# Patient Record
Sex: FEMALE | Race: WHITE | NOT HISPANIC OR LATINO | Employment: UNEMPLOYED | URBAN - METROPOLITAN AREA
[De-identification: names, ages, dates, MRNs, and addresses within clinical notes are randomized per-mention and may not be internally consistent; named-entity substitution may affect disease eponyms.]

---

## 2018-05-18 ENCOUNTER — TELEPHONE (OUTPATIENT)
Dept: SURGERY | Facility: CLINIC | Age: 63
End: 2018-05-18

## 2018-05-18 NOTE — TELEPHONE ENCOUNTER
called pt to inquire about updated insurance information  on file it says no insurance and theres an meredith schedule for consult for umbilical hernia  scheduled per she so i wanted to make sure pt was aware of our fees and if she had any new insurance to update in the system

## 2018-09-18 ENCOUNTER — TRANSCRIBE ORDERS (OUTPATIENT)
Dept: ADMINISTRATIVE | Facility: HOSPITAL | Age: 63
End: 2018-09-18

## 2018-09-18 DIAGNOSIS — R10.2 PELVIC PAIN: Primary | ICD-10-CM

## 2018-09-24 ENCOUNTER — HOSPITAL ENCOUNTER (OUTPATIENT)
Dept: RADIOLOGY | Facility: MEDICAL CENTER | Age: 63
Discharge: HOME | End: 2018-09-24
Payer: COMMERCIAL

## 2018-09-24 DIAGNOSIS — R10.2 PELVIC PAIN: ICD-10-CM

## 2018-09-24 PROCEDURE — 76856 US EXAM PELVIC COMPLETE: CPT

## 2018-09-24 PROCEDURE — 76830 TRANSVAGINAL US NON-OB: CPT

## 2021-08-02 ENCOUNTER — OFFICE VISIT (OUTPATIENT)
Dept: WOUND CARE | Facility: CLINIC | Age: 66
End: 2021-08-02
Payer: COMMERCIAL

## 2021-08-02 VITALS
WEIGHT: 293 LBS | DIASTOLIC BLOOD PRESSURE: 72 MMHG | BODY MASS INDEX: 45.99 KG/M2 | HEART RATE: 92 BPM | SYSTOLIC BLOOD PRESSURE: 132 MMHG | TEMPERATURE: 97.9 F | HEIGHT: 67 IN | RESPIRATION RATE: 22 BRPM

## 2021-08-02 DIAGNOSIS — L97.211 NON-PRESSURE CHRONIC ULCER OF RIGHT CALF LIMITED TO BREAKDOWN OF SKIN (HCC): Primary | ICD-10-CM

## 2021-08-02 DIAGNOSIS — I83.11 LIPODERMATOSCLEROSIS OF BOTH LOWER EXTREMITIES: ICD-10-CM

## 2021-08-02 DIAGNOSIS — E66.01 CLASS 3 SEVERE OBESITY WITH BODY MASS INDEX (BMI) GREATER THAN OR EQUAL TO 70 IN ADULT, UNSPECIFIED OBESITY TYPE, UNSPECIFIED WHETHER SERIOUS COMORBIDITY PRESENT (HCC): ICD-10-CM

## 2021-08-02 DIAGNOSIS — I83.12 LIPODERMATOSCLEROSIS OF BOTH LOWER EXTREMITIES: ICD-10-CM

## 2021-08-02 DIAGNOSIS — R60.9 LIPEDEMA: ICD-10-CM

## 2021-08-02 PROBLEM — M79.3 LIPODERMATOSCLEROSIS OF BOTH LOWER EXTREMITIES: Status: ACTIVE | Noted: 2021-08-02

## 2021-08-02 PROBLEM — E66.813 CLASS 3 SEVERE OBESITY WITH BODY MASS INDEX (BMI) GREATER THAN OR EQUAL TO 70 IN ADULT (HCC): Status: ACTIVE | Noted: 2021-08-02

## 2021-08-02 PROCEDURE — 99203 OFFICE O/P NEW LOW 30 MIN: CPT | Performed by: FAMILY MEDICINE

## 2021-08-02 PROCEDURE — G0463 HOSPITAL OUTPT CLINIC VISIT: HCPCS | Performed by: FAMILY MEDICINE

## 2021-08-02 PROCEDURE — 99213 OFFICE O/P EST LOW 20 MIN: CPT | Performed by: FAMILY MEDICINE

## 2021-08-02 RX ORDER — LIDOCAINE 40 MG/G
CREAM TOPICAL ONCE
Status: COMPLETED | OUTPATIENT
Start: 2021-08-02 | End: 2021-08-02

## 2021-08-02 RX ADMIN — LIDOCAINE: 40 CREAM TOPICAL at 14:57

## 2021-08-02 NOTE — PATIENT INSTRUCTIONS
Orders Placed This Encounter   Procedures    Wound cleansing and dressings     Right lower leg wound:    Wash your hands with soap and water  Remove old dressing, discard into plastic bag and place in trash  Cleanse the right lower leg and wound with soap and water (Dove unscented) prior to applying a clean dressing  Do not use tissue or cotton balls  Do not scrub the wound  Pat dry using gauze  Shower yes     Paint wound with betadine then cover with dry dressing (may use diapers for excessive drainage) secure with coban  Change dressing daily and as needed for excessive drainage or leakage  This was done today  Standing Status:   Future     Standing Expiration Date:   8/2/2022     High Protein Diet   WHAT YOU NEED TO KNOW:   A high-protein diet is a meal plan that includes extra protein  Your body may need extra protein if you have certain health conditions, such as cancer, burns, or injuries  You may also need to follow this diet to get stronger after a surgery or illness  Extra protein helps to heal wounds and form new tissue in the body  Your dietitian will tell you how much protein and how many calories you need each day  DISCHARGE INSTRUCTIONS:   Foods high in protein:  The average amount of protein is listed below in grams (g)  To find the exact amount of protein in a food, read the food labels on packaged items  · Dairy:      ? 1 cup of any type of milk (8 g)    ? ½ cup of evaporated canned milk (9 g)    ? ¼ cup of nonfat dry milk (11 g)    ? 1 ounce of semi-hard or solid cheese (7 g)    ? ¼ cup of parmesan cheese (8 g)    ? ½ cup of cottage cheese (14 g)    ? ½ cup of pudding (4 g)    ? 1 cup of plain or fruit yogurt (8 g)    · Meats and meat substitutes:      ? 3 ounces of cooked freshwater fish (21 g)     ? 3 ounces of cooked shellfish (19 g)    ? ½ cup of canned tuna (14 g)    ? 3 ounces of cooked chicken, turkey, or other poultry (24 g)    ?  3 ounces of cooked beef, pork, lamb, or other red meat (21 g)    ? 1 large egg (6 g)    ? ¼ cup of fat free egg substitute (5 g)    ? ½ cup of tofu or tempeh (10 g)    ? 1 cup of cooked dried beans, such as parisi, kidney, or navy (15 g)    · Nuts and seeds:      ? 2 tablespoons of almonds, cashews, sunflower seeds, or walnuts (5 g)    ? 2 tablespoons of peanuts (7 g)    ? 2 tablespoons of peanut butter (8 g)    How to add extra protein:   · Add powdered milk to milk, cereals, scrambled eggs, soups, and casseroles  · Add cheese to sauces, soups, or vegetables  · Add eggs to tuna, salads, sauces, or casseroles  · Add nutrition supplements and breakfast drink mixes to milk or shakes  · Add nuts to foods or eat them as snacks  · Add meat (beef, chicken, and pork) to soups, casseroles, pasta dishes, or vegetables  · Add beans, peas, and other legumes to salads  · Eat cottage cheese or yogurt with fruit  © Copyright [employer] 2021 Information is for End User's use only and may not be sold, redistributed or otherwise used for commercial purposes  All illustrations and images included in CareNotes® are the copyrighted property of [employer]  or Lindsay Hart   The above information is an  only  It is not intended as medical advice for individual conditions or treatments  Talk to your doctor, nurse or pharmacist before following any medical regimen to see if it is safe and effective for you  Low-Sodium Diet   AMBULATORY CARE:   A low-sodium diet  limits foods that are high in sodium (salt)  You will need to follow a low-sodium diet if you have high blood pressure, kidney disease, or heart failure  You may also need to follow this diet if you have a condition that is causing your body to retain (hold) extra fluid  You may need to limit the amount of sodium you eat in a day to 1,500 to 2,000 mg  Ask your healthcare provider how much sodium you can have each day    How to use food labels to choose foods that are low in sodium:  Read food labels to find the amount of sodium they contain  The amount of sodium is listed in milligrams (mg)  The % Daily Value (DV) column tells you how much of your daily needs are met by 1 serving of the food for each nutrient listed  Choose foods that have less than 5% of the DV of sodium  These foods are considered low in sodium  Foods that have 20% or more of the DV of sodium are considered high in sodium  Some food labels may also list any of the following terms that tell you about the sodium content in the food:  · Sodium-free:  Less than 5 mg in each serving    · Very low sodium:  35 mg of sodium or less in each serving    · Low sodium:  140 mg of sodium or less in each serving    · Reduced sodium: At least 25% less sodium in each serving than the regular type    · Light in sodium:  50% less sodium in each serving    · Unsalted or no added salt:  No extra salt is added during processing (the food may still contain sodium)     Foods to avoid:  Salty foods are high in sodium  You should avoid the following:  · Processed foods:      ? Mixes for cornbread, biscuits, cake, and pudding     ? Instant foods, such as potatoes, cereals, noodles, and rice     ? Packaged foods, such as bread stuffing, rice and pasta mixes, snack dip mixes, and macaroni and cheese     ? Canned foods, such as canned vegetables, soups, broths, sauces, and vegetable or tomato juice    ? Snack foods, such as salted chips, popcorn, pretzels, pork rinds, salted crackers, and salted nuts    ? Frozen foods, such as dinners, entrees, vegetables with sauces, and breaded meats    ? Sauerkraut, pickled vegetables, and other foods prepared in brine    · Meats and cheeses:      ? Smoked or cured meat, such as corned beef, lake, ham, hot dogs, and sausage    ? Canned meats or spreads, such as potted meats, sardines, anchovies, and imitation seafood    ? Deli or lunch meats, such as bologna, ham, turkey, and roast beef    ?  Processed cheese, such as American cheese and cheese spreads    · Condiments, sauces, and seasonings:      ? Salt (¼ teaspoon of salt contains 575 mg of sodium)    ? Seasonings made with salt, such as garlic salt, celery salt, onion salt, and seasoned salt    ? Regular soy sauce, barbecue sauce, teriyaki sauce, steak sauce, Worcestershire sauce, and most flavored vinegars    ? Canned gravy and mixes     ? Regular condiments, such as mustard, ketchup, and salad dressings    ? Pickles and olives    ? Meat tenderizers and monosodium glutamate (MSG)    Foods to include:  Read the food label to find the exact amount of sodium in each serving  · Bread and cereal:  Try to choose breads with less than 80 mg of sodium per serving  ? Bread, roll, yaw, tortilla, or unsalted crackers  ? Ready-to-eat cereals with less than 5% DV of sodium (examples include shredded wheat and puffed rice)    ? Pasta    · Vegetables and fruits:      ? Unsalted fresh, frozen, or canned vegetables    ? Fresh, frozen, or canned fruits    ? Fruit juice    · Dairy:  One serving has about 150 mg of sodium  ? Milk, all types    ? Yogurt    ? Hard cheese, such as cheddar, Swiss, [employer], or mozzarella    · Meat and other protein foods:  Some raw meats may have added sodium  ? Plain meats, fish, and poultry     ? Eggs    · Other foods:      ? Homemade pudding    ? Unsalted nuts, popcorn, or pretzels    ? Unsalted butter or margarine    Ways to decrease sodium:   · Add spices and herbs to foods instead of salt during cooking  Use salt-free seasonings to add flavor to foods  Examples include onion powder, garlic powder, basil, trujillo powder, paprika, and parsley  Try lemon or lime juice or vinegar to give foods a tart flavor  Use hot peppers, pepper, or cayenne pepper to add a spicy flavor  · Do not keep a salt shaker at your kitchen table  This may help keep you from adding salt to food at the table   A teaspoon of salt has 2,300 mg of sodium  It may take time to get used to enjoying the natural flavor of food instead of adding salt  Talk to your healthcare provider before you use salt substitutes  Some salt substitutes have a high amount of potassium and need to be avoided if you have kidney disease  · Choose low-sodium foods at restaurants  Meals from restaurants are often high in sodium  Some restaurants have nutrition information on the menu that tells you the amount of sodium in their foods  If possible, ask for your food to be prepared with less, or no salt  · Shop for unsalted or low-sodium foods and snacks at the grocery store  Examples include unsalted or low-sodium broths, soups, and canned vegetables  Choose fresh or frozen vegetables instead  Choose unsalted nuts or seeds or fresh fruits or vegetables as snacks  Read food labels and choose salt-free, very low-sodium, or low-sodium foods  © Copyright [employer] 2021 Information is for End User's use only and may not be sold, redistributed or otherwise used for commercial purposes  All illustrations and images included in CareNotes® are the copyrighted property of [employer]  or Lindsay Hart   The above information is an  only  It is not intended as medical advice for individual conditions or treatments  Talk to your doctor, nurse or pharmacist before following any medical regimen to see if it is safe and effective for you

## 2021-08-02 NOTE — PROGRESS NOTES
Patient ID: Angelina Glover is a 77 y o  female Date of Birth 1955       Chief Complaint   Patient presents with   [facility]     Patient here today for wound on right lower leg  Patient stated it has been present for about 3 weeks  Current wound treatment is a silver alginate that gets changed daily and the outter dressing gets changed twice a day  Allergies:  Sulfa antibiotics and Nitrates, organic - food allergy    Diagnosis:      Diagnosis ICD-10-CM Associated Orders   1  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 ([facility])  L97 211 lidocaine (LMX) 4 % cream     Wound cleansing and dressings   2  Lipedema  R60 9    3  Lipodermatosclerosis of both lower extremities  I83 11     I83 12    4  Class 3 severe obesity with body mass index (BMI) greater than or equal to 70 in adult, unspecified obesity type, unspecified whether serious comorbidity present ([facility] )  E66 01     Z68 45            Assessment :    Chronic ulcer on the right calf secondary to lipedema  Morbid obesity and L DS  Because of the appearance of her legs, lymphedema is not the exclusive diagnosis  The patient's feet are normal      Plan:     Betadine to area, baby diaper and Coban to hold in place with mild compression  Extensive discussion as to pathophysiology that this is not straightforward lymphedema but rather lipedema and that she would benefit from bariatric surgery  She is 77years old and not sure if there is any age limit  With the BM I of 70 she is still at risk  Subjective:     8/2/21:  1st visit for the 14-year-old female who is referred to the office for ulceration on the right calf  She states that this started about three weeks ago  She is morbidly obese and states that she has lymphedema for most of her life  However, she has never had any skin breakdown before this  Years ago she was given lymphedema pumps but she has never use them    And now she was afraid to use them because of the ulcer  There was no trauma  The following portions of the patient's history were reviewed and updated as appropriate:   Patient Active Problem List   Diagnosis    Lipedema    Lipodermatosclerosis of both lower extremities    Class 3 severe obesity with body mass index (BMI) greater than or equal to 70 in adult [facility])    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 [facility])     Past Medical History:   Diagnosis Date    Morbid obesity ([address] )      History reviewed  No pertinent surgical history  Family History   Problem Relation Age of Onset    Diabetes Mother     Heart disease Mother     Stroke Brother     Diabetes Maternal Grandfather       Social History     Socioeconomic History    Marital status: /Civil Union     Spouse name: None    Number of children: 2    Years of education: [employer] education level: Master's degree (e gerard , MA, MS, Rafael, MEd, MSW, IMELDA)   Occupational History    None   Tobacco Use    Smoking status: Former Smoker    Smokeless tobacco: Never Used    Tobacco comment: quit over 20 years ago   Substance and Sexual Activity    Alcohol use: Never    Drug use: Never    Sexual activity: None   Other Topics Concern    None   Social History Narrative    None     Social Determinants of Health     Financial Resource Strain:     Difficulty of Paying Living Expenses:    Food Insecurity:     Worried About Running Out of Food in the Last Year:     Ran Out of Food in the Last Year:    Transportation Needs:     Lack of Transportation (Medical):      Lack of Transportation (Non-Medical):    Physical Activity:     Days of Exercise per Week:     Minutes of Exercise per Session:    Stress:     Feeling of Stress :    Social Connections:     Frequency of Communication with Friends and Family:     Frequency of Social Gatherings with Friends and Family:     Attends Mandaeism Services:     Active Member of Clubs or Organizations:  Attends Club or Organization Meetings:     Marital Status:    Intimate Partner Violence:     Fear of Current or Ex-Partner:     Emotionally Abused:     Physically Abused:     Sexually Abused:       No current outpatient medications on file  No current facility-administered medications for this visit  Review of Systems   Constitutional: Positive for unexpected weight change (Ongoing weight increase)  Negative for appetite change, chills, fatigue and fever  HENT: Negative for congestion, hearing loss, postnasal drip and sinus pressure  Eyes: Negative for discharge and visual disturbance  Respiratory: Negative for cough and shortness of breath  Cardiovascular: Positive for leg swelling  Negative for chest pain and palpitations  Gastrointestinal: Negative for abdominal pain, blood in stool, constipation, diarrhea and nausea  Endocrine: Negative  Genitourinary: Negative for difficulty urinating, dysuria and urgency  Musculoskeletal: Positive for gait problem [facility])  Negative for back pain  Skin: Positive for wound  Negative for rash  Allergic/Immunologic: Negative  Neurological: Negative for dizziness, tremors, seizures, weakness, numbness and headaches  Hematological: Does not bruise/bleed easily  Psychiatric/Behavioral: Negative  Negative for dysphoric mood  The patient is not nervous/anxious  Objective:  /72   Pulse 92   Temp 97 9 °F (36 6 °C)   Resp 22   Ht 5' 7" (1 702 m)   Wt (!) 204 kg (450 lb)   BMI 70 48 kg/m²   Pain Score: 0-No pain     Physical Exam  Vitals and nursing note reviewed  Constitutional:       Appearance: Normal appearance  She is well-developed  She is morbidly obese  HENT:      Head: Normocephalic and atraumatic  Right Ear: External ear normal       Left Ear: External ear normal       Mouth/Throat:      Comments: masked  Eyes:      General: Lids are normal          Right eye: No discharge  Left eye: No discharge  Conjunctiva/sclera: Conjunctivae normal       Pupils: Pupils are equal, round, and reactive to light  Cardiovascular:      Rate and Rhythm: Normal rate and regular rhythm  Pulses:           Dorsalis pedis pulses are 2+ on the right side and 2+ on the left side  Posterior tibial pulses are 1+ on the right side and 1+ on the left side  Heart sounds: Normal heart sounds  No murmur heard  No friction rub  No gallop  Comments:   Lipedema with LDS of the ankles  Feet are normal   Pulmonary:      Effort: Pulmonary effort is normal       Breath sounds: Normal breath sounds and air entry  Abdominal:      General: Abdomen is flat and protuberant  Palpations: Abdomen is soft  There is no hepatomegaly or splenomegaly  Comments:   Unable to examine the abdomen due to morbid obesity  Musculoskeletal:      Cervical back: Neck supple  Right lower le+ Edema present  Left lower le+ Edema present  Lymphadenopathy:      Cervical: No cervical adenopathy  Skin:     General: Skin is warm and dry  Findings: Wound present  No erythema  Comments: There is a large partial thickness ulcer at the border between lipedema and LDS of the right lower extremity  Slight malodor  Granulation tissue  Some surrounding maceration noted  Neurological:      Mental Status: She is alert and oriented to person, place, and time  Gait: Gait is intact  Psychiatric:         Attention and Perception: Attention normal          Mood and Affect: Mood and affect normal          Speech: Speech normal          Behavior: Behavior is cooperative           Cognition and Memory: Cognition normal            Wound 21 Leg Right;Lateral (Active)   Wound Image   21 1430   Wound Description White;Pink;Yellow 21 1430   Tiffany-wound Assessment Yellow-brown 21 1430   Wound Length (cm) 7 cm 21 1430   Wound Width (cm) 7 5 cm 21 1430   Wound Depth (cm) 0 1 cm 08/02/21 1430   Wound Surface Area (cm^2) 52 5 cm^2 08/02/21 1430   Wound Volume (cm^3) 5 25 cm^3 08/02/21 1430   Calculated Wound Volume (cm^3) 5 25 cm^3 08/02/21 1430   Drainage Amount Large 08/02/21 1430   Drainage Description Yellow 08/02/21 1430   Non-staged Wound Description Full thickness 08/02/21 1430   Treatments Cleansed 08/02/21 1430   Patient Tolerance Tolerated well 08/02/21 1430                 Wound Instructions:  Orders Placed This Encounter   Procedures    Wound cleansing and dressings     Right lower leg wound:    Wash your hands with soap and water  Remove old dressing, discard into plastic bag and place in trash  Cleanse the right lower leg and wound with soap and water (Dove unscented) prior to applying a clean dressing  Do not use tissue or cotton balls  Do not scrub the wound  Pat dry using gauze  Shower yes     Paint wound with betadine then cover with dry dressing (may use diapers for excessive drainage) secure with coban  Change dressing daily and as needed for excessive drainage or leakage  This was done today  Standing Status:   Future     Standing Expiration Date:   8/2/2022       Total time spent today:    Twenty-five minutes  There was so is a combination of evaluation and management as well as discussion of weight loss, bariatric surgery and pathophysiology of lipedema versus  lymphedema  Eva Tate MD, CHT, CWS    Portions of the record may have been created with voice recognition software  Occasional wrong word or "sound alike" substitutions may have occurred due to the inherent limitations of voice recognition software  Read the chart carefully and recognize, using context, where substitutions have occurred

## 2021-08-19 ENCOUNTER — TELEPHONE (OUTPATIENT)
Dept: WOUND CARE | Facility: CLINIC | Age: 66
End: 2021-08-19

## 2021-08-26 ENCOUNTER — OFFICE VISIT (OUTPATIENT)
Dept: WOUND CARE | Facility: CLINIC | Age: 66
End: 2021-08-26
Payer: COMMERCIAL

## 2021-08-26 VITALS
DIASTOLIC BLOOD PRESSURE: 65 MMHG | HEART RATE: 87 BPM | TEMPERATURE: 97.1 F | RESPIRATION RATE: 20 BRPM | SYSTOLIC BLOOD PRESSURE: 161 MMHG

## 2021-08-26 DIAGNOSIS — L97.211 NON-PRESSURE CHRONIC ULCER OF RIGHT CALF LIMITED TO BREAKDOWN OF SKIN (HCC): Primary | ICD-10-CM

## 2021-08-26 DIAGNOSIS — I83.12 LIPODERMATOSCLEROSIS OF BOTH LOWER EXTREMITIES: ICD-10-CM

## 2021-08-26 DIAGNOSIS — R60.9 LIPEDEMA: ICD-10-CM

## 2021-08-26 DIAGNOSIS — I83.11 LIPODERMATOSCLEROSIS OF BOTH LOWER EXTREMITIES: ICD-10-CM

## 2021-08-26 PROCEDURE — 87186 SC STD MICRODIL/AGAR DIL: CPT | Performed by: SPECIALIST

## 2021-08-26 PROCEDURE — 87077 CULTURE AEROBIC IDENTIFY: CPT | Performed by: SPECIALIST

## 2021-08-26 PROCEDURE — 99213 OFFICE O/P EST LOW 20 MIN: CPT | Performed by: SPECIALIST

## 2021-08-26 PROCEDURE — 87205 SMEAR GRAM STAIN: CPT | Performed by: SPECIALIST

## 2021-08-26 PROCEDURE — 97598 DBRDMT OPN WND ADDL 20CM/<: CPT | Performed by: SPECIALIST

## 2021-08-26 PROCEDURE — 97597 DBRDMT OPN WND 1ST 20 CM/<: CPT | Performed by: SPECIALIST

## 2021-08-26 PROCEDURE — 87070 CULTURE OTHR SPECIMN AEROBIC: CPT | Performed by: SPECIALIST

## 2021-08-26 PROCEDURE — G0463 HOSPITAL OUTPT CLINIC VISIT: HCPCS | Performed by: SPECIALIST

## 2021-08-26 RX ORDER — LIDOCAINE 40 MG/G
CREAM TOPICAL ONCE
Status: COMPLETED | OUTPATIENT
Start: 2021-08-26 | End: 2021-08-26

## 2021-08-26 RX ADMIN — LIDOCAINE: 40 CREAM TOPICAL at 16:04

## 2021-08-26 NOTE — PATIENT INSTRUCTIONS
Orders Placed This Encounter   Procedures    Wound cleansing and dressings     Right lower leg wound:     Wash your hands with soap and water  Marlen Becerra old dressing, discard into plastic bag and place in trash   Cleanse the right lower leg and wound with soap and water (Dove unscented) prior to applying a clean dressing  Do not use tissue or cotton balls  Do not scrub the wound  Pat dry using gauze  Shower yes     Apply Acticoat 3 then cover with dry dressing (may use diapers for excessive drainage) secure with coban  Change dressing daily and as needed for excessive drainage or leakage  This was done today  Wound Culture done today    Apply lotrimin to travis wound then valisone with each dressing change  Standing Status:   Future     Standing Expiration Date:   8/26/2022   High Protein Diet   WHAT YOU NEED TO KNOW:   A high-protein diet is a meal plan that includes extra protein  Your body may need extra protein if you have certain health conditions, such as cancer, burns, or injuries  You may also need to follow this diet to get stronger after a surgery or illness  Extra protein helps to heal wounds and form new tissue in the body  Your dietitian will tell you how much protein and how many calories you need each day  DISCHARGE INSTRUCTIONS:   Foods high in protein:  The average amount of protein is listed below in grams (g)  To find the exact amount of protein in a food, read the food labels on packaged items  · Dairy:      ? 1 cup of any type of milk (8 g)    ? ½ cup of evaporated canned milk (9 g)    ? ¼ cup of nonfat dry milk (11 g)    ? 1 ounce of semi-hard or solid cheese (7 g)    ? ¼ cup of parmesan cheese (8 g)    ? ½ cup of cottage cheese (14 g)    ? ½ cup of pudding (4 g)    ? 1 cup of plain or fruit yogurt (8 g)    · Meats and meat substitutes:      ? 3 ounces of cooked freshwater fish (21 g)     ? 3 ounces of cooked shellfish (19 g)    ? ½ cup of canned tuna (14 g)    ?  3 ounces of cooked chicken, turkey, or other poultry (24 g)    ? 3 ounces of cooked beef, pork, lamb, or other red meat (21 g)    ? 1 large egg (6 g)    ? ¼ cup of fat free egg substitute (5 g)    ? ½ cup of tofu or tempeh (10 g)    ? 1 cup of cooked dried beans, such as parisi, kidney, or navy (15 g)    · Nuts and seeds:      ? 2 tablespoons of almonds, cashews, sunflower seeds, or walnuts (5 g)    ? 2 tablespoons of peanuts (7 g)    ? 2 tablespoons of peanut butter (8 g)    How to add extra protein:   · Add powdered milk to milk, cereals, scrambled eggs, soups, and casseroles  · Add cheese to sauces, soups, or vegetables  · Add eggs to tuna, salads, sauces, or casseroles  · Add nutrition supplements and breakfast drink mixes to milk or shakes  · Add nuts to foods or eat them as snacks  · Add meat (beef, chicken, and pork) to soups, casseroles, pasta dishes, or vegetables  · Add beans, peas, and other legumes to salads  · Eat cottage cheese or yogurt with fruit  © Copyright [address] 2021 Information is for End User's use only and may not be sold, redistributed or otherwise used for commercial purposes  All illustrations and images included in CareNotes® are the copyrighted property of [employer]  or [employer]  The above information is an  only  It is not intended as medical advice for individual conditions or treatments  Talk to your doctor, nurse or pharmacist before following any medical regimen to see if it is safe and effective for you  Low-Sodium Diet   AMBULATORY CARE:   A low-sodium diet  limits foods that are high in sodium (salt)  You will need to follow a low-sodium diet if you have high blood pressure, kidney disease, or heart failure  You may also need to follow this diet if you have a condition that is causing your body to retain (hold) extra fluid  You may need to limit the amount of sodium you eat in a day to 1,500 to 2,000 mg   Ask your healthcare provider how much sodium you can have each day  How to use food labels to choose foods that are low in sodium:  Read food labels to find the amount of sodium they contain  The amount of sodium is listed in milligrams (mg)  The % Daily Value (DV) column tells you how much of your daily needs are met by 1 serving of the food for each nutrient listed  Choose foods that have less than 5% of the DV of sodium  These foods are considered low in sodium  Foods that have 20% or more of the DV of sodium are considered high in sodium  Some food labels may also list any of the following terms that tell you about the sodium content in the food:  · Sodium-free:  Less than 5 mg in each serving    · Very low sodium:  35 mg of sodium or less in each serving    · Low sodium:  140 mg of sodium or less in each serving    · Reduced sodium: At least 25% less sodium in each serving than the regular type    · Light in sodium:  50% less sodium in each serving    · Unsalted or no added salt:  No extra salt is added during processing (the food may still contain sodium)     Foods to avoid:  Salty foods are high in sodium  You should avoid the following:  · Processed foods:      ? Mixes for cornbread, biscuits, cake, and pudding     ? Instant foods, such as potatoes, cereals, noodles, and rice     ? Packaged foods, such as bread stuffing, rice and pasta mixes, snack dip mixes, and macaroni and cheese     ? Canned foods, such as canned vegetables, soups, broths, sauces, and vegetable or tomato juice    ? Snack foods, such as salted chips, popcorn, pretzels, pork rinds, salted crackers, and salted nuts    ? Frozen foods, such as dinners, entrees, vegetables with sauces, and breaded meats    ? Sauerkraut, pickled vegetables, and other foods prepared in brine    · Meats and cheeses:      ? Smoked or cured meat, such as corned beef, lake, ham, hot dogs, and sausage    ?  Canned meats or spreads, such as potted meats, sardines, anchovies, and imitation seafood    ? Deli or lunch meats, such as bologna, ham, turkey, and roast beef    ? Processed cheese, such as American cheese and cheese spreads    · Condiments, sauces, and seasonings:      ? Salt (¼ teaspoon of salt contains 575 mg of sodium)    ? Seasonings made with salt, such as garlic salt, celery salt, onion salt, and seasoned salt    ? Regular soy sauce, barbecue sauce, teriyaki sauce, steak sauce, Worcestershire sauce, and most flavored vinegars    ? Canned gravy and mixes     ? Regular condiments, such as mustard, ketchup, and salad dressings    ? Pickles and olives    ? Meat tenderizers and monosodium glutamate (MSG)    Foods to include:  Read the food label to find the exact amount of sodium in each serving  · Bread and cereal:  Try to choose breads with less than 80 mg of sodium per serving  ? Bread, roll, yaw, tortilla, or unsalted crackers  ? Ready-to-eat cereals with less than 5% DV of sodium (examples include shredded wheat and puffed rice)    ? Pasta    · Vegetables and fruits:      ? Unsalted fresh, frozen, or canned vegetables    ? Fresh, frozen, or canned fruits    ? Fruit juice    · Dairy:  One serving has about 150 mg of sodium  ? Milk, all types    ? Yogurt    ? Hard cheese, such as cheddar, Swiss, [employer], or mozzarella    · Meat and other protein foods:  Some raw meats may have added sodium  ? Plain meats, fish, and poultry     ? Eggs    · Other foods:      ? Homemade pudding    ? Unsalted nuts, popcorn, or pretzels    ? Unsalted butter or margarine    Ways to decrease sodium:   · Add spices and herbs to foods instead of salt during cooking  Use salt-free seasonings to add flavor to foods  Examples include onion powder, garlic powder, basil, trujillo powder, paprika, and parsley  Try lemon or lime juice or vinegar to give foods a tart flavor  Use hot peppers, pepper, or cayenne pepper to add a spicy flavor  · Do not keep a salt shaker at your kitchen table    This may help keep you from adding salt to food at the table  A teaspoon of salt has 2,300 mg of sodium  It may take time to get used to enjoying the natural flavor of food instead of adding salt  Talk to your healthcare provider before you use salt substitutes  Some salt substitutes have a high amount of potassium and need to be avoided if you have kidney disease  · Choose low-sodium foods at restaurants  Meals from restaurants are often high in sodium  Some restaurants have nutrition information on the menu that tells you the amount of sodium in their foods  If possible, ask for your food to be prepared with less, or no salt  · Shop for unsalted or low-sodium foods and snacks at the grocery store  Examples include unsalted or low-sodium broths, soups, and canned vegetables  Choose fresh or frozen vegetables instead  Choose unsalted nuts or seeds or fresh fruits or vegetables as snacks  Read food labels and choose salt-free, very low-sodium, or low-sodium foods  © Copyright [facility] 2021 Information is for End User's use only and may not be sold, redistributed or otherwise used for commercial purposes  All illustrations and images included in CareNotes® are the copyrighted property of [employer]  or [facility] Anette Hart   The above information is an  only  It is not intended as medical advice for individual conditions or treatments  Talk to your doctor, nurse or pharmacist before following any medical regimen to see if it is safe and effective for you

## 2021-08-26 NOTE — PROGRESS NOTES
Patient ID: Castillo Richard is a 77 y o  female Date of Birth 1955     Chief Complaint   Patient presents with    Follow Up Wound Care Visit     Right lower leg ulcer       Allergies  Sulfa antibiotics and Nitrates, organic - food allergy    Assessment / Plan:    No problem-specific Assessment & Plan notes found for this encounter  Diagnoses and all orders for this visit:    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)  -     lidocaine (LMX) 4 % cream  -     Wound cleansing and dressings; Future  -     Wound culture and Gram stain; Future  -     Wound culture and Gram stain    Lipedema  -     lidocaine (LMX) 4 % cream  -     Wound cleansing and dressings; Future    Lipodermatosclerosis of both lower extremities  -     lidocaine (LMX) 4 % cream  -     Wound cleansing and dressings; Future              Debridement   Wound 08/02/21 Leg Right;Lateral    Universal Protocol:  Consent: Written consent obtained  Consent given by: patient  Time out: Immediately prior to procedure a "time out" was called to verify the correct patient, procedure, equipment, support staff and site/side marked as required    Patient understanding: patient states understanding of the procedure being performed  Patient identity confirmed: verbally with patient      Performed by: physician  Debridement type: selective  Pain control: lidocaine 4%  Pre-debridement measurements  Length (cm): 21  Width (cm): 16 5  Depth (cm): 0 1  Surface Area (cm^2): 346 5  Volume (cm^3): 34 65    Post-debridement measurements  Length (cm): 21  Width (cm): 16 5  Depth (cm): 0 1  Percent debrided: 80%  Surface Area (cm^2): 346 5  Area debrided (cm^2): 277 2  Volume (cm^3): 34 65  Devitalized tissue debrided: biofilm, exudate and fibrin  Instrument(s) utilized: curette  Bleeding: none  Hemostasis obtained with: not applicable  Procedural pain (0-10): 3  Post-procedural pain: 1   Response to treatment: procedure was tolerated well  Debridement Comments: Begin Topical Acticoat, with Vaseline and Lotrimin to control maceration  May use pneumatic compression directly over the dressing  Wound 08/02/21 Leg Right;Lateral (Active)   Wound Image Images linked 08/26/21 1550   Wound Description White;Pink;Yellow 08/26/21 1550   Tiffany-wound Assessment Yellow-brown 08/26/21 1550   Wound Length (cm) 21 cm 08/26/21 1550   Wound Width (cm) 16 5 cm 08/26/21 1550   Wound Depth (cm) 0 1 cm 08/26/21 1550   Wound Surface Area (cm^2) 346 5 cm^2 08/26/21 1550   Wound Volume (cm^3) 34 65 cm^3 08/26/21 1550   Calculated Wound Volume (cm^3) 34 65 cm^3 08/26/21 1550   Change in Wound Size % -560 08/26/21 1550   Drainage Amount Large 08/26/21 1550   Drainage Description Brown;Yellow 08/26/21 1550   Non-staged Wound Description Full thickness 08/26/21 1550   Treatments Cleansed 08/26/21 1550   Patient Tolerance Tolerated well 08/26/21 1550       Subjective:            HPI    The following portions of the patient's history were reviewed and updated as appropriate: allergies, current medications, past family history, past medical history, past social history, past surgical history, and problem list     Review of Systems      Objective:       Wound 08/02/21 Leg Right;Lateral (Active)   Wound Image Images linked 08/26/21 1550   Wound Description White;Pink;Yellow 08/26/21 1550   Tiffany-wound Assessment Yellow-brown 08/26/21 1550   Wound Length (cm) 21 cm 08/26/21 1550   Wound Width (cm) 16 5 cm 08/26/21 1550   Wound Depth (cm) 0 1 cm 08/26/21 1550   Wound Surface Area (cm^2) 346 5 cm^2 08/26/21 1550   Wound Volume (cm^3) 34 65 cm^3 08/26/21 1550   Calculated Wound Volume (cm^3) 34 65 cm^3 08/26/21 1550   Change in Wound Size % -560 08/26/21 1550   Drainage Amount Large 08/26/21 1550   Drainage Description Brown;Yellow 08/26/21 1550   Non-staged Wound Description Full thickness 08/26/21 1550   Treatments Cleansed 08/26/21 1550   Patient Tolerance Tolerated well 08/26/21 1550       /65   Pulse 87   Temp (!) 97 1 °F (36 2 °C)   Resp 20     Physical Exam      Wound Instructions:  Orders Placed This Encounter   Procedures    Wound cleansing and dressings     Right lower leg wound:     Wash your hands with soap and water  Sue Acevedo old dressing, discard into plastic bag and place in trash   Cleanse the right lower leg and wound with soap and water (Dove unscented) prior to applying a clean dressing  Do not use tissue or cotton balls  Do not scrub the wound  Pat dry using gauze  Shower yes     Apply Acticoat 3 then cover with dry dressing (may use diapers for excessive drainage) secure with coban  Change dressing daily and as needed for excessive drainage or leakage  This was done today  Wound Culture done today    Apply lotrimin to travis wound then valisone with each dressing change       Standing Status:   Future     Standing Expiration Date:   8/26/2022    Wound culture and Gram stain     Standing Status:   Future     Number of Occurrences:   1     Standing Expiration Date:   8/26/2022     Order Specific Question:   Release to patient through Mychart     Answer:   Immediate

## 2021-08-30 LAB
BACTERIA WND AEROBE CULT: ABNORMAL
GRAM STN SPEC: ABNORMAL

## 2021-09-02 ENCOUNTER — OFFICE VISIT (OUTPATIENT)
Dept: WOUND CARE | Facility: CLINIC | Age: 66
End: 2021-09-02
Payer: COMMERCIAL

## 2021-09-02 VITALS
TEMPERATURE: 97.7 F | RESPIRATION RATE: 20 BRPM | HEART RATE: 88 BPM | DIASTOLIC BLOOD PRESSURE: 80 MMHG | SYSTOLIC BLOOD PRESSURE: 162 MMHG

## 2021-09-02 DIAGNOSIS — L97.211 NON-PRESSURE CHRONIC ULCER OF RIGHT CALF LIMITED TO BREAKDOWN OF SKIN (HCC): Primary | ICD-10-CM

## 2021-09-02 PROCEDURE — 97598 DBRDMT OPN WND ADDL 20CM/<: CPT | Performed by: SPECIALIST

## 2021-09-02 PROCEDURE — 99213 OFFICE O/P EST LOW 20 MIN: CPT | Performed by: SPECIALIST

## 2021-09-02 PROCEDURE — 97597 DBRDMT OPN WND 1ST 20 CM/<: CPT | Performed by: SPECIALIST

## 2021-09-02 PROCEDURE — 99214 OFFICE O/P EST MOD 30 MIN: CPT | Performed by: SPECIALIST

## 2021-09-02 PROCEDURE — G0463 HOSPITAL OUTPT CLINIC VISIT: HCPCS | Performed by: SPECIALIST

## 2021-09-02 RX ORDER — AMPICILLIN 500 MG/1
500 CAPSULE ORAL 4 TIMES DAILY
Qty: 28 CAPSULE | Refills: 0 | Status: SHIPPED | OUTPATIENT
Start: 2021-09-02 | End: 2021-09-09

## 2021-09-02 RX ORDER — LIDOCAINE 40 MG/G
CREAM TOPICAL ONCE
Status: COMPLETED | OUTPATIENT
Start: 2021-09-02 | End: 2021-09-02

## 2021-09-02 RX ORDER — CIPROFLOXACIN 500 MG/1
500 TABLET, FILM COATED ORAL EVERY 12 HOURS SCHEDULED
Qty: 14 TABLET | Refills: 0 | Status: SHIPPED | OUTPATIENT
Start: 2021-09-02 | End: 2021-09-09

## 2021-09-02 RX ADMIN — LIDOCAINE 1 APPLICATION: 40 CREAM TOPICAL at 15:45

## 2021-09-02 NOTE — PATIENT INSTRUCTIONS
Orders Placed This Encounter   Procedures    Debridement     This order was created via procedure documentation    Wound cleansing and dressings     Right lower leg wound:      Wash your hands with soap and water     Shower yes    Apply Lac Hydrin( Ammonium Lactate) to dry areas on the legs-- do not get in the wounds    Wash wound with each dressing change using Hibiclens  Apply Silver Alginate then cover with dry dressing (may use diapers for excessive drainage) secure with coban  Change dressing daily and as needed for excessive drainage or leakage  Treatments above were completed today at the [facility]        Apply lotrimin to travis wound then vasoline with each dressing change       Standing Status:   Future     Standing Expiration Date:   9/2/2022

## 2021-09-02 NOTE — PROGRESS NOTES
Patient ID: Joesph Pacheco is a 77 y o  female Date of Birth 1955     Chief Complaint   Patient presents with    Follow Up Wound Care Visit     RLE wound       Allergies  Sulfa antibiotics and Nitrates, organic - food allergy    Assessment / Plan:    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 ([address] )      Patient was supplied with silver alginate, acticoat not available  Drainage improved  Culture with Pseudomonas and Enterococcus  Topically anesthetized, selective debridement with dermal curette  Add CIPRO and Ampicillin  Continue silver alginage  Use Pneumatic compression over dressings  Wash wound with Hibiclens with each dressing change  Lotramin and Vaseline to wound edge  Follow up one week       Diagnoses and all orders for this visit:    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 ([facility])  -     lidocaine (LMX) 4 % cream  -     Wound cleansing and dressings; Future  -     ciprofloxacin (CIPRO) 500 mg tablet; Take 1 tablet (500 mg total) by mouth every 12 (twelve) hours for 7 days  -     ampicillin (PRINCIPEN) 500 mg capsule; Take 1 capsule (500 mg total) by mouth 4 (four) times a day for 7 days              Debridement   Wound 08/02/21 Leg Right;Lateral    Universal Protocol:  Consent: Written consent obtained    Consent given by: patient  Patient understanding: patient states understanding of the procedure being performed  Patient identity confirmed: verbally with patient      Performed by: physician  Debridement type: selective  Pain control: lidocaine 4%  Pre-debridement measurements  Length (cm): 18 5  Width (cm): 14  Depth (cm): 0 1  Surface Area (cm^2): 259  Volume (cm^3): 25 9    Post-debridement measurements  Length (cm): 18 5  Width (cm): 14  Depth (cm): 0 15  Percent debrided: 20%  Surface Area (cm^2): 259  Area debrided (cm^2): 51 8  Volume (cm^3): 38 85  Devitalized tissue debrided: biofilm, exudate and fibrin  Instrument(s) utilized: curette  Bleeding: none  Hemostasis obtained with: not applicable  Procedural pain (0-10): 3  Post-procedural pain: 1   Response to treatment: procedure was tolerated well             Wound 08/02/21 Leg Right;Lateral (Active)   Wound Image Images linked 09/02/21 1519   Wound Description Epithelialization;Granulation tissue;Pink; White;Yellow 09/02/21 1519   Tiffany-wound Assessment Dry;Edema; Hyperpigmented; Maceration;Scaly 09/02/21 1519   Wound Length (cm) 18 5 cm 09/02/21 1519   Wound Width (cm) 14 cm 09/02/21 1519   Wound Depth (cm) 0 1 cm 09/02/21 1519   Wound Surface Area (cm^2) 259 cm^2 09/02/21 1519   Wound Volume (cm^3) 25 9 cm^3 09/02/21 1519   Calculated Wound Volume (cm^3) 25 9 cm^3 09/02/21 1519   Change in Wound Size % -393 33 09/02/21 1519   Drainage Amount Moderate 09/02/21 1519   Drainage Description Serous; Yellow 09/02/21 1519   Non-staged Wound Description Full thickness 09/02/21 1519       Subjective:        HPI    The following portions of the patient's history were reviewed and updated as appropriate: allergies, current medications, past family history, past medical history, past social history, past surgical history, and problem list     Review of Systems      Objective:       Wound 08/02/21 Leg Right;Lateral (Active)   Wound Image Images linked 09/02/21 1519   Wound Description Epithelialization;Granulation tissue;Pink; White;Yellow 09/02/21 1519   Tiffany-wound Assessment Dry;Edema; Hyperpigmented; Maceration;Scaly 09/02/21 1519   Wound Length (cm) 18 5 cm 09/02/21 1519   Wound Width (cm) 14 cm 09/02/21 1519   Wound Depth (cm) 0 1 cm 09/02/21 1519   Wound Surface Area (cm^2) 259 cm^2 09/02/21 1519   Wound Volume (cm^3) 25 9 cm^3 09/02/21 1519   Calculated Wound Volume (cm^3) 25 9 cm^3 09/02/21 1519   Change in Wound Size % -393 33 09/02/21 1519   Drainage Amount Moderate 09/02/21 1519   Drainage Description Serous; Yellow 09/02/21 1519   Non-staged Wound Description Full thickness 09/02/21 1517 /80   Pulse 88   Temp 97 7 °F (36 5 °C)   Resp 20     Physical Exam      Wound Instructions:  Orders Placed This Encounter   Procedures    Wound cleansing and dressings     Right lower leg wound:      Wash your hands with soap and water     Cleanse the right lower leg and wound with soap and water (Dove unscented) prior to applying a clean dressing       Shower yes      Apply Acticoat 3 then cover with dry dressing (may use diapers for excessive drainage) secure with coban  Change dressing daily and as needed for excessive drainage or leakage  Treatments above were completed today at the [facility]        Apply lotrimin to travis wound then vasoline with each dressing change       Standing Status:   Future     Standing Expiration Date:   9/2/2022

## 2021-09-02 NOTE — ASSESSMENT & PLAN NOTE
Patient was supplied with silver alginate, acticoat not available  Drainage improved  Culture with Pseudomonas and Enterococcus  Topically anesthetized, selective debridement with dermal curette  Add CIPRO and Ampicillin  Continue silver alginage  Use Pneumatic compression over dressings  Wash wound with Hibiclens with each dressing change  Lotramin and Vaseline to wound edge    Follow up one week

## 2021-09-09 ENCOUNTER — OFFICE VISIT (OUTPATIENT)
Dept: WOUND CARE | Facility: CLINIC | Age: 66
End: 2021-09-09
Payer: COMMERCIAL

## 2021-09-09 VITALS
HEART RATE: 86 BPM | DIASTOLIC BLOOD PRESSURE: 84 MMHG | TEMPERATURE: 97.5 F | SYSTOLIC BLOOD PRESSURE: 161 MMHG | RESPIRATION RATE: 24 BRPM

## 2021-09-09 DIAGNOSIS — L97.211 NON-PRESSURE CHRONIC ULCER OF RIGHT CALF LIMITED TO BREAKDOWN OF SKIN (HCC): Primary | ICD-10-CM

## 2021-09-09 DIAGNOSIS — B37.3 YEAST INFECTION INVOLVING THE VAGINA AND SURROUNDING AREA: ICD-10-CM

## 2021-09-09 PROCEDURE — 99213 OFFICE O/P EST LOW 20 MIN: CPT | Performed by: SPECIALIST

## 2021-09-09 PROCEDURE — 97598 DBRDMT OPN WND ADDL 20CM/<: CPT | Performed by: SPECIALIST

## 2021-09-09 PROCEDURE — 97597 DBRDMT OPN WND 1ST 20 CM/<: CPT | Performed by: SPECIALIST

## 2021-09-09 RX ORDER — LIDOCAINE 40 MG/G
CREAM TOPICAL ONCE
Status: COMPLETED | OUTPATIENT
Start: 2021-09-09 | End: 2021-09-09

## 2021-09-09 RX ORDER — FLUCONAZOLE 150 MG/1
150 TABLET ORAL DAILY
Qty: 2 TABLET | Refills: 0 | Status: SHIPPED | OUTPATIENT
Start: 2021-09-09 | End: 2021-09-11

## 2021-09-09 RX ADMIN — LIDOCAINE 1 APPLICATION: 40 CREAM TOPICAL at 16:49

## 2021-09-09 NOTE — ASSESSMENT & PLAN NOTE
With crusted calcium alginate on the wound  Topically anesthetized, fibrinous material removed with curette, washed with Hibiclens, redressed with acticoat, to use alginage over the acticoat as needed to control drainage

## 2021-09-09 NOTE — PROGRESS NOTES
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)      With crusted calcium alginate on the wound  Topically anesthetized, fibrinous material removed with curette, washed with Hibiclens, redressed with acticoat, to use alginage over the acticoat as needed to control drainage  Diagnoses and all orders for this visit:    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)  -     Wound cleansing and dressings; Future  -     lidocaine (LMX) 4 % cream    Yeast infection involving the vagina and surrounding area  -     fluconazole (DIFLUCAN) 150 mg tablet; Take 1 tablet (150 mg total) by mouth daily for 2 doses              Debridement   Wound 08/02/21 Leg Right;Lateral    Universal Protocol:  Consent given by: patient  Time out: Immediately prior to procedure a "time out" was called to verify the correct patient, procedure, equipment, support staff and site/side marked as required    Patient understanding: patient states understanding of the procedure being performed  Patient identity confirmed: verbally with patient      Performed by: physician  Debridement type: selective  Pain control: lidocaine 4%  Pre-debridement measurements  Length (cm): 18  Width (cm): 12  Depth (cm): 0 1  Surface Area (cm^2): 216  Volume (cm^3): 21 6    Post-debridement measurements  Length (cm): 18  Width (cm): 12  Depth (cm): 0 1  Percent debrided: 20%  Surface Area (cm^2): 216  Area debrided (cm^2): 43 2  Volume (cm^3): 21 6  Devitalized tissue debrided: biofilm, exudate and fibrin  Instrument(s) utilized: curette  Bleeding: none  Hemostasis obtained with: not applicable  Procedural pain (0-10): 4  Post-procedural pain: 1 Response to treatment: procedure was tolerated well             Wound 08/02/21 Leg Right;Lateral (Active)   Wound Image Images linked 09/09/21 1628   Wound Description Epithelialization;Granulation tissue;Pink; White;Yellow 09/09/21 1629   Tiffany-wound Assessment Dry;Edema; Hyperpigmented; Maceration;Scaly 09/09/21 1629   Wound Length (cm) 18 cm 09/09/21 1629   Wound Width (cm) 12 cm 09/09/21 1629   Wound Depth (cm) 0 1 cm 09/09/21 1629   Wound Surface Area (cm^2) 216 cm^2 09/09/21 1629   Wound Volume (cm^3) 21 6 cm^3 09/09/21 1629   Calculated Wound Volume (cm^3) 21 6 cm^3 09/09/21 1629   Change in Wound Size % -311 43 09/09/21 1629   Drainage Amount Moderate 09/09/21 1629   Drainage Description Serous; Yellow 09/09/21 1629   Non-staged Wound Description Full thickness 09/09/21 1629   Patient Tolerance Tolerated well 09/09/21 1629       Subjective:        Only interval change is that she is requesting Fluconizole for a yeast infection      The following portions of the patient's history were reviewed and updated as appropriate: allergies, current medications, past family history, past medical history, past social history, past surgical history, and problem list     Review of Systems      Objective:       Wound 08/02/21 Leg Right;Lateral (Active)   Wound Image Images linked 09/09/21 1628   Wound Description Epithelialization;Granulation tissue;Pink; White;Yellow 09/09/21 1629   Tiffany-wound Assessment Dry;Edema; Hyperpigmented; Maceration;Scaly 09/09/21 1629   Wound Length (cm) 18 cm 09/09/21 1629   Wound Width (cm) 12 cm 09/09/21 1629   Wound Depth (cm) 0 1 cm 09/09/21 1629   Wound Surface Area (cm^2) 216 cm^2 09/09/21 1629   Wound Volume (cm^3) 21 6 cm^3 09/09/21 1629   Calculated Wound Volume (cm^3) 21 6 cm^3 09/09/21 1629   Change in Wound Size % -311 43 09/09/21 1629   Drainage Amount Moderate 09/09/21 1629   Drainage Description Serous; Yellow 09/09/21 1622   Non-staged Wound Description Full thickness 09/09/21 0180 Patient Tolerance Tolerated well 09/09/21 1629       /84   Pulse 86   Temp 97 5 °F (36 4 °C)   Resp (!) 24     Physical Exam      Wound Instructions:  Orders Placed This Encounter   Procedures    Wound cleansing and dressings     Wound cleansing and dressings       Right lower leg wound:      Wash your hands with soap and water     Cleanse the right lower leg and wound with soap and water (Dove unscented) prior to applying a clean dressing  May use Hibiclens for cleaning also      Shower yes      Apply Acticoat 3 to right leg wound (May use Calcium alginate on top of Acticoat 3 if there is large amount of drainage)   Apply dry dressing and secure with coban  Change dressing daily and as needed for excessive drainage or leakage       Treatments above were completed today at the [facility]        Apply lotrimin to travis wound then vasoline with each dressing change      Prescription will be sent for Diflucan   Return visit in 1 week     Standing Status:   Future     Standing Expiration Date:   9/9/2022

## 2021-09-09 NOTE — PATIENT INSTRUCTIONS
Orders Placed This Encounter   Procedures    Wound cleansing and dressings     Wound cleansing and dressings       Right lower leg wound:      Wash your hands with soap and water     Cleanse the right lower leg and wound with soap and water (Dove unscented) prior to applying a clean dressing  May use Hibiclens for cleaning also      Shower yes      Apply Acticoat 3 to right leg wound (May use Calcium alginate on top of Acticoat 3 if there is large amount of drainage)   Apply dry dressing and secure with coban  Change dressing daily and as needed for excessive drainage or leakage       Treatments above were completed today at the [facility]        Apply lotrimin to travis wound then vasoline with each dressing change      Prescription will be sent for [facility]   Return visit in 1 week     Standing Status:   Future     Standing Expiration Date:   9/9/2022

## 2021-09-16 ENCOUNTER — OFFICE VISIT (OUTPATIENT)
Dept: WOUND CARE | Facility: CLINIC | Age: 66
End: 2021-09-16
Payer: COMMERCIAL

## 2021-09-16 VITALS
SYSTOLIC BLOOD PRESSURE: 160 MMHG | DIASTOLIC BLOOD PRESSURE: 78 MMHG | HEART RATE: 84 BPM | TEMPERATURE: 97.3 F | RESPIRATION RATE: 18 BRPM

## 2021-09-16 DIAGNOSIS — I83.11 LIPODERMATOSCLEROSIS OF BOTH LOWER EXTREMITIES: ICD-10-CM

## 2021-09-16 DIAGNOSIS — I83.12 LIPODERMATOSCLEROSIS OF BOTH LOWER EXTREMITIES: ICD-10-CM

## 2021-09-16 DIAGNOSIS — L97.211 NON-PRESSURE CHRONIC ULCER OF RIGHT CALF LIMITED TO BREAKDOWN OF SKIN (HCC): Primary | ICD-10-CM

## 2021-09-16 DIAGNOSIS — R60.9 LIPEDEMA: ICD-10-CM

## 2021-09-16 DIAGNOSIS — E66.01 CLASS 3 SEVERE OBESITY WITH BODY MASS INDEX (BMI) GREATER THAN OR EQUAL TO 70 IN ADULT, UNSPECIFIED OBESITY TYPE, UNSPECIFIED WHETHER SERIOUS COMORBIDITY PRESENT (HCC): ICD-10-CM

## 2021-09-16 PROCEDURE — G0463 HOSPITAL OUTPT CLINIC VISIT: HCPCS | Performed by: SPECIALIST

## 2021-09-16 PROCEDURE — 99213 OFFICE O/P EST LOW 20 MIN: CPT | Performed by: SPECIALIST

## 2021-09-16 NOTE — PROGRESS NOTES
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 ([facility] [address] )      With delicate epithelium covering the majority of the wound  No Debridement necessary, washed with Hibiclens and redressed  Still hesitant to do pneumatic compression  Patient reassured, will continue present treatment, follow up in 2 weeks  Diagnoses and all orders for this visit:    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)  -     Wound cleansing and dressings; Future    Lipedema  -     Wound cleansing and dressings; Future    Lipodermatosclerosis of both lower extremities  -     Wound cleansing and dressings; Future    Class 3 severe obesity with body mass index (BMI) greater than or equal to 70 in adult, unspecified obesity type, unspecified whether serious comorbidity present ([facility] [address] )  -     Wound cleansing and dressings; Future              Procedures       Wound 08/02/21 Leg Right;Lateral (Active)   Wound Image Images linked 09/16/21 1533   Wound Description Epithelialization;Granulation tissue;Pink;Yellow 09/16/21 1534   Tiffany-wound Assessment Dry;Edema; Hyperpigmented; Maceration;Scaly 09/16/21 1534   Wound Length (cm) 17 cm 09/16/21 1534   Wound Width (cm) 11 5 cm 09/16/21 1534   Wound Depth (cm) 0 1 cm 09/16/21 1534   Wound Surface Area (cm^2) 195 5 cm^2 09/16/21 1534   Wound Volume (cm^3) 19 55 cm^3 09/16/21 1534   Calculated Wound Volume (cm^3) 19 55 cm^3 09/16/21 1534   Change in Wound Size % -272 38 09/16/21 1534   Drainage Amount Moderate 09/16/21 1534   Drainage Description Serous; Yellow 09/16/21 1534   Non-staged Wound Description Full thickness 09/16/21 1534       Subjective:            HPI    The following portions of the patient's history were reviewed and updated as appropriate: allergies, current medications, past family history, past medical history, past social history, past surgical history, and problem list     Review of Systems      Objective:       Wound 08/02/21 Leg Right;Lateral (Active)   Wound Image Images linked 09/16/21 1533   Wound Description Epithelialization;Granulation tissue;Pink;Yellow 09/16/21 1534   Travis-wound Assessment Dry;Edema; Hyperpigmented; Maceration;Scaly 09/16/21 1534   Wound Length (cm) 17 cm 09/16/21 1534   Wound Width (cm) 11 5 cm 09/16/21 1534   Wound Depth (cm) 0 1 cm 09/16/21 1534   Wound Surface Area (cm^2) 195 5 cm^2 09/16/21 1534   Wound Volume (cm^3) 19 55 cm^3 09/16/21 1534   Calculated Wound Volume (cm^3) 19 55 cm^3 09/16/21 1534   Change in Wound Size % -272 38 09/16/21 1534   Drainage Amount Moderate 09/16/21 1534   Drainage Description Serous; Yellow 09/16/21 1534   Non-staged Wound Description Full thickness 09/16/21 1534       /78   Pulse 84   Temp (!) 97 3 °F (36 3 °C)   Resp 18     Physical Exam      Wound Instructions:  Orders Placed This Encounter   Procedures    Wound cleansing and dressings     Right lower leg wound:      Wash your hands with soap and water     Cleanse the right lower leg and wound with soap and water (Dove unscented) prior to applying a clean dressing  May use Hibiclens for cleaning also      Shower yes      Apply Acticoat 3 flex to right leg wound (May use Calcium alginate on top of Acticoat 3 flex if there is large amount of drainage)   Apply dry dressing and secure with coban  Change dressing daily and as needed for excessive drainage or leakage       Treatments above were completed today at the [facility]     Apply lotrimin to travis wound then vasoline with each dressing change      Miscellaneous Instructions     Other use lymphedema pumps     Return visit in 2 weeks     Standing Status:   Future     Standing Expiration Date:   9/16/2022

## 2021-09-16 NOTE — ASSESSMENT & PLAN NOTE
With delicate epithelium covering the majority of the wound  No Debridement necessary, washed with Hibiclens and redressed  Still hesitant to do pneumatic compression  Patient reassured, will continue present treatment, follow up in 2 weeks

## 2021-09-16 NOTE — PATIENT INSTRUCTIONS
Orders Placed This Encounter   Procedures    Wound cleansing and dressings     Right lower leg wound:      Wash your hands with soap and water     Cleanse the right lower leg and wound with soap and water (Dove unscented) prior to applying a clean dressing  May use Hibiclens for cleaning also      Shower yes      Apply Acticoat 3 flex to right leg wound (May use Calcium alginate on top of Acticoat 3 flex if there is large amount of drainage)   Apply dry dressing and secure with coban  Change dressing daily and as needed for excessive drainage or leakage       Treatments above were completed today at the [facility]     Apply lotrimin to travis wound then vasoline with each dressing change      Miscellaneous Instructions     Other use lymphedema pumps     Return visit in 2 weeks     Standing Status:   Future     Standing Expiration Date:   9/16/2022

## 2021-10-05 ENCOUNTER — AMB VIDEO VISIT (OUTPATIENT)
Dept: OTHER | Facility: HOSPITAL | Age: 66
End: 2021-10-05

## 2021-10-05 PROCEDURE — ECARE PR SL URGENT CARE VIRTUAL VISIT: Performed by: FAMILY MEDICINE

## 2021-10-21 ENCOUNTER — OFFICE VISIT (OUTPATIENT)
Dept: WOUND CARE | Facility: CLINIC | Age: 66
End: 2021-10-21
Payer: COMMERCIAL

## 2021-10-21 VITALS
SYSTOLIC BLOOD PRESSURE: 159 MMHG | DIASTOLIC BLOOD PRESSURE: 84 MMHG | TEMPERATURE: 98 F | RESPIRATION RATE: 28 BRPM | HEART RATE: 94 BPM

## 2021-10-21 DIAGNOSIS — L97.211 NON-PRESSURE CHRONIC ULCER OF RIGHT CALF LIMITED TO BREAKDOWN OF SKIN (HCC): Primary | ICD-10-CM

## 2021-10-21 DIAGNOSIS — B37.2 CANDIDIASIS OF SKIN: ICD-10-CM

## 2021-10-21 PROCEDURE — 87077 CULTURE AEROBIC IDENTIFY: CPT | Performed by: SPECIALIST

## 2021-10-21 PROCEDURE — 99213 OFFICE O/P EST LOW 20 MIN: CPT | Performed by: SPECIALIST

## 2021-10-21 PROCEDURE — 87070 CULTURE OTHR SPECIMN AEROBIC: CPT | Performed by: SPECIALIST

## 2021-10-21 PROCEDURE — 87205 SMEAR GRAM STAIN: CPT | Performed by: SPECIALIST

## 2021-10-21 PROCEDURE — 99212 OFFICE O/P EST SF 10 MIN: CPT | Performed by: SPECIALIST

## 2021-10-21 PROCEDURE — 87186 SC STD MICRODIL/AGAR DIL: CPT | Performed by: SPECIALIST

## 2021-10-21 PROCEDURE — G0463 HOSPITAL OUTPT CLINIC VISIT: HCPCS | Performed by: SPECIALIST

## 2021-10-21 RX ORDER — LIDOCAINE 40 MG/G
CREAM TOPICAL ONCE
Status: COMPLETED | OUTPATIENT
Start: 2021-10-21 | End: 2021-10-21

## 2021-10-21 RX ORDER — CLOTRIMAZOLE AND BETAMETHASONE DIPROPIONATE 10; .64 MG/G; MG/G
CREAM TOPICAL 2 TIMES DAILY
Qty: 45 G | Refills: 3 | Status: SHIPPED | OUTPATIENT
Start: 2021-10-21 | End: 2021-10-28 | Stop reason: SDUPTHER

## 2021-10-21 RX ADMIN — LIDOCAINE: 40 CREAM TOPICAL at 16:30

## 2021-10-24 LAB
BACTERIA WND AEROBE CULT: ABNORMAL
BACTERIA WND AEROBE CULT: ABNORMAL
GRAM STN SPEC: ABNORMAL

## 2021-10-25 ENCOUNTER — TELEPHONE (OUTPATIENT)
Dept: SURGERY | Facility: CLINIC | Age: 66
End: 2021-10-25

## 2021-10-25 RX ORDER — CLOTRIMAZOLE AND BETAMETHASONE DIPROPIONATE 10; .64 MG/G; MG/G
CREAM TOPICAL 2 TIMES DAILY
Qty: 90 G | Refills: 1 | Status: SHIPPED | OUTPATIENT
Start: 2021-10-25 | End: 2021-12-03

## 2021-10-25 RX ORDER — LEVOFLOXACIN 250 MG/1
250 TABLET ORAL DAILY
Qty: 5 TABLET | Refills: 0 | Status: SHIPPED | OUTPATIENT
Start: 2021-10-25 | End: 2021-10-28 | Stop reason: SDUPTHER

## 2021-10-28 ENCOUNTER — OFFICE VISIT (OUTPATIENT)
Dept: WOUND CARE | Facility: CLINIC | Age: 66
End: 2021-10-28
Payer: COMMERCIAL

## 2021-10-28 VITALS
DIASTOLIC BLOOD PRESSURE: 79 MMHG | HEART RATE: 87 BPM | TEMPERATURE: 98.1 F | SYSTOLIC BLOOD PRESSURE: 160 MMHG | RESPIRATION RATE: 20 BRPM

## 2021-10-28 DIAGNOSIS — L97.211 NON-PRESSURE CHRONIC ULCER OF RIGHT CALF LIMITED TO BREAKDOWN OF SKIN (HCC): Primary | ICD-10-CM

## 2021-10-28 DIAGNOSIS — B37.2 CANDIDIASIS OF SKIN: ICD-10-CM

## 2021-10-28 DIAGNOSIS — R60.9 LIPEDEMA: ICD-10-CM

## 2021-10-28 DIAGNOSIS — B37.3 CANDIDIASIS OF VULVA: ICD-10-CM

## 2021-10-28 PROCEDURE — 99214 OFFICE O/P EST MOD 30 MIN: CPT | Performed by: SPECIALIST

## 2021-10-28 PROCEDURE — 99213 OFFICE O/P EST LOW 20 MIN: CPT | Performed by: SPECIALIST

## 2021-10-28 PROCEDURE — G0463 HOSPITAL OUTPT CLINIC VISIT: HCPCS | Performed by: SPECIALIST

## 2021-10-28 RX ORDER — LIDOCAINE HYDROCHLORIDE 40 MG/ML
5 SOLUTION TOPICAL ONCE
Status: COMPLETED | OUTPATIENT
Start: 2021-10-28 | End: 2021-10-28

## 2021-10-28 RX ORDER — CLOTRIMAZOLE AND BETAMETHASONE DIPROPIONATE 10; .64 MG/G; MG/G
CREAM TOPICAL 2 TIMES DAILY
Qty: 45 G | Refills: 3 | Status: SHIPPED | OUTPATIENT
Start: 2021-10-28 | End: 2021-11-08

## 2021-10-28 RX ORDER — LEVOFLOXACIN 250 MG/1
250 TABLET ORAL DAILY
Qty: 5 TABLET | Refills: 0 | Status: SHIPPED | OUTPATIENT
Start: 2021-10-28 | End: 2021-11-02

## 2021-10-28 RX ORDER — FLUCONAZOLE 100 MG/1
100 TABLET ORAL DAILY
Qty: 7 TABLET | Refills: 0 | Status: SHIPPED | OUTPATIENT
Start: 2021-10-28 | End: 2021-11-04

## 2021-10-28 RX ADMIN — LIDOCAINE HYDROCHLORIDE 5 ML: 40 SOLUTION TOPICAL at 15:38

## 2021-11-04 ENCOUNTER — OFFICE VISIT (OUTPATIENT)
Dept: WOUND CARE | Facility: CLINIC | Age: 66
End: 2021-11-04
Payer: COMMERCIAL

## 2021-11-04 VITALS
RESPIRATION RATE: 22 BRPM | TEMPERATURE: 97.8 F | DIASTOLIC BLOOD PRESSURE: 75 MMHG | SYSTOLIC BLOOD PRESSURE: 152 MMHG | HEART RATE: 84 BPM

## 2021-11-04 DIAGNOSIS — L97.211 NON-PRESSURE CHRONIC ULCER OF RIGHT CALF LIMITED TO BREAKDOWN OF SKIN (HCC): Primary | ICD-10-CM

## 2021-11-04 DIAGNOSIS — R60.9 LIPEDEMA: ICD-10-CM

## 2021-11-04 PROCEDURE — 97597 DBRDMT OPN WND 1ST 20 CM/<: CPT | Performed by: SPECIALIST

## 2021-11-04 RX ORDER — LIDOCAINE 40 MG/G
CREAM TOPICAL ONCE
Status: COMPLETED | OUTPATIENT
Start: 2021-11-04 | End: 2021-11-04

## 2021-11-04 RX ADMIN — LIDOCAINE: 40 CREAM TOPICAL at 15:56

## 2021-11-08 ENCOUNTER — OFFICE VISIT (OUTPATIENT)
Dept: FAMILY MEDICINE CLINIC | Facility: CLINIC | Age: 66
End: 2021-11-08
Payer: COMMERCIAL

## 2021-11-08 VITALS
SYSTOLIC BLOOD PRESSURE: 128 MMHG | TEMPERATURE: 98.8 F | OXYGEN SATURATION: 93 % | WEIGHT: 293 LBS | DIASTOLIC BLOOD PRESSURE: 84 MMHG | BODY MASS INDEX: 45.99 KG/M2 | HEART RATE: 87 BPM | HEIGHT: 67 IN

## 2021-11-08 DIAGNOSIS — Z13.220 SCREENING, LIPID: ICD-10-CM

## 2021-11-08 DIAGNOSIS — Z12.31 SCREENING MAMMOGRAM FOR BREAST CANCER: ICD-10-CM

## 2021-11-08 DIAGNOSIS — E66.01 CLASS 3 SEVERE OBESITY WITH BODY MASS INDEX (BMI) GREATER THAN OR EQUAL TO 70 IN ADULT, UNSPECIFIED OBESITY TYPE, UNSPECIFIED WHETHER SERIOUS COMORBIDITY PRESENT (HCC): ICD-10-CM

## 2021-11-08 DIAGNOSIS — Z13.1 SCREENING FOR DIABETES MELLITUS: ICD-10-CM

## 2021-11-08 DIAGNOSIS — Z12.11 SCREEN FOR COLON CANCER: ICD-10-CM

## 2021-11-08 DIAGNOSIS — Z00.00 HEALTHCARE MAINTENANCE: Primary | ICD-10-CM

## 2021-11-08 PROCEDURE — 1036F TOBACCO NON-USER: CPT | Performed by: FAMILY MEDICINE

## 2021-11-08 PROCEDURE — 3725F SCREEN DEPRESSION PERFORMED: CPT | Performed by: FAMILY MEDICINE

## 2021-11-08 PROCEDURE — 3008F BODY MASS INDEX DOCD: CPT | Performed by: FAMILY MEDICINE

## 2021-11-08 PROCEDURE — 1160F RVW MEDS BY RX/DR IN RCRD: CPT | Performed by: FAMILY MEDICINE

## 2021-11-08 PROCEDURE — 99387 INIT PM E/M NEW PAT 65+ YRS: CPT | Performed by: FAMILY MEDICINE

## 2021-11-09 ENCOUNTER — EVALUATION (OUTPATIENT)
Dept: OCCUPATIONAL THERAPY | Facility: CLINIC | Age: 66
End: 2021-11-09
Payer: COMMERCIAL

## 2021-11-09 DIAGNOSIS — L97.211 NON-PRESSURE CHRONIC ULCER OF RIGHT CALF LIMITED TO BREAKDOWN OF SKIN (HCC): ICD-10-CM

## 2021-11-09 DIAGNOSIS — I89.0 LYMPHEDEMA OF BOTH LOWER EXTREMITIES: Primary | ICD-10-CM

## 2021-11-09 PROCEDURE — 97167 OT EVAL HIGH COMPLEX 60 MIN: CPT

## 2021-12-02 DIAGNOSIS — B37.2 CANDIDIASIS OF SKIN: ICD-10-CM

## 2021-12-03 RX ORDER — CLOTRIMAZOLE AND BETAMETHASONE DIPROPIONATE 10; .64 MG/G; MG/G
CREAM TOPICAL 2 TIMES DAILY
Qty: 90 G | Refills: 1 | Status: SHIPPED | OUTPATIENT
Start: 2021-12-03 | End: 2022-07-18

## 2021-12-15 LAB — COLOGUARD RESULT REPORTABLE: NEGATIVE

## 2022-02-28 ENCOUNTER — TELEPHONE (OUTPATIENT)
Dept: ADMINISTRATIVE | Facility: OTHER | Age: 67
End: 2022-02-28

## 2022-02-28 NOTE — TELEPHONE ENCOUNTER
Upon review of the In Basket request we have found as a result of outreach that patient did not have the requested item(s) completed  Any additional questions or concerns should be emailed to the Practice Liaisons via [email or web address] email, please do not reply via In Basket      Thank you  King Collazo

## 2022-02-28 NOTE — TELEPHONE ENCOUNTER
----- Message from Katherin Choudhury MA sent at 2/28/2022  9:55 AM EST -----  Regarding: mammo  02/28/22 9:55 AM    Hello, our patient Angella Valenzuela has had Mammogram completed/performed  Please assist in updating the patient chart by pulling the Care Everywhere (CE) document  The date of service is 2019       Thank you,  KESHAWN Honeycutt PG

## 2022-07-11 ENCOUNTER — TELEPHONE (OUTPATIENT)
Dept: LAB | Facility: HOSPITAL | Age: 67
End: 2022-07-11

## 2022-07-15 ENCOUNTER — APPOINTMENT (OUTPATIENT)
Dept: LAB | Facility: HOSPITAL | Age: 67
End: 2022-07-15
Payer: COMMERCIAL

## 2022-07-15 ENCOUNTER — TELEPHONE (OUTPATIENT)
Dept: FAMILY MEDICINE CLINIC | Facility: CLINIC | Age: 67
End: 2022-07-15

## 2022-07-15 DIAGNOSIS — E66.01 CLASS 3 SEVERE OBESITY WITH BODY MASS INDEX (BMI) GREATER THAN OR EQUAL TO 70 IN ADULT, UNSPECIFIED OBESITY TYPE, UNSPECIFIED WHETHER SERIOUS COMORBIDITY PRESENT (HCC): ICD-10-CM

## 2022-07-15 DIAGNOSIS — Z13.220 SCREENING, LIPID: ICD-10-CM

## 2022-07-15 DIAGNOSIS — Z13.1 SCREENING FOR DIABETES MELLITUS: ICD-10-CM

## 2022-07-15 LAB
ALBUMIN SERPL BCP-MCNC: 3.4 G/DL (ref 3.5–5)
ALP SERPL-CCNC: 78 U/L (ref 46–116)
ALT SERPL W P-5'-P-CCNC: 18 U/L (ref 12–78)
ANION GAP SERPL CALCULATED.3IONS-SCNC: 3 MMOL/L (ref 4–13)
AST SERPL W P-5'-P-CCNC: 12 U/L (ref 5–45)
BASOPHILS # BLD AUTO: 0.02 THOUSANDS/ΜL (ref 0–0.1)
BASOPHILS NFR BLD AUTO: 0 % (ref 0–1)
BILIRUB SERPL-MCNC: 0.63 MG/DL (ref 0.2–1)
BUN SERPL-MCNC: 23 MG/DL (ref 5–25)
CALCIUM ALBUM COR SERPL-MCNC: 9.5 MG/DL (ref 8.3–10.1)
CALCIUM SERPL-MCNC: 9 MG/DL (ref 8.3–10.1)
CHLORIDE SERPL-SCNC: 105 MMOL/L (ref 100–108)
CHOLEST SERPL-MCNC: 196 MG/DL
CO2 SERPL-SCNC: 32 MMOL/L (ref 21–32)
CREAT SERPL-MCNC: 0.69 MG/DL (ref 0.6–1.3)
EOSINOPHIL # BLD AUTO: 0.28 THOUSAND/ΜL (ref 0–0.61)
EOSINOPHIL NFR BLD AUTO: 5 % (ref 0–6)
ERYTHROCYTE [DISTWIDTH] IN BLOOD BY AUTOMATED COUNT: 16.3 % (ref 11.6–15.1)
EST. AVERAGE GLUCOSE BLD GHB EST-MCNC: 120 MG/DL
GFR SERPL CREATININE-BSD FRML MDRD: 90 ML/MIN/1.73SQ M
GLUCOSE P FAST SERPL-MCNC: 92 MG/DL (ref 65–99)
HBA1C MFR BLD: 5.8 %
HCT VFR BLD AUTO: 42 % (ref 34.8–46.1)
HDLC SERPL-MCNC: 52 MG/DL
HGB BLD-MCNC: 12.7 G/DL (ref 11.5–15.4)
IMM GRANULOCYTES # BLD AUTO: 0.01 THOUSAND/UL (ref 0–0.2)
IMM GRANULOCYTES NFR BLD AUTO: 0 % (ref 0–2)
LDLC SERPL CALC-MCNC: 132 MG/DL (ref 0–100)
LYMPHOCYTES # BLD AUTO: 1.69 THOUSANDS/ΜL (ref 0.6–4.47)
LYMPHOCYTES NFR BLD AUTO: 32 % (ref 14–44)
MCH RBC QN AUTO: 27.6 PG (ref 26.8–34.3)
MCHC RBC AUTO-ENTMCNC: 30.2 G/DL (ref 31.4–37.4)
MCV RBC AUTO: 91 FL (ref 82–98)
MONOCYTES # BLD AUTO: 0.66 THOUSAND/ΜL (ref 0.17–1.22)
MONOCYTES NFR BLD AUTO: 13 % (ref 4–12)
NEUTROPHILS # BLD AUTO: 2.64 THOUSANDS/ΜL (ref 1.85–7.62)
NEUTS SEG NFR BLD AUTO: 50 % (ref 43–75)
NONHDLC SERPL-MCNC: 144 MG/DL
NRBC BLD AUTO-RTO: 0 /100 WBCS
PLATELET # BLD AUTO: 217 THOUSANDS/UL (ref 149–390)
PMV BLD AUTO: 10.8 FL (ref 8.9–12.7)
POTASSIUM SERPL-SCNC: 4.5 MMOL/L (ref 3.5–5.3)
PROT SERPL-MCNC: 8.1 G/DL (ref 6.4–8.2)
RBC # BLD AUTO: 4.6 MILLION/UL (ref 3.81–5.12)
SODIUM SERPL-SCNC: 140 MMOL/L (ref 136–145)
TRIGL SERPL-MCNC: 59 MG/DL
TSH SERPL DL<=0.05 MIU/L-ACNC: 2.59 UIU/ML (ref 0.45–4.5)
WBC # BLD AUTO: 5.3 THOUSAND/UL (ref 4.31–10.16)

## 2022-07-15 PROCEDURE — 84443 ASSAY THYROID STIM HORMONE: CPT

## 2022-07-15 PROCEDURE — 83036 HEMOGLOBIN GLYCOSYLATED A1C: CPT

## 2022-07-15 PROCEDURE — 80061 LIPID PANEL: CPT

## 2022-07-15 PROCEDURE — 36415 COLL VENOUS BLD VENIPUNCTURE: CPT

## 2022-07-15 PROCEDURE — 85025 COMPLETE CBC W/AUTO DIFF WBC: CPT

## 2022-07-15 PROCEDURE — 80053 COMPREHEN METABOLIC PANEL: CPT

## 2022-07-15 NOTE — TELEPHONE ENCOUNTER
If she is able to do a video visit that is fine (not telephone only) -- please schedule her for a virtual next week

## 2022-07-15 NOTE — TELEPHONE ENCOUNTER
Pt had labs done today from mobile lab and is asking if she can have a virtual visit when the results are in    She doesn't drive and has a hard time walking

## 2022-07-18 DIAGNOSIS — R79.89 ABNORMAL CBC: Primary | ICD-10-CM

## 2022-07-19 ENCOUNTER — TELEPHONE (OUTPATIENT)
Dept: LAB | Facility: HOSPITAL | Age: 67
End: 2022-07-19

## 2022-07-22 ENCOUNTER — APPOINTMENT (OUTPATIENT)
Dept: LAB | Facility: HOSPITAL | Age: 67
End: 2022-07-22
Payer: COMMERCIAL

## 2022-07-22 DIAGNOSIS — R79.89 ABNORMAL CBC: ICD-10-CM

## 2022-07-22 LAB
FERRITIN SERPL-MCNC: 59 NG/ML (ref 8–388)
IRON SATN MFR SERPL: 17 % (ref 15–50)
IRON SERPL-MCNC: 52 UG/DL (ref 50–170)
TIBC SERPL-MCNC: 311 UG/DL (ref 250–450)

## 2022-07-22 PROCEDURE — 83550 IRON BINDING TEST: CPT

## 2022-07-22 PROCEDURE — 36415 COLL VENOUS BLD VENIPUNCTURE: CPT

## 2022-07-22 PROCEDURE — 82728 ASSAY OF FERRITIN: CPT

## 2022-07-22 PROCEDURE — 83540 ASSAY OF IRON: CPT

## 2022-07-25 ENCOUNTER — VBI (OUTPATIENT)
Dept: ADMINISTRATIVE | Facility: OTHER | Age: 67
End: 2022-07-25

## 2022-07-29 ENCOUNTER — TELEMEDICINE (OUTPATIENT)
Dept: FAMILY MEDICINE CLINIC | Facility: CLINIC | Age: 67
End: 2022-07-29
Payer: COMMERCIAL

## 2022-07-29 DIAGNOSIS — F33.0 MILD EPISODE OF RECURRENT MAJOR DEPRESSIVE DISORDER (HCC): Primary | ICD-10-CM

## 2022-07-29 DIAGNOSIS — Z91.09 ENVIRONMENTAL ALLERGIES: ICD-10-CM

## 2022-07-29 DIAGNOSIS — R53.83 OTHER FATIGUE: ICD-10-CM

## 2022-07-29 PROCEDURE — 1160F RVW MEDS BY RX/DR IN RCRD: CPT | Performed by: FAMILY MEDICINE

## 2022-07-29 PROCEDURE — 99214 OFFICE O/P EST MOD 30 MIN: CPT | Performed by: FAMILY MEDICINE

## 2022-07-29 PROCEDURE — 3725F SCREEN DEPRESSION PERFORMED: CPT | Performed by: FAMILY MEDICINE

## 2022-07-29 RX ORDER — ESCITALOPRAM OXALATE 5 MG/1
5 TABLET ORAL DAILY
Qty: 30 TABLET | Refills: 1 | Status: SHIPPED | OUTPATIENT
Start: 2022-07-29 | End: 2022-08-23

## 2022-07-29 RX ORDER — FLUTICASONE PROPIONATE 50 MCG
1 SPRAY, SUSPENSION (ML) NASAL DAILY
Qty: 16 G | Refills: 1 | Status: SHIPPED | OUTPATIENT
Start: 2022-07-29 | End: 2022-08-23

## 2022-07-29 RX ORDER — ALBUTEROL SULFATE 90 UG/1
2 AEROSOL, METERED RESPIRATORY (INHALATION) EVERY 4 HOURS PRN
Qty: 18 G | Refills: 1 | Status: SHIPPED | OUTPATIENT
Start: 2022-07-29

## 2022-07-29 RX ORDER — LORATADINE 10 MG/1
10 TABLET ORAL DAILY
Qty: 90 TABLET | Refills: 1 | Status: SHIPPED | OUTPATIENT
Start: 2022-07-29

## 2022-07-29 NOTE — PROGRESS NOTES
Virtual Regular Visit    Verification of patient location:    Patient is located in the following state in which I hold an active license [place]      Assessment/Plan:    Problem List Items Addressed This Visit    None     Visit Diagnoses     Mild episode of recurrent major depressive disorder ([facility] [address] )    -  Primary    Relevant Medications    escitalopram (LEXAPRO) 5 mg tablet    Environmental allergies        Relevant Medications    loratadine (CLARITIN) 10 mg tablet    fluticasone (FLONASE) 50 mcg/act nasal spray    albuterol (Ventolin HFA) 90 mcg/act inhaler    Other fatigue        Relevant Orders    Vitamin B12    Vitamin D 25 hydroxy        Will trial alternative allergy regimen -- switch to Claritin and Flonase, will also give Albuterol PRN  Ordered vitamin labs to have drawn     Pt agreeable to Lexapro  Discussed possible ADRs including GI upset, somnolence, initial worsening of symptoms  Reviewed delayed onset to max therapeutic potential  F/u in 4 weeks to monitor, to call if not tolerating prior  Depression Screening and Follow-up Plan: Patient was screened for depression during today's encounter  They screened negative with a PHQ-2 score of 2  Reason for visit is   Chief Complaint   Patient presents with    Follow-up     Lab review , c/o pain in the legs- with swelling , patient would like medication for depression     Virtual Regular Visit        Encounter provider Jing Mello DO    Provider located at [place] [address] [place] [id number]      Recent Visits  No visits were found meeting these conditions  Showing recent visits within past 7 days and meeting all other requirements  Today's Visits  Date Type Provider Dept   07/29/22 Telemedicine Jing Mello DO [address] today's visits and meeting all other requirements  Future Appointments  No visits were found meeting these conditions    Showing future appointments within next 150 days and meeting all other requirements       The patient was identified by name and date of birth  Vincent Suarez was informed that this is a telemedicine visit and that the visit is being conducted through [address] and patient was informed this is a secure, HIPAA-complaint platform  She agrees to proceed     My office door was closed  No one else was in the room  She acknowledged consent and understanding of privacy and security of the video platform  The patient has agreed to participate and understands they can discontinue the visit at any time  Patient is aware this is a billable service  Subjective  Vincent Suarez is a 79 y o  female who presents due to multiple concerns as detailed below  HPI     Has had cough for a few weeks -- has known allergies, has been taking Zyrtec without relief (thinks it may have stopped working)  Home COVID (-)  Mostly with change in position  Feels deep in chest  Also tried benadryl, but it dries her out  Having significant fatigue -- recent labs benign, but pt wondering if she may have low B12 or D     Pt is interested started medication for depression  Has not been on anything previously  Past Medical History:   Diagnosis Date    Morbid obesity ([facility] [address] )        Past Surgical History:   Procedure Laterality Date    NO PAST SURGERIES         Current Outpatient Medications   Medication Sig Dispense Refill    albuterol (Ventolin HFA) 90 mcg/act inhaler Inhale 2 puffs every 4 (four) hours as needed for wheezing or shortness of breath (cough) 18 g 1    escitalopram (LEXAPRO) 5 mg tablet Take 1 tablet (5 mg total) by mouth daily 30 tablet 1    fluticasone (FLONASE) 50 mcg/act nasal spray 1 spray into each nostril daily 16 g 1    loratadine (CLARITIN) 10 mg tablet Take 1 tablet (10 mg total) by mouth daily 90 tablet 1     No current facility-administered medications for this visit          Allergies   Allergen Reactions  Sulfa Antibiotics GI Intolerance    Nitrates, Organic - Food Allergy Rash    Silver Rash       Review of Systems   Constitutional: Positive for fatigue  Respiratory: Positive for cough (feels like something is always in her throat)  Video Exam    There were no vitals filed for this visit  Physical Exam  Vitals and nursing note reviewed  Constitutional:       General: She is not in acute distress  Appearance: Normal appearance  HENT:      Head: Normocephalic and atraumatic  Pulmonary:      Effort: Pulmonary effort is normal  No respiratory distress  Neurological:      General: No focal deficit present  Mental Status: She is alert  Psychiatric:         Mood and Affect: Mood normal           I spent 10 minutes directly with the patient during this visit    VIRTUAL VISIT DISCLAIMER      Arcadia Ferri verbally agrees to participate in [employer]  Pt is aware that [employer] could be limited without vital signs or the ability to perform a full hands-on physical Prashant Webb understands she or the provider may request at any time to terminate the video visit and request the patient to seek care or treatment in person

## 2022-08-03 ENCOUNTER — TELEPHONE (OUTPATIENT)
Dept: LAB | Facility: HOSPITAL | Age: 67
End: 2022-08-03

## 2022-08-04 NOTE — TELEPHONE ENCOUNTER
8/4 - called pt told her we are booked for tomorrow - she does not want to wait until next week - reached out to Lalitha to see if there is any way she can squeeze her in

## 2022-08-05 ENCOUNTER — APPOINTMENT (OUTPATIENT)
Dept: LAB | Facility: HOSPITAL | Age: 67
End: 2022-08-05
Payer: COMMERCIAL

## 2022-08-05 DIAGNOSIS — R53.83 OTHER FATIGUE: ICD-10-CM

## 2022-08-05 LAB
25(OH)D3 SERPL-MCNC: 20.6 NG/ML (ref 30–100)
VIT B12 SERPL-MCNC: 598 PG/ML (ref 100–900)

## 2022-08-05 PROCEDURE — 36415 COLL VENOUS BLD VENIPUNCTURE: CPT

## 2022-08-05 PROCEDURE — 82306 VITAMIN D 25 HYDROXY: CPT

## 2022-08-05 PROCEDURE — 82607 VITAMIN B-12: CPT

## 2022-08-08 DIAGNOSIS — E55.9 VITAMIN D DEFICIENCY: Primary | ICD-10-CM

## 2022-08-08 RX ORDER — ERGOCALCIFEROL 1.25 MG/1
50000 CAPSULE ORAL WEEKLY
Qty: 12 CAPSULE | Refills: 0 | Status: SHIPPED | OUTPATIENT
Start: 2022-08-08 | End: 2022-10-26

## 2022-08-08 RX ORDER — MELATONIN
1000 DAILY
Qty: 90 TABLET | Refills: 1 | Status: SHIPPED | OUTPATIENT
Start: 2022-08-08 | End: 2023-02-13

## 2022-08-23 DIAGNOSIS — Z91.09 ENVIRONMENTAL ALLERGIES: ICD-10-CM

## 2022-08-23 DIAGNOSIS — F33.0 MILD EPISODE OF RECURRENT MAJOR DEPRESSIVE DISORDER (HCC): ICD-10-CM

## 2022-08-23 RX ORDER — FLUTICASONE PROPIONATE 50 MCG
SPRAY, SUSPENSION (ML) NASAL
Qty: 48 ML | Refills: 1 | Status: SHIPPED | OUTPATIENT
Start: 2022-08-23

## 2022-08-23 RX ORDER — ESCITALOPRAM OXALATE 5 MG/1
5 TABLET ORAL DAILY
Qty: 90 TABLET | Refills: 1 | Status: SHIPPED | OUTPATIENT
Start: 2022-08-23

## 2022-10-26 DIAGNOSIS — E55.9 VITAMIN D DEFICIENCY: ICD-10-CM

## 2022-10-26 RX ORDER — ERGOCALCIFEROL 1.25 MG/1
50000 CAPSULE ORAL WEEKLY
Qty: 12 CAPSULE | Refills: 0 | Status: SHIPPED | OUTPATIENT
Start: 2022-10-26 | End: 2023-01-12

## 2022-11-08 ENCOUNTER — VBI (OUTPATIENT)
Dept: ADMINISTRATIVE | Facility: OTHER | Age: 67
End: 2022-11-08

## 2023-02-13 DIAGNOSIS — E55.9 VITAMIN D DEFICIENCY: ICD-10-CM

## 2023-02-13 RX ORDER — MELATONIN
1000 DAILY
Qty: 90 TABLET | Refills: 1 | Status: SHIPPED | OUTPATIENT
Start: 2023-02-13

## 2023-05-08 PROBLEM — I48.91 ATRIAL FIBRILLATION (HCC): Status: ACTIVE | Noted: 2023-03-23

## 2023-05-08 PROBLEM — J96.01 ACUTE RESPIRATORY FAILURE WITH HYPOXIA (HCC): Status: ACTIVE | Noted: 2023-03-27

## 2023-05-08 PROBLEM — S22.009A THORACIC VERTEBRAL FRACTURE (HCC): Status: ACTIVE | Noted: 2023-03-31

## 2023-05-08 PROBLEM — S01.01XA SCALP LACERATION: Status: RESOLVED | Noted: 2023-03-17 | Resolved: 2023-05-08

## 2023-05-08 PROBLEM — J96.01 ACUTE RESPIRATORY FAILURE WITH HYPOXIA (HCC): Status: RESOLVED | Noted: 2023-03-27 | Resolved: 2023-05-08

## 2023-05-08 PROBLEM — S22.009A THORACIC VERTEBRAL FRACTURE (HCC): Status: RESOLVED | Noted: 2023-03-31 | Resolved: 2023-05-08

## 2023-05-08 PROBLEM — S01.01XA SCALP LACERATION: Status: ACTIVE | Noted: 2023-03-17

## 2023-05-08 PROBLEM — E66.2 OBESITY HYPOVENTILATION SYNDROME (HCC): Status: ACTIVE | Noted: 2023-03-23

## 2023-05-08 PROBLEM — L97.211 NON-PRESSURE CHRONIC ULCER OF RIGHT CALF LIMITED TO BREAKDOWN OF SKIN (HCC): Status: RESOLVED | Noted: 2021-08-02 | Resolved: 2023-05-08

## 2023-05-08 PROBLEM — R60.9 LIPEDEMA: Status: RESOLVED | Noted: 2021-08-02 | Resolved: 2023-05-08

## 2023-05-08 RX ORDER — CETIRIZINE HYDROCHLORIDE 10 MG/1
10 TABLET ORAL DAILY
COMMUNITY
Start: 2023-04-19 | End: 2023-05-09 | Stop reason: SDUPTHER

## 2023-05-08 RX ORDER — GUAIFENESIN 600 MG/1
600 TABLET, EXTENDED RELEASE ORAL 2 TIMES DAILY
COMMUNITY
Start: 2023-04-19 | End: 2023-05-09

## 2023-05-08 RX ORDER — FUROSEMIDE 20 MG/1
20 TABLET ORAL DAILY
COMMUNITY
Start: 2023-04-19 | End: 2023-05-09 | Stop reason: SDUPTHER

## 2023-05-08 RX ORDER — LIDOCAINE 4 G/G
2 PATCH TOPICAL DAILY
COMMUNITY
Start: 2023-04-19

## 2023-05-08 RX ORDER — CHOLECALCIFEROL (VITAMIN D3) 125 MCG
5 CAPSULE ORAL
COMMUNITY
Start: 2023-04-19 | End: 2023-05-09

## 2023-05-08 RX ORDER — TRAMADOL HYDROCHLORIDE 50 MG/1
25 TABLET ORAL EVERY 6 HOURS PRN
COMMUNITY
Start: 2023-04-19 | End: 2023-05-09

## 2023-05-08 RX ORDER — FERROUS SULFATE 325(65) MG
325 TABLET ORAL
COMMUNITY
Start: 2023-04-19 | End: 2023-05-09

## 2023-05-08 RX ORDER — ASCORBIC ACID 250 MG
250 TABLET ORAL
COMMUNITY
Start: 2023-04-19 | End: 2024-04-18

## 2023-05-08 RX ORDER — GABAPENTIN 300 MG/1
300 CAPSULE ORAL DAILY
COMMUNITY
Start: 2023-04-19 | End: 2023-05-09 | Stop reason: SDUPTHER

## 2023-05-09 ENCOUNTER — TELEMEDICINE (OUTPATIENT)
Dept: FAMILY MEDICINE CLINIC | Facility: CLINIC | Age: 68
End: 2023-05-09

## 2023-05-09 DIAGNOSIS — S42.009D CLOSED NONDISPLACED FRACTURE OF CLAVICLE WITH ROUTINE HEALING, UNSPECIFIED LATERALITY, UNSPECIFIED PART OF CLAVICLE, SUBSEQUENT ENCOUNTER: ICD-10-CM

## 2023-05-09 DIAGNOSIS — E66.2 OBESITY HYPOVENTILATION SYNDROME (HCC): ICD-10-CM

## 2023-05-09 DIAGNOSIS — S22.049D CLOSED FRACTURE OF FOURTH THORACIC VERTEBRA WITH ROUTINE HEALING, UNSPECIFIED FRACTURE MORPHOLOGY, SUBSEQUENT ENCOUNTER: ICD-10-CM

## 2023-05-09 DIAGNOSIS — S22.31XD CLOSED FRACTURE OF ONE RIB OF RIGHT SIDE WITH ROUTINE HEALING, SUBSEQUENT ENCOUNTER: ICD-10-CM

## 2023-05-09 DIAGNOSIS — I89.0 LYMPHEDEMA OF BOTH LOWER EXTREMITIES: ICD-10-CM

## 2023-05-09 DIAGNOSIS — R05.2 SUBACUTE COUGH: ICD-10-CM

## 2023-05-09 DIAGNOSIS — S01.01XD LACERATION OF SCALP, SUBSEQUENT ENCOUNTER: Primary | ICD-10-CM

## 2023-05-09 DIAGNOSIS — D62 ACUTE BLOOD LOSS ANEMIA: ICD-10-CM

## 2023-05-09 DIAGNOSIS — S22.039D CLOSED FRACTURE OF THIRD THORACIC VERTEBRA WITH ROUTINE HEALING, UNSPECIFIED FRACTURE MORPHOLOGY, SUBSEQUENT ENCOUNTER: ICD-10-CM

## 2023-05-09 DIAGNOSIS — I48.91 ATRIAL FIBRILLATION, UNSPECIFIED TYPE (HCC): ICD-10-CM

## 2023-05-09 RX ORDER — METHOCARBAMOL 500 MG/1
500 TABLET, FILM COATED ORAL EVERY 6 HOURS PRN
COMMUNITY
Start: 2023-04-27 | End: 2023-05-09 | Stop reason: SDUPTHER

## 2023-05-09 RX ORDER — CETIRIZINE HYDROCHLORIDE 10 MG/1
10 TABLET ORAL DAILY
Qty: 90 TABLET | Refills: 1 | Status: SHIPPED | OUTPATIENT
Start: 2023-05-09

## 2023-05-09 RX ORDER — GABAPENTIN 300 MG/1
300 CAPSULE ORAL DAILY
Qty: 90 CAPSULE | Refills: 1 | Status: SHIPPED | OUTPATIENT
Start: 2023-05-09

## 2023-05-09 RX ORDER — METHOCARBAMOL 500 MG/1
500 TABLET, FILM COATED ORAL EVERY 6 HOURS PRN
Qty: 360 TABLET | Refills: 1 | Status: SHIPPED | OUTPATIENT
Start: 2023-05-09

## 2023-05-09 RX ORDER — FLUTICASONE PROPIONATE 50 MCG
2 SPRAY, SUSPENSION (ML) NASAL DAILY
Qty: 48 ML | Refills: 1 | Status: SHIPPED | OUTPATIENT
Start: 2023-05-09

## 2023-05-09 RX ORDER — FUROSEMIDE 20 MG/1
20 TABLET ORAL DAILY
Qty: 90 TABLET | Refills: 1 | Status: SHIPPED | OUTPATIENT
Start: 2023-05-09

## 2023-05-09 NOTE — PROGRESS NOTES
Virtual Regular Visit    Verification of patient location:    Patient is located at Home in the following state in which I hold an active license [place]      Assessment/Plan:    Problem List Items Addressed This Visit        Cardiovascular and Mediastinum    Atrial fibrillation (HCC)    Relevant Medications    rivaroxaban (XARELTO) 20 mg tablet    metoprolol tartrate (LOPRESSOR) 25 mg tablet    Other Relevant Orders    Comprehensive metabolic panel       Other    Lymphedema of leg    Relevant Medications    furosemide (LASIX) 20 mg tablet    Obesity hypoventilation syndrome (HCC)    Relevant Orders    Home Study   Other Visit Diagnoses     Laceration of scalp, subsequent encounter    -  Primary    Relevant Medications    methocarbamol (ROBAXIN) 500 mg tablet    gabapentin (NEURONTIN) 300 mg capsule    Closed nondisplaced fracture of clavicle with routine healing, unspecified laterality, unspecified part of clavicle, subsequent encounter        Relevant Medications    methocarbamol (ROBAXIN) 500 mg tablet    gabapentin (NEURONTIN) 300 mg capsule    Closed fracture of one rib of right side with routine healing, subsequent encounter        Relevant Medications    methocarbamol (ROBAXIN) 500 mg tablet    gabapentin (NEURONTIN) 300 mg capsule    Closed fracture of third thoracic vertebra with routine healing, unspecified fracture morphology, subsequent encounter        Relevant Medications    methocarbamol (ROBAXIN) 500 mg tablet    gabapentin (NEURONTIN) 300 mg capsule    Closed fracture of fourth thoracic vertebra with routine healing, unspecified fracture morphology, subsequent encounter        Relevant Medications    methocarbamol (ROBAXIN) 500 mg tablet    gabapentin (NEURONTIN) 300 mg capsule    Acute blood loss anemia        Relevant Orders    CBC and differential    Iron Panel (Includes Ferritin, Iron Sat%, Iron, and TIBC)    Subacute cough        Relevant Medications    cetirizine (ZyrTEC) 10 mg tablet fluticasone (FLONASE) 50 mcg/act nasal spray        Reviewed and updated medication list  Refills sent  Encouraged to continue PT/OT  Will order Home Study per hospital recommendations  Encouraged trial of Flonase to help with phlegm which is likely etiology of cough  Update labs to monitor anemia s/p acute blood loss and kidney function/K given Lasix usage -- given info for mobile lab        Reason for visit is   Chief Complaint   Patient presents with   • Virtual Regular Visit        Encounter provider Bailee Celaya DO    Provider located at [place] [address] [place] [id number]      Recent Visits  No visits were found meeting these conditions  Showing recent visits within past 7 days and meeting all other requirements  Today's Visits  Date Type Provider Dept   05/09/23 Telemedicine Kylie Jimenez DO [facility]   Showing today's visits and meeting all other requirements  Future Appointments  No visits were found meeting these conditions  Showing future appointments within next 150 days and meeting all other requirements       The patient was identified by name and date of birth  Michael Judd was informed that this is a telemedicine visit and that the visit is being conducted through the [employer]  She agrees to proceed     My office door was closed  No one else was in the room  She acknowledged consent and understanding of privacy and security of the video platform  The patient has agreed to participate and understands they can discontinue the visit at any time  Patient is aware this is a billable service  Subjective  Michael Judd is a 76 y o  female who presents for hospital follow up  HPI     3/17 [facility] ED s/p fall down stairs into dresser and subsequently transferred to [facility] where she was admitted until 3/31     Scalp hematoma/laceration with active hemorrhage -- went to OR for "washout, hematoma evacuation, and closure  Clavicle Fx, R 3rd rib Fx, T3-T4 Fx -- no intervention   Required 3U PRBC  Had respiratory insufficiency/stridor, pt deferred scope with ENT, Pulm recommended outpatient sleep study   Had new onset AFIB with RVR  ECHO: EF 55-60%, NRWA, Normal diastolic dysfunction   Initiated on Metoprolol, converted to sinus rhythm     Pt discharged to SNF     Today:   Feeling \"better\"   Having Home OT 2x/wk and PT 1x/wk    Pt notes that she is still coughing -- mucus is clear -- when she talks too much or change positions  Wondering when it is going to go away or if it is going to last \"forever\"   Does take albuterol, which helps \"a little\"  Also took Mucinex for about 1 week but didn't note much improvement    Needs a refill on her medications     Past Medical History:   Diagnosis Date   • Acute respiratory failure with hypoxia ([facility] ) 3/27/2023   • Morbid obesity ([facility] [address] )    • 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 ([facility] [address] ) 8/2/2021   • Scalp laceration 3/17/2023   • Thoracic vertebral fracture (HCC) 3/31/2023       Past Surgical History:   Procedure Laterality Date   • NO PAST SURGERIES         Current Outpatient Medications   Medication Sig Dispense Refill   • ascorbic acid (VITAMIN C) 250 MG tablet Take 250 mg by mouth     • cetirizine (ZyrTEC) 10 mg tablet Take 1 tablet (10 mg total) by mouth daily 90 tablet 1   • fluticasone (FLONASE) 50 mcg/act nasal spray 2 sprays into each nostril daily 48 mL 1   • furosemide (LASIX) 20 mg tablet Take 1 tablet (20 mg total) by mouth daily 90 tablet 1   • gabapentin (NEURONTIN) 300 mg capsule Take 1 capsule (300 mg total) by mouth daily 90 capsule 1   • Lidocaine 4 % PTCH Place 2 patches on the skin daily     • methocarbamol (ROBAXIN) 500 mg tablet Take 1 tablet (500 mg total) by mouth every 6 (six) hours as needed for muscle spasms 360 tablet 1   • metoprolol tartrate (LOPRESSOR) 25 mg tablet Take 0 5 tablets (12 5 mg " total) by mouth 2 (two) times a day 90 tablet 1   • Multiple Vitamins-Minerals (MULTIVITAMIN ADULTS PO) Take 1 tablet by mouth daily     • rivaroxaban (XARELTO) 20 mg tablet Take 1 tablet (20 mg total) by mouth daily with breakfast 90 tablet 1   • albuterol (Ventolin HFA) 90 mcg/act inhaler Inhale 2 puffs every 4 (four) hours as needed for wheezing or shortness of breath (cough) 18 g 1     No current facility-administered medications for this visit  Allergies   Allergen Reactions   • Other GI Intolerance     Food preservatives   • Sulfa Antibiotics GI Intolerance   • Nitrates, Organic - Food Allergy Rash   • Silver Rash       Review of Systems   Constitutional: Negative for appetite change and fever  HENT: Positive for postnasal drip (has a lot of phlegm)  Respiratory: Positive for cough  Negative for shortness of breath  Cardiovascular: Positive for leg swelling  Gastrointestinal: Negative for constipation and diarrhea  Musculoskeletal: Positive for arthralgias and myalgias  Video Exam    There were no vitals filed for this visit  Physical Exam  Vitals and nursing note reviewed  Constitutional:       General: She is not in acute distress  Appearance: Normal appearance  HENT:      Head: Normocephalic and atraumatic  Pulmonary:      Effort: Pulmonary effort is normal  No respiratory distress  Neurological:      General: No focal deficit present  Mental Status: She is alert     Psychiatric:         Mood and Affect: Mood normal           Visit Time  Total Visit Duration: 15

## 2023-05-11 ENCOUNTER — TELEPHONE (OUTPATIENT)
Dept: LAB | Facility: HOSPITAL | Age: 68
End: 2023-05-11

## 2023-05-11 ENCOUNTER — RA CDI HCC (OUTPATIENT)
Dept: OTHER | Facility: HOSPITAL | Age: 68
End: 2023-05-11

## 2023-05-12 ENCOUNTER — TELEPHONE (OUTPATIENT)
Dept: LAB | Facility: HOSPITAL | Age: 68
End: 2023-05-12

## 2023-05-19 ENCOUNTER — APPOINTMENT (OUTPATIENT)
Dept: LAB | Facility: HOSPITAL | Age: 68
End: 2023-05-19

## 2023-05-19 DIAGNOSIS — D62 ACUTE BLOOD LOSS ANEMIA: ICD-10-CM

## 2023-05-19 DIAGNOSIS — I48.91 ATRIAL FIBRILLATION, UNSPECIFIED TYPE (HCC): ICD-10-CM

## 2023-05-19 LAB
ALBUMIN SERPL BCP-MCNC: 3 G/DL (ref 3.5–5)
ALP SERPL-CCNC: 97 U/L (ref 46–116)
ALT SERPL W P-5'-P-CCNC: 12 U/L (ref 12–78)
ANION GAP SERPL CALCULATED.3IONS-SCNC: -2 MMOL/L (ref 4–13)
AST SERPL W P-5'-P-CCNC: 7 U/L (ref 5–45)
BASOPHILS # BLD AUTO: 0.03 THOUSANDS/ÂΜL (ref 0–0.1)
BASOPHILS NFR BLD AUTO: 1 % (ref 0–1)
BILIRUB SERPL-MCNC: 0.38 MG/DL (ref 0.2–1)
BUN SERPL-MCNC: 10 MG/DL (ref 5–25)
CALCIUM ALBUM COR SERPL-MCNC: 9.6 MG/DL (ref 8.3–10.1)
CALCIUM SERPL-MCNC: 8.8 MG/DL (ref 8.3–10.1)
CHLORIDE SERPL-SCNC: 102 MMOL/L (ref 96–108)
CO2 SERPL-SCNC: 36 MMOL/L (ref 21–32)
CREAT SERPL-MCNC: 0.62 MG/DL (ref 0.6–1.3)
EOSINOPHIL # BLD AUTO: 0.25 THOUSAND/ÂΜL (ref 0–0.61)
EOSINOPHIL NFR BLD AUTO: 5 % (ref 0–6)
ERYTHROCYTE [DISTWIDTH] IN BLOOD BY AUTOMATED COUNT: 15.2 % (ref 11.6–15.1)
FERRITIN SERPL-MCNC: 20 NG/ML (ref 11–307)
GFR SERPL CREATININE-BSD FRML MDRD: 92 ML/MIN/1.73SQ M
GLUCOSE P FAST SERPL-MCNC: 116 MG/DL (ref 65–99)
HCT VFR BLD AUTO: 37 % (ref 34.8–46.1)
HGB BLD-MCNC: 10.2 G/DL (ref 11.5–15.4)
IMM GRANULOCYTES # BLD AUTO: 0.01 THOUSAND/UL (ref 0–0.2)
IMM GRANULOCYTES NFR BLD AUTO: 0 % (ref 0–2)
IRON SATN MFR SERPL: 8 % (ref 15–50)
IRON SERPL-MCNC: 26 UG/DL (ref 50–170)
LYMPHOCYTES # BLD AUTO: 1.19 THOUSANDS/ÂΜL (ref 0.6–4.47)
LYMPHOCYTES NFR BLD AUTO: 23 % (ref 14–44)
MCH RBC QN AUTO: 25.4 PG (ref 26.8–34.3)
MCHC RBC AUTO-ENTMCNC: 27.6 G/DL (ref 31.4–37.4)
MCV RBC AUTO: 92 FL (ref 82–98)
MONOCYTES # BLD AUTO: 0.53 THOUSAND/ÂΜL (ref 0.17–1.22)
MONOCYTES NFR BLD AUTO: 10 % (ref 4–12)
NEUTROPHILS # BLD AUTO: 3.15 THOUSANDS/ÂΜL (ref 1.85–7.62)
NEUTS SEG NFR BLD AUTO: 61 % (ref 43–75)
NRBC BLD AUTO-RTO: 0 /100 WBCS
PLATELET # BLD AUTO: 264 THOUSANDS/UL (ref 149–390)
PMV BLD AUTO: 10.4 FL (ref 8.9–12.7)
POTASSIUM SERPL-SCNC: 3.8 MMOL/L (ref 3.5–5.3)
PROT SERPL-MCNC: 7.2 G/DL (ref 6.4–8.4)
RBC # BLD AUTO: 4.02 MILLION/UL (ref 3.81–5.12)
SODIUM SERPL-SCNC: 136 MMOL/L (ref 135–147)
TIBC SERPL-MCNC: 320 UG/DL (ref 250–450)
WBC # BLD AUTO: 5.16 THOUSAND/UL (ref 4.31–10.16)

## 2023-05-24 ENCOUNTER — TELEMEDICINE (OUTPATIENT)
Dept: FAMILY MEDICINE CLINIC | Facility: CLINIC | Age: 68
End: 2023-05-24

## 2023-05-24 ENCOUNTER — TELEPHONE (OUTPATIENT)
Dept: FAMILY MEDICINE CLINIC | Facility: CLINIC | Age: 68
End: 2023-05-24

## 2023-05-24 DIAGNOSIS — R05.2 SUBACUTE COUGH: Primary | ICD-10-CM

## 2023-05-24 DIAGNOSIS — S22.039D CLOSED FRACTURE OF THIRD THORACIC VERTEBRA WITH ROUTINE HEALING, UNSPECIFIED FRACTURE MORPHOLOGY, SUBSEQUENT ENCOUNTER: ICD-10-CM

## 2023-05-24 DIAGNOSIS — I89.0 LYMPHEDEMA OF BOTH LOWER EXTREMITIES: ICD-10-CM

## 2023-05-24 DIAGNOSIS — S42.009D CLOSED NONDISPLACED FRACTURE OF CLAVICLE WITH ROUTINE HEALING, UNSPECIFIED LATERALITY, UNSPECIFIED PART OF CLAVICLE, SUBSEQUENT ENCOUNTER: ICD-10-CM

## 2023-05-24 DIAGNOSIS — S22.31XD CLOSED FRACTURE OF ONE RIB OF RIGHT SIDE WITH ROUTINE HEALING, SUBSEQUENT ENCOUNTER: ICD-10-CM

## 2023-05-24 DIAGNOSIS — R53.81 PHYSICAL DECONDITIONING: ICD-10-CM

## 2023-05-24 DIAGNOSIS — S22.049D CLOSED FRACTURE OF FOURTH THORACIC VERTEBRA WITH ROUTINE HEALING, UNSPECIFIED FRACTURE MORPHOLOGY, SUBSEQUENT ENCOUNTER: ICD-10-CM

## 2023-05-24 NOTE — TELEPHONE ENCOUNTER
[facility] does not  [facility] does, they are running her insurance through to authorize and make sure they can offer this to her   They will call me back

## 2023-05-24 NOTE — TELEPHONE ENCOUNTER
[facility] [address] and see if either are able to provide in home lymphedema PT/OT for this patient  She is currently working with [facility], but they do not provide this service

## 2023-05-24 NOTE — PROGRESS NOTES
Virtual Regular Visit    Verification of patient location:    Patient is located at Home in the following state in which I hold an active license [place]      Assessment/Plan:    Problem List Items Addressed This Visit        Other    Lymphedema of leg    Relevant Orders    Referral to [address] [facility]   Other Visit Diagnoses     Subacute cough    -  Primary    Closed nondisplaced fracture of clavicle with routine healing, unspecified laterality, unspecified part of clavicle, subsequent encounter        Relevant Orders    [facility]erral to [facility]    Closed fracture of one rib of right side with routine healing, subsequent encounter        Relevant Orders    [facility]    Closed fracture of third thoracic vertebra with routine healing, unspecified fracture morphology, subsequent encounter        Relevant Orders    [facility]    Closed fracture of fourth thoracic vertebra with routine healing, unspecified fracture morphology, subsequent encounter        Relevant Orders    [facility] [address] [facility]    Physical deconditioning        Relevant Orders    Referral to [address] [facility]  [place]        Discussed that pt would benefit from either lung exam or CXR  Pt does not leave the house, so is unable to complete XR  Encouraged to check with home health if nursing visit could be done for lung evaluation  Will also send referral to our [address] [place] [address] [facility] group to determine whether they are able to provide lymphedema therapy (in addition to there PT/OT needs) and are in her service area     Encouraged to call to schedule sleep study as this may be contributing to her shortness of breath/fatigue     Reviewed hemoglobin was stable, but still low along with low iron -- recently started OTC iron supplement, encouraged to continue as this can contribute to shortness of breath   Blood sugar was non-fasting, so mild elevation likely benign           Reason for visit is   Chief Complaint   Patient presents with   • Virtual Regular Visit        Encounter provider Tiffani Jerome DO    Provider located at [place] [address]  [address] [place] [id number]      Recent Visits  No visits were found meeting these conditions  Showing recent visits within past 7 days and meeting all other requirements  Today's Visits  Date Type Provider Dept   05/24/23 Telemedicine Tiffani Jerome DO [facility]   Showing today's visits and meeting all other requirements  Future Appointments  No visits were found meeting these conditions  Showing future appointments within next 150 days and meeting all other requirements       The patient was identified by name and date of birth  Marilin Hansen was informed that this is a telemedicine visit and that the visit is being conducted through the [address] [place] [address]  She agrees to proceed     My office door was closed  No one else was in the room  She acknowledged consent and understanding of privacy and security of the video platform  The patient has agreed to participate and understands they can discontinue the visit at any time  Patient is aware this is a billable service  Subjective  Marilin Hansen is a 76 y o  female who presents due to cough      HPI     Continues to have clear mucus cough  Since last week, her oxygen saturation has been lower (from her usual of 90% to 86-88%) and her temperature is a bit elevated (high 99)   Did get phone call for sleep study, needs to call back     Her PT/OT is not certified to do lymphedema therapy,which she would like to do     Would also like to review lab work       Past Medical History:   Diagnosis Date   • Acute respiratory failure with hypoxia ([address] ) 3/27/2023   • Morbid obesity ([address] )    • 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 [address] ) 3/31/2023       Past Surgical History:   Procedure Laterality Date   • NO PAST SURGERIES         Current Outpatient Medications   Medication Sig Dispense Refill   • albuterol (Ventolin HFA) 90 mcg/act inhaler Inhale 2 puffs every 4 (four) hours as needed for wheezing or shortness of breath (cough) 18 g 1   • ascorbic acid (VITAMIN C) 250 MG tablet Take 250 mg by mouth     • cetirizine (ZyrTEC) 10 mg tablet Take 1 tablet (10 mg total) by mouth daily 90 tablet 1   • fluticasone (FLONASE) 50 mcg/act nasal spray 2 sprays into each nostril daily 48 mL 1   • furosemide (LASIX) 20 mg tablet Take 1 tablet (20 mg total) by mouth daily 90 tablet 1   • gabapentin (NEURONTIN) 300 mg capsule Take 1 capsule (300 mg total) by mouth daily 90 capsule 1   • Lidocaine 4 % PTCH Place 2 patches on the skin daily     • methocarbamol (ROBAXIN) 500 mg tablet Take 1 tablet (500 mg total) by mouth every 6 (six) hours as needed for muscle spasms 360 tablet 1   • metoprolol tartrate (LOPRESSOR) 25 mg tablet Take 0 5 tablets (12 5 mg total) by mouth 2 (two) times a day 90 tablet 1   • Multiple Vitamins-Minerals (MULTIVITAMIN ADULTS PO) Take 1 tablet by mouth daily     • rivaroxaban (XARELTO) 20 mg tablet Take 1 tablet (20 mg total) by mouth daily with breakfast 90 tablet 1     No current facility-administered medications for this visit  Allergies   Allergen Reactions   • Other GI Intolerance     Food preservatives   • Sulfa Antibiotics GI Intolerance   • Nitrates, Organic - Food Allergy Rash   • Silver Rash       Review of Systems   Constitutional: Negative for fever (borderline)  Respiratory: Positive for cough  Video Exam    There were no vitals filed for this visit  Physical Exam  Vitals and nursing note reviewed  Constitutional:       General: She is not in acute distress  Appearance: Normal appearance  HENT:      Head: Normocephalic and atraumatic     Pulmonary:      Effort: Pulmonary effort is normal  No respiratory distress  Neurological:      General: No focal deficit present  Mental Status: She is alert     Psychiatric:         Mood and Affect: Mood normal           Visit Time  Total Visit Duration: 10

## 2023-05-26 ENCOUNTER — TELEPHONE (OUTPATIENT)
Dept: FAMILY MEDICINE CLINIC | Facility: CLINIC | Age: 68
End: 2023-05-26

## 2023-05-26 NOTE — TELEPHONE ENCOUNTER
"Home health nurse called regarding patient - stated that she had a virtual appt and you wanted someone to listen to her lungs  Today they were Diminished B/L with [place]  O2 was ranging between 85-92 2LPM  sedentary  Patient and  refused to go to the ER stated that \" they do not want to pay for it \"       "

## 2023-05-26 NOTE — TELEPHONE ENCOUNTER
While I was in the room with a patient yesterday, they did call back, however I missed the call  I did call them back shortly after they called but had to leave a voicemail  No call back  Today I left voicemail's on both the intake line and the  line  No call back  I called back again at 4:10 pm and got a person who advised me that their therapist is out for a month

## 2023-05-26 NOTE — TELEPHONE ENCOUNTER
Would highly recommend pt be evaluated in Urgent Care or ED -- pt needs at least a chest xray and evaluation for possible infection or fluid in her lungs

## 2023-05-30 DIAGNOSIS — I83.11 LIPODERMATOSCLEROSIS OF BOTH LOWER EXTREMITIES: Primary | ICD-10-CM

## 2023-05-30 DIAGNOSIS — I83.12 LIPODERMATOSCLEROSIS OF BOTH LOWER EXTREMITIES: Primary | ICD-10-CM

## 2023-05-30 DIAGNOSIS — R09.02 OXYGEN DESATURATION: Primary | ICD-10-CM

## 2023-05-30 DIAGNOSIS — I89.0 LYMPHEDEMA OF LOWER EXTREMITY, UNSPECIFIED LATERALITY: ICD-10-CM

## 2023-05-30 DIAGNOSIS — R05.1 ACUTE COUGH: ICD-10-CM

## 2023-05-30 NOTE — TELEPHONE ENCOUNTER
Spoke with Carmen from [address] [place] [address] home health, she asked if the doctor would order an X-ray and send to [facility] for mobile X-ray   Spoke with Dr Nick Nair and she ordered this, I faxed this to [facility]

## 2023-05-30 NOTE — TELEPHONE ENCOUNTER
Can we please let pt know that [facility] may be able to provide lymphedema therapy, but their therapist is currently on leave

## 2023-05-30 NOTE — TELEPHONE ENCOUNTER
Advised pt that she needs to be evaluated per providers instructions  Pt stated she will wait until the home nurse comes today and will decide if she should go or not

## 2023-06-08 ENCOUNTER — TELEPHONE (OUTPATIENT)
Dept: SLEEP CENTER | Facility: CLINIC | Age: 68
End: 2023-06-08

## 2023-06-08 DIAGNOSIS — E66.2 OBESITY HYPOVENTILATION SYNDROME (HCC): Primary | ICD-10-CM

## 2023-06-08 NOTE — TELEPHONE ENCOUNTER
----- Message from Storm Mcarthur MD sent at 6/7/2023  7:27 PM EDT -----  Based on history #high CO2, cardiac hx, would recommend split  study as opposed to home study (there is a chance insurance may not approve this, though they may and that would provide a better  test)   Would recommend changing order to split study   ----- Message -----  From: Lou Watson  Sent: 6/1/2023   8:12 AM EDT  To: Sleep Medicine [facility] Provider    This Home sleep study needs approval      If approved please sign and return to clerical pool  If denied please include reasons why  Also provide alternative testing if warranted  Please sign and return to clerical pool

## 2023-06-15 DIAGNOSIS — Z71.89 COMPLEX CARE COORDINATION: Primary | ICD-10-CM

## 2023-06-20 ENCOUNTER — PATIENT OUTREACH (OUTPATIENT)
Dept: FAMILY MEDICINE CLINIC | Facility: CLINIC | Age: 68
End: 2023-06-20

## 2023-06-20 NOTE — PROGRESS NOTES
Complex Care Management Note:  [employer] notification for complex outpatient care management received  Referral made by [facility] Coordinator  Chart review completed  ED visits and Hospitalizations:   Patient recently hospitalized at [facility] from 06/02/23 to 06/16/23 for acute respiratory failure with hypoxia and hypercapnia 2/2 pneumonia  Patient presented to the ED with c/o of SOB  Patient required intubation in admission to ICU  Patient's condition improved during the course of her hospitalization and she was cleared to discharge to [facility] in [facility] for STR     PMH Includes:   Paroxysmal afib, diastolic CHF, chronic venous stasis, chronic lymphedema and wound  Morbid obesity with BMI greater than 70  Refer to problem list for complete list of medical diagnosis  Admission or ED risk score is 3  Case meets screening criteria for complex care management  I plan to attempt first outreach within the next 30 days when patient returns home from STR     IB message reminder has been set

## 2023-07-10 ENCOUNTER — TRANSITIONAL CARE MANAGEMENT (OUTPATIENT)
Dept: FAMILY MEDICINE CLINIC | Facility: CLINIC | Age: 68
End: 2023-07-10

## 2023-07-11 RX ORDER — AMMONIUM LACTATE 12 G/100G
LOTION TOPICAL
COMMUNITY
Start: 2023-06-13

## 2023-07-12 ENCOUNTER — TELEMEDICINE (OUTPATIENT)
Dept: FAMILY MEDICINE CLINIC | Facility: CLINIC | Age: 68
End: 2023-07-12
Payer: COMMERCIAL

## 2023-07-12 DIAGNOSIS — J96.01 ACUTE RESPIRATORY FAILURE WITH HYPOXIA (HCC): Primary | ICD-10-CM

## 2023-07-12 DIAGNOSIS — F32.A DEPRESSION, UNSPECIFIED DEPRESSION TYPE: ICD-10-CM

## 2023-07-12 DIAGNOSIS — D50.9 IRON DEFICIENCY ANEMIA, UNSPECIFIED IRON DEFICIENCY ANEMIA TYPE: ICD-10-CM

## 2023-07-12 DIAGNOSIS — Z76.89 ENCOUNTER FOR SUPPORT AND COORDINATION OF TRANSITION OF CARE: ICD-10-CM

## 2023-07-12 DIAGNOSIS — Z79.899 LONG TERM CURRENT USE OF DIURETIC: ICD-10-CM

## 2023-07-12 DIAGNOSIS — N89.8 VAGINAL ITCHING: ICD-10-CM

## 2023-07-12 PROCEDURE — 99495 TRANSJ CARE MGMT MOD F2F 14D: CPT | Performed by: FAMILY MEDICINE

## 2023-07-12 RX ORDER — FERROUS SULFATE 325(65) MG
1 TABLET ORAL DAILY
COMMUNITY
Start: 2023-07-06

## 2023-07-12 RX ORDER — AZITHROMYCIN 250 MG/1
TABLET, FILM COATED ORAL
COMMUNITY
Start: 2023-07-07

## 2023-07-12 RX ORDER — FLUCONAZOLE 150 MG/1
150 TABLET ORAL
Qty: 2 TABLET | Refills: 0 | Status: SHIPPED | OUTPATIENT
Start: 2023-07-12 | End: 2023-07-16

## 2023-07-12 RX ORDER — ESCITALOPRAM OXALATE 5 MG/1
5 TABLET ORAL DAILY
Qty: 30 TABLET | Refills: 1 | Status: SHIPPED | OUTPATIENT
Start: 2023-07-12

## 2023-07-12 RX ORDER — POTASSIUM CHLORIDE 1500 MG/1
40 TABLET, EXTENDED RELEASE ORAL 3 TIMES DAILY
COMMUNITY
Start: 2023-07-06

## 2023-07-12 RX ORDER — OXYBUTYNIN CHLORIDE 10 MG/1
10 TABLET, EXTENDED RELEASE ORAL
COMMUNITY
Start: 2023-07-06

## 2023-07-12 NOTE — PROGRESS NOTES
Virtual TCM Visit:    Verification of patient location:    Patient is located at Home in the following state in which I hold an active license [place]    Assessment/Plan:        Problem List Items Addressed This Visit    None  Visit Diagnoses     Acute respiratory failure with hypoxia (HCC)    -  Primary    Long term current use of diuretic        Relevant Orders    Basic metabolic panel    Iron deficiency anemia, unspecified iron deficiency anemia type        Relevant Medications    ferrous sulfate 325 (65 Fe) mg tablet    Other Relevant Orders    CBC and differential    Vaginal itching        Relevant Medications    fluconazole (DIFLUCAN) 150 mg tablet    Depression, unspecified depression type        Relevant Medications    escitalopram (LEXAPRO) 5 mg tablet    Encounter for support and coordination of transition of care               Respiratory status much improved, continue supplemental O2. Will provide Diflucan for vaginal itching (likely yeast infection in setting of recent antibiotic use)   Pt agreeable to start Lexapro. Discussed possible ADRs including GI upset, somnolence, initial worsening of symptoms. Reviewed delayed onset to max therapeutic potential. F/u in 4 weeks to monitor, to call if not tolerating prior. Reviewed need for chronic anticoagulation in setting of AFib for stroke prophylaxis. Reviewed need for K supplementation with diuretic -- will check K level to monitor and determine if can decrease supplementation   Reviewed Zpack course is appropriate as prescribed   Recheck CBC -- pt denies any overt bleeding. In 07/2022, pt was not anemic. Since hospitalization in 03/2023, has been anemic, though Hb has been stable in the 10s.    Discussed consideration of decreasing Oxybutynin to 5 mg given difficulty voiding in AM, pt defers      Reason for visit is TCM    Encounter provider Nii Hudson DO     Provider located at [facility] [address] [facility] [id number]    Recent Visits  No visits were found meeting these conditions. Showing recent visits within past 7 days and meeting all other requirements  Today's Visits  Date Type Provider Dept   07/12/23 Telemedicine DO Nilay Medina   Showing today's visits and meeting all other requirements  Future Appointments  No visits were found meeting these conditions. Showing future appointments within next 150 days and meeting all other requirements       After connecting through [email or web address], the patient was identified by name and date of birth. Gabriel Mccabe was informed that this is a telemedicine visit and that the visit is being conducted through the [employer]. She agrees to proceed. .  My office door was closed. No one else was in the room. She acknowledged consent and understanding of privacy and security of the video platform. The patient has agreed to participate and understands they can discontinue the visit at any time. Patient is aware this is a billable service. Transitional Care Management Review:  Gabriel Mccabe is a 76 y.o. female here for TCM follow up. During the TCM phone call patient stated:    TCM Call     Date and time call was made  7/10/2023  2:20 PM    Patient was hospitialized at  [facility]    Date of Admission  06/16/23    Date of discharge  07/07/23    Diagnosis  pneumonia    Disposition  Home    Current Symptoms  Fatigue; Cough      TCM Call     I have advised the patient to call PCP with any new or worsening symptoms  cindy macias rec    Comments  pt needs virtual - she is homebound        Subjective:     Patient ID: Gabriel Mccabe is a 76 y.o. female. HPI     Pt presents with her  for hospital follow up s/p admissino to [facility] from 6/2-6/16 due to respiratory distress. Pt presented via EMS with hypoxia. Pt initially required intubation, but was successfully extubated with supplemental O2. She received antibiotics for possible PNA as well as diuretics for fluid overload. wound care way also on board. She was discharged to [facility] at [facility] where she stayed from 6/16-7/6. EKF: NSR, L axis deviation, possbile LA enlargement   B/L LE Duplex (-) for DVT  ECHO: EF 55-60%, mild hypertrophy, mild pulmonary HTN   Hb 10.8 (stable)   Cr 0.57/GFR 99    Today:   Feeling "good"   Denies shortness of breath, using supplemental O2 (usually at 1.5L)   Cough is gone   Has episodes of feeling cold (checks her temp and doesn't have fever) -- thinks this is due to her anemia    Appetite is ok "I'm eating because I have to" -- does eat a lot less than she used to eat   In the AM, she feels like she has to force her urine to come out     Has a number of medication questions:   Wondering about blood thinner, potassium   Has Zpack -- only given 5 days worth   Having vaginal itching -- would like script for Diflucan   Would like to start anti-depressant     Review of Systems   Constitutional: Positive for appetite change. Negative for fever. Respiratory: Negative for cough and shortness of breath. Cardiovascular: Negative for chest pain and palpitations. Gastrointestinal: Negative for blood in stool, constipation and diarrhea. Endocrine: Positive for cold intolerance. Genitourinary: Positive for difficulty urinating (in AM). Negative for hematuria. (+) vaginal itching   Psychiatric/Behavioral: Positive for sleep disturbance ("good" does wake a few times). Objective: There were no vitals filed for this visit. Physical Exam  Vitals and nursing note reviewed. Constitutional:       General: She is not in acute distress. Appearance: Normal appearance. Interventions: Nasal cannula in place. HENT:      Head: Normocephalic and atraumatic. Pulmonary:      Effort: Pulmonary effort is normal. No respiratory distress. Neurological:      General: No focal deficit present.       Mental Status: She is alert. Psychiatric:         Mood and Affect: Mood normal.         Medications have been reviewed by provider in current encounter    I spent 15 minutes with the patient during this visit. Rafael Chang,       VIRTUAL VISIT DISCLAIMER    Al Card verbally agrees to participate in [employer]. Pt is aware that [employer] could be limited without vital signs or the ability to perform a full hands-on physical Sam Sax understands she or the provider may request at any time to terminate the video visit and request the patient to seek care or treatment in person.

## 2023-07-12 NOTE — PROGRESS NOTES
Virtual TCM Visit:    Verification of patient location:    Patient is located at {Amb Virtual Patient Location:[address]} in the following state in which I hold an active license { amb virtual patient location:[address]}    Assessment/Plan:        Problem List Items Addressed This Visit    None       Reason for visit is ***    Encounter provider Zack Harper DO     Provider located at [address] [place] [phone]    Recent Visits  No visits were found meeting these conditions. Showing recent visits within past 7 days and meeting all other requirements  Today's Visits  Date Type Provider Dept   07/12/23 Telemedicine Zack Harper DO [facility]   Showing today's visits and meeting all other requirements  Future Appointments  No visits were found meeting these conditions. Showing future appointments within next 150 days and meeting all other requirements       After connecting through [employer], the patient was identified by name and date of birth. Sophie Flores was informed that this is a telemedicine visit and that the visit is being conducted through {AMB VIRTUAL VISIT ECQOXU:42424}. {Telemedicine confidentiality :62132} {Telemedicine participants:04402}  She acknowledged consent and understanding of privacy and security of the video platform. The patient has agreed to participate and understands they can discontinue the visit at any time. Patient is aware this is a billable service. Transitional Care Management Review:  Sophie Flores is a 76 y.o. female here for TCM follow up.      During the TCM phone call patient stated:    TCM Call     Date and time call was made  7/10/2023  2:20 PM    Patient was hospitialized at  [facility]    Date of Admission  06/16/23    Date of discharge  07/07/23    Diagnosis  pneumonia    Disposition  Home    Current Symptoms  Fatigue; Cough      TCM Call     I have advised the patient to call PCP with any new or worsening symptoms  cindy jerome med rec    Comments  pt needs virtual - she is homebound        Subjective:     Patient ID: Cheli Corbin is a 76 y.o. female. HPI  Review of Systems      Objective: There were no vitals filed for this visit. Physical Exam    { Medications have NOT been reviewed by provider in current encounter. Once medications have been reviewed, please refresh your progress note so that you can sign the visit }    I spent *** minutes with the patient during this visit. Romi Molina, DO      VIRTUAL VISIT DISCLAIMER    Cheli Corbin verbally agrees to participate in [employer]. Pt is aware that [employer] could be limited without vital signs or the ability to perform a full hands-on physical Behzad Cam understands she or the provider may request at any time to terminate the video visit and request the patient to seek care or treatment in person.

## 2023-07-12 NOTE — PROGRESS NOTES
Virtual Regular Visit    Verification of patient location:    Patient is located at {Amb Virtual Patient Location:[address]} in the following state in which I hold an active license {[facility] virtual patient location:[address]}      Assessment/Plan:    Problem List Items Addressed This Visit    None           Reason for visit is   Chief Complaint   Patient presents with   • Virtual Brief Visit   • Virtual Regular Visit     Hospital f/u   • Virtual Tcm        Encounter provider Nii Hudson DO    Provider located at [address] [place] [phone]      Recent Visits  No visits were found meeting these conditions. Showing recent visits within past 7 days and meeting all other requirements  Today's Visits  Date Type Provider Dept   07/12/23 Telemedicine Nii Hudson DO [facility]   Showing today's visits and meeting all other requirements  Future Appointments  No visits were found meeting these conditions. Showing future appointments within next 150 days and meeting all other requirements       The patient was identified by name and date of birth. Sherrilyn Epley was informed that this is a telemedicine visit and that the visit is being conducted through {[facility] VIRTUAL VISIT QUMXXE:98035}. {Telemedicine confidentiality :21985} {Telemedicine participants:37351}  She acknowledged consent and understanding of privacy and security of the video platform. The patient has agreed to participate and understands they can discontinue the visit at any time. Patient is aware this is a billable service. Subjective  Sherrilyn Epley is a 76 y.o. female *** .       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 ([address]) 8/2/2021   • Scalp laceration 3/17/2023   • Thoracic vertebral fracture (HCC) 3/31/2023       Past Surgical History:   Procedure Laterality Date   • NO PAST SURGERIES         Current Outpatient Medications   Medication Sig Dispense Refill   • Torsemide 40 MG TABS Take 40 mg by mouth 2 (two) times a day     • albuterol (Ventolin HFA) 90 mcg/act inhaler Inhale 2 puffs every 4 (four) hours as needed for wheezing or shortness of breath (cough) 18 g 1   • ammonium lactate (LAC-HYDRIN) 12 % lotion      • ascorbic acid (VITAMIN C) 250 MG tablet Take 250 mg by mouth     • azithromycin (ZITHROMAX) 250 mg tablet TAKE ONCE DAILY BY MOUTH FOR 4 DAYS STARTING ON 7/8/23     • cetirizine (ZyrTEC) 10 mg tablet Take 1 tablet (10 mg total) by mouth daily 90 tablet 1   • ferrous sulfate 325 (65 Fe) mg tablet Take 1 tablet by mouth daily     • fluticasone (FLONASE) 50 mcg/act nasal spray 2 sprays into each nostril daily 48 mL 1   • furosemide (LASIX) 20 mg tablet Take 1 tablet (20 mg total) by mouth daily 90 tablet 1   • gabapentin (NEURONTIN) 300 mg capsule Take 1 capsule (300 mg total) by mouth daily 90 capsule 1   • Klor-Con M20 20 MEQ tablet Take 40 mEq by mouth 3 (three) times a day     • Lidocaine 4 % PTCH Place 2 patches on the skin daily     • methocarbamol (ROBAXIN) 500 mg tablet Take 1 tablet (500 mg total) by mouth every 6 (six) hours as needed for muscle spasms 360 tablet 1   • metoprolol tartrate (LOPRESSOR) 25 mg tablet Take 0.5 tablets (12.5 mg total) by mouth 2 (two) times a day 90 tablet 1   • Multiple Vitamins-Minerals (MULTIVITAMIN ADULTS PO) Take 1 tablet by mouth daily     • oxybutynin (DITROPAN-XL) 10 MG 24 hr tablet Take 10 mg by mouth daily at bedtime     • rivaroxaban (XARELTO) 20 mg tablet Take 1 tablet (20 mg total) by mouth daily with breakfast 90 tablet 1     No current facility-administered medications for this visit.         Allergies   Allergen Reactions   • Other GI Intolerance     Food preservatives   • Sulfa Antibiotics GI Intolerance   • Nitrates, Organic - Food Allergy Rash • Silver Rash       Review of Systems    Video Exam    There were no vitals filed for this visit.     Physical Exam     Visit Time  Total Visit Duration: ***

## 2023-07-20 ENCOUNTER — PATIENT OUTREACH (OUTPATIENT)
Dept: FAMILY MEDICINE CLINIC | Facility: CLINIC | Age: 68
End: 2023-07-20

## 2023-07-20 ENCOUNTER — TELEPHONE (OUTPATIENT)
Dept: FAMILY MEDICINE CLINIC | Facility: CLINIC | Age: 68
End: 2023-07-20

## 2023-07-20 NOTE — TELEPHONE ENCOUNTER
Based on most recent ECHO, she had normal systolic function and they were not able to evaluate diastolic dysfunction -- so based on that she does NOT have CHF diagnosis

## 2023-07-20 NOTE — TELEPHONE ENCOUNTER
Lisa - OT with [facility] just wanted to let you know that she will be seeing patient once a week x 7 weeks. Also needed a clarification if patient has a DX of CHF?

## 2023-07-20 NOTE — PROGRESS NOTES
Outpatient Care Management Note:    Telephone outreach made to introduce self and role of outpatient care management, follow up on general health, and outreach for any possible medical or psychosocial services needed. Spoke with a male who identified self a patient's spouse. CM explained OPCM program, that support would be telephonic to provide education about chronic illness, medications and identify any SDOH needs. All questions answered to patient's satisfaction. Spouse does not feel patient needs OPCM Services at this time as patient has [address] coming into the home at present time.     Complex Care episode closed and I removed myself from the care team.

## 2023-07-27 ENCOUNTER — TELEPHONE (OUTPATIENT)
Dept: FAMILY MEDICINE CLINIC | Facility: CLINIC | Age: 68
End: 2023-07-27

## 2023-07-27 ENCOUNTER — TELEPHONE (OUTPATIENT)
Dept: OTHER | Facility: OTHER | Age: 68
End: 2023-07-27

## 2023-07-27 NOTE — TELEPHONE ENCOUNTER
Lisa from [facility] called and said that Jenni cancelled her OT today and refused a reschedule for this week.

## 2023-08-04 ENCOUNTER — TELEPHONE (OUTPATIENT)
Dept: FAMILY MEDICINE CLINIC | Facility: CLINIC | Age: 68
End: 2023-08-04

## 2023-08-04 NOTE — TELEPHONE ENCOUNTER
[facility] called to let you know of a missed visit.  Patient decline OT this week

## 2023-08-06 DIAGNOSIS — F32.A DEPRESSION, UNSPECIFIED DEPRESSION TYPE: ICD-10-CM

## 2023-08-07 RX ORDER — ESCITALOPRAM OXALATE 5 MG/1
5 TABLET ORAL DAILY
Qty: 90 TABLET | Refills: 1 | Status: SHIPPED | OUTPATIENT
Start: 2023-08-07

## 2023-08-10 DIAGNOSIS — I48.91 ATRIAL FIBRILLATION, UNSPECIFIED TYPE (HCC): ICD-10-CM

## 2023-08-10 DIAGNOSIS — E87.6 HYPOKALEMIA: Primary | ICD-10-CM

## 2023-08-10 RX ORDER — POTASSIUM CHLORIDE 1500 MG/1
40 TABLET, EXTENDED RELEASE ORAL 3 TIMES DAILY
Qty: 180 TABLET | Refills: 5 | Status: SHIPPED | OUTPATIENT
Start: 2023-08-10 | End: 2023-08-15

## 2023-08-11 ENCOUNTER — TELEPHONE (OUTPATIENT)
Dept: FAMILY MEDICINE CLINIC | Facility: CLINIC | Age: 68
End: 2023-08-11

## 2023-08-11 DIAGNOSIS — I89.0 LYMPHEDEMA OF BOTH LOWER EXTREMITIES: ICD-10-CM

## 2023-08-11 DIAGNOSIS — I48.91 ATRIAL FIBRILLATION, UNSPECIFIED TYPE (HCC): Primary | ICD-10-CM

## 2023-08-11 DIAGNOSIS — E66.2 OBESITY HYPOVENTILATION SYNDROME (HCC): ICD-10-CM

## 2023-08-11 RX ORDER — TORSEMIDE 20 MG/1
20 TABLET ORAL 2 TIMES DAILY
Qty: 180 TABLET | Refills: 1 | Status: SHIPPED | OUTPATIENT
Start: 2023-08-11 | End: 2023-08-15

## 2023-08-11 NOTE — TELEPHONE ENCOUNTER
Based on her good curt[facility] discharge paperwork, she is supposed to be taking Torsemide 20 mg BID not Lasix -- can we please clarify with pt

## 2023-08-11 NOTE — TELEPHONE ENCOUNTER
Script sent.  Please remind pt to schedule mobile lab visit to have her blood work drawn to check on her kidney function and electrolytes

## 2023-08-13 DIAGNOSIS — N95.0 PMB (POSTMENOPAUSAL BLEEDING): Primary | ICD-10-CM

## 2023-08-15 ENCOUNTER — TELEMEDICINE (OUTPATIENT)
Dept: FAMILY MEDICINE CLINIC | Facility: CLINIC | Age: 68
End: 2023-08-15
Payer: COMMERCIAL

## 2023-08-15 DIAGNOSIS — D50.9 IRON DEFICIENCY ANEMIA, UNSPECIFIED IRON DEFICIENCY ANEMIA TYPE: Primary | ICD-10-CM

## 2023-08-15 DIAGNOSIS — Z79.899 LONG TERM CURRENT USE OF DIURETIC: ICD-10-CM

## 2023-08-15 DIAGNOSIS — E87.6 HYPOKALEMIA: ICD-10-CM

## 2023-08-15 DIAGNOSIS — I89.0 LYMPHEDEMA OF BOTH LOWER EXTREMITIES: ICD-10-CM

## 2023-08-15 PROCEDURE — 99213 OFFICE O/P EST LOW 20 MIN: CPT | Performed by: FAMILY MEDICINE

## 2023-08-15 RX ORDER — POTASSIUM CHLORIDE 1500 MG/1
TABLET, EXTENDED RELEASE ORAL
Qty: 180 TABLET | Refills: 5
Start: 2023-08-15

## 2023-08-15 RX ORDER — TORSEMIDE 20 MG/1
20 TABLET ORAL DAILY
Qty: 180 TABLET | Refills: 1
Start: 2023-08-15

## 2023-08-15 NOTE — PROGRESS NOTES
Virtual Regular Visit    Verification of patient location:    Patient is located at Home in the following state in which I hold an active license [place]      Assessment/Plan:    Problem List Items Addressed This Visit        Other    Lymphedema of leg    Relevant Medications    torsemide (DEMADEX) 20 mg tablet   Other Visit Diagnoses     Iron deficiency anemia, unspecified iron deficiency anemia type    -  Primary    Relevant Orders    CBC and differential    Iron Panel (Includes Ferritin, Iron Sat%, Iron, and TIBC)    Long term current use of diuretic        Relevant Orders    Comprehensive metabolic panel    Hypokalemia        Relevant Medications    Klor-Con M20 20 MEQ tablet        Can trial decrease in Torsemide to once daily, will also decrease K supplement by half. Encouraged to monitor for worsening edema, dyspnea, decrease in oxygenation. Discussed changing Metoprolol to XL so she can take 25 mg once daily -- pt defers. Can defer ultrasound if pt is no longer having blood spotting -- pt thinks it may have been hemorrhoidal.   Encouraged to call mobile lab to update blood work as previously ordered. Reason for visit is   Chief Complaint   Patient presents with   • Virtual Regular Visit        Encounter provider Pippa Pickens DO    Provider located at [address] [place] [phone]      Recent Visits  Date Type Provider Dept   08/11/23 Telephone Bhumika Bradley [facility] recent visits within past 7 days and meeting all other requirements  Today's Visits  Date Type Provider Dept   08/15/23 Telemedicine Pippa Pickens DO [facility]   Showing today's visits and meeting all other requirements  Future Appointments  No visits were found meeting these conditions. Showing future appointments within next 150 days and meeting all other requirements       The patient was identified by name and date of birth. Luc Zhang was informed that this is a telemedicine visit and that the visit is being conducted through the [employer]. She agrees to proceed. .  My office door was closed. No one else was in the room. She acknowledged consent and understanding of privacy and security of the video platform. The patient has agreed to participate and understands they can discontinue the visit at any time. Patient is aware this is a billable service. Subjective  Luc Zhang is a 76 y.o. female who presents to discuss medication. HPI     Currently taking Torsemide 20 mg BID (and K 40 TID) -- is having polyuria and wondering if she can decrease this   States her breathing is "okay" during the day     Metoprolol is a small tab -- wondering if she can just take 25 in AM instead of 12.5 mg BID     Had an episode of blood in her underwear -- had suggested pelvic US, but pt isn't able to get this at home. She states she is not having any further bleeding at this time.      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 ([address]) 8/2/2021   • Scalp laceration 3/17/2023   • Thoracic vertebral fracture (HCC) 3/31/2023       Past Surgical History:   Procedure Laterality Date   • NO PAST SURGERIES         Current Outpatient Medications   Medication Sig Dispense Refill   • Klor-Con M20 20 MEQ tablet Take 40 mEq AM + 20 mEq PM by mouth daily 180 tablet 5   • torsemide (DEMADEX) 20 mg tablet Take 1 tablet (20 mg total) by mouth in the morning 180 tablet 1   • albuterol (Ventolin HFA) 90 mcg/act inhaler Inhale 2 puffs every 4 (four) hours as needed for wheezing or shortness of breath (cough) 18 g 1   • ammonium lactate (LAC-HYDRIN) 12 % lotion      • ascorbic acid (VITAMIN C) 250 MG tablet Take 250 mg by mouth     • azithromycin (ZITHROMAX) 250 mg tablet TAKE ONCE DAILY BY MOUTH FOR 4 DAYS STARTING ON 7/8/23     • cetirizine (ZyrTEC) 10 mg tablet Take 1 tablet (10 mg total) by mouth daily 90 tablet 1   • Diclofenac Sodium (VOLTAREN) 1 % PLEASE SEE ATTACHED FOR DETAILED DIRECTIONS     • escitalopram (LEXAPRO) 5 mg tablet TAKE 1 TABLET (5 MG TOTAL) BY MOUTH DAILY. 90 tablet 1   • ferrous sulfate 325 (65 Fe) mg tablet Take 1 tablet by mouth daily     • fluticasone (FLONASE) 50 mcg/act nasal spray 2 sprays into each nostril daily 48 mL 1   • gabapentin (NEURONTIN) 300 mg capsule Take 1 capsule (300 mg total) by mouth daily 90 capsule 1   • Lidocaine 4 % PTCH Place 2 patches on the skin daily     • methocarbamol (ROBAXIN) 500 mg tablet Take 1 tablet (500 mg total) by mouth every 6 (six) hours as needed for muscle spasms 360 tablet 1   • metoprolol tartrate (LOPRESSOR) 25 mg tablet Take 0.5 tablets (12.5 mg total) by mouth 2 (two) times a day 90 tablet 1   • Multiple Vitamins-Minerals (MULTIVITAMIN ADULTS PO) Take 1 tablet by mouth daily     • oxybutynin (DITROPAN-XL) 10 MG 24 hr tablet Take 10 mg by mouth daily at bedtime     • rivaroxaban (XARELTO) 20 mg tablet Take 1 tablet (20 mg total) by mouth daily with breakfast 90 tablet 1     No current facility-administered medications for this visit. Allergies   Allergen Reactions   • Other GI Intolerance     Food preservatives   • Sulfa Antibiotics GI Intolerance   • Nitrates, Organic - Food Allergy Rash   • Silver Rash       Review of Systems   Constitutional: Diaphoresis: waking up hot at night. Cardiovascular: Positive for leg swelling. Endocrine: Positive for polyuria. Video Exam    There were no vitals filed for this visit. Physical Exam  Vitals and nursing note reviewed. Constitutional:       General: She is not in acute distress. Appearance: Normal appearance. Interventions: Nasal cannula in place. HENT:      Head: Normocephalic and atraumatic.    Pulmonary: Effort: Pulmonary effort is normal. No respiratory distress. Neurological:      Mental Status: She is alert.    Psychiatric:         Mood and Affect: Mood normal.          Visit Time  Total Visit Duration: 8

## 2023-08-18 ENCOUNTER — TELEPHONE (OUTPATIENT)
Dept: FAMILY MEDICINE CLINIC | Facility: CLINIC | Age: 68
End: 2023-08-18

## 2023-08-18 ENCOUNTER — TELEPHONE (OUTPATIENT)
Dept: LAB | Facility: HOSPITAL | Age: 68
End: 2023-08-18

## 2023-08-18 NOTE — TELEPHONE ENCOUNTER
She can consider an increase in her Metoprolol -- she can take a full tab instead of half; however, if her heart rate is consistently high, would recommend she go to ED for evaluation. An elevated potassium level can potentially cause both a high or a low heart rate.  We did decrease her potassium supplement at her recent visit but I would need her blood work to know if it needs to be adjusted further

## 2023-08-18 NOTE — TELEPHONE ENCOUNTER
Can she have her increase Metoprolol her HR was running about 150.  Also was wondering if K+ can increase his HR?

## 2023-08-18 NOTE — TELEPHONE ENCOUNTER
Lisa called from [address] [place] [address]. Therapy stated that patient cancelled her appointment for today because she was not feeling good.

## 2023-08-25 ENCOUNTER — APPOINTMENT (OUTPATIENT)
Dept: LAB | Facility: HOSPITAL | Age: 68
End: 2023-08-25
Payer: COMMERCIAL

## 2023-08-25 DIAGNOSIS — Z79.899 LONG TERM CURRENT USE OF DIURETIC: ICD-10-CM

## 2023-08-25 DIAGNOSIS — D50.9 IRON DEFICIENCY ANEMIA, UNSPECIFIED IRON DEFICIENCY ANEMIA TYPE: ICD-10-CM

## 2023-08-25 LAB
ALBUMIN SERPL BCP-MCNC: 2.9 G/DL (ref 3.5–5)
ALP SERPL-CCNC: 63 U/L (ref 34–104)
ALT SERPL W P-5'-P-CCNC: 6 U/L (ref 7–52)
ANION GAP SERPL CALCULATED.3IONS-SCNC: 3 MMOL/L
AST SERPL W P-5'-P-CCNC: 10 U/L (ref 13–39)
BASOPHILS # BLD AUTO: 0.03 THOUSANDS/ÂΜL (ref 0–0.1)
BASOPHILS NFR BLD AUTO: 1 % (ref 0–1)
BILIRUB SERPL-MCNC: 0.36 MG/DL (ref 0.2–1)
BUN SERPL-MCNC: 10 MG/DL (ref 5–25)
CALCIUM ALBUM COR SERPL-MCNC: 9.5 MG/DL (ref 8.3–10.1)
CALCIUM SERPL-MCNC: 8.6 MG/DL (ref 8.4–10.2)
CHLORIDE SERPL-SCNC: 95 MMOL/L (ref 96–108)
CO2 SERPL-SCNC: 45 MMOL/L (ref 21–32)
CREAT SERPL-MCNC: 0.6 MG/DL (ref 0.6–1.3)
EOSINOPHIL # BLD AUTO: 0.34 THOUSAND/ÂΜL (ref 0–0.61)
EOSINOPHIL NFR BLD AUTO: 6 % (ref 0–6)
ERYTHROCYTE [DISTWIDTH] IN BLOOD BY AUTOMATED COUNT: 17.9 % (ref 11.6–15.1)
FERRITIN SERPL-MCNC: 47 NG/ML (ref 11–307)
GFR SERPL CREATININE-BSD FRML MDRD: 93 ML/MIN/1.73SQ M
GLUCOSE P FAST SERPL-MCNC: 93 MG/DL (ref 65–99)
HCT VFR BLD AUTO: 34.9 % (ref 34.8–46.1)
HGB BLD-MCNC: 9.7 G/DL (ref 11.5–15.4)
IMM GRANULOCYTES # BLD AUTO: 0.04 THOUSAND/UL (ref 0–0.2)
IMM GRANULOCYTES NFR BLD AUTO: 1 % (ref 0–2)
IRON SATN MFR SERPL: 12 % (ref 15–50)
IRON SERPL-MCNC: 30 UG/DL (ref 50–212)
LYMPHOCYTES # BLD AUTO: 1.59 THOUSANDS/ÂΜL (ref 0.6–4.47)
LYMPHOCYTES NFR BLD AUTO: 28 % (ref 14–44)
MCH RBC QN AUTO: 26.5 PG (ref 26.8–34.3)
MCHC RBC AUTO-ENTMCNC: 27.8 G/DL (ref 31.4–37.4)
MCV RBC AUTO: 95 FL (ref 82–98)
MONOCYTES # BLD AUTO: 0.59 THOUSAND/ÂΜL (ref 0.17–1.22)
MONOCYTES NFR BLD AUTO: 10 % (ref 4–12)
NEUTROPHILS # BLD AUTO: 3.06 THOUSANDS/ÂΜL (ref 1.85–7.62)
NEUTS SEG NFR BLD AUTO: 54 % (ref 43–75)
NRBC BLD AUTO-RTO: 0 /100 WBCS
PLATELET # BLD AUTO: 222 THOUSANDS/UL (ref 149–390)
PMV BLD AUTO: 10.7 FL (ref 8.9–12.7)
POTASSIUM SERPL-SCNC: 3.7 MMOL/L (ref 3.5–5.3)
PROT SERPL-MCNC: 6.3 G/DL (ref 6.4–8.4)
RBC # BLD AUTO: 3.66 MILLION/UL (ref 3.81–5.12)
SODIUM SERPL-SCNC: 143 MMOL/L (ref 135–147)
TIBC SERPL-MCNC: 255 UG/DL (ref 250–450)
UIBC SERPL-MCNC: 225 UG/DL (ref 155–355)
WBC # BLD AUTO: 5.65 THOUSAND/UL (ref 4.31–10.16)

## 2023-08-25 PROCEDURE — 80053 COMPREHEN METABOLIC PANEL: CPT

## 2023-08-25 PROCEDURE — 36415 COLL VENOUS BLD VENIPUNCTURE: CPT

## 2023-08-25 PROCEDURE — 83540 ASSAY OF IRON: CPT

## 2023-08-25 PROCEDURE — 83550 IRON BINDING TEST: CPT

## 2023-08-25 PROCEDURE — 85025 COMPLETE CBC W/AUTO DIFF WBC: CPT

## 2023-08-25 PROCEDURE — 82728 ASSAY OF FERRITIN: CPT

## 2023-08-29 ENCOUNTER — TELEPHONE (OUTPATIENT)
Dept: FAMILY MEDICINE CLINIC | Facility: CLINIC | Age: 68
End: 2023-08-29

## 2023-08-29 NOTE — TELEPHONE ENCOUNTER
Patient notified, if virtual can not address any knee pain.   She wanted to just review her results, appointment changed

## 2023-08-29 NOTE — TELEPHONE ENCOUNTER
Patient called to ask if her visit tomorrow can be virtual.  I asked if it was for knee pain and stated in the notes, she said that it is more to review results with her. Is virtual ok?

## 2023-08-29 NOTE — TELEPHONE ENCOUNTER
If it is for results review, ok for virtual. If it is for knee pain, needs to be evaluated in person and this cannot be done virtually

## 2023-08-30 ENCOUNTER — TELEMEDICINE (OUTPATIENT)
Dept: FAMILY MEDICINE CLINIC | Facility: CLINIC | Age: 68
End: 2023-08-30
Payer: COMMERCIAL

## 2023-08-30 DIAGNOSIS — E66.2 OBESITY HYPOVENTILATION SYNDROME (HCC): ICD-10-CM

## 2023-08-30 DIAGNOSIS — D50.9 IRON DEFICIENCY ANEMIA, UNSPECIFIED IRON DEFICIENCY ANEMIA TYPE: Primary | ICD-10-CM

## 2023-08-30 DIAGNOSIS — Z79.899 LONG TERM USE OF DRUG: ICD-10-CM

## 2023-08-30 PROCEDURE — 99214 OFFICE O/P EST MOD 30 MIN: CPT | Performed by: FAMILY MEDICINE

## 2023-08-30 NOTE — PROGRESS NOTES
Virtual Regular Visit    Verification of patient location:    Patient is located at Home in the following state in which I hold an active license [place]      Assessment/Plan:    Problem List Items Addressed This Visit        Other    Obesity hypoventilation syndrome ([address])    Iron deficiency anemia - Primary    Relevant Orders    CBC and differential    Iron Panel (Includes Ferritin, Iron Sat%, Iron, and TIBC)   Other Visit Diagnoses     Long term use of drug        Relevant Orders    Comprehensive metabolic panel        Pt denies any bleeding symptoms. Will increase iron supplement to twice daily. Pt would like to recheck blood work in 1 month, though did discuss we may not see significant changes right away. Discussed consideration of stopping Torsemide, though did discuss potential effects of not taking including worsening LE edema, fluid build up in lungs. Pt would like to hold off for now. Reviewed pt would benefit from sleep study to evaluate for likely JOSE/Obesity Hypoventilation Syndrome, which is likely contributing to elevated CO2/drops in O2 with exertion. Study is ordered, provided pt phone number to schedule. Did discuss potential for home study, but may need to be in sleep lab. Ultimately, pt would benefit from in person evaluation. Will discuss with provider in office who performs home visits to see if this can be arranged.           Reason for visit is   Chief Complaint   Patient presents with   • Virtual Regular Visit        Encounter provider Kendall Manriquez DO    Provider located at [address] [place] [phone]      Recent Visits  Date Type Provider Dept   08/29/23 Telephone Renato Bradley [facility] recent visits within past 7 days and meeting all other requirements  Today's Visits  Date Type Provider Dept   08/30/23 Telemedicine Kylie Jimenez DO [facility] today's visits and meeting all other requirements  Future Appointments  No visits were found meeting these conditions. Showing future appointments within next 150 days and meeting all other requirements       The patient was identified by name and date of birth. Gris Aviles was informed that this is a telemedicine visit and that the visit is being conducted through the [employer]. She agrees to proceed. .  My office door was closed. No one else was in the room. She acknowledged consent and understanding of privacy and security of the video platform. The patient has agreed to participate and understands they can discontinue the visit at any time. Patient is aware this is a billable service. Subjective  Gris Aviles is a 76 y.o. female who presents for lab review. HPI     Cr 0.6/GFR 93, K 3.8 -- did decrease diuretic and potassium supplement and pt notes improvement in urination   LFTs WNL; Albumin 2.9/Protein 6.3  CO2 45   Glucose 93  Hb 9.7 (down from 10.2); Iron 30, Ferritin 47 -- has been taking iron supplement daily; no blood in urine, stool    Wondering if she can stop Torsemide     Had an episode of hypoxia while working with PT yesterday, which, per pt, rebounded quickly.  She monitors her pulse ox daily and does not report hypoxia at rest.     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 ([address]) 8/2/2021   • Scalp laceration 3/17/2023   • Thoracic vertebral fracture ([address]) 3/31/2023       Past Surgical History:   Procedure Laterality Date   • NO PAST SURGERIES         Current Outpatient Medications   Medication Sig Dispense Refill   • albuterol (Ventolin HFA) 90 mcg/act inhaler Inhale 2 puffs every 4 (four) hours as needed for wheezing or shortness of breath (cough) 18 g 1   • ammonium lactate (LAC-HYDRIN) 12 % lotion      • ascorbic acid (VITAMIN C) 250 MG tablet Take 250 mg by mouth     • cetirizine (ZyrTEC) 10 mg tablet Take 1 tablet (10 mg total) by mouth daily 90 tablet 1   • Diclofenac Sodium (VOLTAREN) 1 % PLEASE SEE ATTACHED FOR DETAILED DIRECTIONS     • escitalopram (LEXAPRO) 5 mg tablet TAKE 1 TABLET (5 MG TOTAL) BY MOUTH DAILY. 90 tablet 1   • ferrous sulfate 325 (65 Fe) mg tablet Take 1 tablet by mouth 2 (two) times a day     • Klor-Con M20 20 MEQ tablet Take 40 mEq AM + 20 mEq PM by mouth daily 180 tablet 5   • Lidocaine 4 % PTCH Place 2 patches on the skin daily     • methocarbamol (ROBAXIN) 500 mg tablet Take 1 tablet (500 mg total) by mouth every 6 (six) hours as needed for muscle spasms 360 tablet 1   • metoprolol tartrate (LOPRESSOR) 25 mg tablet Take 0.5 tablets (12.5 mg total) by mouth 2 (two) times a day 90 tablet 1   • Multiple Vitamins-Minerals (MULTIVITAMIN ADULTS PO) Take 1 tablet by mouth daily     • oxybutynin (DITROPAN-XL) 10 MG 24 hr tablet Take 10 mg by mouth daily at bedtime     • torsemide (DEMADEX) 20 mg tablet Take 1 tablet (20 mg total) by mouth in the morning 180 tablet 1     No current facility-administered medications for this visit. Allergies   Allergen Reactions   • Other GI Intolerance     Food preservatives   • Sulfa Antibiotics GI Intolerance   • Nitrates, Organic - Food Allergy Rash   • Silver Rash       Review of Systems   Constitutional: Positive for fatigue. Gastrointestinal: Negative for blood in stool. Genitourinary: Positive for frequency (improved). Negative for hematuria. Neurological: Positive for weakness. Video Exam    There were no vitals filed for this visit. Physical Exam  Vitals and nursing note reviewed. Constitutional:       General: She is not in acute distress. Pulmonary:      Effort: Pulmonary effort is normal.   Neurological:      Mental Status: She is alert.    Psychiatric:         Mood and Affect: Mood normal.     Unable to see pt on video, but able to hear pt Visit Time  Total Visit Duration: 15

## 2023-08-31 ENCOUNTER — TELEPHONE (OUTPATIENT)
Dept: FAMILY MEDICINE CLINIC | Facility: CLINIC | Age: 68
End: 2023-08-31

## 2023-08-31 NOTE — TELEPHONE ENCOUNTER
Lisa from [facility] called after seeing Herbie Boyd in her home today. She wanted you to be aware that her O2 at its lowest was 79 at rest, remaining between 85-86. When they practiced deep breathing her O2 went up to 91-92. They did not do any therapy with her today as her O2 was too low.  Lisa did say that Herbie Boyd advised her it has been low like that lately

## 2023-09-21 ENCOUNTER — TELEPHONE (OUTPATIENT)
Dept: FAMILY MEDICINE CLINIC | Facility: CLINIC | Age: 68
End: 2023-09-21

## 2023-09-21 DIAGNOSIS — R05.1 ACUTE COUGH: Primary | ICD-10-CM

## 2023-09-21 NOTE — TELEPHONE ENCOUNTER
Patient called asking if you would be able to order a mobile CHEST X-RAY and labs? Stated that she is coughing again and afraid she is going to get pneumonia back.

## 2023-09-21 NOTE — TELEPHONE ENCOUNTER
CXR ordered -- I believe they need to call to schedule mobile.      If her oxygen drops <90% and stays there or she has worsening shortness of breath, should be evaluated in the ED

## 2023-09-26 DIAGNOSIS — J18.9 PNEUMONIA OF LEFT LOWER LOBE DUE TO INFECTIOUS ORGANISM: Primary | ICD-10-CM

## 2023-09-26 RX ORDER — AMOXICILLIN AND CLAVULANATE POTASSIUM 875; 125 MG/1; MG/1
1 TABLET, FILM COATED ORAL EVERY 12 HOURS SCHEDULED
Qty: 14 TABLET | Refills: 0 | Status: SHIPPED | OUTPATIENT
Start: 2023-09-26 | End: 2023-10-18

## 2023-09-26 RX ORDER — AZITHROMYCIN 250 MG/1
TABLET, FILM COATED ORAL
Qty: 6 TABLET | Refills: 0 | Status: SHIPPED | OUTPATIENT
Start: 2023-09-26 | End: 2023-10-18

## 2023-09-28 ENCOUNTER — HOSPITAL ENCOUNTER (INPATIENT)
Facility: HOSPITAL | Age: 68
LOS: 20 days | DRG: 189 | End: 2023-10-18
Attending: EMERGENCY MEDICINE | Admitting: ANESTHESIOLOGY
Payer: COMMERCIAL

## 2023-09-28 ENCOUNTER — APPOINTMENT (INPATIENT)
Dept: NON INVASIVE DIAGNOSTICS | Facility: HOSPITAL | Age: 68
DRG: 189 | End: 2023-09-28
Payer: COMMERCIAL

## 2023-09-28 ENCOUNTER — APPOINTMENT (EMERGENCY)
Dept: RADIOLOGY | Facility: HOSPITAL | Age: 68
DRG: 189 | End: 2023-09-28
Payer: COMMERCIAL

## 2023-09-28 DIAGNOSIS — J96.02 ACUTE RESPIRATORY FAILURE WITH HYPOXIA AND HYPERCAPNIA (HCC): Primary | ICD-10-CM

## 2023-09-28 DIAGNOSIS — J96.01 ACUTE RESPIRATORY FAILURE WITH HYPOXIA AND HYPERCAPNIA (HCC): Primary | ICD-10-CM

## 2023-09-28 DIAGNOSIS — S81.802A OPEN WOUND OF LEFT LOWER EXTREMITY, INITIAL ENCOUNTER: ICD-10-CM

## 2023-09-28 DIAGNOSIS — J96.22 ACUTE ON CHRONIC RESPIRATORY FAILURE WITH HYPOXIA AND HYPERCAPNIA (HCC): ICD-10-CM

## 2023-09-28 DIAGNOSIS — E66.2 OBESITY HYPOVENTILATION SYNDROME (HCC): ICD-10-CM

## 2023-09-28 DIAGNOSIS — J96.21 ACUTE ON CHRONIC RESPIRATORY FAILURE WITH HYPOXIA AND HYPERCAPNIA (HCC): ICD-10-CM

## 2023-09-28 DIAGNOSIS — E66.01 CLASS 3 SEVERE OBESITY WITH BODY MASS INDEX (BMI) GREATER THAN OR EQUAL TO 70 IN ADULT, UNSPECIFIED OBESITY TYPE, UNSPECIFIED WHETHER SERIOUS COMORBIDITY PRESENT (HCC): ICD-10-CM

## 2023-09-28 DIAGNOSIS — G93.40 ACUTE ENCEPHALOPATHY: ICD-10-CM

## 2023-09-28 PROBLEM — R79.89 ELEVATED TROPONIN: Status: ACTIVE | Noted: 2023-09-28

## 2023-09-28 PROBLEM — R79.89 ELEVATED BRAIN NATRIURETIC PEPTIDE (BNP) LEVEL: Status: ACTIVE | Noted: 2023-09-28

## 2023-09-28 LAB
2HR DELTA HS TROPONIN: 9 NG/L
4HR DELTA HS TROPONIN: 37 NG/L
ALBUMIN SERPL BCP-MCNC: 3.8 G/DL (ref 3.5–5)
ALP SERPL-CCNC: 81 U/L (ref 34–104)
ALT SERPL W P-5'-P-CCNC: 10 U/L (ref 7–52)
ANION GAP SERPL CALCULATED.3IONS-SCNC: 2 MMOL/L
ANION GAP SERPL CALCULATED.3IONS-SCNC: 3 MMOL/L
AORTIC ROOT: 3.4 CM
AORTIC VALVE MEAN VELOCITY: 17.1 M/S
APICAL FOUR CHAMBER EJECTION FRACTION: 76 %
APTT PPP: 35 SECONDS (ref 23–37)
ARTERIAL PATENCY WRIST A: YES
ARTERIAL PATENCY WRIST A: YES
ASCENDING AORTA: 3.8 CM
AST SERPL W P-5'-P-CCNC: 13 U/L (ref 13–39)
ATRIAL RATE: 90 BPM
ATRIAL RATE: 94 BPM
ATRIAL RATE: 95 BPM
AV AREA BY CONTINUOUS VTI: 3.8 CM2
AV AREA PEAK VELOCITY: 3.6 CM2
AV LVOT MEAN GRADIENT: 9 MMHG
AV LVOT PEAK GRADIENT: 16 MMHG
AV MEAN GRADIENT: 14 MMHG
AV PEAK GRADIENT: 25 MMHG
AV VALVE AREA: 3.76 CM2
BASE EX.OXY STD BLDV CALC-SCNC: 79.7 % (ref 60–80)
BASE EX.OXY STD BLDV CALC-SCNC: 81.2 % (ref 60–80)
BASE EXCESS BLDA CALC-SCNC: 10 MMOL/L
BASE EXCESS BLDA CALC-SCNC: 13.4 MMOL/L
BASE EXCESS BLDA CALC-SCNC: 13.8 MMOL/L
BASE EXCESS BLDA CALC-SCNC: 13.8 MMOL/L
BASE EXCESS BLDA CALC-SCNC: 14 MMOL/L (ref -2–3)
BASE EXCESS BLDA CALC-SCNC: 16.3 MMOL/L
BASE EXCESS BLDA CALC-SCNC: 7.7 MMOL/L
BASE EXCESS BLDA CALC-SCNC: 8.3 MMOL/L
BASE EXCESS BLDV CALC-SCNC: 14.3 MMOL/L
BASE EXCESS BLDV CALC-SCNC: 6.5 MMOL/L
BASOPHILS # BLD AUTO: 0.02 THOUSANDS/ÂΜL (ref 0–0.1)
BASOPHILS NFR BLD AUTO: 0 % (ref 0–1)
BILIRUB SERPL-MCNC: 0.61 MG/DL (ref 0.2–1)
BNP SERPL-MCNC: 438 PG/ML (ref 0–100)
BODY TEMPERATURE: 99.5 DEGREES FEHRENHEIT
BODY TEMPERATURE: 99.6 DEGREES FEHRENHEIT
BODY TEMPERATURE: 99.6 DEGREES FEHRENHEIT
BODY TEMPERATURE: 99.7 DEGREES FEHRENHEIT
BUN SERPL-MCNC: 23 MG/DL (ref 5–25)
BUN SERPL-MCNC: 26 MG/DL (ref 5–25)
CA-I BLD-SCNC: 1.05 MMOL/L (ref 1.12–1.32)
CALCIUM SERPL-MCNC: 8.9 MG/DL (ref 8.4–10.2)
CALCIUM SERPL-MCNC: 8.9 MG/DL (ref 8.4–10.2)
CARDIAC TROPONIN I PNL SERPL HS: 113 NG/L
CARDIAC TROPONIN I PNL SERPL HS: 122 NG/L
CARDIAC TROPONIN I PNL SERPL HS: 150 NG/L
CARDIAC TROPONIN I PNL SERPL HS: 94 NG/L (ref 8–18)
CHLORIDE SERPL-SCNC: 93 MMOL/L (ref 96–108)
CHLORIDE SERPL-SCNC: 93 MMOL/L (ref 96–108)
CO2 SERPL-SCNC: 43 MMOL/L (ref 21–32)
CO2 SERPL-SCNC: 44 MMOL/L (ref 21–32)
CREAT SERPL-MCNC: 1.01 MG/DL (ref 0.6–1.3)
CREAT SERPL-MCNC: 1.04 MG/DL (ref 0.6–1.3)
DOP CALC AO VTI: 43.9 CM
DOP CALC LVOT AREA: 4.52
DOP CALC LVOT DIAMETER: 2.4 CM
DOP CALC LVOT PEAK VEL VTI: 36.5 CM
DOP CALC LVOT PEAK VEL: 1.97 M/S
DOP CALC LVOT STROKE INDEX: 54.5 ML/M2
DOP CALC LVOT STROKE VOLUME: 165.04
E WAVE DECELERATION TIME: 250 MS
EOSINOPHIL # BLD AUTO: 0.26 THOUSAND/ÂΜL (ref 0–0.61)
EOSINOPHIL NFR BLD AUTO: 2 % (ref 0–6)
ERYTHROCYTE [DISTWIDTH] IN BLOOD BY AUTOMATED COUNT: 16.1 % (ref 11.6–15.1)
FLUAV RNA RESP QL NAA+PROBE: NEGATIVE
FLUBV RNA RESP QL NAA+PROBE: NEGATIVE
FRACTIONAL SHORTENING: 35 (ref 28–44)
GFR SERPL CREATININE-BSD FRML MDRD: 55 ML/MIN/1.73SQ M
GFR SERPL CREATININE-BSD FRML MDRD: 57 ML/MIN/1.73SQ M
GLUCOSE SERPL-MCNC: 119 MG/DL (ref 65–140)
GLUCOSE SERPL-MCNC: 123 MG/DL (ref 65–140)
GLUCOSE SERPL-MCNC: 134 MG/DL (ref 65–140)
GLUCOSE SERPL-MCNC: 139 MG/DL (ref 65–140)
HCO3 BLDA-SCNC: 37.2 MMOL/L (ref 22–28)
HCO3 BLDA-SCNC: 37.4 MMOL/L (ref 22–28)
HCO3 BLDA-SCNC: 38.5 MMOL/L (ref 22–28)
HCO3 BLDA-SCNC: 40.1 MMOL/L (ref 22–28)
HCO3 BLDA-SCNC: 42.2 MMOL/L (ref 22–28)
HCO3 BLDA-SCNC: 43 MMOL/L (ref 24–30)
HCO3 BLDA-SCNC: 44 MMOL/L (ref 22–28)
HCO3 BLDA-SCNC: 44 MMOL/L (ref 22–28)
HCO3 BLDV-SCNC: 36.6 MMOL/L (ref 24–30)
HCO3 BLDV-SCNC: 46.9 MMOL/L (ref 24–30)
HCT VFR BLD AUTO: 36.5 % (ref 34.8–46.1)
HCT VFR BLD CALC: 35 % (ref 34.8–46.1)
HGB BLD-MCNC: 10.1 G/DL (ref 11.5–15.4)
HGB BLDA-MCNC: 11.9 G/DL (ref 11.5–15.4)
IMM GRANULOCYTES # BLD AUTO: 0.14 THOUSAND/UL (ref 0–0.2)
IMM GRANULOCYTES NFR BLD AUTO: 1 % (ref 0–2)
INR PPP: 1.04 (ref 0.84–1.19)
INTERVENTRICULAR SEPTUM IN DIASTOLE (PARASTERNAL SHORT AXIS VIEW): 1.3 CM
INTERVENTRICULAR SEPTUM: 1.3 CM (ref 0.6–1.1)
IPAP: 18
IPAP: 24
IPAP: 24
IPAP: 28
LA/AORTA RATIO 2D: 1.06
LAAS-AP2: 32.3 CM2
LAAS-AP4: 29.9 CM2
LACTATE SERPL-SCNC: 0.8 MMOL/L (ref 0.5–2)
LACTATE SERPL-SCNC: 1 MMOL/L (ref 0.5–2)
LEFT ATRIUM SIZE: 3.6 CM
LEFT ATRIUM VOLUME (MOD BIPLANE): 108 ML
LEFT INTERNAL DIMENSION IN SYSTOLE: 3.6 CM (ref 2.1–4)
LEFT VENTRICULAR INTERNAL DIMENSION IN DIASTOLE: 5.5 CM (ref 3.5–6)
LEFT VENTRICULAR POSTERIOR WALL IN END DIASTOLE: 1.3 CM
LEFT VENTRICULAR STROKE VOLUME: 93 ML
LVSV (TEICH): 93 ML
LYMPHOCYTES # BLD AUTO: 0.81 THOUSANDS/ÂΜL (ref 0.6–4.47)
LYMPHOCYTES NFR BLD AUTO: 7 % (ref 14–44)
MCH RBC QN AUTO: 27.3 PG (ref 26.8–34.3)
MCHC RBC AUTO-ENTMCNC: 27.7 G/DL (ref 31.4–37.4)
MCV RBC AUTO: 99 FL (ref 82–98)
MONOCYTES # BLD AUTO: 0.7 THOUSAND/ÂΜL (ref 0.17–1.22)
MONOCYTES NFR BLD AUTO: 6 % (ref 4–12)
MV E'TISSUE VEL-SEP: 13 CM/S
MV PEAK A VEL: 0.98 M/S
MV PEAK E VEL: 98 CM/S
MV STENOSIS PRESSURE HALF TIME: 73 MS
MV VALVE AREA P 1/2 METHOD: 3.01
NEUTROPHILS # BLD AUTO: 9.38 THOUSANDS/ÂΜL (ref 1.85–7.62)
NEUTS SEG NFR BLD AUTO: 84 % (ref 43–75)
NON VENT- BIPAP: ABNORMAL
NRBC BLD AUTO-RTO: 0 /100 WBCS
O2 CT BLDA-SCNC: 13.3 ML/DL (ref 16–23)
O2 CT BLDA-SCNC: 13.6 ML/DL (ref 16–23)
O2 CT BLDA-SCNC: 13.7 ML/DL (ref 16–23)
O2 CT BLDA-SCNC: 13.7 ML/DL (ref 16–23)
O2 CT BLDA-SCNC: 14.2 ML/DL (ref 16–23)
O2 CT BLDA-SCNC: 14.5 ML/DL (ref 16–23)
O2 CT BLDA-SCNC: 18.1 ML/DL (ref 16–23)
O2 CT BLDV-SCNC: 12 ML/DL
O2 CT BLDV-SCNC: 12.9 ML/DL
OXYHGB MFR BLDA: 89.5 % (ref 94–97)
OXYHGB MFR BLDA: 89.7 % (ref 94–97)
OXYHGB MFR BLDA: 92.2 % (ref 94–97)
OXYHGB MFR BLDA: 93.3 % (ref 94–97)
OXYHGB MFR BLDA: 93.8 % (ref 94–97)
OXYHGB MFR BLDA: 94 % (ref 94–97)
OXYHGB MFR BLDA: 95.4 % (ref 94–97)
P AXIS: 46 DEGREES
P AXIS: 51 DEGREES
P AXIS: 54 DEGREES
PCO2 BLD: 81.8 MM HG (ref 42–50)
PCO2 BLD: >45 MMOL/L (ref 21–32)
PCO2 BLDA: 100.2 MM HG (ref 36–44)
PCO2 BLDA: 62.4 MM HG (ref 36–44)
PCO2 BLDA: 63.9 MM HG (ref 36–44)
PCO2 BLDA: 73.1 MM HG (ref 36–44)
PCO2 BLDA: 78.9 MM HG (ref 36–44)
PCO2 BLDA: 79.9 MM HG (ref 36–44)
PCO2 BLDA: 96.8 MM HG (ref 36–44)
PCO2 BLDV: 125.5 MM HG (ref 42–50)
PCO2 BLDV: 92.4 MM HG (ref 42–50)
PEEP MAX SETTING VENT: 6 CM[H2O]
PEEP MAX SETTING VENT: 8 CM[H2O]
PEEP MAX SETTING VENT: 8 CM[H2O]
PH BLD: 7.33 [PH] (ref 7.3–7.4)
PH BLDA: 7.2 [PH] (ref 7.35–7.45)
PH BLDA: 7.28 [PH] (ref 7.35–7.45)
PH BLDA: 7.29 [PH] (ref 7.35–7.45)
PH BLDA: 7.35 [PH] (ref 7.35–7.45)
PH BLDA: 7.39 [PH] (ref 7.35–7.45)
PH BLDA: 7.4 [PH] (ref 7.35–7.45)
PH BLDA: 7.42 [PH] (ref 7.35–7.45)
PH BLDV: 7.19 [PH] (ref 7.3–7.4)
PH BLDV: 7.22 [PH] (ref 7.3–7.4)
PLATELET # BLD AUTO: 167 THOUSANDS/UL (ref 149–390)
PMV BLD AUTO: 9.2 FL (ref 8.9–12.7)
PO2 BLD: 40 MM HG (ref 35–45)
PO2 BLDA: 61.2 MM HG (ref 75–129)
PO2 BLDA: 62.6 MM HG (ref 75–129)
PO2 BLDA: 71.8 MM HG (ref 75–129)
PO2 BLDA: 76 MM HG (ref 75–129)
PO2 BLDA: 77.3 MM HG (ref 75–129)
PO2 BLDA: 83.9 MM HG (ref 75–129)
PO2 BLDA: 85.3 MM HG (ref 75–129)
PO2 BLDV: 50.3 MM HG (ref 35–45)
PO2 BLDV: 51.2 MM HG (ref 35–45)
POTASSIUM BLD-SCNC: 4.8 MMOL/L (ref 3.5–5.3)
POTASSIUM SERPL-SCNC: 5 MMOL/L (ref 3.5–5.3)
POTASSIUM SERPL-SCNC: 5 MMOL/L (ref 3.5–5.3)
PR INTERVAL: 180 MS
PR INTERVAL: 182 MS
PR INTERVAL: 186 MS
PROCALCITONIN SERPL-MCNC: 0.16 NG/ML
PROCALCITONIN SERPL-MCNC: 0.23 NG/ML
PROT SERPL-MCNC: 8.3 G/DL (ref 6.4–8.4)
PROTHROMBIN TIME: 14.3 SECONDS (ref 11.6–14.5)
QRS AXIS: 101 DEGREES
QRS AXIS: 106 DEGREES
QRS AXIS: 87 DEGREES
QRSD INTERVAL: 100 MS
QRSD INTERVAL: 94 MS
QRSD INTERVAL: 96 MS
QT INTERVAL: 350 MS
QT INTERVAL: 354 MS
QT INTERVAL: 356 MS
QTC INTERVAL: 433 MS
QTC INTERVAL: 439 MS
QTC INTERVAL: 445 MS
RBC # BLD AUTO: 3.7 MILLION/UL (ref 3.81–5.12)
RSV RNA RESP QL NAA+PROBE: NEGATIVE
SAO2 % BLD FROM PO2: 67 % (ref 60–85)
SARS-COV-2 RNA RESP QL NAA+PROBE: NEGATIVE
SL CV PED ECHO LEFT VENTRICLE DIASTOLIC VOLUME (MOD BIPLANE) 2D: 147 ML
SL CV PED ECHO LEFT VENTRICLE SYSTOLIC VOLUME (MOD BIPLANE) 2D: 54 ML
SODIUM BLD-SCNC: 137 MMOL/L (ref 136–145)
SODIUM SERPL-SCNC: 139 MMOL/L (ref 135–147)
SODIUM SERPL-SCNC: 139 MMOL/L (ref 135–147)
SPECIMEN SOURCE: ABNORMAL
T WAVE AXIS: -23 DEGREES
T WAVE AXIS: 27 DEGREES
T WAVE AXIS: 28 DEGREES
TRICUSPID ANNULAR PLANE SYSTOLIC EXCURSION: 3.7 CM
VENT BIPAP FIO2: 40 %
VENT BIPAP FIO2: 50 %
VENT BIPAP FIO2: 50 %
VENTRICULAR RATE: 90 BPM
VENTRICULAR RATE: 94 BPM
VENTRICULAR RATE: 95 BPM
WBC # BLD AUTO: 11.31 THOUSAND/UL (ref 4.31–10.16)

## 2023-09-28 PROCEDURE — 99223 1ST HOSP IP/OBS HIGH 75: CPT | Performed by: INTERNAL MEDICINE

## 2023-09-28 PROCEDURE — 82805 BLOOD GASES W/O2 SATURATION: CPT | Performed by: EMERGENCY MEDICINE

## 2023-09-28 PROCEDURE — 4A133B1 MONITORING OF ARTERIAL PRESSURE, PERIPHERAL, PERCUTANEOUS APPROACH: ICD-10-PCS | Performed by: ANESTHESIOLOGY

## 2023-09-28 PROCEDURE — 36620 INSERTION CATHETER ARTERY: CPT

## 2023-09-28 PROCEDURE — 80048 BASIC METABOLIC PNL TOTAL CA: CPT | Performed by: NURSE PRACTITIONER

## 2023-09-28 PROCEDURE — 94760 N-INVAS EAR/PLS OXIMETRY 1: CPT

## 2023-09-28 PROCEDURE — 93005 ELECTROCARDIOGRAM TRACING: CPT

## 2023-09-28 PROCEDURE — 71045 X-RAY EXAM CHEST 1 VIEW: CPT

## 2023-09-28 PROCEDURE — 93306 TTE W/DOPPLER COMPLETE: CPT | Performed by: INTERNAL MEDICINE

## 2023-09-28 PROCEDURE — 94660 CPAP INITIATION&MGMT: CPT

## 2023-09-28 PROCEDURE — 84484 ASSAY OF TROPONIN QUANT: CPT | Performed by: EMERGENCY MEDICINE

## 2023-09-28 PROCEDURE — NC001 PR NO CHARGE

## 2023-09-28 PROCEDURE — 93010 ELECTROCARDIOGRAM REPORT: CPT | Performed by: INTERNAL MEDICINE

## 2023-09-28 PROCEDURE — 99291 CRITICAL CARE FIRST HOUR: CPT | Performed by: ANESTHESIOLOGY

## 2023-09-28 PROCEDURE — 96365 THER/PROPH/DIAG IV INF INIT: CPT

## 2023-09-28 PROCEDURE — 4A133J1 MONITORING OF ARTERIAL PULSE, PERIPHERAL, PERCUTANEOUS APPROACH: ICD-10-PCS | Performed by: ANESTHESIOLOGY

## 2023-09-28 PROCEDURE — 96372 THER/PROPH/DIAG INJ SC/IM: CPT

## 2023-09-28 PROCEDURE — 85730 THROMBOPLASTIN TIME PARTIAL: CPT | Performed by: EMERGENCY MEDICINE

## 2023-09-28 PROCEDURE — 83605 ASSAY OF LACTIC ACID: CPT | Performed by: EMERGENCY MEDICINE

## 2023-09-28 PROCEDURE — 36620 INSERTION CATHETER ARTERY: CPT | Performed by: NURSE PRACTITIONER

## 2023-09-28 PROCEDURE — 82805 BLOOD GASES W/O2 SATURATION: CPT

## 2023-09-28 PROCEDURE — 85025 COMPLETE CBC W/AUTO DIFF WBC: CPT | Performed by: EMERGENCY MEDICINE

## 2023-09-28 PROCEDURE — 80053 COMPREHEN METABOLIC PANEL: CPT | Performed by: EMERGENCY MEDICINE

## 2023-09-28 PROCEDURE — 82330 ASSAY OF CALCIUM: CPT

## 2023-09-28 PROCEDURE — 97163 PT EVAL HIGH COMPLEX 45 MIN: CPT

## 2023-09-28 PROCEDURE — 99291 CRITICAL CARE FIRST HOUR: CPT | Performed by: EMERGENCY MEDICINE

## 2023-09-28 PROCEDURE — 84484 ASSAY OF TROPONIN QUANT: CPT | Performed by: NURSE PRACTITIONER

## 2023-09-28 PROCEDURE — 85610 PROTHROMBIN TIME: CPT | Performed by: EMERGENCY MEDICINE

## 2023-09-28 PROCEDURE — 82948 REAGENT STRIP/BLOOD GLUCOSE: CPT

## 2023-09-28 PROCEDURE — 82805 BLOOD GASES W/O2 SATURATION: CPT | Performed by: NURSE PRACTITIONER

## 2023-09-28 PROCEDURE — 82803 BLOOD GASES ANY COMBINATION: CPT

## 2023-09-28 PROCEDURE — 82947 ASSAY GLUCOSE BLOOD QUANT: CPT

## 2023-09-28 PROCEDURE — 03HY32Z INSERTION OF MONITORING DEVICE INTO UPPER ARTERY, PERCUTANEOUS APPROACH: ICD-10-PCS | Performed by: ANESTHESIOLOGY

## 2023-09-28 PROCEDURE — NC001 PR NO CHARGE: Performed by: NURSE PRACTITIONER

## 2023-09-28 PROCEDURE — 83880 ASSAY OF NATRIURETIC PEPTIDE: CPT | Performed by: EMERGENCY MEDICINE

## 2023-09-28 PROCEDURE — 36415 COLL VENOUS BLD VENIPUNCTURE: CPT | Performed by: EMERGENCY MEDICINE

## 2023-09-28 PROCEDURE — 0241U HB NFCT DS VIR RESP RNA 4 TRGT: CPT | Performed by: EMERGENCY MEDICINE

## 2023-09-28 PROCEDURE — 84145 PROCALCITONIN (PCT): CPT | Performed by: EMERGENCY MEDICINE

## 2023-09-28 PROCEDURE — 84132 ASSAY OF SERUM POTASSIUM: CPT

## 2023-09-28 PROCEDURE — 84295 ASSAY OF SERUM SODIUM: CPT

## 2023-09-28 PROCEDURE — 97167 OT EVAL HIGH COMPLEX 60 MIN: CPT

## 2023-09-28 PROCEDURE — 93306 TTE W/DOPPLER COMPLETE: CPT

## 2023-09-28 PROCEDURE — 87040 BLOOD CULTURE FOR BACTERIA: CPT | Performed by: EMERGENCY MEDICINE

## 2023-09-28 PROCEDURE — 84145 PROCALCITONIN (PCT): CPT | Performed by: NURSE PRACTITIONER

## 2023-09-28 PROCEDURE — 83605 ASSAY OF LACTIC ACID: CPT | Performed by: NURSE PRACTITIONER

## 2023-09-28 PROCEDURE — 99285 EMERGENCY DEPT VISIT HI MDM: CPT

## 2023-09-28 PROCEDURE — 85014 HEMATOCRIT: CPT

## 2023-09-28 RX ORDER — LIDOCAINE HYDROCHLORIDE 10 MG/ML
5 INJECTION, SOLUTION EPIDURAL; INFILTRATION; INTRACAUDAL; PERINEURAL ONCE
Status: DISCONTINUED | OUTPATIENT
Start: 2023-09-28 | End: 2023-09-29

## 2023-09-28 RX ORDER — EPINEPHRINE 1 MG/ML
0.5 INJECTION, SOLUTION, CONCENTRATE INTRAVENOUS ONCE
Status: COMPLETED | OUTPATIENT
Start: 2023-09-28 | End: 2023-09-28

## 2023-09-28 RX ORDER — ACETAMINOPHEN 650 MG/1
325 SUPPOSITORY RECTAL EVERY 4 HOURS PRN
Status: DISCONTINUED | OUTPATIENT
Start: 2023-09-28 | End: 2023-10-02

## 2023-09-28 RX ORDER — LIDOCAINE 50 MG/G
2 PATCH TOPICAL DAILY
Status: DISCONTINUED | OUTPATIENT
Start: 2023-09-28 | End: 2023-09-28

## 2023-09-28 RX ORDER — EPINEPHRINE 1 MG/ML
INJECTION, SOLUTION, CONCENTRATE INTRAVENOUS
Status: COMPLETED
Start: 2023-09-28 | End: 2023-09-28

## 2023-09-28 RX ORDER — HYDROMORPHONE HCL IN WATER/PF 6 MG/30 ML
0.2 PATIENT CONTROLLED ANALGESIA SYRINGE INTRAVENOUS
Status: DISCONTINUED | OUTPATIENT
Start: 2023-09-28 | End: 2023-09-29

## 2023-09-28 RX ORDER — ALBUTEROL SULFATE 2.5 MG/3ML
2 SOLUTION RESPIRATORY (INHALATION) ONCE
Status: COMPLETED | OUTPATIENT
Start: 2023-09-28 | End: 2023-09-28

## 2023-09-28 RX ORDER — HEPARIN SODIUM 5000 [USP'U]/ML
7500 INJECTION, SOLUTION INTRAVENOUS; SUBCUTANEOUS EVERY 8 HOURS SCHEDULED
Status: DISCONTINUED | OUTPATIENT
Start: 2023-09-28 | End: 2023-09-30

## 2023-09-28 RX ORDER — ONDANSETRON 2 MG/ML
4 INJECTION INTRAMUSCULAR; INTRAVENOUS EVERY 6 HOURS PRN
Status: DISCONTINUED | OUTPATIENT
Start: 2023-09-28 | End: 2023-10-18 | Stop reason: HOSPADM

## 2023-09-28 RX ORDER — CHLORHEXIDINE GLUCONATE ORAL RINSE 1.2 MG/ML
15 SOLUTION DENTAL EVERY 12 HOURS SCHEDULED
Status: DISCONTINUED | OUTPATIENT
Start: 2023-09-28 | End: 2023-10-02

## 2023-09-28 RX ORDER — METHOCARBAMOL 500 MG/1
500 TABLET, FILM COATED ORAL EVERY 6 HOURS PRN
Status: DISCONTINUED | OUTPATIENT
Start: 2023-09-28 | End: 2023-10-18 | Stop reason: HOSPADM

## 2023-09-28 RX ORDER — DEXMEDETOMIDINE HYDROCHLORIDE 4 UG/ML
.1-1 INJECTION, SOLUTION INTRAVENOUS
Status: DISCONTINUED | OUTPATIENT
Start: 2023-09-28 | End: 2023-09-29

## 2023-09-28 RX ORDER — EPINEPHRINE 1 MG/ML
1 INJECTION, SOLUTION, CONCENTRATE INTRAVENOUS ONCE
Status: COMPLETED | OUTPATIENT
Start: 2023-09-28 | End: 2023-09-28

## 2023-09-28 RX ORDER — CEFTRIAXONE 2 G/50ML
2000 INJECTION, SOLUTION INTRAVENOUS ONCE
Status: COMPLETED | OUTPATIENT
Start: 2023-09-28 | End: 2023-09-28

## 2023-09-28 RX ORDER — AMMONIUM LACTATE 12 G/100G
LOTION TOPICAL 2 TIMES DAILY
Status: DISCONTINUED | OUTPATIENT
Start: 2023-09-28 | End: 2023-10-18 | Stop reason: HOSPADM

## 2023-09-28 RX ADMIN — HYDROMORPHONE HYDROCHLORIDE 0.2 MG: 0.2 INJECTION, SOLUTION INTRAMUSCULAR; INTRAVENOUS; SUBCUTANEOUS at 19:35

## 2023-09-28 RX ADMIN — HEPARIN SODIUM 7500 UNITS: 5000 INJECTION INTRAVENOUS; SUBCUTANEOUS at 15:00

## 2023-09-28 RX ADMIN — HEPARIN SODIUM 7500 UNITS: 5000 INJECTION INTRAVENOUS; SUBCUTANEOUS at 08:20

## 2023-09-28 RX ADMIN — DEXMEDETOMIDINE HYDROCHLORIDE 0.2 MCG/KG/HR: 400 INJECTION INTRAVENOUS at 09:17

## 2023-09-28 RX ADMIN — NOREPINEPHRINE BITARTRATE 2 MCG/MIN: 1 SOLUTION INTRAVENOUS at 04:45

## 2023-09-28 RX ADMIN — DEXMEDETOMIDINE HYDROCHLORIDE 0.2 MCG/KG/HR: 400 INJECTION INTRAVENOUS at 22:15

## 2023-09-28 RX ADMIN — EPINEPHRINE 0.5 MG: 1 INJECTION, SOLUTION, CONCENTRATE INTRAVENOUS at 00:40

## 2023-09-28 RX ADMIN — CHLORHEXIDINE GLUCONATE 0.12% ORAL RINSE 15 ML: 1.2 LIQUID ORAL at 20:25

## 2023-09-28 RX ADMIN — HEPARIN SODIUM 7500 UNITS: 5000 INJECTION INTRAVENOUS; SUBCUTANEOUS at 22:15

## 2023-09-28 RX ADMIN — PERFLUTREN 0.6 ML/MIN: 6.52 INJECTION, SUSPENSION INTRAVENOUS at 11:40

## 2023-09-28 RX ADMIN — CEFTRIAXONE 2000 MG: 2 INJECTION, SOLUTION INTRAVENOUS at 02:04

## 2023-09-28 RX ADMIN — NOREPINEPHRINE BITARTRATE 9 MCG/MIN: 1 SOLUTION INTRAVENOUS at 11:27

## 2023-09-28 NOTE — RESPIRATORY THERAPY NOTE
09/28/23 1948   Respiratory Assessment   Assessment Type Assess only   General Appearance Awake   Respiratory Pattern Assisted   Chest Assessment Chest expansion symmetrical   Bilateral Breath Sounds Diminished   Resp Comments Pt remains on continuos NIV decreased rate at this time, will continue with current orders.    O2 Device V60   Non-Invasive Information   O2 Interface Device Face mask   Non-Invasive Ventilation Mode BiPAP   $ Pulse Oximetry Spot Check Charge Completed   Non-Invasive Settings   IPAP (cm) 24 cm   EPAP (cm) 8 cm   Rate (Set) (S)  20   FiO2 (%) 50   Pressure Support (cm H2O) 16   Rise Time 2   Inspiratory Time (Set) 0.75   Temperature (Set) 31   Non-Invasive Readings   Skin Intervention Skin intact   Total Rate 30   MV (Mech) 16.4   Peak Pressure (Obs) 24   Spontaneous Vt (mL) 551   Heater Temperature (Obs) 30.7   Leak (lpm) 0   Non-Invasive Alarms   Insp Pressure High (cm H20) 42   Insp Pressure Low (cm H20) 5   Low Insp Pressure Time (sec) 2 sec   MV Low (L/min) 3   Vt High (mL) 1350   Vt Low (mL) 200   High Resp Rate (BPM) 45 BPM   Low Resp Rate (BPM) 8 BPM   Apnea Rate 12   Maintenance   Water bag changed No

## 2023-09-28 NOTE — PLAN OF CARE
Problem: OCCUPATIONAL THERAPY ADULT  Goal: Performs self-care activities at highest level of function for planned discharge setting. See evaluation for individualized goals. Description: Treatment Interventions: ADL retraining, Functional transfer training, UE strengthening/ROM, Endurance training, Cognitive reorientation, Patient/family training, Equipment evaluation/education, Compensatory technique education, Energy conservation, Activityengagement          See flowsheet documentation for full assessment, interventions and recommendations. Note: Limitation: Decreased ADL status, Decreased UE strength, Decreased cognition, Decreased Safe judgement during ADL, Decreased endurance, Decreased self-care trans, Decreased high-level ADLs  Prognosis: Good  Assessment: Patient is a 76 y.o. female seen for OT evaluation s/p admit to [facility]  on 9/28/2023 w/Acute on chronic respiratory failure with hypoxia and hypercapnia ([address] Commorbidities affecting patient's functional performance at time of assessment include: obesity, A-fib, obesity hypoventilation syndrome, acute encephalopathy, and elevated BNP level. Orders placed for OT evaluation and treatment. Performed at least two patient identifiers during session including name and wristband. Prior to admission, Patient requires assistance with ADLs/IADLs, ambulatory with RW, and lives with family in a one level house. Personal factors affecting patient at time of initial evaluation include: limited insight into deficits, difficulty performing ADLs and difficulty performing IADLs. Upon evaluation, patient requires maximal assist for UB ADLs, total assist for LB ADLs. Patient is oriented to name, and oriented to time, and presents with limited ability to recall information after a brief period of time (short term memory) / working memory .   Occupational performance is affected by the following deficits: decreased muscle strength, dynamic sit/ stand balance deficit with poor standing tolerance time for self care and functional mobility, decreased activity tolerance, impaired memory, impaired problem solving, increased pain, delayed righting and equilibrium reactions and poor trunk control. Patient to benefit from continued Occupational Therapy treatment while in the hospital to address deficits as defined above and maximize level of functional independence with ADLs and functional mobility. Occupational Performance areas to address include: eating, grooming , bathing/ shower, dressing, toilet hygiene, transfer to all surfaces and functional ambulation. From OT standpoint, recommendation at time of d/c would be Post acute rehabilitation services.      OT Discharge Recommendation: Post acute rehabilitation services        [facility] OTD, OTR/L

## 2023-09-28 NOTE — CONSULTS
Consultation - Pulmonary Medicine   Hui Srinivasan 76 y.o. female MRN: [id number]  Unit/Bed#:  Encounter: [id number]      Assessment:  Acute on chronic hypercapnic respiratory failure  Obesity hypoventilation syndrome    Plan:   Acute on chronic hypercapnic respiratory failure currently on continuous BiPAP still with increased minute ventilation, currently the settings of BiPAP being managed by critical care currently on a BiPAP 26/12, likely secondary to obesity hypoventilation syndrome  Chest x-ray with pulmonary vascular congestion and likely pulmonary edema no evidence of any consolidation. She would benefit from the noninvasive ventilator for nocturnal use at home   Will follow-up once stabilized with the BiPAP settings here, and being able to wean to nasal cannula, for the NIV for home use. Lifestyle modifications for weight loss  Discussed with CC nurse practitioner     History of Present Illness   Physician Requesting Consult: Hung Elam MD  Reason for Consult / Principal Problem: Nocturnal noninvasive ventilator for home use evaluation  Hx and PE limited by: On continuous BiPAP history from the chart and the critical care nurse practitioner, patient answering yes and no  HPI: Hui Srinivasan is a 76y.o. year old female with past medical history as per the chart from [facility], with history of paroxysmal A-fib on anticoagulation on Xarelto also obesity hypoventilation was recently admitted in the hospital in June 2023 with acute on chronic hypoxemic respiratory failure and needed intubation and she was discharged home on 2 L of oxygen continuous that she has been using and she was also ordered for an outpatient sleep study after the prior hospitalization and discharge in June. She has not had any face-to-face visit with any physician after that, she has had a telemedicine visit with her primary care has been having PT and home nurse visits.   A split-night study has been ordered by her PCP which is still pending  Presenting with shortness of breath found to be in acute hypercapnic and hypoxemic respiratory failure has been placed on the BiPAP  Currently on continuous BiPAP pulmonary has been consulted for evaluation for the outpatient setting for nocturnal likely AVAPS mode      Inpatient consult to Pulmonology  Consult performed by: Kenney Donis MD  Consult ordered by: EMIL Pope          Review of Systems  Could not be evaluated with continuous BiPAP.   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 ([address]) 8/2/2021   • Scalp laceration 3/17/2023   • Thoracic vertebral fracture (HCC) 3/31/2023     Past Surgical History:   Procedure Laterality Date   • NO PAST SURGERIES       Social History   Social History     Substance and Sexual Activity   Alcohol Use Never     Social History     Substance and Sexual Activity   Drug Use Never     E-Cigarette/Vaping     E-Cigarette/Vaping Substances     Social History     Tobacco Use   Smoking Status Former   • Packs/day: 0.25   • Years: 3.00   • Total pack years: 0.75   • Types: Cigarettes   Smokeless Tobacco Never   Tobacco Comments    quit over >15 years ago        Family History: non-contributory    Meds/Allergies   current meds:   Current Facility-Administered Medications   Medication Dose Route Frequency   • acetaminophen (TYLENOL) rectal suppository 325 mg  325 mg Rectal Q4H PRN   • chlorhexidine (PERIDEX) 0.12 % oral rinse 15 mL  15 mL Mouth/Throat Q12H [facility]   • dexmedeTOMIDine (Precedex) 400 mcg in sodium chloride 0.9% 100 mL  0.1-0.7 mcg/kg/hr Intravenous Titrated   • heparin (porcine) subcutaneous injection 7,500 Units  7,500 Units Subcutaneous Q8H [facility]   • HYDROmorphone HCl (DILAUDID) injection 0.2 mg  0.2 mg Intravenous Q3H PRN   • lidocaine (PF) (XYLOCAINE-MPF) 1 % injection 5 mL  5 mL Infiltration Once   • NOREPINEPHRINE 4 MG  ML NSS (CMPD ORDER) infusion  1-30 mcg/min Intravenous Titrated   • ondansetron (ZOFRAN) injection 4 mg  4 mg Intravenous Q6H PRN       Allergies   Allergen Reactions   • Other GI Intolerance     Food preservatives   • Sulfa Antibiotics GI Intolerance   • Nitrates, Organic - Food Allergy Rash   • Silver Rash       Objective   Vitals: Blood pressure 122/52, pulse 82, temperature 99.3 °F (37.4 °C), temperature source Axillary, resp. rate 18, height 5' 7" (1.702 m), weight (!) 235 kg (518 lb), SpO2 94 %. ,Body mass index is 81.13 kg/m². Intake/Output Summary (Last 24 hours) at 9/28/2023 1557  Last data filed at 9/28/2023 0600  Gross per 24 hour   Intake 87.09 ml   Output --   Net 87.09 ml     Invasive Devices     Peripheral Intravenous Line  Duration           Peripheral IV 09/28/23 Right Antecubital <1 day    Peripheral IV 09/28/23 Right;Ventral (anterior) Forearm <1 day          Arterial Line  Duration           Arterial Line 09/28/23 Radial <1 day                Physical Exam  Currently in bed currently on the BiPAP  Eyes anicteric Saeta, conjunctive is clear  Neck, short and [place], no JVD  Lungs diminished breath sounds bilaterally no rhonchi  Abdomen soft nontender bowel sounds are present  Extremities bilateral edema  CNS awake and alert answering questions appropriately  Lab Results:   CBC:   Lab Results   Component Value Date    WBC 11.31 (H) 09/28/2023    HGB 10.1 (L) 09/28/2023    HGB 11.9 09/28/2023    HCT 36.5 09/28/2023    HCT 35 09/28/2023    MCV 99 (H) 09/28/2023     09/28/2023    RBC 3.70 (L) 09/28/2023    MCH 27.3 09/28/2023    MCHC 27.7 (L) 09/28/2023    RDW 16.1 (H) 09/28/2023    MPV 9.2 09/28/2023    NRBC 0 09/28/2023     Imaging Studies: I have personally reviewed pertinent films in PACS  EKG, Pathology, and Other Studies: I have personally reviewed pertinent reports.     VTE Prophylaxis: Sequential compression device (Venodyne)     Code Status: Level 1 - Full Code  Advance Directive and Living Will:      Power of :    POLST: complains of pain/discomfort

## 2023-09-28 NOTE — RESPIRATORY THERAPY NOTE
09/28/23 0315   Respiratory Assessment   Resp Comments pt received from ED wearing NIV   O2 Device v60 [place]   Non-Invasive Information   O2 Interface Device Face mask  (medium)   Non-Invasive Ventilation Mode BiPAP   SpO2 97 %   $ Pulse Oximetry Spot Check Charge Completed   Non-Invasive Settings   IPAP (cm) (S)  22 cm  (increased by AP when pt in ED)   EPAP (cm) 8 cm   Rate (Set) 12   FiO2 (%) 60   Rise Time 2   Inspiratory Time (Set) 1   Humidification   (heater)   Temperature (Set) 31   Non-Invasive Readings   Skin Intervention Skin intact   Total Rate 22   Vt (mL) (Mech) 386   MV (Mech) 8.6   Peak Pressure (Obs) 25   Heater Temperature (Obs)   (unit warming)   Leak (lpm) 0   Non-Invasive Alarms   Insp Pressure High (cm H20) 35   Insp Pressure Low (cm H20) 6   Low Insp Pressure Time (sec) 20 sec   MV Low (L/min) 3   Vt High (mL) 1000   Vt Low (mL) 250   High Resp Rate (BPM) 45 BPM   Low Resp Rate (BPM) 8 BPM   Maintenance   Water bag changed No

## 2023-09-28 NOTE — PROCEDURES
Arterial Line Insertion    Date/Time: 9/28/2023 3:41 AM    Performed by: EMIL Martinez  Authorized by: EMIL Martinez    Patient location:  Bedside and ICU  Consent:     Consent obtained:  Verbal    Consent given by:  Spouse    Risks discussed:  Bleeding, infection, pain, ischemia and repeat procedure  Universal protocol:     Procedure explained and questions answered to patient or proxy's satisfaction: yes      Immediately prior to procedure a time out was called: yes      Patient identity confirmed:  Arm band  Indications:     Indications: hemodynamic monitoring and multiple ABGs    Pre-procedure details:     Skin preparation:  Chlorhexidine    Preparation: Patient was prepped and draped in sterile fashion    Anesthesia (see MAR for exact dosages): Anesthesia method:  None  Procedure details:     Location / Tip of Catheter:  Radial    Laterality:  Left    Needle gauge:  20 G    Placement technique:  Percutaneous, Seldinger and ultrasound guided    Ultrasound image availability:  Not obtained due to urgency    Sterile ultrasound techniques: Sterile gel and sterile probe covers were used      Number of attempts:  2    Successful placement: yes      Transducer: waveform confirmed    Post-procedure details:     Post-procedure:  Secured with tape, sterile dressing applied and sutured    CMS:  Normal    Patient tolerance of procedure:   Tolerated well, no immediate complications

## 2023-09-28 NOTE — PLAN OF CARE
Problem: PHYSICAL THERAPY ADULT  Goal: Performs mobility at highest level of function for planned discharge setting. See evaluation for individualized goals. Description: Treatment/Interventions: Functional transfer training, LE strengthening/ROM, Therapeutic exercise, Endurance training, Patient/family training, Bed mobility, Continued evaluation, Spoke to nursing, Spoke to advanced practitioner, OT          See flowsheet documentation for full assessment, interventions and recommendations. Note: Prognosis: Fair  Problem List: Decreased strength, Decreased endurance, Impaired balance, Decreased mobility, Obesity, Pain  Assessment: Pt is 76year old female seen for PT evaluation s/p admit to [id number] [place] [address] on 9/28/2023 with Acute on chronic respiratory failure with hypoxia and hypercapnia ([address] PT consulted to assess pt's functional mobility and discharge needs. Order placed for PT evaluation and treatment, with out of bed to chair order. Comorbidities affecting pt's physical performance at time of assessment include class 3 severe obesity, atrial fibrillation, obesity hypoventilation syndrome, acute encephalopathy, elevated troponin, and elevated BNP level, and lymphedema. Prior to hospitalization, pt was requiring assist for mobility. Pt ambulates with a walker. Pt ambulates household distances. Pt resides with her spouse and sister, in a multi-level house, but is able to stay on the first floor, with a ramped entrance. Personal factors affecting pt at time of initial evaluation include inability to ambulate household distances, inability to navigate level surfaces without external assistance, unable to perform dynamic tasks in the community, limited home support, positive fall history, difficulty performing ADLs, and inability to perform IADLs.  Please find objective findings from PT assessment regarding body systems outlined above with impairments and limitations including weakness, impaired balance, decreased endurance, pain, decreased activity tolerance, decreased functional mobility tolerance, fall risk, and SOB upon exertion. The following objective measures were performed on initial evaluation Barthel Index: 20/100, Modified [place]: 5 (severe disability) and AM-PAC 6-Clicks: 0/27. Pt's clinical presentation is currently unstable/unpredictable seen in pt's presentation of need for ongoing medical management/monitoring, pt is a fall risk, pt is currently requiring supplemental oxygen, and pt requires cues and assist of three for safety with functional mobility. Pt to benefit from continued PT treatment to address deficits as defined above and maximize pt's level of function and independence with mobility. From a PT standpoint, recommendation at time of discharge would be STR pending pt's progress in order to facilitate return to prior level of function. Barriers to Discharge: Inaccessible home environment, Decreased caregiver support     PT Discharge Recommendation: Post acute rehabilitation services    See flowsheet documentation for full assessment.

## 2023-09-28 NOTE — RESPIRATORY THERAPY NOTE
09/28/23 0508   Respiratory Assessment   General Appearance Sleeping   Respiratory Pattern Assisted   Chest Assessment Chest expansion symmetrical   Bilateral Breath Sounds Diminished   Cough None   Resp Comments pt remains on NIV assistance   O2 Device v60 [place]   Non-Invasive Information   O2 Interface Device Face mask   Non-Invasive Ventilation Mode BiPAP   SpO2 96 %   $ Pulse Oximetry Spot Check Charge Completed   Non-Invasive Settings   IPAP (cm) 28 cm   EPAP (cm) 6 cm   Rate (Set) (S)  30  (increased by AP)   FiO2 (%) (S)  40  (decreased by AP)   Rise Time 2   Inspiratory Time (Set) 0.75   Humidification   (heater)   Temperature (Set) 31   Non-Invasive Readings   Skin Intervention Skin intact;Mask rotated   Total Rate 30   Vt (mL) (Mech) 508   MV (Mech) 15.2   Peak Pressure (Obs) 31   Heater Temperature (Obs) 31.1   Leak (lpm) 0   Non-Invasive Alarms   Insp Pressure High (cm H20) 42   Insp Pressure Low (cm H20) 5   Low Insp Pressure Time (sec) 20 sec   MV Low (L/min) 3   Vt High (mL) 1100   Vt Low (mL) 200   High Resp Rate (BPM) 45 BPM   Low Resp Rate (BPM) 8 BPM     ABG collected on setting 28/6 x 30 .40, plan to repeat ABG @ 0600

## 2023-09-28 NOTE — ED PROVIDER NOTES
History  Chief Complaint   Patient presents with   • Shortness of Breath     Pt started with an allergic reaction earlier today received steroids and started having respiratory distress called EMS, they put her on 15 liters for WOB and cyanosis     76year-old female presenting to the emergency room in respiratory distress. Patient unable to provide information upon initial evaluation. Patient arrives on NRB by EMS, initial pulse oxymetry 70% on NC O2 at home that patient is reportedly on chronic (unclear setting). Limited information provided by EMS. Per EMS states PCP was concerned for pneumonia and recently placed the patient on antibiotics which the patient has been taking for the past 1-2 days. They state tonight patient's breathing acutely worsened along with development of a diffuse erythematous rash on the bilateral arms. They state family reported patient has no medical issues; unclear why patient in on O2. EMS administered 0.3 mg of epinephrine and nebulized medications without improvement. Patient appears acutely ill on initial evaluation. Considering limited history, 0.5 mg of epinephrine administered IM without improvement. Patient transitioned to BiPAP and tolerating it well. Patient's  arrived and affirms most of the information provided by EMS. Patient was started on amoxicillin two days ago and azithromycin earlier that day. Patient had an outpatient xr that demonstrated left lower lobe pneumonia. Patient has a history of prior episodes similar to this where the patient was admitted at [facility] which seems to be related to obesity hypoventilation syndrome (patient weights over 450 lbs). Patient's  prefers we attempt not to intubate the patient as he states prior attempts have been challenging. Focused Objective. PHYSICAL EXAM:  Constitutional:  Acute respiratory distress. Somnolent. Obese.   Neck:  No asymmetry without obvious masses or swelling; no tracheal deviation. No jugular venous distention. No stridor. No bruit. CV:  Regular rate and rhythm. No S3. No murmur. Peripheral pulses intact and equal.  Respiratory:  Normal inspection with no rash, signs of infection, or trauma. No intercostal, supraclavicular, subcostal, or substernal retractions. No tenderness or crepitus on palpitation. Auscultation demonstrates no clear adventitious sounds with significantly decreased air movement. Medical Decision Making  Patient presents with dyspnea representing a broad differential, including multiple emergent diagnoses. Given the large differential, the decision making in this case is of high complexity. Patient has a past medical history suggestive of OHS being the causitive agent, perhaps worsened by pneumonia considering the history. EKG obtained and reviewed independently by myself, which was interpreted by myself as normal sinus rhythm with no acute MICKEY with nonspecific findings. Patient placed on cardiac monitoring. CXR will be obtained to evaluate for alternative pathologies, including pneumothorax, pulmonary edema, or pneumonia. Laboratory analysis including CBC to evaluate for anemia and evaluate potential for significant leukocytosis. Will obtain BMP to evaluate renal function and electrolytes including possibility of metabolic derangement accounting for patient's symptoms. Considering patient's history, will obtain troponin to evaluate for ACS. Will obtain VBG to further evaluate possibility of metabolic derangement as a component of the patient's symptoms and evaluate the chronicity of the possible symptamatology. Will obtain septic evaluation considering possible history of pneumonia but based on history, more concerning for OHS. Considering patient's history and examination, will attempt symptomatic management with BiPAP. Patient will be placed on continuous cardiopulmonary monitoring and reassessed.      Reassessment: Patient reassessed numerous times while in the ER. Patient VBG demonstrating respiratory acidosis with severe hypercarbia likely secondary to patient's body habitus. Patient tolerating BiPAP well but continued somnolent likely secondary to the hypercarbia. Repeat VBG shows improvement. Initiated patient on broad spectrum antibiotics due to concerns for pneumonia. Discussed with ICU who accepted the patient for continued monitoring and evaluation. Critical Care Time  - Authorized and Performed by: Stephanie Samuels MD  - Total critical care time: 59 minutes  - Due to a high probability of clinically significant, life threatening deterioration, the patient required my highest level of preparedness to intervene emergently and I personally spent this critical care time directly and personally managing the patient. This critical care time included obtaining a history; examining the patient; pulse oximetry; ordering and review of studies; arranging urgent treatment with development of a management plan; evaluation of patient's response to treatment; frequent reassessment; and, discussions with other providers. - This critical care time was performed to assess and manage the high probability of imminent, life-threatening deterioration that could result in multi-organ failure. It was exclusive of separately billable procedures and treating other patients and teaching time. Prior to Admission Medications   Prescriptions Last Dose Informant Patient Reported? Taking?    Diclofenac Sodium (VOLTAREN) 1 % Past Week  Yes Yes   Sig: PLEASE SEE ATTACHED FOR DETAILED DIRECTIONS   Heraclio M20 20 MEQ tablet 9/28/2023  No Yes   Sig: Take 40 mEq AM + 20 mEq PM by mouth daily   Lidocaine 4 % PTCH Past Week  Yes Yes   Sig: Place 2 patches on the skin daily   Multiple Vitamins-Minerals (MULTIVITAMIN ADULTS PO) 9/28/2023  Yes Yes   Sig: Take 1 tablet by mouth daily   albuterol (Ventolin HFA) 90 mcg/act inhaler Past Week No Yes   Sig: Inhale 2 puffs every 4 (four) hours as needed for wheezing or shortness of breath (cough)   ammonium lactate (LAC-HYDRIN) 12 % lotion   Yes No   amoxicillin-clavulanate (AUGMENTIN) 875-125 mg per tablet Not Taking  No No   Sig: Take 1 tablet by mouth every 12 (twelve) hours for 7 days   Patient not taking: Reported on 9/28/2023   ascorbic acid (VITAMIN C) 250 MG tablet   Yes No   Sig: Take 250 mg by mouth   azithromycin (Zithromax) 250 mg tablet Not Taking  No No   Sig: Take 2 tablets (500 mg total) by mouth daily for 1 day, THEN 1 tablet (250 mg total) daily for 4 days. Patient not taking: Reported on 9/28/2023   cetirizine (ZyrTEC) 10 mg tablet Past Week  No Yes   Sig: Take 1 tablet (10 mg total) by mouth daily   escitalopram (LEXAPRO) 5 mg tablet Not Taking  No No   Sig: TAKE 1 TABLET (5 MG TOTAL) BY MOUTH DAILY.    Patient not taking: Reported on 9/28/2023   ferrous sulfate 325 (65 Fe) mg tablet Past Week  Yes Yes   Sig: Take 1 tablet by mouth 2 (two) times a day   methocarbamol (ROBAXIN) 500 mg tablet Past Month  No Yes   Sig: Take 1 tablet (500 mg total) by mouth every 6 (six) hours as needed for muscle spasms   metoprolol tartrate (LOPRESSOR) 25 mg tablet 9/28/2023  No Yes   Sig: Take 0.5 tablets (12.5 mg total) by mouth 2 (two) times a day   oxybutynin (DITROPAN-XL) 10 MG 24 hr tablet Not Taking  Yes No   Sig: Take 10 mg by mouth daily at bedtime   Patient not taking: Reported on 9/28/2023   torsemide (DEMADEX) 20 mg tablet 9/28/2023  No Yes   Sig: Take 1 tablet (20 mg total) by mouth in the morning      Facility-Administered Medications: None       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     E-Cigarette/Vaping     E-Cigarette/Vaping Substances     Social History     Tobacco Use   • Smoking status: Former     Packs/day: 0.25     Years: 3.00     Total pack years: 0.75     Types: Cigarettes   • Smokeless tobacco: Never   • Tobacco comments:     quit over >15 years ago    Substance Use Topics   • Alcohol use: Never   • Drug use: Never       Review of Systems    Physical Exam  Physical Exam    Vital Signs  ED Triage Vitals   Temperature Pulse Respirations Blood Pressure SpO2   09/28/23 0051 09/28/23 0051 09/28/23 0051 09/28/23 0051 09/28/23 0051   99 °F (37.2 °C) 97 (!) 35 127/87 100 %      Temp Source Heart Rate Source Patient Position - Orthostatic VS BP Location FiO2 (%)   09/28/23 0051 09/28/23 0051 09/28/23 0051 09/28/23 0051 09/28/23 0051   Axillary Monitor Sitting Right arm 100      Pain Score       09/28/23 0847       10 - Worst Possible Pain           Vitals:    09/28/23 1430 09/28/23 1445 09/28/23 1500 09/28/23 1515   BP:       Pulse: 83 82 85 82   Patient Position - Orthostatic VS:             Visual Acuity  Visual Acuity    Flowsheet Row Most Recent Value   L Pupil Size (mm) 2   R Pupil Size (mm) 2   L Pupil Shape Round   R Pupil Shape Round          ED Medications  Medications   chlorhexidine (PERIDEX) 0.12 % oral rinse 15 mL (15 mL Mouth/Throat Not Given 9/28/23 0900)   heparin (porcine) subcutaneous injection 7,500 Units (7,500 Units Subcutaneous Given 9/28/23 1500)   NOREPINEPHRINE 4 MG  ML NSS (CMPD ORDER) infusion (0 mcg/min Intravenous Stopped 9/28/23 1500)   dexmedeTOMIDine (Precedex) 400 mcg in sodium chloride 0.9% 100 mL (0.2 mcg/kg/hr × 235 kg Intravenous New Bag 9/28/23 0917)   HYDROmorphone HCl (DILAUDID) injection 0.2 mg (has no administration in time range)   acetaminophen (TYLENOL) rectal suppository 325 mg (has no administration in time range)   ondansetron (ZOFRAN) injection 4 mg (has no administration in time range)   lidocaine (PF) (XYLOCAINE-MPF) 1 % injection 5 mL (has no administration in time range)   EPINEPHrine PF (ADRENALIN) 1 mg/mL injection 0.5 mg (0.5 mg Intramuscular Given 9/28/23 0040)   albuterol (FOR EMS ONLY) (2.5 mg/3 mL) 0.083 % inhalation solution 5 mg (0 mg Does not apply Given to EMS 9/28/23 0057)   diphenhydrAMINE (FOR EMS ONLY) (BENADRYL) injection 50 mg (0 mg Does not apply Given to EMS 9/28/23 0058)   EPINEPHrine PF (FOR EMS ONLY) (ADRENALIN) 1 mg/mL injection 1 mg (0 mg Does not apply Given to EMS 9/28/23 0059)   cefTRIAXone (ROCEPHIN) IVPB (premix in dextrose) 2,000 mg 50 mL (0 mg Intravenous Stopped 9/28/23 0237)   perflutren lipid microsphere (DEFINITY) injection (0.6 mL/min Intravenous Given 9/28/23 1140)       Diagnostic Studies  Results Reviewed     Procedure Component Value Units Date/Time    Procalcitonin [[id number]]  (Normal) Collected: 09/28/23 1217    Lab Status: Final result Specimen: Blood from Arm, Left Updated: 09/28/23 1251     Procalcitonin 0.23 ng/ml     Blood culture #1 [[id number]] Collected: 09/28/23 0141    Lab Status: Preliminary result Specimen: Blood from Arm, Right Updated: 09/28/23 1001     Blood Culture Received in Microbiology Lab. Culture in Progress. Blood culture #2 [[id number]] Collected: 09/28/23 0042    Lab Status: Preliminary result Specimen: Blood from Arm, Right Updated: 09/28/23 1001     Blood Culture Received in Microbiology Lab. Culture in Progress.     HS Troponin I 4hr [[id number]]  (Abnormal) Collected: 09/28/23 0453    Lab Status: Final result Specimen: Blood from Line, Arterial Updated: 09/28/23 0532     hs TnI 4hr 150 ng/L      Delta 4hr hsTnI 37 ng/L     HS Troponin I 2hr [[id number]]  (Abnormal) Collected: 09/28/23 0258    Lab Status: Final result Specimen: Blood from Arm, Right Updated: 09/28/23 0323     hs TnI 2hr 122 ng/L      Delta 2hr hsTnI 9 ng/L     Fingerstick Glucose (POCT) [[id number]]  (Normal) Collected: 09/28/23 0040    Lab Status: Final result Updated: 09/28/23 0235     POC Glucose 119 mg/dl     Blood gas, venous [[id number]]  (Abnormal) Collected: 09/28/23 0141    Lab Status: Final result Specimen: Blood from Arm, Right Updated: 09/28/23 0150     pH, Chaitanya 7.216     pCO2, Chaitanya 92.4 mm Hg      pO2, Chaitanya 51.2 mm Hg      HCO3, Chaitanya 36.6 mmol/L      Base Excess, Chaitanya 6.5 mmol/L      O2 Content, Chaitanya 12.0 ml/dL      O2 HGB, VENOUS 81.2 %     FLU/RSV/COVID - if FLU/RSV clinically relevant [[id number]]  (Normal) Collected: 09/28/23 0042    Lab Status: Final result Specimen: Nares from Nose Updated: 09/28/23 0138     SARS-CoV-2 Negative     INFLUENZA A PCR Negative     INFLUENZA B PCR Negative     RSV PCR Negative    Narrative:      FOR PEDIATRIC PATIENTS - copy/paste COVID Guidelines URL to browser: [email or web address]    SARS-CoV-2 assay is a Nucleic Acid Amplification assay intended for the  qualitative detection of nucleic acid from SARS-CoV-2 in nasopharyngeal  swabs. Results are for the presumptive identification of SARS-CoV-2 RNA. Positive results are indicative of infection with SARS-CoV-2, the virus  causing COVID-19, but do not rule out bacterial infection or co-infection  with other viruses. Laboratories within the [facility] and its  territories are required to report all positive results to the appropriate  public health authorities. Negative results do not preclude SARS-CoV-2  infection and should not be used as the sole basis for treatment or other  patient management decisions. Negative results must be combined with  clinical observations, patient history, and epidemiological information. This test has not been FDA cleared or approved.     This test has been authorized by FDA under an Emergency Use Authorization  (EUA). This test is only authorized for the duration of time the  declaration that circumstances exist justifying the authorization of the  emergency use of an in vitro diagnostic tests for detection of SARS-CoV-2  virus and/or diagnosis of COVID-19 infection under section 564(b)(1) of  the Act, 21 U. S.C. 006HUU-2(Y)(7), unless the authorization is terminated  or revoked sooner. The test has been validated but independent review by FDA  and CLIA is pending. Test performed using adFreeqpert: This RT-PCR assay targets N2,  a region unique to SARS-CoV-2. A conserved region in the E-gene was chosen  for pan-Sarbecovirus detection which includes SARS-CoV-2. According to CMS-2020-01-R, this platform meets the definition of high-throughput technology. B-Type Natriuretic Peptide(BNP) [[id number]]  (Abnormal) Collected: 09/28/23 0042    Lab Status: Final result Specimen: Blood from Arm, Right Updated: 09/28/23 0126      pg/mL     Procalcitonin [[id number]]  (Normal) Collected: 09/28/23 0042    Lab Status: Final result Specimen: Blood from Arm, Right Updated: 09/28/23 0122     Procalcitonin 0.16 ng/ml     HS Troponin 0hr (reflex protocol) [[id number]]  (Abnormal) Collected: 09/28/23 0042    Lab Status: Final result Specimen: Blood from Arm, Right Updated: 09/28/23 0121     hs TnI 0hr 113 ng/L     Lactic acid [[id number]]  (Normal) Collected: 09/28/23 0042    Lab Status: Final result Specimen: Blood from Arm, Right Updated: 09/28/23 0114     LACTIC ACID 1.0 mmol/L     Narrative:      Result may be elevated if tourniquet was used during collection.     Comprehensive metabolic panel [[id number]]  (Abnormal) Collected: 09/28/23 0042    Lab Status: Final result Specimen: Blood from Arm, Right Updated: 09/28/23 0113     Sodium 139 mmol/L      Potassium 5.0 mmol/L      Chloride 93 mmol/L      CO2 44 mmol/L      ANION GAP 2 mmol/L      BUN 23 mg/dL      Creatinine 1.01 mg/dL      Glucose 134 mg/dL Calcium 8.9 mg/dL      AST 13 U/L      ALT 10 U/L      Alkaline Phosphatase 81 U/L      Total Protein 8.3 g/dL      Albumin 3.8 g/dL      Total Bilirubin 0.61 mg/dL      eGFR 57 ml/min/1.73sq m     Narrative:      [facility] guidelines for Chronic Kidney Disease (CKD):   •  Stage 1 with normal or high GFR (GFR > 90 mL/min/1.73 square meters)  •  Stage 2 Mild CKD (GFR = 60-89 mL/min/1.73 square meters)  •  Stage 3A Moderate CKD (GFR = 45-59 mL/min/1.73 square meters)  •  Stage 3B Moderate CKD (GFR = 30-44 mL/min/1.73 square meters)  •  Stage 4 Severe CKD (GFR = 15-29 mL/min/1.73 square meters)  •  Stage 5 End Stage CKD (GFR <15 mL/min/1.73 square meters)  Note: GFR calculation is accurate only with a steady state creatinine    Protime-INR [[id number]]  (Normal) Collected: 09/28/23 0042    Lab Status: Final result Specimen: Blood from Arm, Right Updated: 09/28/23 0109     Protime 14.3 seconds      INR 1.04    APTT [[id number]]  (Normal) Collected: 09/28/23 0042    Lab Status: Final result Specimen: Blood from Arm, Right Updated: 09/28/23 0109     PTT 35 seconds     CBC and differential [[id number]]  (Abnormal) Collected: 09/28/23 0042    Lab Status: Final result Specimen: Blood from Arm, Right Updated: 09/28/23 0055     WBC 11.31 Thousand/uL      RBC 3.70 Million/uL      Hemoglobin 10.1 g/dL      Hematocrit 36.5 %      MCV 99 fL      MCH 27.3 pg      MCHC 27.7 g/dL      RDW 16.1 %      MPV 9.2 fL      Platelets 380 Thousands/uL      nRBC 0 /100 WBCs      Neutrophils Relative 84 %      Immat GRANS % 1 %      Lymphocytes Relative 7 %      Monocytes Relative 6 %      Eosinophils Relative 2 %      Basophils Relative 0 %      Neutrophils Absolute 9.38 Thousands/µL      Immature Grans Absolute 0.14 Thousand/uL      Lymphocytes Absolute 0.81 Thousands/µL      Monocytes Absolute 0.70 Thousand/µL      Eosinophils Absolute 0.26 Thousand/µL      Basophils Absolute 0.02 Thousands/µL     POCT Blood Gas (CG8+) ED Disposition   Admit    Condition   Stable    Date/Time   u Sep 28, 2023  2:35 AM    Comment   Case was discussed with ICU and the patient's admission status was agreed to be Admission Status: inpatient status to the service of Dr. PARHAM .           Follow-up Information    None         Current Discharge Medication List      CONTINUE these medications which have NOT CHANGED    Details   albuterol (Ventolin HFA) 90 mcg/act inhaler Inhale 2 puffs every 4 (four) hours as needed for wheezing or shortness of breath (cough)  Qty: 18 g, Refills: 1    Comments: Substitution to a formulary equivalent within the same pharmaceutical class is authorized.   Associated Diagnoses: Environmental allergies      cetirizine (ZyrTEC) 10 mg tablet Take 1 tablet (10 mg total) by mouth daily  Qty: 90 tablet, Refills: 1    Associated Diagnoses: Subacute cough      Diclofenac Sodium (VOLTAREN) 1 % PLEASE SEE ATTACHED FOR DETAILED DIRECTIONS      ferrous sulfate 325 (65 Fe) mg tablet Take 1 tablet by mouth 2 (two) times a day      Klor-Con M20 20 MEQ tablet Take 40 mEq AM + 20 mEq PM by mouth daily  Qty: 180 tablet, Refills: 5    Associated Diagnoses: Hypokalemia      Lidocaine 4 % PTCH Place 2 patches on the skin daily      methocarbamol (ROBAXIN) 500 mg tablet Take 1 tablet (500 mg total) by mouth every 6 (six) hours as needed for muscle spasms  Qty: 360 tablet, Refills: 1    Associated Diagnoses: Laceration of scalp, subsequent encounter; Closed nondisplaced fracture of clavicle with routine healing, unspecified laterality, unspecified part of clavicle, subsequent encounter; Closed fracture of one rib of right side with routine healing, subsequent encounter; Closed fracture of third thoracic vertebra with routine healing, unspecified fracture morphology, subsequent encounter; Closed fracture of fourth thoracic vertebra with routine healing, unspecified fracture morphology, subsequent encounter      metoprolol tartrate (LOPRESSOR) 25 mg tablet Take 0.5 tablets (12.5 mg total) by mouth 2 (two) times a day  Qty: 90 tablet, Refills: 1    Associated Diagnoses: Atrial fibrillation, unspecified type (HCC)      Multiple Vitamins-Minerals (MULTIVITAMIN ADULTS PO) Take 1 tablet by mouth daily      torsemide (DEMADEX) 20 mg tablet Take 1 tablet (20 mg total) by mouth in the morning  Qty: 180 tablet, Refills: 1    Associated Diagnoses: Lymphedema of both lower extremities      ammonium lactate (LAC-HYDRIN) 12 % lotion       amoxicillin-clavulanate (AUGMENTIN) 875-125 mg per tablet Take 1 tablet by mouth every 12 (twelve) hours for 7 days  Qty: 14 tablet, Refills: 0    Associated Diagnoses: Pneumonia of left lower lobe due to infectious organism      ascorbic acid (VITAMIN C) 250 MG tablet Take 250 mg by mouth      azithromycin (Zithromax) 250 mg tablet Take 2 tablets (500 mg total) by mouth daily for 1 day, THEN 1 tablet (250 mg total) daily for 4 days. Qty: 6 tablet, Refills: 0    Associated Diagnoses: Pneumonia of left lower lobe due to infectious organism      escitalopram (LEXAPRO) 5 mg tablet TAKE 1 TABLET (5 MG TOTAL) BY MOUTH DAILY. Qty: 90 tablet, Refills: 1    Associated Diagnoses: Depression, unspecified depression type      oxybutynin (DITROPAN-XL) 10 MG 24 hr tablet Take 10 mg by mouth daily at bedtime             No discharge procedures on file.     PDMP Review     None          ED Provider  Electronically Signed by           Hyacinth Storey MD  09/28/23 [phone]

## 2023-09-28 NOTE — RESPIRATORY THERAPY NOTE
09/28/23 0939   Non-Invasive Settings   IPAP (cm) (S)  18 cm   EPAP (cm) 6 cm   Rate (Set) 20   FiO2 (%) 40   Pressure Support (cm H2O) 22   Rise Time 2   Inspiratory Time (Set) 0.75   Humidification   (heater)   Temperature (Set) 31   Non-Invasive Readings   Skin Intervention Skin intact   Total Rate 22   MV (Mech) 12   Peak Pressure (Obs) 20   Spontaneous Vt (mL) 451   Heater Temperature (Obs) 30.9   Leak (lpm) 0     Ipap weaned to 18 per ap request.

## 2023-09-28 NOTE — H&P
[address]  H&P  Name: Jeb Overton 76 y.o. female I MRN: [id number]  Unit/Bed#: TR 30 I Date of Admission: 9/28/2023   Date of Service: 9/28/2023 I Hospital Day: 0      Assessment/Plan   Elevated brain natriuretic peptide (BNP) level  Assessment & Plan  · Hold on diuretics at present  · Resume home torsemide when respiratory acidosis improved    Elevated troponin  Assessment & Plan  · Continue to trend with serial EKGs  · Can discuss ECHO, suspect habitus may make study non-diagnostic    Acute encephalopathy  Assessment & Plan  · Suspect related to hypercarbia  · If no improvement in correction of pCO2, attempt to obtain CT head  · Hold sedating medications    Obesity hypoventilation syndrome (HCC)  Assessment & Plan  · Consider inpatient sleep study  · Non-invasive positive pressure at bedtime and naps    Atrial fibrillation ([address])  Assessment & Plan  · Review of outside records reports paroxysmal atrial fibrillation on Xarelto  · Family denies any history and does not take any blood thinners  · Would start heparin infusion if patient has arrhythmia    Class 3 severe obesity with body mass index (BMI) greater than or equal to 70 in [facility])  Assessment & Plan  · Encourage therapeutic lifestyle changes    * Acute on chronic respiratory failure with hypoxia and hypercapnia (HCC)  Assessment & Plan  · Continue non-invasive positive pressure for now  · Assess for possible arterial line to trend blood gases  · Goal SpO2 around 88%  · Respiratory protocol  · Per family, due to patient's size and mobility, has been unable to complete an outpatient sleep study  · Consider discussion with pulmonary to obtain testing while inpatient           History of Present Illness     HPI: Jeb Overton is a 76 y.o. who presents on 9/28 with a complaint of altered mental status and respiratory distress.  Per family, she has no significant past medical history outside of lymphedema and only takes diuretics at home "as a preventative." Review of outside hospital records demonstrates paroxysmal atrial fibrillation reportedly on Xarelto, obesity hypoventilation syndrome, chronic hypoxic respiratory failure on 2L nasal cannula, and history of admissions for pneumonia. In the pre-hospital setting, there was some concern for an allergic reaction of unkown etiology for which she received IV Benadryl and epinephrine. On arrival to the emergency room, the patient was lethargic and hypoxic. She was placed on non-invasive positive pressure and referred ot the stepdown unit for admission. History obtained from spouse, chart review and unobtainable from patient due to mental status. Review of Systems   Unable to perform ROS: Mental status change        Historical Information   Past Medical History:  3/27/2023: Acute respiratory failure with hypoxia (HCC)  No date: Morbid obesity ([address])  8/2/2021: Non-pressure chronic ulcer of right calf limited to   breakdown of skin ([address])  3/17/2023: Scalp laceration  3/31/2023: Thoracic vertebral fracture [facility]) Past Surgical History:  No date: NO PAST SURGERIES   Current Outpatient Medications   Medication Instructions   • albuterol (Ventolin HFA) 90 mcg/act inhaler 2 puffs, Inhalation, Every 4 hours PRN   • ammonium lactate (LAC-HYDRIN) 12 % lotion No dose, route, or frequency recorded. • amoxicillin-clavulanate (AUGMENTIN) 875-125 mg per tablet 1 tablet, Oral, Every 12 hours scheduled   • ascorbic acid (VITAMIN C) 250 mg, Oral   • azithromycin (Zithromax) 250 mg tablet Take 2 tablets (500 mg total) by mouth daily for 1 day, THEN 1 tablet (250 mg total) daily for 4 days.    • cetirizine (ZYRTEC) 10 mg, Oral, Daily   • Diclofenac Sodium (VOLTAREN) 1 % PLEASE SEE ATTACHED FOR DETAILED DIRECTIONS   • escitalopram (LEXAPRO) 5 mg, Oral, Daily   • ferrous sulfate 325 (65 Fe) mg tablet 1 tablet, Oral, 2 times daily   • Klor-Con M20 20 MEQ tablet Take 40 mEq AM + 20 mEq PM by mouth daily • Lidocaine 4 % PTCH 2 patches, Transdermal, Daily   • methocarbamol (ROBAXIN) 500 mg, Oral, Every 6 hours PRN   • metoprolol tartrate (LOPRESSOR) 12.5 mg, Oral, 2 times daily   • Multiple Vitamins-Minerals (MULTIVITAMIN ADULTS PO) 1 tablet, Oral, Daily   • oxybutynin (DITROPAN-XL) 10 mg, Oral, Daily at bedtime   • torsemide (DEMADEX) 20 mg, Oral, Daily    Allergies   Allergen Reactions   • Other GI Intolerance     Food preservatives   • Sulfa Antibiotics GI Intolerance   • Nitrates, Organic - Food Allergy Rash   • Silver Rash      Social History     Tobacco Use   • Smoking status: Former     Packs/day: 0.25     Years: 3.00     Total pack years: 0.75     Types: Cigarettes   • Smokeless tobacco: Never   • Tobacco comments:     quit over >15 years ago    Substance Use Topics   • Alcohol use: Never   • Drug use: Never    Family History   Problem Relation Age of Onset   • Diabetes Mother    • Heart disease Mother    • Stroke Brother    • Diabetes Maternal Grandmother           Objective                            Vitals I/O      Most Recent Min/Max in 24hrs   Temp 99 °F (37.2 °C) Temp  Min: 99 °F (37.2 °C)  Max: 99 °F (37.2 °C)   Pulse 85 Pulse  Min: 84  Max: 97   Resp (!) 24 Resp  Min: 24  Max: 35   /61 BP  Min: 92/55  Max: 127/87   O2 Sat 96 % SpO2  Min: 96 %  Max: 100 %    No intake or output data in the 24 hours ending 09/28/23 0236      No diet orders on file     Invasive Monitoring Physical exam    Physical Exam  Eyes:      Pupils: Pupils are equal, round, and reactive to light. Skin:     General: Skin is warm and dry. HENT:      Mouth/Throat:      Mouth: Mucous membranes are dry. Cardiovascular:      Rate and Rhythm: Normal rate and regular rhythm. Musculoskeletal:         General: Swelling (profound lower extremity edema) and deformity (severe vascular changes of the lower extremities with lichenification) present. Abdominal:      General: There is distension. Tenderness:  There is no abdominal tenderness. Constitutional:       General: She is in acute distress. Appearance: She is ill-appearing. Interventions: Face mask in place. Pulmonary:      Effort: Respiratory distress present. Neurological:      Mental Status: She is lethargic. GCS: GCS eye subscore is 2. GCS verbal subscore is 2. GCS motor subscore is 4. Comments: Patient withdraws in bilateral upper extremities  Unable to elicit movement in bilateral lower extremities            Diagnostic Studies      EKG: Sinus rhythm  Imaging:  I have personally reviewed pertinent films in PACS     Medications:  Scheduled PRN        Continuous    No current facility-administered medications for this encounter.        Labs:    CBC    Recent Labs     09/28/23  0042   WBC 11.31*   HGB 10.1*  11.9   HCT 36.5  35        BMP    Recent Labs     09/28/23  0042   SODIUM 139   K 5.0   CL 93*   CO2 44*  >45*   AGAP 2   BUN 23   CREATININE 1.01   CALCIUM 8.9       Coags    Recent Labs     09/28/23  0042   INR 1.04   PTT 35        Additional Electrolytes  Recent Labs     09/28/23 0042   CAIONIZED 1.05*          Blood Gas    No recent results  Recent Labs     09/28/23  0141   PHVEN 7.216*   FRE7NIA 92.4*   PO2VEN 51.2*   ECB1GBF 36.6*   BEVEN 6.5    LFTs  Recent Labs     09/28/23  0042   ALT 10   AST 13   ALKPHOS 81   ALB 3.8   TBILI 0.61       Infectious  Recent Labs     09/28/23  0042   PROCALCITONI 0.16     Glucose  Recent Labs     09/28/23  0042   GLUC 134           Anticipated Length of Stay is > 2 [address] [place], CRNP

## 2023-09-28 NOTE — WOUND OSTOMY CARE
Progress Note - Wound   Yeison Roth 76 y.o. female MRN: [id number]  Unit/Bed#:  Encounter: [id number]      Assessment:   Patient admitted due to acute on chronic respiratory failure with hypoxia and hypercapnia. History of morbid obesity, a.fib. Wound care consulted for lower extremity wound. Patient agreeable to assessment, alert and awake, on continuous BiPAP, assist of 3-4 to turn for assessment, will need a bariatric mattress, heels elevated off of mattress, is an assist with care. Primary RN at beside for assessment. 1. Bilateral sacrum, buttock, hips, elbows and heels- skin is dry, intact, blanchable. 2. Left posterior tibial- Wound is oval in shape, true depth unknown, approx. 40% yellow adhered slough and 60% brown adhered eschar, with scant amount of serosanguineous drainage noted. Tiffany-wound is dry, intact, blanchable, scaly skin. 3. No other wounds noted at this time to lower extremities. No induration, fluctuance, odor, warmth, redness, or purulence noted to the above noted wound. New dressing applied. Patient tolerated well. Primary nurse aware of plan of care. See flow sheets for more detailed assessment findings. Will follow along. Skin care plans:  1-Hydraguard to bilateral sacrum, buttock and heels BID and PRN  2-Elevate heels to offload pressure. 3-Ehob cushion in chair when out of bed. 4-Moisturize skin daily with skin nourishing cream.  5-Turn/reposition q2h or when medically stable for pressure re-distribution on skin. 6-Left posterior tibial- Cleanse wound with NSS, pat dry. Apply Silvasorb gel over wound bed and cover with clover bordered Allevyn foam dressing. Change every other day and as needed for soilage/displacement.        Wound 09/28/23 Tibial Left;Posterior (Active)   Wound Image   09/28/23 1415   Wound Description Yellow;Slough;Brown;Eschar 09/28/23 1415   Tiffany-wound Assessment Intact;Dry;Scaly 09/28/23 1415   Wound Length (cm) 0.6 cm 09/28/23 1415 Wound Width (cm) 1.2 cm 09/28/23 1415   Wound Depth (cm) 0.1 cm 09/28/23 1415   Wound Surface Area (cm^2) 0.72 cm^2 09/28/23 1415   Wound Volume (cm^3) 0.072 cm^3 09/28/23 1415   Calculated Wound Volume (cm^3) 0.07 cm^3 09/28/23 1415   Tunneling 0 cm 09/28/23 1415   Undermining 0 09/28/23 1415   Drainage Amount Scant 09/28/23 1415   Drainage Description Serosanguineous 09/28/23 1415   Non-staged Wound Description Not applicable 04/18[birth year] 2482   Treatments Cleansed;Site care;Irrigation with NSS 09/28/23 1415   Dressing Silvasorb gel; Foam, Silicon (eg. Allevyn, etc) 09/28/23 1415   Wound packed? No 09/28/23 1415   Dressing Changed Changed 09/28/23 1415   Patient Tolerance Tolerated well 09/28/23 1415   Dressing Status Clean;Dry; Intact 09/28/23 1415       Call or tigertext with any questions  Wound Care will continue to follow    Nancy Cardoso BSN RN CWON  Wound and Ostomy care

## 2023-09-28 NOTE — RESPIRATORY THERAPY NOTE
09/28/23 0101   Respiratory Assessment   Assessment Type Assess only   General Appearance Drowsy   Respiratory Pattern Labored   Chest Assessment Chest expansion symmetrical   Bilateral Breath Sounds Diminished   Location Specific No   Cough None   Resp Comments Pt placed on bipap at this time   O2 Device V60   Non-Invasive Information   O2 Interface Device Face mask   Non-Invasive Ventilation Mode BiPAP   $ Intermittent NIV Yes   SpO2 100 %   $ Pulse Oximetry Spot Check Charge Completed   Non-Invasive Settings   IPAP (cm) 18 cm   EPAP (cm) 8 cm   Rate (Set) 12   FiO2 (%) 100   Pressure Support (cm H2O) 10   Rise Time 2   Non-Invasive Readings   Skin Intervention Skin intact   Total Rate 36   Vt (mL) (Mech) 380   MV (Mech) 10   Peak Pressure (Obs) 21   Spontaneous Vt (mL) 352   Leak (lpm) 0   Non-Invasive Alarms   Insp Pressure High (cm H20) 30   Insp Pressure Low (cm H20) 5   Low Insp Pressure Time (sec) 20 sec   MV Low (L/min) 3   Vt High (mL) 1200   Vt Low (mL) 200   High Resp Rate (BPM) 50 BPM   Low Resp Rate (BPM) 12 BPM   Apnea Rate 12     RT Ventilator Management Note  Render Emy 76 y.o. female MRN: [id number]  Unit/Bed#:  30 Encounter: [id number]      Daily Screen    No data found in the last 10 encounters.            Physical Exam:   Assessment Type: Assess only  General Appearance: Drowsy  Respiratory Pattern: Labored  Chest Assessment: Chest expansion symmetrical  Bilateral Breath Sounds: Diminished  Location Specific: No  Cough: None  O2 Device: V60      Resp Comments: Pt placed on bipap at this time

## 2023-09-28 NOTE — PHYSICAL THERAPY NOTE
Physical Therapy Evaluation     Patient's Name: Renee Suarez    Admitting Diagnosis  Respiratory distress [R06.03]  Acute respiratory failure with hypoxia and hypercapnia ([address]) [J96.01, J96.02]    Problem List  Patient Active Problem List   Diagnosis    Lipodermatosclerosis of both lower extremities    Class 3 severe obesity with body mass index (BMI) greater than or equal to 70 in [facility])    Bakers cyst    Lymphedema of leg    Atrial fibrillation (HCC)    Obesity hypoventilation syndrome (HCC)    Iron deficiency anemia    Acute on chronic respiratory failure with hypoxia and hypercapnia (HCC)    Acute encephalopathy    Elevated troponin    Elevated brain natriuretic peptide (BNP) level     Past Medical History  Past Medical History:   Diagnosis Date    Acute respiratory failure with hypoxia ([address]) 3/27/2023    Morbid obesity ([address])     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 ([address]) 8/2/2021    Scalp laceration 3/17/2023    Thoracic vertebral fracture ([address]) 3/31/2023     Past Surgical History  Past Surgical History:   Procedure Laterality Date    NO PAST SURGERIES        09/28/23 0910   PT Last Visit   PT Visit Date 09/28/23   Note Type   Note type Evaluation   Pain Assessment   Pain Assessment Tool 0-10   Pain Score 10 - Worst Possible Pain   Pain Location/Orientation Location: Generalized   Pain Onset/Description Onset: Ongoing   Hospital Pain Intervention(s) Repositioned   Restrictions/Precautions   Weight Bearing Precautions Per Order No   Other Precautions Bed Alarm;Limb alert;Multiple lines;Telemetry;O2;Fall Risk;Pain   Home Living   Type of Home House   Home Layout Multi-level; Able to live on main level with bedroom/bathroom; Performs ADLs on one level;Ramped entrance   [address]   Additional Comments Pt ambulates with a walker. Prior Function   Level of [place] Needs assistance with functional mobility; Needs assistance with ADLs; Needs assistance with IADLS   Lives With Spouse  (and sister)   Denisse Sanabria Help From Family   IADLs Family/Friend/Other provides transportation; Family/Friend/Other provides meals; Family/Friend/Other provides medication management   Falls in the last 6 months 1 to 4  (1 fall)   General   Additional Pertinent History RN and NP cleared for bed level assessment   Family/Caregiver Present No   Cognition   Overall Cognitive Status   (questionable)   Arousal/Participation Alert   Orientation Level Oriented to person;Oriented to time  (oriented to month and year)   Memory Decreased recall of recent events;Decreased short term memory   Following Commands Follows one step commands with increased time or repetition   Subjective   Subjective "I can roll."   RLE Assessment   RLE Assessment X   Strength RLE   RLE Overall Strength 2-/5  (at least; grossly assessed with functional mobility; limited due to pain)   LLE Assessment   LLE Assessment X   Strength LLE   LLE Overall Strength 2-/5  (at least; grossly assessed with functional mobility; limited due to pain)   Bed Mobility   Rolling R 2  Maximal assistance   Additional items Assist x 3;Bedrails; Increased time required;Verbal cues;LE management   Rolling L 2  Maximal assistance   Additional items Assist x 3;Bedrails; Increased time required;Verbal cues;LE management   Supine to Sit Unable to assess  (deferred secondary medical status)   Balance   Static Sitting Zero   Endurance Deficit   Endurance Deficit Yes   Endurance Deficit Description decreased activity tolerance; pt requiring supplemental oxygen; pt was received BiPAP   Activity Tolerance   Activity Tolerance Patient limited by fatigue;Treatment limited secondary to medical complications (Comment)   Medical Staff Made Aware OT Daniel Mcgraw; SUSAN Stephensoning  (Co-evaluation performed with OT secondary to complex medical condition of patient and regression of functional status from baseline.  PT/OT goals were addressed separately.)   Nurse Made Aware MADELINE Perry   Assessment   Prognosis Fair   Problem List Decreased strength;Decreased endurance; Impaired balance;Decreased mobility;Obesity;Pain   Assessment Pt is 76year old female seen for PT evaluation s/p admit to [address] on 9/28/2023 with Acute on chronic respiratory failure with hypoxia and hypercapnia ([address] PT consulted to assess pt's functional mobility and discharge needs. Order placed for PT evaluation and treatment, with out of bed to chair order. Comorbidities affecting pt's physical performance at time of assessment include class 3 severe obesity, atrial fibrillation, obesity hypoventilation syndrome, acute encephalopathy, elevated troponin, and elevated BNP level, and lymphedema. Prior to hospitalization, pt was requiring assist for mobility. Pt ambulates with a walker. Pt ambulates household distances. Pt resides with her spouse and sister, in a multi-level house, but is able to stay on the first floor, with a ramped entrance. Personal factors affecting pt at time of initial evaluation include inability to ambulate household distances, inability to navigate level surfaces without external assistance, unable to perform dynamic tasks in the community, limited home support, positive fall history, difficulty performing ADLs, and inability to perform IADLs. Please find objective findings from PT assessment regarding body systems outlined above with impairments and limitations including weakness, impaired balance, decreased endurance, pain, decreased activity tolerance, decreased functional mobility tolerance, fall risk, and SOB upon exertion. The following objective measures were performed on initial evaluation Barthel Index: 20/100, Modified [place]: 5 (severe disability) and AM-PAC 6-Clicks: 5/33.  Pt's clinical presentation is currently unstable/unpredictable seen in pt's presentation of need for ongoing medical management/monitoring, pt is a fall risk, pt is currently requiring supplemental oxygen, and pt requires cues and assist of three for safety with functional mobility. Pt to benefit from continued PT treatment to address deficits as defined above and maximize pt's level of function and independence with mobility. From a PT standpoint, recommendation at time of discharge would be STR pending pt's progress in order to facilitate return to prior level of function. Barriers to Discharge Inaccessible home environment;Decreased caregiver support   Goals   STG Expiration Date 10/08/23   Short Term Goal #1 In 10 days: Increase bilateral LE strength 1/2 grade to facilitate independent mobility, Perform all bed mobility tasks with max A of 2 to decrease caregiver burden and PT to see and establish goals for transfers, sitting balance, out of bed tolerance, and gait when appropriate   Plan   Treatment/Interventions Functional transfer training;LE strengthening/ROM; Therapeutic exercise; Endurance training;Patient/family training;Bed mobility;Continued evaluation;Spoke to nursing;Spoke to advanced practitioner;OT   PT Frequency 3-5x/wk   Recommendation   PT Discharge Recommendation Post acute rehabilitation services   AM-PAC Basic Mobility Inpatient   Turning in Flat Bed Without Bedrails 1   Lying on Back to Sitting on Edge of Flat Bed Without Bedrails 1   Moving Bed to Chair 1   Standing Up From Chair Using Arms 1   Walk in Room 1   Climb 3-5 Stairs With Railing 1   Basic Mobility Inpatient Raw Score 6   Turning Head Towards Sound 4   Follow Simple Instructions 3   Low Function Basic Mobility Raw Score  13   Low Function Basic Mobility Standardized Score  20.14   Highest Level Of Mobility   JH-HLM Goal 2: Bed activities/Dependent transfer   JH-HLM Achieved 2: Bed activities/Dependent transfer   Modified Tripp Scale   Modified Tripp Scale 5   Barthel Index   Feeding 5   Bathing 0   Grooming Score 0   Dressing Score 5   Bladder Score 5   Bowels Score 5   Toilet Use Score 0 Transfers (Bed/Chair) Score 0   Mobility (Level Surface) Score 0   Stairs Score 0   Barthel Index Score 20     PT Evaluation Time: [id number]    Sammy Castelan, PT, DPT

## 2023-09-28 NOTE — RESPIRATORY THERAPY NOTE
09/28/23 1302   Non-Invasive Information   O2 Interface Device Face mask   Non-Invasive Ventilation Mode BiPAP   $ Pulse Oximetry Spot Check Charge Completed   Non-Invasive Settings   IPAP (cm) (S)  24 cm   EPAP (cm) 8 cm   Rate (Set) (S)  26   FiO2 (%) 50   Pressure Support (cm H2O) 16   Rise Time 2   Inspiratory Time (Set) 0.75   Humidification   (heater)   Temperature (Set) 31   Non-Invasive Readings   Skin Intervention Skin intact   Total Rate 28   Vt (mL) (Mech) 473   MV (Mech) 12.8   Peak Pressure (Obs) 23   Spontaneous Vt (mL) 444   Heater Temperature (Obs) 30.9   Leak (lpm) 0   Non-Invasive Alarms   Insp Pressure High (cm H20) 42   Insp Pressure Low (cm H20) 5   Low Insp Pressure Time (sec) 2 sec   MV Low (L/min) 3   Vt High (mL) 1350   Vt Low (mL) 200   High Resp Rate (BPM) 45 BPM   Low Resp Rate (BPM) 8 BPM   Apnea Rate 12   Maintenance   Water bag changed No     Ipap increased to [id number] and rr to 26 for hypercapneic abg.

## 2023-09-28 NOTE — ASSESSMENT & PLAN NOTE
· Review of outside records reports paroxysmal atrial fibrillation on Xarelto  · Family denies any history and does not take any blood thinners  · Would start heparin infusion if patient has arrhythmia

## 2023-09-28 NOTE — PROCEDURES
Arterial Line Insertion    Date/Time: 9/28/2023 12:06 PM    Performed by: EMIL Smalls  Authorized by: Janis Rader, [address] [place] [address]    Patient location:  ICU  Other Assisting Provider: No    Consent:     Consent obtained:  Verbal    Consent given by:  Spouse  Universal protocol:     Patient identity confirmed:  Arm band  Indications:     Indications: hemodynamic monitoring, multiple ABGs and continuous blood pressure monitoring    Anesthesia (see MAR for exact dosages):      Anesthesia method:  None  Procedure details:     Location / Tip of Catheter:  Radial    Laterality:  Right    Placement technique:  Ultrasound guided    Ultrasound image availability:  Not saved    Sterile ultrasound techniques: Sterile gel and sterile probe covers were used      Number of attempts:  1  Post-procedure details:     Post-procedure:  Secured with tape, sterile dressing applied, sutured and wrist guard applied    EMIL Smalls

## 2023-09-28 NOTE — ASSESSMENT & PLAN NOTE
· Continue to trend with serial EKGs  · Can discuss ECHO, suspect habitus may make study non-diagnostic

## 2023-09-28 NOTE — DISCHARGE INSTR - OTHER ORDERS
Skin care plans:  1-Left posterior tibial- Cleanse wound with NSS, pat dry. Apply Triad paste over wound bed and cover with clover bordered Allevyn foam dressing. Change every other day and as needed for soilage/displacement. 2-Posterior right thigh- Cleanse wound with soap and water, pat dry. Apply Allevyn foam dressing over wound bed. Change every 3 days and as needed for soilage/displacement. 3- Right vulva/labia- Cleanse wounds with soap and water, pat dry. Apply thin layer of [place] over wound beds 3 times a day and as needed with travis-care.

## 2023-09-28 NOTE — ASSESSMENT & PLAN NOTE
· Continue non-invasive positive pressure for now  · Assess for possible arterial line to trend blood gases  · Goal SpO2 around 88%  · Respiratory protocol  · Per family, due to patient's size and mobility, has been unable to complete an outpatient sleep study  · Consider discussion with pulmonary to obtain testing while inpatient

## 2023-09-28 NOTE — CASE MANAGEMENT
Case Management Assessment & Discharge Planning Note    Patient name Jose Benjamin  Location / MRN [id number]  : 1955 Date 2023       Current Admission Date: 2023  Current Admission Diagnosis:Acute on chronic respiratory failure with hypoxia and hypercapnia [facility])   Patient Active Problem List    Diagnosis Date Noted   • Acute on chronic respiratory failure with hypoxia and hypercapnia ([address]) 2023   • Acute encephalopathy 2023   • Elevated troponin 2023   • Elevated brain natriuretic peptide (BNP) level 2023   • Iron deficiency anemia 2023   • Atrial fibrillation ([address]) 2023   • Obesity hypoventilation syndrome ([address]) 2023   • Lipodermatosclerosis of both lower extremities 2021   • Class 3 severe obesity with body mass index (BMI) greater than or equal to 70 in adult [facility]) 2021   • Bakers cyst 10/27/2014   • Lymphedema of leg 10/27/2014      LOS (days): 0  Geometric Mean LOS (GMLOS) (days): 3.50  Days to GMLOS:2.9     OBJECTIVE:    Risk of Unplanned Readmission Score: 11.45         Current admission status: Inpatient       Preferred Pharmacy:   [facility]/pharmacy #[id number]- [address] - [address], [address]  [address] [address]  Phone: [phone] Fax: [phone]    Primary Care Provider: Yury Bishop DO    Primary Insurance: [employer]  Secondary Insurance:     ASSESSMENT:  [facility] Proxies    There are no active Health Care Proxies on file.        Advance Directives  Does patient have a [address]?: No  Was patient offered paperwork?: Yes (declined)  Does patient have Advance Directives?: No  Was patient offered paperwork?: Yes (declined)  Primary Contact: Miguel Colón (Spouse)   [phone] (Mobile)         Readmission Root Cause  30 Day Readmission: No    Patient Information  Admitted from[de-identified] Home  Mental Status: Sedated  During Assessment patient was accompanied by: Not accompanied during assessment  Assessment information provided by[de-identified] Spouse  Primary Caregiver: Private caregiver  Caregiver's Relationship to Patient[de-identified] Other (Specify) (pt has private hire "home aide" 5 hrs per day, M-F)  Support Systems: Spouse/significant other  [place] of Residence: [address] [place] [address] [place] [facility] [facility] [place] [place]  Home entry access options. Select all that apply.: Ramp  Type of Current Residence: 2 story home  Upon entering residence, is there a bedroom on the main floor (no further steps)?: Yes  Upon entering residence, is there a bathroom on the main floor (no further steps)?: Yes  In the last 12 months, was there a time when you were not able to pay the mortgage or rent on time?: No  In the last 12 months, how many places have you lived?: 1  In the last 12 months, was there a time when you did not have a steady place to sleep or slept in a shelter (including now)?: No  Homeless/housing insecurity resource given?: N/A  Living Arrangements: Lives w/ Spouse/significant other  Is patient a ?: No    Activities of Daily Living Prior to Admission  Functional Status: Assistance  Completes ADLs independently?: No  Level of ADL dependence: Assistance  Ambulates independently?: Yes  Does patient use assisted devices?: Yes  Assisted Devices (DME) used: Jeni Cavazos, Bedside Commode, Wheelchair, Home Oxygen concentrator, Portable Oxygen tanks  DME Company Name (respiratory supplies):  [employer]  O2 Rate(s): 3 L continuous  Does patient currently own DME?: Yes  What DME does the patient currently own?: Bedside Commode, Home Oxygen concentrator, Portable Oxygen tanks, Walker, Wheelchair  Does patient have a history of Outpatient Therapy (PT/OT)?: No  Does the patient have a history of Short-Term Rehab?: Yes ([address] and [facility])  Does patient have a history of HHC?: Yes  Does patient currently have [facility]?: Yes Ankur Hansen)    Current [address] [facility]  Type of Current Home Care Services: Home OT, Home PT, Nurse visit, Other (Comment) ("home aide" per spouse)  [address][de-identified] Son[place] Provider[de-identified] PCP    Patient Information Continued  Income Source: Employed (spouse works from home;  pt has applied for [employer] and is waiting for determination)  Does patient have prescription coverage?: Yes  Within the past 12 months, you worried that your food would run out before you got the money to buy more.: Never true  Within the past 12 months, the food you bought just didn't last and you didn't have money to get more.: Never true  Food insecurity resource given?: N/A  Does patient receive dialysis treatments?: No  Does patient have a history of substance abuse?: No  Does patient have a history of Mental Health Diagnosis?: No         Means of Transportation  Means of Transport to Appts[de-identified] Other (Comment) (doesn't leave house;  has telehealth visits)  In the past 12 months, has lack of transportation kept you from medical appointments or from getting medications?: No  In the past 12 months, has lack of transportation kept you from meetings, work, or from getting things needed for daily living?: No  Was application for public transport provided?: N/A        DISCHARGE DETAILS:    Discharge planning discussed with[de-identified] spouse     Comments - Freedom of Choice: Met w pt and family, introduced self and explained role of CM, including arranging DME, STR, home health, out pt tx. Advised pt/family that CM will make appropriate referrals based on treatment team reccommendations and MD orders, and they can consider recommended plan of care and choose from available vendors.   CM contacted family/caregiver?: Yes  Were Treatment Team discharge recommendations reviewed with patient/caregiver?:  (none at present)          Contacts  Patient Contacts: Jd Ruiz (Spouse)   [phone] (Mobile)  Relationship to Patient[de-identified] Family  Contact Method: Phone  Phone Number: Miguel Colón (Spouse)   [phone] (Mobile)  Reason/Outcome: Continuity of Care, Discharge [address]         Is the patient interested in [address] [facility] at discharge?: Yes  [address] [facility] requested[de-identified] Nursing, Physical [address] Name[de-identified] [facility] External Referral Reason (only applicable if external HHA name selected): Patient has established relationship with provider  [address] [facility] Provider[de-identified] PCP  Home Health Services Needed[de-identified] Evaluate Functional Status and Safety, Gait/ADL Training, Heart Failure Management, Oxygen Via Nasal Cannula, Strengthening/Theraputic Exercises to Improve Function  Oxygen LPM Ordered (if applicable based on home health services needed):: 3 LPM  Homebound Criteria Met[de-identified] Requires the Assistance of Another Person for Safe Ambulation or to Leave the Home, Uses an Assist Device (i.e. cane, walker, etc)  Supporting Clincal Findings[de-identified] Fatigues Easliy in [employer], Limited Endurance, Dyspnea with Exertion, Requires Oxygen    DME Referral Provided  Referral made for DME?: No    Other Referral/Resources/Interventions Provided:  Referral Comments: Spouse reports that pt has been to STR at [facility] and [facility]. He reports that if STR is recommended during this hospitalization, he would like her to go to [facility]. CM will continue to be available and refer as needed. Treatment Team Recommendation: Other (TBD)  Discharge Destination Plan[de-identified] Other (TBD)  Transport at Discharge :  Other (Comment) (TBD)

## 2023-09-28 NOTE — ASSESSMENT & PLAN NOTE
· Suspect related to hypercarbia  · If no improvement in correction of pCO2, attempt to obtain CT head  · Hold sedating medications

## 2023-09-28 NOTE — OCCUPATIONAL THERAPY NOTE
Occupational Therapy Evaluation      Chetna Frances    9/28/2023    Patient Active Problem List   Diagnosis    Lipodermatosclerosis of both lower extremities    Class 3 severe obesity with body mass index (BMI) greater than or equal to 70 in adult [facility])    Bakers cyst    Lymphedema of leg    Atrial fibrillation (HCC)    Obesity hypoventilation syndrome (HCC)    Iron deficiency anemia    Acute on chronic respiratory failure with hypoxia and hypercapnia (HCC)    Acute encephalopathy    Elevated troponin    Elevated brain natriuretic peptide (BNP) level       Past Medical History:   Diagnosis Date    Acute respiratory failure with hypoxia ([address]) 3/27/2023    Morbid obesity ([address])     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 Upon arrival pt supine in bed with HOB elevated. Pain Assessment   Pain Assessment Tool 0-10   Pain Score 10 - Worst Possible Pain   Pain Location/Orientation Location: Generalized   Pain Onset/Description Onset: Ongoing;Frequency: Constant/Continuous   Hospital Pain Intervention(s) Medication (See MAR); Repositioned; Ambulation/increased activity   Multiple Pain Sites Yes   Restrictions/Precautions   Weight Bearing Precautions Per Order No   Other Precautions Bed Alarm;Multiple lines;Telemetry;O2;Fall Risk;Pain  (BiPAP)   Home Living   Type of [address] [place] [address] One level;Performs ADLs on one level; Able to live on main level with bedroom/bathroom; Ramped entrance   [place]  (utilizes RW at baseline)   Prior Function   Level of [place] Needs assistance with ADLs; Needs assistance with IADLS   Lives With Spouse  (& Sister)   Elpidio Deleon Help From Family   IADLs Family/Friend/Other provides transportation; Family/Friend/Other provides meals; Family/Friend/Other provides medication management   Falls in the last 6 months 1 to 4  (1 fall per pt)   Vocational Retired   Lifestyle   Autonomy Patient requires assistance with ADLs/IADLs, ambulatory with RW, and lives with family in a one level house   Reciprocal Relationships  and sister; pt reports that son passed away ~1 year ago   Service to Others Retired   ADL   Grooming Assistance 2  [address] [place] 2  Maximal Assistance    [address] 1  Total Assistance   [address] 2  [address] [facility] 1  [address] [facility]  1  Total Assistance   Additional Comments ADL levels based on functional performance during OT eval   Bed Mobility   Rolling R 2  Maximal assistance   Additional items Assist x 3;Bedrails; Increased time required;Verbal cues;LE management   Rolling L 2  Maximal assistance   Additional items Assist x 3;Bedrails; Increased time required;Verbal cues;LE management   Supine to Sit Unable to assess  (deferred d/t medical status)   Additional Comments Repositioned patient with pillows/wedges to decrease pain. Pt required assist x 4 to boost patient in bed. Activity Tolerance   Activity Tolerance Patient limited by pain; Patient limited by fatigue;Treatment limited secondary to medical complications (Comment)  (Low BP & BiPAP)   Medical Staff Made Aware Pt seen as a co-eval with PT due to the patient's co-morbidities and clinically unstable presentation indicated by chart review.    Nurse Made Aware NP Les Newman and RN Viral Kaur verbalized pt appropriate for therapy   RUE Assessment   RUE Assessment WFL  (grossly 3/5 MMT based on functional performance)   LUE Assessment   LUE Assessment WFL  (grossly 3/5 MMT based on functional performance)   Hand Function   Gross Motor Coordination Functional   Fine Motor Coordination Functional   Cognition   Overall Cognitive Status   (Questionable)   Arousal/Participation Alert; Responsive; Cooperative   Attention Attends with cues to redirect   Orientation Level Oriented to person;Oriented to time  (Questionable orientation to place and situation)   Memory Decreased recall of recent events;Decreased short term memory   Following Commands Follows one step commands with increased time or repetition   Assessment   Limitation Decreased ADL status; Decreased UE strength;Decreased cognition;Decreased Safe judgement during ADL;Decreased endurance;Decreased self-care trans;Decreased high-level ADLs   Prognosis Good   Assessment Patient is a 76 y.o. female seen for OT evaluation s/p admit to [facility]  on 9/28/2023 w/Acute on chronic respiratory failure with hypoxia and hypercapnia ([address]). Commorbidities affecting patient's functional performance at time of assessment include: obesity, A-fib, obesity hypoventilation syndrome, acute encephalopathy, and elevated BNP level. Orders placed for OT evaluation and treatment. Performed at least two patient identifiers during session including name and wristband. Prior to admission, Patient requires assistance with ADLs/IADLs, ambulatory with RW, and lives with family in a one level house. Personal factors affecting patient at time of initial evaluation include: limited insight into deficits, difficulty performing ADLs and difficulty performing IADLs. Upon evaluation, patient requires maximal assist for UB ADLs, total assist for LB ADLs. Patient is oriented to name, and oriented to time, and presents with limited ability to recall information after a brief period of time (short term memory) / working memory .   Occupational performance is affected by the following deficits: decreased muscle strength, dynamic sit/ stand balance deficit with poor standing tolerance time for self care and functional mobility, decreased activity tolerance, impaired memory, impaired problem solving, increased pain, delayed righting and equilibrium reactions and poor trunk control. Patient to benefit from continued Occupational Therapy treatment while in the hospital to address deficits as defined above and maximize level of functional independence with ADLs and functional mobility. Occupational Performance areas to address include: eating, grooming , bathing/ shower, dressing, toilet hygiene, transfer to all surfaces and functional ambulation. From OT standpoint, recommendation at time of d/c would be Post acute rehabilitation services. Goals   Patient Goals to have less pain   Plan   Treatment Interventions ADL retraining;Functional transfer training;UE strengthening/ROM; Endurance training;Cognitive reorientation;Patient/family training;Equipment evaluation/education; Compensatory technique education; Energy conservation; Activityengagement   Goal Expiration Date 10/13/23   OT Treatment Day 0   OT Frequency 3-5x/wk   Recommendation   OT Discharge Recommendation Post acute rehabilitation services   AM-PAC Daily Activity Inpatient   Lower Body Dressing 1   Bathing 1   Toileting 1   Upper Body Dressing 1   Grooming 1   Eating 1   Daily Activity Raw Score 6   Turning Head Towards Sound 3   Follow Simple Instructions 3   Low Function Daily Activity Raw Score 12   Low Function Daily Activity Standardized Score  21.38   AM-PAC Applied Cognition Inpatient   Following a Speech/Presentation 3   Understanding Ordinary Conversation 4   Taking Medications 2   Remembering Where Things Are Placed or Put Away 2   Remembering List of 4-5 Errands 2   Taking Care of Complicated Tasks 2   Applied Cognition Raw Score 15   Applied Cognition Standardized Score 33.54   Modified [place] Scale   Modified [place] Scale 5   End of Consult   Patient Position at End of Consult Supine; All needs within reach   Nurse Communication Nurse aware of consult  (MADELINE Tucker present)     GOALS:    *ADL transfers with Mod (A) for inc'd independence with ADLs/purposeful tasks    *UB ADL with Min (A) for inc'd independence with self cares    *LB ADL with Mod (A) using AE prn for inc'd independence with self cares    *Toileting with Mod (A) for clothing management and hygiene for return to PLOF with personal care    *Participate in 10m UE therex to increase overall stamina/activity tolerance for purposeful tasks    *Bed mobility- Min (A) for inc'd independence to manage own comfort and initiate EOB & OOB purposeful tasks    *Patient will verbalize 3 safety awareness/ principles to prevent falls in the home setting. *Patient will verbalize and demonstrate use of energy conservation/deep breathing techniques and work simplification skills during functional activities with no verbal cues. *Patient will increase OOB/sitting tolerance to 2-4 hours per day to increase participation in self-care and leisure tasks with no s/s of exertion. *Patient will engage in ongoing cognitive assessment to assist with safe discharge planning/recommendations.       *Pt will demonstrate use of long handled AE during 100% of tx sessions for increased ADL safety and independence following D/C     TIANA Thomas, OTR/L

## 2023-09-29 LAB
ALBUMIN SERPL BCP-MCNC: 3.3 G/DL (ref 3.5–5)
ALP SERPL-CCNC: 71 U/L (ref 34–104)
ALT SERPL W P-5'-P-CCNC: 9 U/L (ref 7–52)
ANION GAP SERPL CALCULATED.3IONS-SCNC: 6 MMOL/L
ARTERIAL PATENCY WRIST A: YES
AST SERPL W P-5'-P-CCNC: 13 U/L (ref 13–39)
BASE EXCESS BLDA CALC-SCNC: 15.5 MMOL/L
BASE EXCESS BLDA CALC-SCNC: 16 MMOL/L (ref -2–3)
BASOPHILS # BLD AUTO: 0 THOUSANDS/ÂΜL (ref 0–0.1)
BASOPHILS NFR BLD AUTO: 0 % (ref 0–1)
BILIRUB SERPL-MCNC: 0.55 MG/DL (ref 0.2–1)
BUN SERPL-MCNC: 26 MG/DL (ref 5–25)
CA-I BLD-SCNC: 1.17 MMOL/L (ref 1.12–1.32)
CALCIUM ALBUM COR SERPL-MCNC: 9.7 MG/DL (ref 8.3–10.1)
CALCIUM SERPL-MCNC: 9.1 MG/DL (ref 8.4–10.2)
CHLORIDE SERPL-SCNC: 95 MMOL/L (ref 96–108)
CO2 SERPL-SCNC: 39 MMOL/L (ref 21–32)
CREAT SERPL-MCNC: 0.89 MG/DL (ref 0.6–1.3)
DME PARACHUTE DELIVERY DATE REQUESTED: NORMAL
DME PARACHUTE ITEM DESCRIPTION: NORMAL
DME PARACHUTE ORDER STATUS: NORMAL
DME PARACHUTE SUPPLIER NAME: NORMAL
DME PARACHUTE SUPPLIER PHONE: NORMAL
EOSINOPHIL # BLD AUTO: 0.46 THOUSAND/ÂΜL (ref 0–0.61)
EOSINOPHIL NFR BLD AUTO: 8 % (ref 0–6)
ERYTHROCYTE [DISTWIDTH] IN BLOOD BY AUTOMATED COUNT: 16.4 % (ref 11.6–15.1)
FIO2 GAS DIL.REBREATH: 50 L
GFR SERPL CREATININE-BSD FRML MDRD: 66 ML/MIN/1.73SQ M
GLUCOSE SERPL-MCNC: 104 MG/DL (ref 65–140)
GLUCOSE SERPL-MCNC: 110 MG/DL (ref 65–140)
HCO3 BLDA-SCNC: 41.2 MMOL/L (ref 24–30)
HCO3 BLDA-SCNC: 41.9 MMOL/L (ref 22–28)
HCT VFR BLD AUTO: 30.5 % (ref 34.8–46.1)
HCT VFR BLD CALC: 33 % (ref 34.8–46.1)
HGB BLD-MCNC: 8.9 G/DL (ref 11.5–15.4)
HGB BLDA-MCNC: 11.2 G/DL (ref 11.5–15.4)
IMM GRANULOCYTES # BLD AUTO: 0.03 THOUSAND/UL (ref 0–0.2)
IMM GRANULOCYTES NFR BLD AUTO: 1 % (ref 0–2)
IPAP: 24
LYMPHOCYTES # BLD AUTO: 0.62 THOUSANDS/ÂΜL (ref 0.6–4.47)
LYMPHOCYTES NFR BLD AUTO: 11 % (ref 14–44)
MAGNESIUM SERPL-MCNC: 2.2 MG/DL (ref 1.9–2.7)
MCH RBC QN AUTO: 27.1 PG (ref 26.8–34.3)
MCHC RBC AUTO-ENTMCNC: 29.2 G/DL (ref 31.4–37.4)
MCV RBC AUTO: 93 FL (ref 82–98)
MONOCYTES # BLD AUTO: 0.41 THOUSAND/ÂΜL (ref 0.17–1.22)
MONOCYTES NFR BLD AUTO: 7 % (ref 4–12)
NEUTROPHILS # BLD AUTO: 4.1 THOUSANDS/ÂΜL (ref 1.85–7.62)
NEUTS SEG NFR BLD AUTO: 73 % (ref 43–75)
NON VENT- BIPAP: ABNORMAL
NRBC BLD AUTO-RTO: 0 /100 WBCS
O2 CT BLDA-SCNC: 13 ML/DL (ref 16–23)
OXYHGB MFR BLDA: 93.6 % (ref 94–97)
PCO2 BLD: 43 MMOL/L (ref 21–32)
PCO2 BLD: 54.2 MM HG (ref 42–50)
PCO2 BLDA: 63.2 MM HG (ref 36–44)
PEEP MAX SETTING VENT: 8 CM[H2O]
PH BLD: 7.49 [PH] (ref 7.3–7.4)
PH BLDA: 7.44 [PH] (ref 7.35–7.45)
PHOSPHATE SERPL-MCNC: 3.2 MG/DL (ref 2.3–4.1)
PLATELET # BLD AUTO: 147 THOUSANDS/UL (ref 149–390)
PMV BLD AUTO: 10.1 FL (ref 8.9–12.7)
PO2 BLD: 77 MM HG (ref 35–45)
PO2 BLDA: 72.1 MM HG (ref 75–129)
POTASSIUM BLD-SCNC: 4.8 MMOL/L (ref 3.5–5.3)
POTASSIUM SERPL-SCNC: 5 MMOL/L (ref 3.5–5.3)
PROT SERPL-MCNC: 7.2 G/DL (ref 6.4–8.4)
RBC # BLD AUTO: 3.29 MILLION/UL (ref 3.81–5.12)
SAO2 % BLD FROM PO2: 96 % (ref 60–85)
SODIUM BLD-SCNC: 137 MMOL/L (ref 136–145)
SODIUM SERPL-SCNC: 140 MMOL/L (ref 135–147)
SPECIMEN SOURCE: ABNORMAL
SPECIMEN SOURCE: ABNORMAL
VENT BIPAP FIO2: 50 %
WBC # BLD AUTO: 5.62 THOUSAND/UL (ref 4.31–10.16)

## 2023-09-29 PROCEDURE — 83735 ASSAY OF MAGNESIUM: CPT | Performed by: NURSE PRACTITIONER

## 2023-09-29 PROCEDURE — 82947 ASSAY GLUCOSE BLOOD QUANT: CPT

## 2023-09-29 PROCEDURE — 94664 DEMO&/EVAL PT USE INHALER: CPT

## 2023-09-29 PROCEDURE — 85025 COMPLETE CBC W/AUTO DIFF WBC: CPT | Performed by: NURSE PRACTITIONER

## 2023-09-29 PROCEDURE — 36600 WITHDRAWAL OF ARTERIAL BLOOD: CPT

## 2023-09-29 PROCEDURE — 99233 SBSQ HOSP IP/OBS HIGH 50: CPT | Performed by: ANESTHESIOLOGY

## 2023-09-29 PROCEDURE — 82805 BLOOD GASES W/O2 SATURATION: CPT

## 2023-09-29 PROCEDURE — 84100 ASSAY OF PHOSPHORUS: CPT | Performed by: NURSE PRACTITIONER

## 2023-09-29 PROCEDURE — 84132 ASSAY OF SERUM POTASSIUM: CPT

## 2023-09-29 PROCEDURE — 82803 BLOOD GASES ANY COMBINATION: CPT

## 2023-09-29 PROCEDURE — 94760 N-INVAS EAR/PLS OXIMETRY 1: CPT

## 2023-09-29 PROCEDURE — 99232 SBSQ HOSP IP/OBS MODERATE 35: CPT | Performed by: INTERNAL MEDICINE

## 2023-09-29 PROCEDURE — 84295 ASSAY OF SERUM SODIUM: CPT

## 2023-09-29 PROCEDURE — 94660 CPAP INITIATION&MGMT: CPT

## 2023-09-29 PROCEDURE — 82330 ASSAY OF CALCIUM: CPT

## 2023-09-29 PROCEDURE — 80053 COMPREHEN METABOLIC PANEL: CPT | Performed by: NURSE PRACTITIONER

## 2023-09-29 PROCEDURE — 85014 HEMATOCRIT: CPT

## 2023-09-29 RX ADMIN — Medication: at 21:40

## 2023-09-29 RX ADMIN — DEXMEDETOMIDINE HYDROCHLORIDE 1 MCG/KG/HR: 400 INJECTION INTRAVENOUS at 03:15

## 2023-09-29 RX ADMIN — CHLORHEXIDINE GLUCONATE 0.12% ORAL RINSE 15 ML: 1.2 LIQUID ORAL at 21:40

## 2023-09-29 RX ADMIN — METHOCARBAMOL 500 MG: 500 TABLET ORAL at 23:07

## 2023-09-29 RX ADMIN — HEPARIN SODIUM 7500 UNITS: 5000 INJECTION INTRAVENOUS; SUBCUTANEOUS at 15:07

## 2023-09-29 RX ADMIN — DEXMEDETOMIDINE HYDROCHLORIDE 0.6 MCG/KG/HR: 400 INJECTION INTRAVENOUS at 00:35

## 2023-09-29 RX ADMIN — DEXMEDETOMIDINE HYDROCHLORIDE 0.1 MCG/KG/HR: 400 INJECTION INTRAVENOUS at 02:55

## 2023-09-29 RX ADMIN — HEPARIN SODIUM 7500 UNITS: 5000 INJECTION INTRAVENOUS; SUBCUTANEOUS at 06:59

## 2023-09-29 RX ADMIN — HEPARIN SODIUM 7500 UNITS: 5000 INJECTION INTRAVENOUS; SUBCUTANEOUS at 21:40

## 2023-09-29 NOTE — PLAN OF CARE
Problem: Prexisting or High Potential for Compromised Skin Integrity  Goal: Skin integrity is maintained or improved  Description: INTERVENTIONS:  - Identify patients at risk for skin breakdown  - Assess and monitor skin integrity  - Assess and monitor nutrition and hydration status  - Monitor labs   - Assess for incontinence   - Turn and reposition patient  - Assist with mobility/ambulation  - Relieve pressure over bony prominences  - Avoid friction and shearing  - Provide appropriate hygiene as needed including keeping skin clean and dry  - Evaluate need for skin moisturizer/barrier cream  - Collaborate with interdisciplinary team   - Patient/family teaching  - Consider wound care consult   9/28/2023 2348 by Parul Tomlin RN  Outcome: Progressing  9/28/2023 2348 by Parul Tomlin RN  Outcome: Progressing     Problem: MOBILITY - ADULT  Goal: Maintain or return to baseline ADL function  Description: INTERVENTIONS:  -  Assess patient's ability to carry out ADLs; assess patient's baseline for ADL function and identify physical deficits which impact ability to perform ADLs (bathing, care of mouth/teeth, toileting, grooming, dressing, etc.)  - Assess/evaluate cause of self-care deficits   - Assess range of motion  - Assess patient's mobility; develop plan if impaired  - Assess patient's need for assistive devices and provide as appropriate  - Encourage maximum independence but intervene and supervise when necessary  - Involve family in performance of ADLs  - Assess for home care needs following discharge   - Consider OT consult to assist with ADL evaluation and planning for discharge  - Provide patient education as appropriate  9/28/2023 2348 by Parul Tomlin RN  Outcome: Progressing  Goal: Maintains/Returns to pre admission functional level  Description: INTERVENTIONS:  - Perform BMAT or MOVE assessment daily.   - Set and communicate daily mobility goal to care team and patient/family/caregiver.    - Collaborate with rehabilitation services on mobility goals if consulted  - Perform Range of Motion 3 times a day. - Reposition patient every 2 hours.   - Dangle patient 3 times a day  - Stand patient 3 times a day  - Ambulate patient 3 times a day  - Out of bed to chair 3 times a day   - Out of bed for meals 3 times a day  - Out of bed for toileting  - Record patient progress and toleration of activity level   9/28/2023 2348 by Sonali Hickman RN  Outcome: Progressing

## 2023-09-29 NOTE — CASE MANAGEMENT
Case Management Discharge Planning Note    Patient name Yeison Roth  Location / MRN [id number]  : 1955 Date 2023       Current Admission Date: 2023  Current Admission Diagnosis:Acute on chronic respiratory failure with hypoxia and hypercapnia [facility])   Patient Active Problem List    Diagnosis Date Noted   • Acute on chronic respiratory failure with hypoxia and hypercapnia ([address]) 2023   • Acute encephalopathy 2023   • Elevated troponin 2023   • Elevated brain natriuretic peptide (BNP) level 2023   • Iron deficiency anemia 2023   • Atrial fibrillation ([address]) 2023   • Obesity hypoventilation syndrome ([address]) 2023   • Lipodermatosclerosis of both lower extremities 2021   • Class 3 severe obesity with body mass index (BMI) greater than or equal to 70 in adult [facility]) 2021   • Bakers cyst 10/27/2014   • Lymphedema of leg 10/27/2014      LOS (days): 1  Geometric Mean LOS (GMLOS) (days): 3.50  Days to GMLOS:1.8     OBJECTIVE:  Risk of Unplanned Readmission Score: 13.43         Current admission status: Inpatient   Preferred Pharmacy:   [facility]/pharmacy #[id number]- [address] - [address], [address]  [address] [facility], [address], [address] [facility]  Phone: [phone] Fax: [phone]    Primary Care Provider: Malcom Guzman DO    Primary Insurance: [address] [place] [address]  Secondary Insurance:     DISCHARGE DETAILS:          Comments - Freedom of Choice: Error to prior entry;  s/w pt's spouse only to complete CMA. Other Referral/Resources/Interventions Provided:  Referral Comments: Awaiting MD signature on NIV order in [place]. No acute bed offer for placement;  deadline extended. Awaiting responses. Have yet to discuss feasibility of subacute placement w spouse;  he is most interested in [facility].          Treatment Team Recommendation: Short Term Rehab  Discharge Destination Plan[de-identified] Short Term Rehab  Transport at Discharge :  Other (Comment) (TBD)

## 2023-09-29 NOTE — CASE MANAGEMENT
Case Management Discharge Planning Note    Patient name Yeison Roth  Location / MRN [id number]  : 1955 Date 2023       Current Admission Date: 2023  Current Admission Diagnosis:Acute on chronic respiratory failure with hypoxia and hypercapnia [facility])   Patient Active Problem List    Diagnosis Date Noted   • Acute on chronic respiratory failure with hypoxia and hypercapnia ([address]) 2023   • Acute encephalopathy 2023   • Elevated troponin 2023   • Elevated brain natriuretic peptide (BNP) level 2023   • Iron deficiency anemia 2023   • Atrial fibrillation ([address]) 2023   • Obesity hypoventilation syndrome ([address]) 2023   • Lipodermatosclerosis of both lower extremities 2021   • Class 3 severe obesity with body mass index (BMI) greater than or equal to 70 in adult [facility]) 2021   • Bakers cyst 10/27/2014   • Lymphedema of leg 10/27/2014      LOS (days): 1  Geometric Mean LOS (GMLOS) (days): 3.50  Days to GMLOS:2.2     OBJECTIVE:  Risk of Unplanned Readmission Score: 13.66         Current admission status: Inpatient   Preferred Pharmacy:   [facility]/pharmacy #[id number]- [address] - [address]  [address] [address]  Phone: [phone] Fax: [phone]    Primary Care Provider: Malcom Guzman DO    Primary Insurance: [address] [place] [address]  Secondary Insurance:     DISCHARGE DETAILS:                                          Other Referral/Resources/Interventions Provided:  Referral Comments: Tx team recommending STR placement for pt at d/c. Referrals made to acute and subacute programs. Awaiting responses.

## 2023-09-29 NOTE — ASSESSMENT & PLAN NOTE
· Suspect related to hypercarbia  · Improved with resolution of acute respiratory acidosis  · Patient now alert with agitation at times  · Low dose precedex to tolerate BIPAP overnight

## 2023-09-29 NOTE — ARC ADMISSION
Referral received for patient consideration of ARC placement for rehab. Reviewed patient case with [facility] physician and patient has been declined for ARC. Recommending slower, longer paced therapy at this time. CM updated in [place].

## 2023-09-29 NOTE — RESPIRATORY THERAPY NOTE
RT Protocol Note  Rishabh Taylor 76 y.o. female MRN: [id number]  Unit/Bed#:  Encounter: [id number]    Assessment    Principal Problem:    Acute on chronic respiratory failure with hypoxia and hypercapnia (HCC)  Active Problems:    Class 3 severe obesity with body mass index (BMI) greater than or equal to 70 in adult [facility])    Atrial fibrillation (HCC)    Obesity hypoventilation syndrome (HCC)    Acute encephalopathy    Elevated troponin    Elevated brain natriuretic peptide (BNP) level    Subjective Data: no noted pulmonary hx but prn MDI prescribed    Objective    Physical Exam:   Assessment Type: Assess only  General Appearance: Awake  Respiratory Pattern: Spontaneous  Chest Assessment: Chest expansion symmetrical  Bilateral Breath Sounds: Diminished  O2 Device: mfnc    Vitals:  Blood pressure 118/58, pulse 55, temperature 97.8 °F (36.6 °C), temperature source Oral, resp. rate (!) 28, height 5' 7" (1.702 m), weight (!) 239 kg (526 lb 10.9 oz), SpO2 (!) 89 %. Results from last 7 days   Lab Units 09/29/23  0017   PH ART  7.439   PCO2 ART mm Hg 63.2*   PO2 ART mm Hg 72.1*   HCO3 ART mmol/L 41.9*   BASE EXC ART mmol/L 15.5   O2 CONTENT ART mL/dL 13.0*   O2 HGB, ARTERIAL % 93.6*   ABG SOURCE  Line, Arterial   INDIRA TEST  Yes       O2 Device: mfnc     Plan    Respiratory Plan: Vent/NIV/HFNC        Resp Comments: [id number] at this time        09/29/23 0939   Respiratory Protocol   Protocol Initiated? No   Protocol Selection Respiratory   Language Barrier? No   Medical & Social History Reviewed? Yes   Diagnostic Studies Reviewed? Yes   Physical Assessment Performed?  Yes   Respiratory Plan Vent/NIV/HFNC   Respiratory Assessment   Assessment Type Assess only   General Appearance Awake   Respiratory Pattern Spontaneous   Chest Assessment Chest expansion symmetrical   Bilateral Breath Sounds Diminished   Resp Comments pt on [address] at this time   O2 Device mfnc   Additional Assessments   Pulse 55 Respirations (!) 28   SpO2 (!) 89 %

## 2023-09-29 NOTE — RESPIRATORY THERAPY NOTE
RT Ventilator Management Note  Petra Pinto 76 y.o. female MRN: [id number]  Unit/Bed#:  Encounter: [id number]      Daily Screen    No data found in the last 10 encounters.            Physical Exam:   Assessment Type: Assess only  General Appearance: Awake  Respiratory Pattern: Spontaneous  Chest Assessment: Chest expansion symmetrical  Bilateral Breath Sounds: Diminished  O2 Device: mfnc      Resp Comments: pt trialed off bipap at this time to 15 L mfnc       09/29/23 0749   Respiratory Assessment   Assessment Type Assess only   General Appearance Awake   Respiratory Pattern Spontaneous   Chest Assessment Chest expansion symmetrical   Bilateral Breath Sounds Diminished   Resp Comments pt trialed off bipap at this time to 15 L mfnc   O2 Device mfnc   Oxygen Therapy/Pulse Ox   O2 Device Mid flow nasal cannula   O2 Flow Rate (L/min) 15 L/min   SpO2 (!) 89 %   SpO2 Activity At Rest   $ Pulse Oximetry Spot Check Charge Completed

## 2023-09-29 NOTE — UTILIZATION REVIEW
Initial Clinical Review    Admission: Date/Time/Statement:   Admission Orders (From admission, onward)       Ordered        09/28/23 0235  INPATIENT ADMISSION  Once                          Orders Placed This Encounter   Procedures    INPATIENT ADMISSION     Standing Status:   Standing     Number of Occurrences:   1     Order Specific Question:   Level of Care     Answer:   Level 1 Stepdown [13]     Order Specific Question:   Estimated length of stay     Answer:   More than 2 Midnights     Order Specific Question:   Certification     Answer:   I certify that inpatient services are medically necessary for this patient for a duration of greater than two midnights. See H&P and MD Progress Notes for additional information about the patient's course of treatment. ED Arrival Information       Expected   -    Arrival   9/28/2023 00:35    Acuity   Immediate              Means of arrival   Ambulance    Escorted by   [address] [facility]    Service   Critical Care/ICU    Admission type   Emergency              Arrival complaint   Respiratory Distress             Chief Complaint   Patient presents with    Shortness of Breath     Pt started with an allergic reaction earlier today received steroids and started having respiratory distress called EMS, they put her on 15 liters for WOB and cyanosis       Initial Presentation: 76 y.o. female to the ED from home via EMs with complaints of respiratory distress. As per family, no medical history. ON EMS arrival, pulse ox 70% on chronic home o2 of unsure setting. REcently started on abx by PCP for likely pneumonia. EMS placed on NRB, gave IM epi, iv benadryl for concern for allergic reaction given rash and respiratory distress. Given pre hospital nebulizer without improvement. Transitioned to BIPAP on arrival.  VBG showing respiratory acidosis with severe hypercarbia likely due to patients body habitus. Troponin elevated.  CXR shows pulmonary vascular congestion, possible pneumonia. As per family, due to patients size and mobility, has been unable to complete outpatient sleep study. Upon family arrival, found to have h/o afib, morbid obesity. On arrival, appearing acutely ill, lethargic, gcs 8. Withdraws bilateral upper extremities. Admitted to [address] [place] [address],First Floor for acute on chronic respiratory failure with hypoxia and hypercapnia. PROCEDURE NOTE  Date/Time: 9/28/2023   ARTERIAL LINE    Pulmonary consult: acute on chronic hypercapnic resp failure currently on continuous bipap. Chest x-ray with pulmonary vascular congestion and likely pulmonary edema no evidence of any consolidation. Date: 9/29   Day 2:     Acute on chronic respiratory failure with hypoxia, hypercapnia. Arterial line in place for frequent ABGs. Continue low dose precedex to tolerate BIpap. Currently NSR. Family denies medical history and although outside record report h/o afib, on Xarelto, family denies. Bilateral lower extremity edema with PVD changes noted. LUngs clear. Confused. Tachypneic, bradycardic.      ED Triage Vitals   Temperature Pulse Respirations Blood Pressure SpO2   09/28/23 0051 09/28/23 0051 09/28/23 0051 09/28/23 0051 09/28/23 0051   99 °F (37.2 °C) 97 (!) 35 127/87 100 %      Temp Source Heart Rate Source Patient Position - Orthostatic VS BP Location FiO2 (%)   09/28/23 0051 09/28/23 0051 09/28/23 0051 09/28/23 0051 09/28/23 0051   Axillary Monitor Sitting Right arm 100      Pain Score       09/28/23 0847       10 - Worst Possible Pain          Wt Readings from Last 1 Encounters:   09/29/23 (!) 239 kg (526 lb 10.9 oz)     Additional Vital Signs:   te/Time Temp Pulse Resp BP MAP (mmHg) Arterial Line BP MAP SpO2 FiO2 (%) O2 Flow Rate (L/min) O2 Device O2 Interface Device Patient Position - Orthostatic VS   09/29/23 0939 -- 55 28 Abnormal  -- -- -- -- 89 % Abnormal  -- -- -- -- --   09/29/23 0830 97.8 °F (36.6 °C) 60 28 Abnormal  118/58 81 -- -- 89 % Abnormal  -- -- Mid flow nasal cannula -- Lying   09/29/23 0749 -- -- -- -- -- -- -- 89 % Abnormal  -- 15 L/min Mid flow nasal cannula MFNC prongs --   09/28/23 2200 -- 77 -- 118/56 80 121/45 66 mmHg 94 % -- -- -- -- --   09/28/23 2100 -- 90 -- -- -- 118/52 73 mmHg 95 % -- -- -- -- --   09/28/23 1445 -- 82 -- -- -- 123/54 74 mmHg 93 % 50 -- BiPAP -- --   09/28/23 1430 -- 83 30 Abnormal  -- -- 119/53 71 mmHg 94 % 50 -- BiPAP -- --   09/28/23 1415 -- 91 -- -- -- 118/54 73 mmHg 93 % 50 -- BiPAP -- --   09/28/23 1400 -- 74 -- -- -- 111/50 67 mmHg 94 % 50 -- BiPAP -- --   09/28/23 1330 -- 82 30 Abnormal  -- -- 123/56 76 mmHg 95 % 40 -- BiPAP -- --   09/28/23 1302 -- -- -- -- -- -- -- -- -- -- -- Face mask --   09/28/23 1300 -- 82 30 Abnormal  -- -- 152/72 98 mmHg 96 % 40 -- BiPAP -- --   09/28/23 1200 98.7 °F (37.1 °C) 69 30 Abnormal  -- -- 105/48 67 mmHg 91 % 40 -- BiPAP -- --   09/28/23 1100 -- 69 40 Abnormal  -- -- 64/54 58 mmHg Abnormal  93 % 40 -- BiPAP -- --   09/28/23 1005 -- 72 -- 122/52 -- -- -- -- -- -- -- -- --   09/28/23 1000 -- 85 30 Abnormal  -- -- 108/92 97 mmHg 83 % Abnormal  40 -- BiPAP -- --   09/28/23 0509 -- -- -- -- -- 85/30 46 mmHg Abnormal  -- -- -- -- -- --   09/28/23 0508 -- -- -- -- -- -- -- 96 % -- -- -- Face mask --   09/28/23 0502 -- 71 -- -- -- 102/43 60 mmHg Abnormal  95 % -- -- -- -- --   09/28/23 0500 -- 73 -- -- -- 112/49 67 mmHg 88 % Abnormal  -- -- -- -- --   09/28/23 0452 -- -- -- -- -- 102/42 59 mmHg Abnormal  -- -- -- -- -- --   09/28/23 0445 -- -- -- -- -- 104/44 61 mmHg Abnormal  -- -- -- -- -- --   09/28/23 0400 -- 82 -- -- -- 127/51 75 mmHg 84 % Abnormal  -- -- -- -- --   09/28/23 0330 98.4 °F (36.9 °C) 83 30 Abnormal  122/52 73 -- -- -- 60 -- BiPAP -- Lying   09/28/23 0315 -- -- -- -- -- -- -- 97 % -- -- -- Face mask  --   O2 Interface Device: medium at 09/28/23 0315 09/28/23 0245 -- 82 20 133/72 89 -- -- 98 % -- -- None (Room air) -- Sitting   09/28/23 0230 -- 82 26 Abnormal  113/64 82 -- -- 97 % -- -- None (Room air) -- Sitting   09/28/23 0200 -- 85 24 Abnormal  111/61 77 -- -- 96 % -- -- None (Room air) -- Sitting   09/28/23 0145 -- 84 26 Abnormal  92/55 67 -- -- 99 % -- -- None (Room air) -- Sitting   09/28/23 0120 -- -- -- -- -- -- -- 100 % -- -- -- -- --   09/28/23 0110 -- -- -- -- -- -- -- 100 % -- -- -- -- --   09/28/23 0101 -- -- -- -- -- -- -- 100 % -- -- -- Face mask --   09/28/23 0059 -- 90 27 Abnormal  125/78 -- -- -- 100 % -- -- BiPAP -- Sitting   09/28/23 0051 99 °F (37.2 °C) 97 35 Abnormal  127/87 -- -- -- 100 % 100 -- BiPAP -- Sitting     Pertinent Labs/Diagnostic Test Results:   9/28 ECHO: This was a very technically difficult study. Echo contrast was used to opacify the left ventricle. Left ventricular function was preserved. LVEF was estimated at 55%. There were no diagnostic wall motion abnormalities however the possibility not be excluded on the basis of this study. The valves were not well visualized. There was possible mild aortic stenosis. Consider SUN or Cardiac MRI for further evlauation if clinically indicated. 9/28 EKG: Sinus rhythm  Rightward axis  Possible Inferior infarct , age undetermined  Abnormal ECG  No previous ECGs available  XR chest portable   Final Result by Gus Romberg, MD (09/28 1110)      Pulmonary vascular congestion suggests pulmonary edema with associated cardiomegaly. Superimposed pneumonia is not excluded. I have personally reviewed this study including all images.  / IKE Resident: Bobbi Gonzalez, the attending radiologist, have reviewed the images and agree with the final report above.       Workstation performed: [id number]           Results from last 7 days   Lab Units 09/28/23  0042   SARS-COV-2  Negative     Results from last 7 days   Lab Units 09/29/23  0437 09/29/23  0436 09/28/23  0042   WBC Thousand/uL 5.62  --  11.31*   HEMOGLOBIN g/dL 8.9*  --  10.1*   I STAT HEMOGLOBIN g/dl  --  11.2* 11.9   HEMATOCRIT % 30.5*  --  36.5 HEMATOCRIT, ISTAT %  --  33* 35   PLATELETS Thousands/uL 147*  --  167   NEUTROS ABS Thousands/µL 4.10  --  9.38*         Results from last 7 days   Lab Units 09/29/23 0437 09/29/23  0436 09/28/23  0544 09/28/23  0042   SODIUM mmol/L 140  --  139 139   POTASSIUM mmol/L 5.0  --  5.0 5.0   CHLORIDE mmol/L 95*  --  93* 93*   CO2 mmol/L 39*  --  43* 44*   CO2, I-STAT mmol/L  --  43*  --  >45*   ANION GAP mmol/L 6  --  3 2   BUN mg/dL 26*  --  26* 23   CREATININE mg/dL 0.89  --  1.04 1.01   EGFR ml/min/1.73sq m 66  --  55 57   CALCIUM mg/dL 9.1  --  8.9 8.9   CALCIUM, IONIZED, ISTAT mmol/L  --  1.17  --  1.05*   MAGNESIUM mg/dL 2.2  --   --   --    PHOSPHORUS mg/dL 3.2  --   --   --      Results from last 7 days   Lab Units 09/29/23 0437 09/28/23  0042   AST U/L 13 13   ALT U/L 9 10   ALK PHOS U/L 71 81   TOTAL PROTEIN g/dL 7.2 8.3   ALBUMIN g/dL 3.3* 3.8   TOTAL BILIRUBIN mg/dL 0.55 0.61     Results from last 7 days   Lab Units 09/28/23  0040   POC GLUCOSE mg/dl 119     Results from last 7 days   Lab Units 09/29/23  0437 09/28/23  0544 09/28/23  0042   GLUCOSE RANDOM mg/dL 104 123 134             No results found for: "BETA-HYDROXYBUTYRATE"   Results from last 7 days   Lab Units 09/29/23  0017 09/28/23  1859 09/28/23  1450   PH ART  7.439 7.415 7.397   PCO2 ART mm Hg 63.2* 63.9* 73.1*   PO2 ART mm Hg 72.1* 85.3 76.0   HCO3 ART mmol/L 41.9* 40.1* 44.0*   BASE EXC ART mmol/L 15.5 13.4 16.3   O2 CONTENT ART mL/dL 13.0* 13.7* 13.7*   O2 HGB, ARTERIAL % 93.6* 95.4 93.8*   ABG SOURCE  Line, Arterial Line, Arterial Line, Arterial     Results from last 7 days   Lab Units 09/28/23  0141 09/28/23  0042   PH RANDY  7.216* 7.190*   PCO2 RANDY mm Hg 92.4* 125.5*   PO2 RANDY mm Hg 51.2* 50.3*   HCO3 RADNY mmol/L 36.6* 46.9*   BASE EXC RANDY mmol/L 6.5 14.3   O2 CONTENT RANDY ml/dL 12.0 12.9   O2 HGB, VENOUS % 81.2* 79.7     Results from last 7 days   Lab Units 09/29/23  0436 09/28/23 0042   PH, RANDY I-STAT  7.489* 7.329   PCO2, RANDY ISTAT mm HG 54.2* 81.8*   PO2, RANDY ISTAT mm HG 77.0* 40.0   HCO3, RANDY ISTAT mmol/L 41.2* 43.0*   I STAT BASE EXC mmol/L 16* 14*   I STAT O2 SAT % 96* 67         Results from last 7 days   Lab Units 09/28/23  0453 09/28/23  0258 09/28/23  0042   HS TNI 0HR ng/L  --   --  113*   HS TNI 2HR ng/L  --  122*  --    HSTNI D2 ng/L  --  9  --    HS TNI 4HR ng/L 150*  --   --    HSTNI D4 ng/L 37*  --   --          Results from last 7 days   Lab Units 09/28/23  0042   PROTIME seconds 14.3   INR  1.04   PTT seconds 35         Results from last 7 days   Lab Units 09/28/23  1217 09/28/23  0042   PROCALCITONIN ng/ml 0.23 0.16     Results from last 7 days   Lab Units 09/28/23  0544 09/28/23  0042   LACTIC ACID mmol/L 0.8 1.0             Results from last 7 days   Lab Units 09/28/23  0042   BNP pg/mL 438*     Results from last 7 days   Lab Units 09/28/23  0042   INFLUENZA A PCR  Negative   INFLUENZA B PCR  Negative   RSV PCR  Negative       Results from last 7 days   Lab Units 09/28/23  0141 09/28/23  0042   BLOOD CULTURE  No Growth at 24 hrs. No Growth at 24 hrs.        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 female  Admission Orders:  Scheduled Medications:  ammonium lactate, , Topical, BID  chlorhexidine, 15 mL, Mouth/Throat, Q12H DEVIN  heparin (porcine), 7,500 Units, Subcutaneous, Q8H DEVIN  lidocaine (PF), 5 mL, Infiltration, Once      Continuous IV Infusions:     PRN Meds:  acetaminophen, 325 mg, Rectal, Q4H PRN  HYDROmorphone, 0.2 mg, Intravenous, Q3H PRN  methocarbamol, 500 mg, Oral, Q6H PRN  ondansetron, 4 mg, Intravenous, Q6H PRN        IP CONSULT TO CASE MANAGEMENT  IP CONSULT TO PULMONOLOGY    Network Utilization Review Department  ATTENTION: Please call with any questions or concerns to [phone] and carefully listen to the prompts so that you are directed to the right person. All voicemails are confidential.  Bailee Oden all requests for admission clinical reviews, approved or denied determinations and any other requests to dedicated fax number below belonging to the campus where the patient is receiving treatment.  List of dedicated fax numbers for the Facilities:  [place] DENIALS (Administrative/Medical Necessity) [phone] 2303 [facility] (Maternity/NICU/Pediatrics) [phone]   [address] [place] [address] Drive [phone]   [facility] [address] [facility] [phone] 150[address] [facility] [address] [place] [address] [phone]   [address] [facility] [address] [facility]  [address] [phone]

## 2023-09-29 NOTE — ASSESSMENT & PLAN NOTE
· Continue non-invasive positive pressure for now, can trial off this morning  · A-line in place to facilitate frequent ABGs  · Goal SpO2 around 88%  · Respiratory protocol  · Per family, due to patient's size and mobility, has been unable to complete an outpatient sleep study  · Consider discussion with pulmonary to obtain testing while inpatient

## 2023-09-29 NOTE — CASE MANAGEMENT
Case Management Discharge Planning Note    Patient name Leyla Villagran  Location / MRN [id number]  : 1955 Date 2023       Current Admission Date: 2023  Current Admission Diagnosis:Acute on chronic respiratory failure with hypoxia and hypercapnia [facility])   Patient Active Problem List    Diagnosis Date Noted   • Acute on chronic respiratory failure with hypoxia and hypercapnia ([address]) 2023   • Acute encephalopathy 2023   • Elevated troponin 2023   • Elevated brain natriuretic peptide (BNP) level 2023   • Iron deficiency anemia 2023   • Atrial fibrillation ([address]) 2023   • Obesity hypoventilation syndrome ([address]) 2023   • Lipodermatosclerosis of both lower extremities 2021   • Class 3 severe obesity with body mass index (BMI) greater than or equal to 70 in adult [facility]) 2021   • Bakers cyst 10/27/2014   • Lymphedema of leg 10/27/2014      LOS (days): 1  Geometric Mean LOS (GMLOS) (days): 3.50  Days to GMLOS:2.1     OBJECTIVE:  Risk of Unplanned Readmission Score: 13.4         Current admission status: Inpatient   Preferred Pharmacy:   [facility] [id number]- [address] - [address]  [address] [facility], [address] [place] [address]  Phone: [phone] Fax: [phone]    Primary Care Provider: Dev Lopez DO    Primary Insurance: [address] [place] [address]  Secondary Insurance:     DISCHARGE DETAILS:                                          Other Referral/Resources/Interventions Provided:  Referral Comments: Denial received for acute level STR placement at [facility]. Awaiting further responses. Advised by pulmonary that pt will need a NIV for home;  order placed w [facility], awaiting response.

## 2023-09-29 NOTE — RESPIRATORY THERAPY NOTE
09/29/23 0440   Respiratory Assessment   Assessment Type Assess only   General Appearance Sleeping   Respiratory Pattern Assisted   Chest Assessment Chest expansion symmetrical   Bilateral Breath Sounds Diminished   Resp Comments decreased IPAP at this time, will continue with BiPAP   Non-Invasive Information   O2 Interface Device Face mask   Non-Invasive Ventilation Mode BiPAP   $ Pulse Oximetry Spot Check Charge Completed   Non-Invasive Settings   IPAP (cm) 20 cm   EPAP (cm) 8 cm   Rate (Set) 20   FiO2 (%) 50   Pressure Support (cm H2O) 12   Rise Time 2   Inspiratory Time (Set) 0.75   Temperature (Set) 31   Non-Invasive Readings   Total Rate 28   MV (Mech) 11.6   Peak Pressure (Obs) 21   Spontaneous Vt (mL) 422   Heater Temperature (Obs) 31   Non-Invasive Alarms   Insp Pressure High (cm H20) 42   Insp Pressure Low (cm H20) 5   Low Insp Pressure Time (sec) 2 sec   MV Low (L/min) 3   Vt High (mL) 1350   Vt Low (mL) 200   High Resp Rate (BPM) 45 BPM   Low Resp Rate (BPM) 8 BPM

## 2023-09-29 NOTE — PROGRESS NOTES
[address]  Progress Note  Name: Magda Fung  MRN: [id number]  Unit/Bed#:  I Date of Admission: 9/28/2023   Date of Service: 9/29/2023 I Hospital Day: 1    Assessment/Plan   * Acute on chronic respiratory failure with hypoxia and hypercapnia (HCC)  Assessment & Plan  · Continue non-invasive positive pressure for now, can trial off this morning  · A-line in place to facilitate frequent ABGs  · Goal SpO2 around 88%  · Respiratory protocol  · Per family, due to patient's size and mobility, has been unable to complete an outpatient sleep study  · Consider discussion with pulmonary to obtain testing while inpatient    Elevated brain natriuretic peptide (BNP) level  Assessment & Plan  · Hold on diuretics at present  · Resume home torsemide when respiratory acidosis improved    Elevated troponin  Assessment & Plan  · Continue to trend with serial EKGs  · Echo was without wall motion abnormality    Acute encephalopathy  Assessment & Plan  · Suspect related to hypercarbia  · Improved with resolution of acute respiratory acidosis  · Patient now alert with agitation at times  · Low dose precedex to tolerate BIPAP overnight    Obesity hypoventilation syndrome (HCC)  Assessment & Plan  · Consider inpatient sleep study  · Non-invasive positive pressure at bedtime and naps    Atrial fibrillation (HCC)  Assessment & Plan  · Review of outside records reports paroxysmal atrial fibrillation on Xarelto  · Family denies any history and does not take any blood thinners  · Would start heparin infusion if patient has arrhythmia    Class 3 severe obesity with body mass index (BMI) greater than or equal to 70 in adult [facility])  Assessment & Plan  · Encourage therapeutic lifestyle changes           ICU Core Measures     A: Assess, Prevent, and Manage Pain · Has pain been assessed? Yes  · Need for changes to pain regimen?  No   B: Both SAT/SAT  · N/A   C: Choice of Sedation · RASS Goal: 0 Alert and Calm  · Need for changes to sedation or analgesia regimen? No   D: Delirium · CAM-ICU: Negative   E: Early Mobility  · Plan for early mobility? Yes   F: Family Engagement · Plan for family engagement today? Yes       Review of Invasive Devices:    Ama Plan: Discontinue arterial line    Prophylaxis:  VTE VTE covered by:  heparin (porcine), Subcutaneous, 7,500 Units at 09/28/23 2215       Stress Ulcer  not ordered     Subjective   HPI/24hr events: Patient has been on continuous BIPAP due to hypercapnia secondary to JOSE/OHS. Patient's pH has normalized and she is appears to be back to her baseline CO2 level. Repeat ABG pending for this morning. If it remains stable, patient can be trialed off BIPAP with plan for patient to wear it for any naps and at HS. Patient with agitation overnight leading to pulling out IVs and nearly pulling out her arterial line. Precedex gtt started. No other overnight events. Review of Systems   Unable to perform ROS: Mental status change            Objective                            Vitals I/O      Most Recent Min/Max in 24hrs   Temp 97.9 °F (36.6 °C) Temp  Min: 97.9 °F (36.6 °C)  Max: 100.1 °F (37.8 °C)   Pulse 61 Pulse  Min: 61  Max: 91   Resp (!) 26 Resp  Min: 18  Max: 40   /72 BP  Min: 106/72  Max: 122/52   O2 Sat 95 % SpO2  Min: 83 %  Max: 98 %      Intake/Output Summary (Last 24 hours) at 9/29/2023 0443  Last data filed at 9/29/2023 0000  Gross per 24 hour   Intake 473.13 ml   Output 700 ml   Net -226.87 ml         Diet NPO     Invasive Monitoring Physical exam    Physical Exam  Eyes:      Conjunctiva/sclera: Conjunctivae normal.   Skin:     General: Skin is warm and dry. Comments: PVD changes in B/L LE with scaling   HENT:      Head: Normocephalic and atraumatic. Mouth/Throat:      Mouth: Mucous membranes are dry. Cardiovascular:      Rate and Rhythm: Regular rhythm. Bradycardia present. Pulses: Normal pulses. Heart sounds: Normal heart sounds. Musculoskeletal:      Right lower leg: Edema present. Left lower leg: Edema present. Comments: Limited ROM, patient at baseline and Chronic lymphedema   Abdominal:      Palpations: Abdomen is soft. Constitutional:       Appearance: She is morbidly obese. She is ill-appearing. Pulmonary:      Breath sounds: Normal breath sounds. Comments: Assisted by BIPAP  Neurological:      General: No focal deficit present. Mental Status: She is alert. She is disoriented to place, disoriented to time and disoriented to situation. Vitals reviewed. Diagnostic Studies    EKG: SB on tele  Imaging:  I have personally reviewed pertinent reports.        Medications:  Scheduled PRN   ammonium lactate, , BID  chlorhexidine, 15 mL, Q12H DEVIN  heparin (porcine), 7,500 Units, Q8H DEVIN  lidocaine (PF), 5 mL, Once      acetaminophen, 325 mg, Q4H PRN  HYDROmorphone, 0.2 mg, Q3H PRN  methocarbamol, 500 mg, Q6H PRN  ondansetron, 4 mg, Q6H PRN       Continuous    dexmedetomidine, 0.1-1 mcg/kg/hr, Last Rate: 0.8 mcg/kg/hr (09/29/23 0431)         Labs:  CBC    Recent Labs     09/28/23 0042 09/29/23 0436   WBC 11.31*  --    HGB 10.1*  11.9 11.2*   HCT 36.5  35 33*     --      BMP    Recent Labs     09/28/23 0042 09/28/23  0544 09/29/23 0436   SODIUM 139 139  --    K 5.0 5.0  --    CL 93* 93*  --    CO2 44*  >45* 43* 43*   AGAP 2 3  --    BUN 23 26*  --    CREATININE 1.01 1.04  --    CALCIUM 8.9 8.9  --        Coags    Recent Labs     09/28/23 0042   INR 1.04   PTT 35        Additional Electrolytes  Recent Labs     09/28/23 0042 09/29/23 0436   CAIONIZED 1.05* 1.17          Blood Gas    Recent Labs     09/29/23 0017   PHART 7.439   [id number] 63.2*   PO2ART 72.1*   [id number] 41.9*   BEART 15.5   SOURCE Line, Arterial     Recent Labs     09/28/23  0141 09/28/23  0344 09/29/23 0017   PHVEN 7.216*  --   --    [id number] 92.4*  --   --    PO2VEN 51.2*  --   --    [id number] 36.6*  --   --    BEVEN 6.5  --   -- SOURCE  --    < > Line, Arterial    < > = values in this interval not displayed. LFTs  Recent Labs     09/28/23  0042   ALT 10   AST 13   ALKPHOS 81   ALB 3.8   TBILI 0.61       Infectious  Recent Labs     09/28/23  0042 09/28/23  1217   PROCALCITONI 0.16 0.23     Glucose  Recent Labs     09/28/23  0042 09/28/23  0544   GLUC 134 123               No critical care time.     EMIL Jama

## 2023-09-29 NOTE — PROGRESS NOTES
Progress Note - Pulmonary   Alexander Neves 76 y.o. female MRN: [id number]  Unit/Bed#:  Encounter: [id number]    Assessment:  1. Acute on chronic hypoxemic and hypercapnic respiratory failure  2. Obesity hypoventilation syndrome  3. Restrictive lung disease  4. Likely JOSE    Plan:  Acute on chronic hypoxemic and hypercapnic respiratory failure with underlying obesity hypoventilation, restrictive lung disease and likely JOSE  She had been on continuous BiPAP, this morning has been transitioned to 15 L mid flow, can likely continue to titrate down on the O2 requirement  Continue with BiPAP at bedtime and during the day with naps  Chest x-ray with pulmonary vascular congestion, pulmonary edema  Diuretics per primary team  PCO2 has improved with the use of the BiPAP  She has likely obesity hypoventilation with hypercapnia, also with kyphosis on prior imaging likely contributing to her restrictive lung disease  Would benefit from NIV upon discharge  Patient has restrictive thoracic disease with chronic respiratory failure. She is requiring NIV to target tidal volume with adjustable pressures which will help reduce PCO2 levels, increased oxygenation and help improve overall respiratory status. This will in turn reduce the risk of readmission and patient harm. She will require a battery backup as interruption or power failure may cause serious respiratory medical consequences and/or even life-threatening consequences  Will see patient again on 10/2/23, please call over weekend with questions    Subjective:   Patient resting in bed. She states she is feeling ok. She is having some back pain. On and off delirium due to precedex overnight. Objective:     Vitals: Blood pressure 118/58, pulse 55, temperature 97.8 °F (36.6 °C), temperature source Oral, resp. rate (!) 28, height 5' 7" (1.702 m), weight (!) 239 kg (526 lb 10.9 oz), SpO2 (!) 89 %. ,Body mass index is 82.49 kg/m².       Intake/Output Summary (Last 24 hours) at 9/29/2023 1025  Last data filed at 9/29/2023 0000  Gross per 24 hour   Intake 436.04 ml   Output 700 ml   Net -263.96 ml       Invasive Devices     Peripheral Intravenous Line  Duration           Peripheral IV 09/28/23 Right Antecubital 1 day    Peripheral IV 09/29/23 Right;Ventral (anterior) Forearm <1 day          Drain  Duration           External Urinary Catheter <1 day                Physical Exam: /58 (BP Location: Left arm)   Pulse 55   Temp 97.8 °F (36.6 °C) (Oral)   Resp (!) 28   Ht 5' 7" (1.702 m)   Wt (!) 239 kg (526 lb 10.9 oz)   SpO2 (!) 89%   BMI 82.49 kg/m²   General appearance: alert and oriented, in no acute distress and morbidly obese  Head: Normocephalic, without obvious abnormality, atraumatic  Eyes: negative findings: conjunctivae and sclerae normal  Lungs: diminished breath sounds  Heart: regular rate and rhythm  Abdomen: normal findings: soft, non-tender  Extremities: bilateral LE edema, venous stasis changes  Skin: warm and dry  Neurologic: Mental status: Alert, oriented, thought content appropriate     Labs: I have personally reviewed pertinent lab results. , CBC:   Lab Results   Component Value Date    WBC 5.62 09/29/2023    HGB 8.9 (L) 09/29/2023    HCT 30.5 (L) 09/29/2023    MCV 93 09/29/2023     (L) 09/29/2023    RBC 3.29 (L) 09/29/2023    MCH 27.1 09/29/2023    MCHC 29.2 (L) 09/29/2023    RDW 16.4 (H) 09/29/2023    MPV 10.1 09/29/2023    NRBC 0 09/29/2023   , CMP:   Lab Results   Component Value Date    SODIUM 140 09/29/2023    K 5.0 09/29/2023    CL 95 (L) 09/29/2023    CO2 39 (H) 09/29/2023    CO2 43 (H) 09/29/2023    BUN 26 (H) 09/29/2023    CREATININE 0.89 09/29/2023    GLUCOSE 110 09/29/2023    CALCIUM 9.1 09/29/2023    AST 13 09/29/2023    ALT 9 09/29/2023    ALKPHOS 71 09/29/2023    EGFR 66 09/29/2023     Imaging and other studies: I have personally reviewed pertinent reports.    and I have personally reviewed pertinent films in PACS

## 2023-09-30 LAB
ANION GAP SERPL CALCULATED.3IONS-SCNC: 4 MMOL/L
BUN SERPL-MCNC: 24 MG/DL (ref 5–25)
CA-I BLD-SCNC: 1.03 MMOL/L (ref 1.12–1.32)
CALCIUM SERPL-MCNC: 8.9 MG/DL (ref 8.4–10.2)
CHLORIDE SERPL-SCNC: 96 MMOL/L (ref 96–108)
CO2 SERPL-SCNC: 40 MMOL/L (ref 21–32)
CREAT SERPL-MCNC: 0.73 MG/DL (ref 0.6–1.3)
ERYTHROCYTE [DISTWIDTH] IN BLOOD BY AUTOMATED COUNT: 16.3 % (ref 11.6–15.1)
GFR SERPL CREATININE-BSD FRML MDRD: 84 ML/MIN/1.73SQ M
GLUCOSE SERPL-MCNC: 88 MG/DL (ref 65–140)
HCT VFR BLD AUTO: 29.2 % (ref 34.8–46.1)
HGB BLD-MCNC: 8.6 G/DL (ref 11.5–15.4)
MAGNESIUM SERPL-MCNC: 2.4 MG/DL (ref 1.9–2.7)
MCH RBC QN AUTO: 27.4 PG (ref 26.8–34.3)
MCHC RBC AUTO-ENTMCNC: 29.5 G/DL (ref 31.4–37.4)
MCV RBC AUTO: 93 FL (ref 82–98)
PHOSPHATE SERPL-MCNC: 3.6 MG/DL (ref 2.3–4.1)
PLATELET # BLD AUTO: 146 THOUSANDS/UL (ref 149–390)
PMV BLD AUTO: 9.6 FL (ref 8.9–12.7)
POTASSIUM SERPL-SCNC: 3.9 MMOL/L (ref 3.5–5.3)
RBC # BLD AUTO: 3.14 MILLION/UL (ref 3.81–5.12)
SODIUM SERPL-SCNC: 140 MMOL/L (ref 135–147)
WBC # BLD AUTO: 5.86 THOUSAND/UL (ref 4.31–10.16)

## 2023-09-30 PROCEDURE — 80048 BASIC METABOLIC PNL TOTAL CA: CPT | Performed by: NURSE PRACTITIONER

## 2023-09-30 PROCEDURE — 84100 ASSAY OF PHOSPHORUS: CPT | Performed by: NURSE PRACTITIONER

## 2023-09-30 PROCEDURE — 83735 ASSAY OF MAGNESIUM: CPT | Performed by: NURSE PRACTITIONER

## 2023-09-30 PROCEDURE — 94760 N-INVAS EAR/PLS OXIMETRY 1: CPT

## 2023-09-30 PROCEDURE — 99291 CRITICAL CARE FIRST HOUR: CPT | Performed by: ANESTHESIOLOGY

## 2023-09-30 PROCEDURE — 94664 DEMO&/EVAL PT USE INHALER: CPT

## 2023-09-30 PROCEDURE — 82330 ASSAY OF CALCIUM: CPT | Performed by: NURSE PRACTITIONER

## 2023-09-30 PROCEDURE — 94660 CPAP INITIATION&MGMT: CPT

## 2023-09-30 PROCEDURE — 85027 COMPLETE CBC AUTOMATED: CPT | Performed by: NURSE PRACTITIONER

## 2023-09-30 RX ORDER — METOPROLOL TARTRATE 5 MG/5ML
5 INJECTION INTRAVENOUS ONCE
Status: COMPLETED | OUTPATIENT
Start: 2023-09-30 | End: 2023-09-30

## 2023-09-30 RX ORDER — CALCIUM GLUCONATE 20 MG/ML
2 INJECTION, SOLUTION INTRAVENOUS ONCE
Status: COMPLETED | OUTPATIENT
Start: 2023-09-30 | End: 2023-09-30

## 2023-09-30 RX ORDER — LORATADINE 10 MG/1
5 TABLET ORAL DAILY
Status: DISCONTINUED | OUTPATIENT
Start: 2023-10-01 | End: 2023-09-30

## 2023-09-30 RX ORDER — LORATADINE 10 MG/1
5 TABLET ORAL DAILY
Status: DISCONTINUED | OUTPATIENT
Start: 2023-09-30 | End: 2023-10-18 | Stop reason: HOSPADM

## 2023-09-30 RX ORDER — DIAPER,BRIEF,INFANT-TODD,DISP
EACH MISCELLANEOUS 4 TIMES DAILY PRN
Status: DISCONTINUED | OUTPATIENT
Start: 2023-09-30 | End: 2023-10-18 | Stop reason: HOSPADM

## 2023-09-30 RX ADMIN — HEPARIN SODIUM 7500 UNITS: 5000 INJECTION INTRAVENOUS; SUBCUTANEOUS at 06:23

## 2023-09-30 RX ADMIN — Medication: at 18:42

## 2023-09-30 RX ADMIN — Medication 12.5 MG: at 10:32

## 2023-09-30 RX ADMIN — Medication: at 10:32

## 2023-09-30 RX ADMIN — CHLORHEXIDINE GLUCONATE 0.12% ORAL RINSE 15 ML: 1.2 LIQUID ORAL at 10:11

## 2023-09-30 RX ADMIN — HYDROCORTISONE: 10 CREAM TOPICAL at 09:25

## 2023-09-30 RX ADMIN — METOROPROLOL TARTRATE 5 MG: 5 INJECTION, SOLUTION INTRAVENOUS at 13:31

## 2023-09-30 RX ADMIN — METOROPROLOL TARTRATE 5 MG: 5 INJECTION, SOLUTION INTRAVENOUS at 09:48

## 2023-09-30 RX ADMIN — LORATADINE 5 MG: 10 TABLET ORAL at 20:53

## 2023-09-30 RX ADMIN — CALCIUM GLUCONATE 2 G: 20 INJECTION, SOLUTION INTRAVENOUS at 09:52

## 2023-09-30 RX ADMIN — Medication 12.5 MG: at 18:42

## 2023-09-30 RX ADMIN — RIVAROXABAN 20 MG: 20 TABLET, FILM COATED ORAL at 16:45

## 2023-09-30 NOTE — RESPIRATORY THERAPY NOTE
09/30/23 1423   Respiratory Assessment   Assessment Type Assess only   General Appearance Awake; Alert   Respiratory Pattern Normal   Chest Assessment Chest expansion symmetrical   Bilateral Breath Sounds Diminished;Coarse   Resp Comments pt titrated to 10 L MFNC at this time   O2 Device mfnc   Oxygen Therapy/Pulse Ox   O2 Device Mid flow nasal cannula   FiO2 (%) (S)  10   SpO2 94 %   SpO2 Activity At Rest   $ Pulse Oximetry Spot Check Charge Completed

## 2023-09-30 NOTE — RESPIRATORY THERAPY NOTE
RT Protocol Note  Warren Kurtz 76 y.o. female MRN: [id number]  Unit/Bed#:  Encounter: [id number]    Assessment    Principal Problem:    Acute on chronic respiratory failure with hypoxia and hypercapnia (HCC)  Active Problems:    Class 3 severe obesity with body mass index (BMI) greater than or equal to 70 in adult [facility])    Atrial fibrillation (HCC)    Obesity hypoventilation syndrome (HCC)    Acute encephalopathy    Elevated troponin    Elevated brain natriuretic peptide (BNP) level      Home Pulmonary Medications:  none       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 ([address]) 8/2/2021   • Scalp laceration 3/17/2023   • Thoracic vertebral fracture ([address]) 3/31/2023     Social History     Socioeconomic History   • Marital status: /Civil Union     Spouse name: Not on file   • Number of children: 2   • Years of education: Masters   • Highest education level: Master's degree (e.g., MA, MS, Rafael, MEd, MSW, IMELDA)   Occupational History   • Not on file   Tobacco Use   • Smoking status: Former     Packs/day: 0.25     Years: 3.00     Total pack years: 0.75     Types: Cigarettes   • Smokeless tobacco: Never   • Tobacco comments:     quit over >15 years ago    Substance and Sexual Activity   • Alcohol use: Never   • Drug use: Never   • Sexual activity: Not on file   Other Topics Concern   • Not on file   Social History Narrative    Lives with       Social Determinants of Health     Financial Resource Strain: Not on file   Food Insecurity: No Food Insecurity (9/28/2023)    Hunger Vital Sign    • Worried About Running Out of Food in the Last Year: Never true    • Ran Out of Food in the Last Year: Never true   Transportation Needs: No Transportation Needs (9/28/2023)    PRAPARE - Transportation    • Lack of Transportation (Medical):  No    • Lack of Transportation (Non-Medical): No   Physical Activity: Not on file   Stress: Not on file   Social Connections: Not on file   Intimate Partner Violence: Not on file   Housing Stability: Low Risk  (9/28/2023)    Housing Stability Vital Sign    • Unable to Pay for Housing in the Last Year: No    • Number of Places Lived in the Last Year: 1    • Unstable Housing in the Last Year: No       Subjective    Subjective Data: no noted pulmonary hx but prn MDI prescribed    Objective    Physical Exam:   Assessment Type: Assess only  General Appearance: Drowsy  Respiratory Pattern: Assisted  Chest Assessment: Chest expansion symmetrical  Bilateral Breath Sounds: Diminished  O2 Device: V60    Vitals:  Blood pressure 124/60, pulse 68, temperature 97.8 °F (36.6 °C), temperature source Oral, resp. rate 20, height 5' 7" (1.702 m), weight (!) 239 kg (526 lb 10.9 oz), SpO2 96 %. Results from last 7 days   Lab Units 09/29/23  0017   PH ART  7.439   PCO2 ART mm Hg 63.2*   PO2 ART mm Hg 72.1*   HCO3 ART mmol/L 41.9*   BASE EXC ART mmol/L 15.5   O2 CONTENT ART mL/dL 13.0*   O2 HGB, ARTERIAL % 93.6*   ABG SOURCE  Line, Arterial   INDIRA TEST  Yes       Imaging and other studies: I have personally reviewed pertinent reports. O2 Device: V60     Plan    Respiratory Plan: Discontinue Protocol        Resp Comments: patient with no history of pulmonary disease, with historical albuterl order for allergies. No idication for resp medications at this time, will discontinue protocol.

## 2023-09-30 NOTE — PROGRESS NOTES
[address]  Progress Note  Name: Wilner Rossi  MRN: [id number]  Unit/Bed#:  I Date of Admission: 9/28/2023   Date of Service: 9/30/2023 I Hospital Day: 2    Assessment/Plan   * Acute on chronic respiratory failure with hypoxia and hypercapnia (HCC)  Assessment & Plan  · Continue non-invasive positive pressure for now, can trial off this morning  · Goal SpO2 around 88%  · Respiratory protocol  · Per family, due to patient's size and mobility, has been unable to complete an outpatient sleep study  · Consider discussion with pulmonary to obtain testing while inpatient    Elevated brain natriuretic peptide (BNP) level  Assessment & Plan  · Hold on diuretics at present  · Resume home torsemide as needed    Elevated troponin  Assessment & Plan  · Continue to trend with serial EKGs  · Echo was without wall motion abnormality    Acute encephalopathy  Assessment & Plan  · Suspect related to hypercarbia  · Improved with resolution of acute respiratory acidosis  · Patient now alert with agitation at times    Obesity hypoventilation syndrome (HCC)  Assessment & Plan  · Consider inpatient sleep study  · Non-invasive positive pressure at bedtime and naps    Atrial fibrillation ([address])  Assessment & Plan  · Review of outside records reports paroxysmal atrial fibrillation on Xarelto  · Family denies any history and does not take any blood thinners  · Would start heparin infusion if patient has arrhythmia    Class 3 severe obesity with body mass index (BMI) greater than or equal to 70 in [facility])  Assessment & Plan  · Encourage therapeutic lifestyle changes           ICU Core Measures     A: Assess, Prevent, and Manage Pain · Has pain been assessed? Yes  · Need for changes to pain regimen? No   B: Both SAT/SAT  · N/A   C: Choice of Sedation · RASS Goal: 0 Alert and Calm  · Need for changes to sedation or analgesia regimen?  No   D: Delirium · CAM-ICU: Negative   E: Early Mobility  · Plan for early mobility? Yes   F: Family Engagement · Plan for family engagement today? Yes       Review of Invasive Devices:            Prophylaxis:  VTE VTE covered by:  heparin (porcine), Subcutaneous, 7,500 Units at 09/29/23 2140       Stress Ulcer  not ordered       Subjective   HPI/24hr events: Hemodynamically stable overnight. Remains dependent on BiPAP overnight due to obesity hypoventilation syndrome. Requires mid flow 15 L during the day. Objective                            Vitals I/O      Most Recent Min/Max in 24hrs   Temp 99.3 °F (37.4 °C) Temp  Min: 97.6 °F (36.4 °C)  Max: 99.3 °F (37.4 °C)   Pulse 72 Pulse  Min: 51  Max: 87   Resp (!) 24 Resp  Min: 20  Max: 28   /56 BP  Min: 95/55  Max: 150/65   O2 Sat 96 % SpO2  Min: 89 %  Max: 98 %      Intake/Output Summary (Last 24 hours) at 9/30/2023 0544  Last data filed at 9/30/2023 0000  Gross per 24 hour   Intake --   Output 1350 ml   Net -1350 ml         Diet Cardiovascular; Cardiac     Invasive Monitoring Physical exam    Physical Exam  Eyes:      Extraocular Movements: Extraocular movements intact. Pupils: Pupils are equal, round, and reactive to light. Skin:     General: Skin is warm and dry. HENT:      Head: Normocephalic and atraumatic. Mouth/Throat:      Mouth: Mucous membranes are moist.   Cardiovascular:      Rate and Rhythm: Normal rate and regular rhythm. Pulses: Normal pulses. Heart sounds: Normal heart sounds. Musculoskeletal:         General: Normal range of motion. Abdominal:      General: Bowel sounds are normal. There is no distension. Palpations: Abdomen is soft. Tenderness: There is no abdominal tenderness. Constitutional:       General: She is not in acute distress. Appearance: She is ill-appearing. Pulmonary:      Effort: Pulmonary effort is normal.      Breath sounds: Normal breath sounds. Neurological:      General: No focal deficit present.       Mental Status: She is alert and oriented to person, place and time. Mental status is at baseline. She is calm. Motor: gross motor function is at baseline for patient. Genitourinary/Anorectal:  external catheterVitals and nursing note reviewed. Diagnostic Studies      EKG: Sinus rhythm  Imaging:  I have personally reviewed pertinent reports.    and I have personally reviewed pertinent films in PACS     Medications:  Scheduled PRN   ammonium lactate, , BID  chlorhexidine, 15 mL, Q12H [address]  heparin (porcine), 7,500 Units, Q8H [address]      acetaminophen, 325 mg, Q4H PRN  methocarbamol, 500 mg, Q6H PRN  ondansetron, 4 mg, Q6H PRN       Continuous          Labs:    CBC    Recent Labs     09/29/23 0437 09/30/23 0457   WBC 5.62 5.86   HGB 8.9* 8.6*   HCT 30.5* 29.2*   * 146*     BMP    Recent Labs     09/29/23 0437 09/30/23 0457   SODIUM 140 140   K 5.0 3.9   CL 95* 96   CO2 39* 40*   AGAP 6 4   BUN 26* 24   CREATININE 0.89 0.73   CALCIUM 9.1 8.9       Coags    No recent results     Additional Electrolytes  Recent Labs     09/29/23 0436 09/29/23 0437 09/30/23 0457   MG  --  2.2 2.4   PHOS  --  3.2 3.6   CAIONIZED 1.17  --  1.03*          Blood Gas    Recent Labs     09/29/23  0017   PHART 7.439   [id number] 63.2*   PO2ART 72.1*   [id number] 41.9*   BEART 15.5   SOURCE Line, Arterial     Recent Labs     09/29/23  0017   SOURCE Line, Arterial    LFTs  Recent Labs     09/29/23 0437   ALT 9   AST 13   ALKPHOS 71   ALB 3.3*   TBILI 0.55       Infectious  Recent Labs     09/28/23  1217   PROCALCITONI 0.23     Glucose  Recent Labs     09/29/23 0437 09/30/23  0457   GLUC 104 [address], [address] [place] [address]

## 2023-09-30 NOTE — ASSESSMENT & PLAN NOTE
· Suspect related to hypercarbia  · Improved with resolution of acute respiratory acidosis  · Patient now alert with agitation at times

## 2023-09-30 NOTE — ASSESSMENT & PLAN NOTE
· Continue non-invasive positive pressure for now, can trial off this morning  · Goal SpO2 around 88%  · Respiratory protocol  · Per family, due to patient's size and mobility, has been unable to complete an outpatient sleep study  · Consider discussion with pulmonary to obtain testing while inpatient

## 2023-09-30 NOTE — RESPIRATORY THERAPY NOTE
09/30/23 0016   Respiratory Assessment   Resp Comments pt placed on BiPAP for HS   O2 Device V60   Non-Invasive Information   O2 Interface Device MFNC prongs   Non-Invasive Ventilation Mode MFNC (Mid flow)   $ Intermittent NIV Yes   $ Pulse Oximetry Spot Check Charge Completed   Non-Invasive Settings   IPAP (cm) 20 cm   EPAP (cm) 8 cm   Rate (Set) 20   FiO2 (%) 50   Pressure Support (cm H2O) 12   Rise Time 2   Inspiratory Time (Set) 0.75   Non-Invasive Readings   Skin Intervention Skin intact   Total Rate 34   MV (Mech) 18.3   Peak Pressure (Obs) 22   Spontaneous Vt (mL) 537   Leak (lpm) 0   Non-Invasive Alarms   Insp Pressure High (cm H20) 42   Insp Pressure Low (cm H20) 5   Low Insp Pressure Time (sec) 2 sec   MV Low (L/min) 3   Vt High (mL) 1350   Vt Low (mL) 200   High Resp Rate (BPM) 45 BPM   Low Resp Rate (BPM) 8 BPM   Apnea Rate 12

## 2023-10-01 LAB
ANION GAP SERPL CALCULATED.3IONS-SCNC: 2 MMOL/L
BUN SERPL-MCNC: 16 MG/DL (ref 5–25)
CA-I BLD-SCNC: 1.03 MMOL/L (ref 1.12–1.32)
CALCIUM SERPL-MCNC: 8.7 MG/DL (ref 8.4–10.2)
CHLORIDE SERPL-SCNC: 96 MMOL/L (ref 96–108)
CO2 SERPL-SCNC: 44 MMOL/L (ref 21–32)
CREAT SERPL-MCNC: 0.61 MG/DL (ref 0.6–1.3)
ERYTHROCYTE [DISTWIDTH] IN BLOOD BY AUTOMATED COUNT: 16 % (ref 11.6–15.1)
GFR SERPL CREATININE-BSD FRML MDRD: 93 ML/MIN/1.73SQ M
GLUCOSE SERPL-MCNC: 98 MG/DL (ref 65–140)
HCT VFR BLD AUTO: 30.5 % (ref 34.8–46.1)
HGB BLD-MCNC: 8.8 G/DL (ref 11.5–15.4)
MAGNESIUM SERPL-MCNC: 2.4 MG/DL (ref 1.9–2.7)
MCH RBC QN AUTO: 27.2 PG (ref 26.8–34.3)
MCHC RBC AUTO-ENTMCNC: 28.9 G/DL (ref 31.4–37.4)
MCV RBC AUTO: 94 FL (ref 82–98)
PHOSPHATE SERPL-MCNC: 4 MG/DL (ref 2.3–4.1)
PLATELET # BLD AUTO: 146 THOUSANDS/UL (ref 149–390)
PMV BLD AUTO: 9.9 FL (ref 8.9–12.7)
POTASSIUM SERPL-SCNC: 3.8 MMOL/L (ref 3.5–5.3)
RBC # BLD AUTO: 3.23 MILLION/UL (ref 3.81–5.12)
SODIUM SERPL-SCNC: 142 MMOL/L (ref 135–147)
WBC # BLD AUTO: 5.49 THOUSAND/UL (ref 4.31–10.16)

## 2023-10-01 PROCEDURE — 85027 COMPLETE CBC AUTOMATED: CPT | Performed by: NURSE PRACTITIONER

## 2023-10-01 PROCEDURE — 82330 ASSAY OF CALCIUM: CPT | Performed by: NURSE PRACTITIONER

## 2023-10-01 PROCEDURE — 94660 CPAP INITIATION&MGMT: CPT

## 2023-10-01 PROCEDURE — 83735 ASSAY OF MAGNESIUM: CPT | Performed by: NURSE PRACTITIONER

## 2023-10-01 PROCEDURE — 94760 N-INVAS EAR/PLS OXIMETRY 1: CPT

## 2023-10-01 PROCEDURE — 80048 BASIC METABOLIC PNL TOTAL CA: CPT | Performed by: NURSE PRACTITIONER

## 2023-10-01 PROCEDURE — 84100 ASSAY OF PHOSPHORUS: CPT | Performed by: NURSE PRACTITIONER

## 2023-10-01 RX ORDER — TORSEMIDE 20 MG/1
20 TABLET ORAL DAILY
Status: DISCONTINUED | OUTPATIENT
Start: 2023-10-01 | End: 2023-10-04

## 2023-10-01 RX ADMIN — Medication: at 17:34

## 2023-10-01 RX ADMIN — RIVAROXABAN 20 MG: 20 TABLET, FILM COATED ORAL at 16:00

## 2023-10-01 RX ADMIN — METOPROLOL TARTRATE 25 MG: 25 TABLET, FILM COATED ORAL at 09:06

## 2023-10-01 RX ADMIN — CHLORHEXIDINE GLUCONATE 0.12% ORAL RINSE 15 ML: 1.2 LIQUID ORAL at 09:06

## 2023-10-01 RX ADMIN — LORATADINE 5 MG: 10 TABLET ORAL at 09:06

## 2023-10-01 RX ADMIN — Medication 1 APPLICATION: at 09:07

## 2023-10-01 RX ADMIN — METOPROLOL TARTRATE 25 MG: 25 TABLET, FILM COATED ORAL at 17:00

## 2023-10-01 RX ADMIN — TORSEMIDE 20 MG: 20 TABLET ORAL at 09:06

## 2023-10-01 NOTE — ASSESSMENT & PLAN NOTE
· Continue non-invasive positive pressure for now, can trial off this morning  · Goal SpO2 around 88%  · Respiratory protocol  · Per family, due to patient's size and mobility, has been unable to complete an outpatient sleep study  · Pulm working on obtaining NIPPV for home

## 2023-10-01 NOTE — PROGRESS NOTES
[address]  Progress Note  Name: Murali Leggett  MRN: [id number]  Unit/Bed#:  I Date of Admission: 9/28/2023   Date of Service: 10/1/2023 I Hospital Day: 3    Assessment/Plan   * Acute on chronic respiratory failure with hypoxia and hypercapnia (HCC)  Assessment & Plan  · Continue non-invasive positive pressure for now, can trial off this morning  · Goal SpO2 around 88%  · Respiratory protocol  · Per family, due to patient's size and mobility, has been unable to complete an outpatient sleep study  · Pulm working on obtaining NIPPV for home     Elevated brain natriuretic peptide (BNP) level  Assessment & Plan    · Resumed home torsemide     Elevated troponin  Assessment & Plan  · Downtrended  · Echo was without wall motion abnormality    Acute encephalopathy  Assessment & Plan  · Suspect related to hypercarbia  · Now resolved    Obesity hypoventilation syndrome ([address])  Assessment & Plan  · Pulm working on obtaining NIPPV machine for patient at home  · Non-invasive positive pressure at bedtime and naps    Atrial fibrillation ([address])  Assessment & Plan  · Review of outside records reports paroxysmal atrial fibrillation on Xarelto  · Family denies any history and does not take any blood thinners  · Pt noted to go into afib with RVR 9/30 requiring metoprolol boluses  · Now back in NSR  · Xarelto restarted  · Family reports that she had vaginal bleeding previously while on Xarelto-unable to obtain transvaginal US while admitted due to body habitus, consider GYN consult    Class 3 severe obesity with body mass index (BMI) greater than or equal to 70 in adult [facility])  Assessment & Plan  · Encourage therapeutic lifestyle changes             ICU Core Measures     A: Assess, Prevent, and Manage Pain · Has pain been assessed? Yes  · Need for changes to pain regimen?  No   B: Both SAT/SAT  · N/A   C: Choice of Sedation · RASS Goal: 0 Alert and Calm or N/A patient not on sedation  · Need for changes to sedation or analgesia regimen? No   D: Delirium · CAM-ICU: Negative   E: Early Mobility  · Plan for early mobility? Yes   F: Family Engagement · Plan for family engagement today? Yes       Review of Invasive Devices:            Prophylaxis:  VTE VTE covered by:  rivaroxaban, Oral, 20 mg at 09/30/23 1645       Stress Ulcer  not ordered       Subjective   HPI/24hr events: O2 weaning. Went into afib with RVR yesterday, required metoprolol boluses, oral dose increased, xarelto restarted. Review of Systems   Constitutional: Negative. HENT: Negative. Eyes: Negative. Respiratory: Negative for cough and shortness of breath. Cardiovascular: Positive for leg swelling. Gastrointestinal: Negative. Genitourinary: Negative. Musculoskeletal: Positive for arthralgias and myalgias. Skin: Positive for color change. Neurological: Negative. Objective                            Vitals I/O      Most Recent Min/Max in 24hrs   Temp 98.3 °F (36.8 °C) Temp  Min: 98.3 °F (36.8 °C)  Max: 98.7 °F (37.1 °C)   Pulse 80 Pulse  Min: 74  Max: 145   Resp 22 Resp  Min: 22  Max: 22   /94 BP  Min: 111/53  Max: 158/94   O2 Sat 90 % SpO2  Min: 90 %  Max: 96 %      Intake/Output Summary (Last 24 hours) at 10/1/2023 0730  Last data filed at 9/30/2023 2101  Gross per 24 hour   Intake 100 ml   Output 2050 ml   Net -1950 ml         Diet Cardiovascular; Cardiac     Invasive Monitoring Physical exam    Physical Exam  Eyes:      Extraocular Movements: Extraocular movements intact. Pupils: Pupils are equal, round, and reactive to light. Skin:     General: Skin is dry. Coloration: Skin is pale. Comments: Lymphedema bilat LE   HENT:      Head: Normocephalic and atraumatic. Neck:     Vascular: No JVD. Cardiovascular:      Rate and Rhythm: Normal rate. Rhythm irregular. Heart sounds: Normal heart sounds. Musculoskeletal:      Right lower leg: Edema present.       Left lower leg: Edema present. Constitutional:       General: She is awake. Appearance: She is morbidly obese. She is ill-appearing. Pulmonary:      Effort: Tachypnea present. Breath sounds: Decreased breath sounds and wheezing present. Psychiatric:         Behavior: Behavior is cooperative. Neurological:      Mental Status: She is alert and oriented to person, place, and time. Diagnostic Studies      EKG: afib  Imaging:  I have personally reviewed pertinent reports.        Medications:  Scheduled PRN   ammonium lactate, , BID  chlorhexidine, 15 mL, Q12H DEVIN  loratadine, 5 mg, Daily  metoprolol tartrate, 25 mg, BID  rivaroxaban, 20 mg, Daily With Dinner  torsemide, 20 mg, Daily      acetaminophen, 325 mg, Q4H PRN  hydrocortisone, , 4x Daily PRN  methocarbamol, 500 mg, Q6H PRN  ondansetron, 4 mg, Q6H PRN       Continuous          Labs:    CBC    Recent Labs     09/30/23  0457 10/01/23  0438   WBC 5.86 5.49   HGB 8.6* 8.8*   HCT 29.2* 30.5*   * 146*     BMP    Recent Labs     09/30/23  0457 10/01/23  0438   SODIUM 140 142   K 3.9 3.8   CL 96 96   CO2 40* 44*   AGAP 4 2   BUN 24 16   CREATININE 0.73 0.61   CALCIUM 8.9 8.7       Coags    No recent results     Additional Electrolytes  Recent Labs     09/30/23  0457 10/01/23  0438   MG 2.4 2.4   PHOS 3.6 4.0   CAIONIZED 1.03* 1.03*          Blood Gas    No recent results  No recent results LFTs  No recent results    Infectious  No recent results  Glucose  Recent Labs     09/30/23  0457 10/01/23  0438   GLUC 88 98               No CC time    [employer], CRNP

## 2023-10-01 NOTE — RESPIRATORY THERAPY NOTE
09/30/23 2120   Respiratory Assessment   Assessment Type Assess only   General Appearance Alert; Awake   Respiratory Pattern Dyspnea with exertion   Chest Assessment Chest expansion symmetrical   Bilateral Breath Sounds Diminished   Resp Comments titrated patient's liter flow to 8 will continue to titrate as tolerated. Patient to go on BiPAP for hours of sleep.    O2 Device MFNC   Oxygen Therapy/Pulse Ox   O2 Device Mid flow nasal cannula   O2 Therapy Oxygen humidified   O2 Flow Rate (L/min) 8 L/min   SpO2 Activity At Rest   $ Pulse Oximetry Spot Check Charge Completed

## 2023-10-01 NOTE — ASSESSMENT & PLAN NOTE
· Review of outside records reports paroxysmal atrial fibrillation on Xarelto  · Family denies any history and does not take any blood thinners  · Pt noted to go into afib with RVR 9/30 requiring metoprolol boluses  · Now back in NSR  · Xarelto restarted  · Family reports that she had vaginal bleeding previously while on Xarelto-unable to obtain transvaginal US while admitted due to body habitus, consider GYN consult

## 2023-10-01 NOTE — ASSESSMENT & PLAN NOTE
· Continue non-invasive positive pressure QHS, midflow NC during day-currently 8L  · Goal SpO2 around 88%  · Respiratory protocol  · Per family, due to patient's size and mobility, has been unable to complete an outpatient sleep study  · Pulm working on obtaining NIPPV for home

## 2023-10-01 NOTE — ASSESSMENT & PLAN NOTE
· Pulm working on obtaining NIPPV machine for patient at home  · Non-invasive positive pressure at bedtime and naps

## 2023-10-01 NOTE — RESPIRATORY THERAPY NOTE
09/30/23 2302   Respiratory Assessment   Resp Comments placed pt on NIV for HS use, RN aware   O2 Device v60   Non-Invasive Information   O2 Interface Device Face mask   Non-Invasive Ventilation Mode BiPAP   SpO2 93 %   $ Pulse Oximetry Spot Check Charge Completed   Non-Invasive Settings   IPAP (cm) 20 cm   EPAP (cm) 8 cm   Rate (Set) 20   FiO2 (%) 50   Rise Time 3  (15 min ramp applied)   Inspiratory Time (Set) 0.75   Humidification   (heater)   Temperature (Set) 31   Non-Invasive Readings   Skin Intervention Skin intact   Total Rate 29   Vt (mL) (Mech) 404   MV (Mech) 11.7   Peak Pressure (Obs) 13   Heater Temperature (Obs)   (unit warming)   Leak (lpm) 0   Non-Invasive Alarms   Insp Pressure High (cm H20) 35   Insp Pressure Low (cm H20) 6   Low Insp Pressure Time (sec) 20 sec   MV Low (L/min) 3   Vt High (mL) 1350   Vt Low (mL) 200   High Resp Rate (BPM) 45 BPM   Low Resp Rate (BPM) 8 BPM   Maintenance   Water bag changed No

## 2023-10-01 NOTE — RESPIRATORY THERAPY NOTE
10/01/23 2914   Respiratory Assessment   Assessment Type Assess only   General Appearance Drowsy   Respiratory Pattern Dyspnea with exertion   Chest Assessment Chest expansion symmetrical   Resp Comments pt placed on bipap at this time for lethargy and AMS.  will monitor   O2 Device mfnc   Non-Invasive Information   O2 Interface Device Face mask   Non-Invasive Ventilation Mode BiPAP   $ Intermittent NIV Yes   SpO2 96 %   $ Pulse Oximetry Spot Check Charge Completed   Non-Invasive Settings   IPAP (cm) 20 cm   EPAP (cm) 8 cm   Rate (Set) 20   FiO2 (%) 50   Pressure Support (cm H2O) 12   Rise Time 3   Inspiratory Time (Set) 0.75   Temperature (Set) 31   Non-Invasive Readings   Skin Intervention Skin intact   Total Rate 27   MV (Mech) 15   Peak Pressure (Obs) 21   Spontaneous Vt (mL) 552   Leak (lpm) 0   Non-Invasive Alarms   Insp Pressure High (cm H20) 35   Insp Pressure Low (cm H20) 6   Low Insp Pressure Time (sec) 20 sec   MV Low (L/min) 3   Vt High (mL) 1350   Vt Low (mL) 200   High Resp Rate (BPM) 45 BPM   Low Resp Rate (BPM) 8 BPM   Apnea Rate 12   Maintenance   Water bag changed No

## 2023-10-02 LAB
BUN SERPL-MCNC: 13 MG/DL (ref 5–25)
CA-I BLD-SCNC: 1.05 MMOL/L (ref 1.12–1.32)
CALCIUM SERPL-MCNC: 8.7 MG/DL (ref 8.4–10.2)
CHLORIDE SERPL-SCNC: 96 MMOL/L (ref 96–108)
CO2 SERPL-SCNC: >45 MMOL/L (ref 21–32)
CREAT SERPL-MCNC: 0.58 MG/DL (ref 0.6–1.3)
ERYTHROCYTE [DISTWIDTH] IN BLOOD BY AUTOMATED COUNT: 15.8 % (ref 11.6–15.1)
GFR SERPL CREATININE-BSD FRML MDRD: 95 ML/MIN/1.73SQ M
GLUCOSE SERPL-MCNC: 87 MG/DL (ref 65–140)
HCT VFR BLD AUTO: 31.7 % (ref 34.8–46.1)
HGB BLD-MCNC: 8.9 G/DL (ref 11.5–15.4)
MAGNESIUM SERPL-MCNC: 2.3 MG/DL (ref 1.9–2.7)
MCH RBC QN AUTO: 27.4 PG (ref 26.8–34.3)
MCHC RBC AUTO-ENTMCNC: 28.1 G/DL (ref 31.4–37.4)
MCV RBC AUTO: 98 FL (ref 82–98)
PHOSPHATE SERPL-MCNC: 3.4 MG/DL (ref 2.3–4.1)
PLATELET # BLD AUTO: 167 THOUSANDS/UL (ref 149–390)
PMV BLD AUTO: 9.4 FL (ref 8.9–12.7)
POTASSIUM SERPL-SCNC: 4.1 MMOL/L (ref 3.5–5.3)
RBC # BLD AUTO: 3.25 MILLION/UL (ref 3.81–5.12)
SODIUM SERPL-SCNC: 144 MMOL/L (ref 135–147)
WBC # BLD AUTO: 4.99 THOUSAND/UL (ref 4.31–10.16)

## 2023-10-02 PROCEDURE — 94660 CPAP INITIATION&MGMT: CPT

## 2023-10-02 PROCEDURE — 82330 ASSAY OF CALCIUM: CPT | Performed by: NURSE PRACTITIONER

## 2023-10-02 PROCEDURE — 94760 N-INVAS EAR/PLS OXIMETRY 1: CPT

## 2023-10-02 PROCEDURE — 83735 ASSAY OF MAGNESIUM: CPT | Performed by: NURSE PRACTITIONER

## 2023-10-02 PROCEDURE — 85027 COMPLETE CBC AUTOMATED: CPT | Performed by: NURSE PRACTITIONER

## 2023-10-02 PROCEDURE — 80048 BASIC METABOLIC PNL TOTAL CA: CPT | Performed by: NURSE PRACTITIONER

## 2023-10-02 PROCEDURE — 84100 ASSAY OF PHOSPHORUS: CPT | Performed by: NURSE PRACTITIONER

## 2023-10-02 RX ORDER — CALCIUM GLUCONATE 20 MG/ML
2 INJECTION, SOLUTION INTRAVENOUS ONCE
Status: COMPLETED | OUTPATIENT
Start: 2023-10-02 | End: 2023-10-02

## 2023-10-02 RX ORDER — ACETAMINOPHEN 325 MG/1
975 TABLET ORAL EVERY 6 HOURS PRN
Status: DISCONTINUED | OUTPATIENT
Start: 2023-10-02 | End: 2023-10-18 | Stop reason: HOSPADM

## 2023-10-02 RX ADMIN — METHOCARBAMOL 500 MG: 500 TABLET ORAL at 07:39

## 2023-10-02 RX ADMIN — TORSEMIDE 20 MG: 20 TABLET ORAL at 10:01

## 2023-10-02 RX ADMIN — CALCIUM GLUCONATE 2 G: 20 INJECTION, SOLUTION INTRAVENOUS at 07:39

## 2023-10-02 RX ADMIN — Medication: at 18:10

## 2023-10-02 RX ADMIN — LORATADINE 5 MG: 10 TABLET ORAL at 10:02

## 2023-10-02 RX ADMIN — RIVAROXABAN 20 MG: 20 TABLET, FILM COATED ORAL at 18:10

## 2023-10-02 RX ADMIN — ACETAMINOPHEN 975 MG: 325 TABLET, FILM COATED ORAL at 07:56

## 2023-10-02 RX ADMIN — Medication: at 10:05

## 2023-10-02 RX ADMIN — CHLORHEXIDINE GLUCONATE 0.12% ORAL RINSE 15 ML: 1.2 LIQUID ORAL at 10:04

## 2023-10-02 RX ADMIN — METOPROLOL TARTRATE 25 MG: 25 TABLET, FILM COATED ORAL at 10:01

## 2023-10-02 NOTE — PLAN OF CARE
Problem: Prexisting or High Potential for Compromised Skin Integrity  Goal: Skin integrity is maintained or improved  Description: INTERVENTIONS:  - Identify patients at risk for skin breakdown  - Assess and monitor skin integrity  - Assess and monitor nutrition and hydration status  - Monitor labs   - Assess for incontinence   - Turn and reposition patient  - Assist with mobility/ambulation  - Relieve pressure over bony prominences  - Avoid friction and shearing  - Provide appropriate hygiene as needed including keeping skin clean and dry  - Evaluate need for skin moisturizer/barrier cream  - Collaborate with interdisciplinary team   - Patient/family teaching  - Consider wound care consult   Outcome: Progressing     Problem: MOBILITY - ADULT  Goal: Maintain or return to baseline ADL function  Description: INTERVENTIONS:  -  Assess patient's ability to carry out ADLs; assess patient's baseline for ADL function and identify physical deficits which impact ability to perform ADLs (bathing, care of mouth/teeth, toileting, grooming, dressing, etc.)  - Assess/evaluate cause of self-care deficits   - Assess range of motion  - Assess patient's mobility; develop plan if impaired  - Assess patient's need for assistive devices and provide as appropriate  - Encourage maximum independence but intervene and supervise when necessary  - Involve family in performance of ADLs  - Assess for home care needs following discharge   - Consider OT consult to assist with ADL evaluation and planning for discharge  - Provide patient education as appropriate  Outcome: Progressing  Goal: Maintains/Returns to pre admission functional level  Description: INTERVENTIONS:  - Perform BMAT or MOVE assessment daily.   - Set and communicate daily mobility goal to care team and patient/family/caregiver.    - Collaborate with rehabilitation services on mobility goals if consulted  - Out of bed for toileting  - Record patient progress and toleration of activity level   Outcome: Progressing     Problem: PAIN - ADULT  Goal: Verbalizes/displays adequate comfort level or baseline comfort level  Description: Interventions:  - Encourage patient to monitor pain and request assistance  - Assess pain using appropriate pain scale  - Administer analgesics based on type and severity of pain and evaluate response  - Implement non-pharmacological measures as appropriate and evaluate response  - Consider cultural and social influences on pain and pain management  - Notify physician/advanced practitioner if interventions unsuccessful or patient reports new pain  Outcome: Progressing     Problem: INFECTION - ADULT  Goal: Absence or prevention of progression during hospitalization  Description: INTERVENTIONS:  - Assess and monitor for signs and symptoms of infection  - Monitor lab/diagnostic results  - Monitor all insertion sites, i.e. indwelling lines, tubes, and drains  - Monitor endotracheal if appropriate and nasal secretions for changes in amount and color  - [place] appropriate cooling/warming therapies per order  - Administer medications as ordered  - Instruct and encourage patient and family to use good hand hygiene technique  - Identify and instruct in appropriate isolation precautions for identified infection/condition  Outcome: Progressing     Problem: SAFETY ADULT  Goal: Maintain or return to baseline ADL function  Description: INTERVENTIONS:  -  Assess patient's ability to carry out ADLs; assess patient's baseline for ADL function and identify physical deficits which impact ability to perform ADLs (bathing, care of mouth/teeth, toileting, grooming, dressing, etc.)  - Assess/evaluate cause of self-care deficits   - Assess range of motion  - Assess patient's mobility; develop plan if impaired  - Assess patient's need for assistive devices and provide as appropriate  - Encourage maximum independence but intervene and supervise when necessary  - Involve family in performance of ADLs  - Assess for home care needs following discharge   - Consider OT consult to assist with ADL evaluation and planning for discharge  - Provide patient education as appropriate  Outcome: Progressing  Goal: Maintains/Returns to pre admission functional level  Description: INTERVENTIONS:  - Perform BMAT or MOVE assessment daily.   - Set and communicate daily mobility goal to care team and patient/family/caregiver.    - Collaborate with rehabilitation services on mobility goals if consulted  - Out of bed for toileting  - Record patient progress and toleration of activity level   Outcome: Progressing  Goal: Patient will remain free of falls  Description: INTERVENTIONS:  - Educate patient/family on patient safety including physical limitations  - Instruct patient to call for assistance with activity   - Consult OT/PT to assist with strengthening/mobility   - Keep Call bell within reach  - Keep bed low and locked with side rails adjusted as appropriate  - Keep care items and personal belongings within reach  - Initiate and maintain comfort rounds  - Make Fall Risk Sign visible to staff  - Apply yellow socks and bracelet for high fall risk patients  - Consider moving patient to room near nurses station  Outcome: Progressing     Problem: DISCHARGE PLANNING  Goal: Discharge to home or other facility with appropriate resources  Description: INTERVENTIONS:  - Identify barriers to discharge w/patient and caregiver  - Arrange for needed discharge resources and transportation as appropriate  - Identify discharge learning needs (meds, wound care, etc.)  - Arrange for interpretive services to assist at discharge as needed  - Refer to Case Management Department for coordinating discharge planning if the patient needs post-hospital services based on physician/advanced practitioner order or complex needs related to functional status, cognitive ability, or social support system  Outcome: Progressing     Problem: Knowledge Deficit  Goal: Patient/family/caregiver demonstrates understanding of disease process, treatment plan, medications, and discharge instructions  Description: Complete learning assessment and assess knowledge base.   Interventions:  - Provide teaching at level of understanding  - Provide teaching via preferred learning methods  Outcome: Progressing     Problem: RESPIRATORY - ADULT  Goal: Achieves optimal ventilation and oxygenation  Description: INTERVENTIONS:  - Assess for changes in respiratory status  - Assess for changes in mentation and behavior  - Position to facilitate oxygenation and minimize respiratory effort  - Oxygen administered by appropriate delivery if ordered  - Initiate smoking cessation education as indicated  - Encourage broncho-pulmonary hygiene including cough, deep breathe, Incentive Spirometry  - Assess the need for suctioning and aspirate as needed  - Assess and instruct to report SOB or any respiratory difficulty  - Respiratory Therapy support as indicated  Outcome: Progressing

## 2023-10-02 NOTE — PROGRESS NOTES
Report given to Andi Benitez RN; patient transferred via bariatric bed with all belongings & meds.  Patient's sister made aware of transfer

## 2023-10-02 NOTE — ASSESSMENT & PLAN NOTE
· Suspect related to hypercarbia  · Intermittently encephalopathic when naps without her BiPAP.   Strongly suggest positive pressure ventilation for naps in addition to at at bedtime

## 2023-10-02 NOTE — PROGRESS NOTES
[address]  Progress Note  Name: Shantal Zapata  MRN: [id number]  Unit/Bed#:  I Date of Admission: 9/28/2023   Date of Service: 10/2/2023 I Hospital Day: 4    Assessment/Plan   * Acute on chronic respiratory failure with hypoxia and hypercapnia (HCC)  Assessment & Plan  · Continue non-invasive positive pressure QHS, midflow NC during day-currently 8L  · Goal SpO2 around 88%  · Respiratory protocol  · Per family, due to patient's size and mobility, has been unable to complete an outpatient sleep study  · Pulm working on obtaining NIPPV for home     Elevated brain natriuretic peptide (BNP) level  Assessment & Plan  · Resumed home torsemide     Elevated troponin  Assessment & Plan  · Downtrended  · Echo was without wall motion abnormality    Acute encephalopathy  Assessment & Plan  · Suspect related to hypercarbia  · Intermittently encephalopathic when naps without her BiPAP. Strongly suggest positive pressure ventilation for naps in addition to at at bedtime    Obesity hypoventilation syndrome ([address])  Assessment & Plan  · Pulm working on obtaining NIPPV machine for patient at home  · Non-invasive positive pressure at bedtime and naps    Atrial fibrillation ([address])  Assessment & Plan  · Review of outside records reports paroxysmal atrial fibrillation on Xarelto  · Family denies any history and does not take any blood thinners  · Pt noted to go into afib with RVR 9/30 requiring metoprolol boluses  · Now back in NSR  · Xarelto restarted  · Family reports that she had vaginal bleeding previously while on Xarelto-unable to obtain transvaginal US while admitted due to body habitus, consider GYN consult if bleeding restarts    Class 3 severe obesity with body mass index (BMI) greater than or equal to 70 in adult [facility])  Assessment & Plan  · Encourage therapeutic lifestyle changes             ICU Core Measures     A: Assess, Prevent, and Manage Pain · Has pain been assessed?  Yes  · Need for changes to pain regimen? NA   B: Both SAT/SAT  · N/A   C: Choice of Sedation · RASS Goal: 0 Alert and Calm  · Need for changes to sedation or analgesia regimen? NA   D: Delirium · CAM-ICU: Negative   E: Early Mobility  · Plan for early mobility? Yes   F: Family Engagement · Plan for family engagement today? Yes       Review of Invasive Devices:            Prophylaxis:  VTE VTE covered by:  rivaroxaban, Oral, 20 mg at 10/01/23 1600       Stress Ulcer  not ordered       Subjective   HPI/24hr events: Patient with episode of encephalopathy yesterday while taking a nap without her BiPAP. BiPAP replaced with resolution of her altered mental status. Hemodynamically stable overnight, tolerating her at bedtime BiPAP. Continues to require 8 L mid flow during the day. Objective                            Vitals I/O      Most Recent Min/Max in 24hrs   Temp 97.5 °F (36.4 °C) Temp  Min: 97.2 °F (36.2 °C)  Max: 98.5 °F (36.9 °C)   Pulse 72 Pulse  Min: 56  Max: 95   Resp (!) 26 Resp  Min: 18  Max: 26   /65 BP  Min: 120/66  Max: 158/94   O2 Sat 96 % SpO2  Min: 88 %  Max: 99 %      Intake/Output Summary (Last 24 hours) at 10/2/2023 0340  Last data filed at 10/1/2023 1801  Gross per 24 hour   Intake 480 ml   Output 1600 ml   Net -1120 ml         Diet Cardiovascular; Cardiac     Invasive Monitoring Physical exam    Physical Exam  Eyes:      General: No scleral icterus. Extraocular Movements: Extraocular movements intact. Conjunctiva/sclera: Conjunctivae normal.   Skin:     General: Skin is warm and dry. Capillary Refill: Capillary refill takes less than 2 seconds. HENT:      Head: Normocephalic and atraumatic. Mouth/Throat:      Mouth: Mucous membranes are dry. Cardiovascular:      Rate and Rhythm: Normal rate and regular rhythm. Pulses: Normal pulses. Heart sounds: Normal heart sounds. Musculoskeletal:         General: Normal range of motion. Right lower leg: Trace Edema present. Left lower leg: Trace Edema present. Abdominal:      General: Bowel sounds are normal. There is no distension. Palpations: Abdomen is soft. Tenderness: There is no abdominal tenderness. There is no guarding. Constitutional:       General: She is not in acute distress. Appearance: She is ill-appearing. Pulmonary:      Effort: Pulmonary effort is normal. No respiratory distress. Breath sounds: Rhonchi present. Comments: Diminished throughout  Neurological:      General: No focal deficit present. Mental Status: She is alert and oriented to person, place and time. Mental status is at baseline. She is calm. Motor: gross motor function is at baseline for patient. Vitals and nursing note reviewed. Diagnostic Studies      EKG: Sinus rhythm  Imaging:  I have personally reviewed pertinent reports.    and I have personally reviewed pertinent films in PACS     Medications:  Scheduled PRN   ammonium lactate, , BID  chlorhexidine, 15 mL, Q12H DEVIN  loratadine, 5 mg, Daily  metoprolol tartrate, 25 mg, BID  rivaroxaban, 20 mg, Daily With Dinner  torsemide, 20 mg, Daily      acetaminophen, 325 mg, Q4H PRN  hydrocortisone, , 4x Daily PRN  methocarbamol, 500 mg, Q6H PRN  ondansetron, 4 mg, Q6H PRN       Continuous          Labs:    CBC    Recent Labs     09/30/23  0457 10/01/23  0438   WBC 5.86 5.49   HGB 8.6* 8.8*   HCT 29.2* 30.5*   * 146*     BMP    Recent Labs     09/30/23  0457 10/01/23  0438   SODIUM 140 142   K 3.9 3.8   CL 96 96   CO2 40* 44*   AGAP 4 2   BUN 24 16   CREATININE 0.73 0.61   CALCIUM 8.9 8.7       Coags    No recent results     Additional Electrolytes  Recent Labs     09/30/23  0457 10/01/23  0438   MG 2.4 2.4   PHOS 3.6 4.0   CAIONIZED 1.03* 1.03*          Blood Gas    No recent results  No recent results LFTs  No recent results    Infectious  No recent results  Glucose  Recent Labs     09/30/23  0457 10/01/23  0438   GLUC 88 98 Jimenez Pena

## 2023-10-02 NOTE — ASSESSMENT & PLAN NOTE
· Review of outside records reports paroxysmal atrial fibrillation on Xarelto  · Family denies any history and does not take any blood thinners  · Pt noted to go into afib with RVR 9/30 requiring metoprolol boluses  · Now back in NSR  · Xarelto restarted  · Family reports that she had vaginal bleeding previously while on Xarelto-unable to obtain transvaginal US while admitted due to body habitus, consider GYN consult if bleeding restarts

## 2023-10-02 NOTE — PLAN OF CARE
Problem: Prexisting or High Potential for Compromised Skin Integrity  Goal: Skin integrity is maintained or improved  Description: INTERVENTIONS:  - Identify patients at risk for skin breakdown  - Assess and monitor skin integrity  - Assess and monitor nutrition and hydration status  - Monitor labs   - Assess for incontinence   - Turn and reposition patient  - Assist with mobility/ambulation  - Relieve pressure over bony prominences  - Avoid friction and shearing  - Provide appropriate hygiene as needed including keeping skin clean and dry  - Evaluate need for skin moisturizer/barrier cream  - Collaborate with interdisciplinary team   - Patient/family teaching  - Consider wound care consult   Outcome: Progressing     Problem: MOBILITY - ADULT  Goal: Maintain or return to baseline ADL function  Description: INTERVENTIONS:  -  Assess patient's ability to carry out ADLs; assess patient's baseline for ADL function and identify physical deficits which impact ability to perform ADLs (bathing, care of mouth/teeth, toileting, grooming, dressing, etc.)  - Assess/evaluate cause of self-care deficits   - Assess range of motion  - Assess patient's mobility; develop plan if impaired  - Assess patient's need for assistive devices and provide as appropriate  - Encourage maximum independence but intervene and supervise when necessary  - Involve family in performance of ADLs  - Assess for home care needs following discharge   - Consider OT consult to assist with ADL evaluation and planning for discharge  - Provide patient education as appropriate  Outcome: Progressing  Goal: Maintains/Returns to pre admission functional level  Description: INTERVENTIONS:  - Perform BMAT or MOVE assessment daily.   - Set and communicate daily mobility goal to care team and patient/family/caregiver. - Collaborate with rehabilitation services on mobility goals if consulted  - Perform Range of Motion 3 times a day.   - Reposition patient every 2 hours.  - Dangle patient  times a day  - Stand patient  times a day  - Ambulate patient  times a day  - Out of bed to chair  times a day   - Out of bed for meals  times a day  - Out of bed for toileting  - Record patient progress and toleration of activity level   Outcome: Progressing     Problem: PAIN - ADULT  Goal: Verbalizes/displays adequate comfort level or baseline comfort level  Description: Interventions:  - Encourage patient to monitor pain and request assistance  - Assess pain using appropriate pain scale  - Administer analgesics based on type and severity of pain and evaluate response  - Implement non-pharmacological measures as appropriate and evaluate response  - Consider cultural and social influences on pain and pain management  - Notify physician/advanced practitioner if interventions unsuccessful or patient reports new pain  Outcome: Progressing     Problem: INFECTION - ADULT  Goal: Absence or prevention of progression during hospitalization  Description: INTERVENTIONS:  - Assess and monitor for signs and symptoms of infection  - Monitor lab/diagnostic results  - Monitor all insertion sites, i.e. indwelling lines, tubes, and drains  - Monitor endotracheal if appropriate and nasal secretions for changes in amount and color  - [place] appropriate cooling/warming therapies per order  - Administer medications as ordered  - Instruct and encourage patient and family to use good hand hygiene technique  - Identify and instruct in appropriate isolation precautions for identified infection/condition  Outcome: Progressing     Problem: SAFETY ADULT  Goal: Maintain or return to baseline ADL function  Description: INTERVENTIONS:  -  Assess patient's ability to carry out ADLs; assess patient's baseline for ADL function and identify physical deficits which impact ability to perform ADLs (bathing, care of mouth/teeth, toileting, grooming, dressing, etc.)  - Assess/evaluate cause of self-care deficits   - Assess range of motion  - Assess patient's mobility; develop plan if impaired  - Assess patient's need for assistive devices and provide as appropriate  - Encourage maximum independence but intervene and supervise when necessary  - Involve family in performance of ADLs  - Assess for home care needs following discharge   - Consider OT consult to assist with ADL evaluation and planning for discharge  - Provide patient education as appropriate  Outcome: Progressing  Goal: Maintains/Returns to pre admission functional level  Description: INTERVENTIONS:  - Perform BMAT or MOVE assessment daily.   - Set and communicate daily mobility goal to care team and patient/family/caregiver. - Collaborate with rehabilitation services on mobility goals if consulted  - Perform Range of Motion 3 times a day. - Reposition patient every 2 hours.   - Dangle patient  times a day  - Stand patient  times a day  - Ambulate patient  times a day  - Out of bed to chair  times a day   - Out of bed for meals  times a day  - Out of bed for toileting  - Record patient progress and toleration of activity level   Outcome: Progressing  Goal: Patient will remain free of falls  Description: INTERVENTIONS:  - Educate patient/family on patient safety including physical limitations  - Instruct patient to call for assistance with activity   - Consult OT/PT to assist with strengthening/mobility   - Keep Call bell within reach  - Keep bed low and locked with side rails adjusted as appropriate  - Keep care items and personal belongings within reach  - Initiate and maintain comfort rounds  - Make Fall Risk Sign visible to staff  - Offer Toileting every 2 Hours, in advance of need  - Initiate/Maintain bed alarm  - Obtain necessary fall risk management equipment:   - Apply yellow socks and bracelet for high fall risk patients  - Consider moving patient to room near nurses station  Outcome: Progressing     Problem: DISCHARGE PLANNING  Goal: Discharge to home or other facility with appropriate resources  Description: INTERVENTIONS:  - Identify barriers to discharge w/patient and caregiver  - Arrange for needed discharge resources and transportation as appropriate  - Identify discharge learning needs (meds, wound care, etc.)  - Arrange for interpretive services to assist at discharge as needed  - Refer to Case Management Department for coordinating discharge planning if the patient needs post-hospital services based on physician/advanced practitioner order or complex needs related to functional status, cognitive ability, or social support system  Outcome: Progressing     Problem: Knowledge Deficit  Goal: Patient/family/caregiver demonstrates understanding of disease process, treatment plan, medications, and discharge instructions  Description: Complete learning assessment and assess knowledge base.   Interventions:  - Provide teaching at level of understanding  - Provide teaching via preferred learning methods  Outcome: Progressing     Problem: RESPIRATORY - ADULT  Goal: Achieves optimal ventilation and oxygenation  Description: INTERVENTIONS:  - Assess for changes in respiratory status  - Assess for changes in mentation and behavior  - Position to facilitate oxygenation and minimize respiratory effort  - Oxygen administered by appropriate delivery if ordered  - Initiate smoking cessation education as indicated  - Encourage broncho-pulmonary hygiene including cough, deep breathe, Incentive Spirometry  - Assess the need for suctioning and aspirate as needed  - Assess and instruct to report SOB or any respiratory difficulty  - Respiratory Therapy support as indicated  Outcome: Progressing

## 2023-10-03 ENCOUNTER — APPOINTMENT (INPATIENT)
Dept: RADIOLOGY | Facility: HOSPITAL | Age: 68
DRG: 189 | End: 2023-10-03
Payer: COMMERCIAL

## 2023-10-03 LAB
ANION GAP SERPL CALCULATED.3IONS-SCNC: 5 MMOL/L
BACTERIA BLD CULT: NORMAL
BACTERIA BLD CULT: NORMAL
BASOPHILS # BLD AUTO: 0.01 THOUSANDS/ÂΜL (ref 0–0.1)
BASOPHILS NFR BLD AUTO: 0 % (ref 0–1)
BUN SERPL-MCNC: 11 MG/DL (ref 5–25)
CALCIUM SERPL-MCNC: 8.6 MG/DL (ref 8.4–10.2)
CHLORIDE SERPL-SCNC: 93 MMOL/L (ref 96–108)
CO2 SERPL-SCNC: 42 MMOL/L (ref 21–32)
CREAT SERPL-MCNC: 0.57 MG/DL (ref 0.6–1.3)
DME PARACHUTE DELIVERY DATE REQUESTED: NORMAL
DME PARACHUTE ITEM DESCRIPTION: NORMAL
DME PARACHUTE ORDER STATUS: NORMAL
DME PARACHUTE SUPPLIER NAME: NORMAL
DME PARACHUTE SUPPLIER PHONE: NORMAL
EOSINOPHIL # BLD AUTO: 0.47 THOUSAND/ÂΜL (ref 0–0.61)
EOSINOPHIL NFR BLD AUTO: 8 % (ref 0–6)
ERYTHROCYTE [DISTWIDTH] IN BLOOD BY AUTOMATED COUNT: 15.3 % (ref 11.6–15.1)
GFR SERPL CREATININE-BSD FRML MDRD: 95 ML/MIN/1.73SQ M
GLUCOSE SERPL-MCNC: 158 MG/DL (ref 65–140)
HCT VFR BLD AUTO: 33.6 % (ref 34.8–46.1)
HGB BLD-MCNC: 9.6 G/DL (ref 11.5–15.4)
IMM GRANULOCYTES # BLD AUTO: 0.07 THOUSAND/UL (ref 0–0.2)
IMM GRANULOCYTES NFR BLD AUTO: 1 % (ref 0–2)
LYMPHOCYTES # BLD AUTO: 1.11 THOUSANDS/ÂΜL (ref 0.6–4.47)
LYMPHOCYTES NFR BLD AUTO: 20 % (ref 14–44)
MAGNESIUM SERPL-MCNC: 2 MG/DL (ref 1.9–2.7)
MCH RBC QN AUTO: 27.2 PG (ref 26.8–34.3)
MCHC RBC AUTO-ENTMCNC: 28.6 G/DL (ref 31.4–37.4)
MCV RBC AUTO: 95 FL (ref 82–98)
MONOCYTES # BLD AUTO: 0.5 THOUSAND/ÂΜL (ref 0.17–1.22)
MONOCYTES NFR BLD AUTO: 9 % (ref 4–12)
NEUTROPHILS # BLD AUTO: 3.45 THOUSANDS/ÂΜL (ref 1.85–7.62)
NEUTS SEG NFR BLD AUTO: 62 % (ref 43–75)
NRBC BLD AUTO-RTO: 0 /100 WBCS
PHOSPHATE SERPL-MCNC: 2.6 MG/DL (ref 2.3–4.1)
PLATELET # BLD AUTO: 167 THOUSANDS/UL (ref 149–390)
PMV BLD AUTO: 9.6 FL (ref 8.9–12.7)
POTASSIUM SERPL-SCNC: 3.6 MMOL/L (ref 3.5–5.3)
RBC # BLD AUTO: 3.53 MILLION/UL (ref 3.81–5.12)
SODIUM SERPL-SCNC: 140 MMOL/L (ref 135–147)
WBC # BLD AUTO: 5.61 THOUSAND/UL (ref 4.31–10.16)

## 2023-10-03 PROCEDURE — 84100 ASSAY OF PHOSPHORUS: CPT | Performed by: NURSE PRACTITIONER

## 2023-10-03 PROCEDURE — 83735 ASSAY OF MAGNESIUM: CPT | Performed by: NURSE PRACTITIONER

## 2023-10-03 PROCEDURE — 94760 N-INVAS EAR/PLS OXIMETRY 1: CPT

## 2023-10-03 PROCEDURE — 80048 BASIC METABOLIC PNL TOTAL CA: CPT | Performed by: NURSE PRACTITIONER

## 2023-10-03 PROCEDURE — 97110 THERAPEUTIC EXERCISES: CPT

## 2023-10-03 PROCEDURE — 99232 SBSQ HOSP IP/OBS MODERATE 35: CPT | Performed by: PHYSICIAN ASSISTANT

## 2023-10-03 PROCEDURE — 85025 COMPLETE CBC W/AUTO DIFF WBC: CPT | Performed by: NURSE PRACTITIONER

## 2023-10-03 PROCEDURE — 94660 CPAP INITIATION&MGMT: CPT

## 2023-10-03 PROCEDURE — 97530 THERAPEUTIC ACTIVITIES: CPT

## 2023-10-03 PROCEDURE — 71045 X-RAY EXAM CHEST 1 VIEW: CPT

## 2023-10-03 RX ADMIN — Medication: at 17:57

## 2023-10-03 RX ADMIN — RIVAROXABAN 20 MG: 20 TABLET, FILM COATED ORAL at 17:58

## 2023-10-03 RX ADMIN — ACETAMINOPHEN 975 MG: 325 TABLET, FILM COATED ORAL at 17:57

## 2023-10-03 RX ADMIN — LORATADINE 5 MG: 10 TABLET ORAL at 09:59

## 2023-10-03 RX ADMIN — TORSEMIDE 20 MG: 20 TABLET ORAL at 09:59

## 2023-10-03 RX ADMIN — ACETAMINOPHEN 975 MG: 325 TABLET, FILM COATED ORAL at 10:03

## 2023-10-03 RX ADMIN — METOPROLOL TARTRATE 25 MG: 25 TABLET, FILM COATED ORAL at 17:57

## 2023-10-03 RX ADMIN — Medication: at 11:09

## 2023-10-03 RX ADMIN — METOPROLOL TARTRATE 25 MG: 25 TABLET, FILM COATED ORAL at 09:59

## 2023-10-03 NOTE — RESPIRATORY THERAPY NOTE
10/03/23 0429   Respiratory Assessment   Assessment Type Assess only   General Appearance Sleeping   Respiratory Pattern Dyspnea with exertion   Chest Assessment Chest expansion symmetrical   Bilateral Breath Sounds Diminished   Location Specific No   Cough None   Resp Comments Pt remains on bipap   O2 Device V60   Non-Invasive Information   O2 Interface Device Face mask   Non-Invasive Ventilation Mode BiPAP   SpO2 95 %   $ Pulse Oximetry Spot Check Charge Completed   Non-Invasive Settings   IPAP (cm) 20 cm   EPAP (cm) 8 cm   Rate (Set) 20   FiO2 (%) (S)  70   Pressure Support (cm H2O) 12   Inspiratory Time (Set) (S)  1   Temperature (Set) 31   Non-Invasive Readings   Skin Intervention Skin intact   Total Rate 21   Vt (mL) (Mech) 426   MV (Mech) 8.4   Peak Pressure (Obs) 21   Spontaneous Vt (mL) 362   Leak (lpm) 0   Spontaneous MV (mL) 8.5   Non-Invasive Alarms   Insp Pressure High (cm H20) 35   Insp Pressure Low (cm H20) 6   Low Insp Pressure Time (sec) 20 sec   MV Low (L/min) 3   Vt High (mL) 1350   Vt Low (mL) 200   High Resp Rate (BPM) 12 BPM   Low Resp Rate (BPM) 8 BPM   Apnea Rate 12     RT Ventilator Management Note  Chris Cano 76 y.o. female MRN: [id number]  Unit/Bed#: -01 Encounter: [id number]      Daily Screen    No data found in the last 10 encounters.            Physical Exam:   Assessment Type: Assess only  General Appearance: Sleeping  Respiratory Pattern: Dyspnea with exertion  Chest Assessment: Chest expansion symmetrical  Bilateral Breath Sounds: Diminished  Location Specific: No  Cough: None  O2 Device: V60      Resp Comments: Pt remains on bipap

## 2023-10-03 NOTE — RESPIRATORY THERAPY NOTE
10/02/23 2050   Respiratory Assessment   Assessment Type Assess only   General Appearance Alert; Awake   Respiratory Pattern Dyspnea with exertion   Chest Assessment Chest expansion symmetrical   Bilateral Breath Sounds Diminished   Location Specific No   Cough None   Resp Comments Pt placed on bipap at this time   O2 Device V60   Non-Invasive Information   O2 Interface Device Face mask   Non-Invasive Ventilation Mode BiPAP   $ Intermittent NIV Yes   SpO2 98 %   $ Pulse Oximetry Spot Check Charge Completed   Non-Invasive Settings   IPAP (cm) 20 cm   EPAP (cm) 8 cm   Rate (Set) 20   FiO2 (%) 50   Pressure Support (cm H2O) 12   Rise Time 3   Inspiratory Time (Set) 0.75   Temperature (Set) 31   Non-Invasive Readings   Total Rate 25   Vt (mL) (Mech) 518   MV (Mech) 13.7   Peak Pressure (Obs) 21   Spontaneous MV (mL) 11   Non-Invasive Alarms   Insp Pressure High (cm H20) 35   Insp Pressure Low (cm H20) 6   Low Insp Pressure Time (sec) 20 sec   MV Low (L/min) 3   Vt High (mL) 1350   Vt Low (mL) 200   High Resp Rate (BPM) 45 BPM   Low Resp Rate (BPM) 8 BPM   Apnea Rate 12     RT Ventilator Management Note  Aleksandra Belle 76 y.o. female MRN: [id number]  Unit/Bed#: -01 Encounter: [id number]      Daily Screen    No data found in the last 10 encounters.            Physical Exam:   Assessment Type: Assess only  General Appearance: Alert, Awake  Respiratory Pattern: Dyspnea with exertion  Chest Assessment: Chest expansion symmetrical  Bilateral Breath Sounds: Diminished  Location Specific: No  Cough: None  O2 Device: V60      Resp Comments: Pt placed on bipap at this time

## 2023-10-03 NOTE — PHYSICAL THERAPY NOTE
Physical Therapy Treatment Note    Patient Name: Sarah Lemus    Diagnosis: Respiratory distress [R06.03]  Acute respiratory failure with hypoxia and hypercapnia ([address]) [J96.01, J96.02]     10/03/23 1241   PT Last Visit   PT Visit Date 10/03/23   Note Type   Note Type Treatment   Pain Assessment   Pain Assessment Tool 0-10   Pain Score 7   Pain Location/Orientation Orientation: Bilateral;Location: Knee   Pain Onset/Description Onset: Ongoing   Hospital Pain Intervention(s) Repositioned; Ambulation/increased activity   Restrictions/Precautions   Weight Bearing Precautions Per Order No   Other Precautions Bed Alarm;Multiple lines;Telemetry;Limb alert;O2;Fall Risk;Pain  (BiPAP)   General   Chart Reviewed Yes   Response to Previous Treatment Patient with no complaints from previous session. Family/Caregiver Present Yes  (pt's sisters)   Cognition   Overall Cognitive Status   (questionable)   Arousal/Participation Alert; Responsive; Cooperative   Attention Attends with cues to redirect   Orientation Level Oriented to person;Oriented to place   Memory Decreased recall of recent events;Decreased short term memory   Following Commands Follows one step commands with increased time or repetition   Comments Pt agreeable to PT. Subjective   Subjective "What should I do?"   Bed Mobility   Rolling R 2  Maximal assistance   Additional items Assist x 2;Bedrails; Increased time required;Verbal cues;LE management   Rolling L 2  Maximal assistance   Additional items Assist x 2;Bedrails; Increased time required;Verbal cues;LE management   Supine to Sit 2  Maximal assistance   Additional items Assist x 3;HOB elevated; Bedrails; Increased time required;Verbal cues;LE management   Sit to Supine 2  Maximal assistance   Additional items Assist x 3; Increased time required;Verbal cues;LE management   Additional Comments Pt able to tolerate supported sitting at EOB for ~10 minutes   Transfers   Sit to Stand Unable to assess   Balance Static Sitting Poor +   Dynamic Sitting Poor   Endurance Deficit   Endurance Deficit Yes   Endurance Deficit Description decreased activity tolerance; pt requiring supplemental oxygen; pt was received on BiPAP   Activity Tolerance   Activity Tolerance Patient limited by fatigue   Medical Staff Made Aware OT Jean Haynes   Nurse Made Aware Discussed case with RN Flora   Exercises   Knee AROM Long Arc Quad Sitting;10 reps;AROM; Bilateral   Ankle Pumps Sitting;10 reps;AROM; Bilateral   Assessment   Prognosis Fair   Problem List Decreased strength;Decreased endurance; Impaired balance;Decreased mobility;Obesity;Pain   Assessment Chart reviewed. Patient was received supine in bed in NAD and agreeable to PT session. Today's PT treatment session consisted of therapeutic activity for facilitation of transitional movements and safe performance of correct technique for bed mobility and therapeutic exercise to increase lower extremity muscle strength. In comparison to the previous session the patient has made progress as evident by ability to perform supine to sit and sit to supine transfer. Pt continues to require assist of two to three for all functional mobility. Pt was able to tolerate sitting supported at EOB for approximately 10 minutes. Pt exhibited poor+/poor sitting balance. Pt tolerated all therapeutic exercise well without complaints. Pt was received on BiPAP, SpO2 remained >96% throughout the session. Overall, patient tolerated today's session well, but was limited by fatigue. Pt continues to be making progress towards achieving her STG's. Pt's STG's were updated this session. Patient's prognosis for achieving their STG's is fair. Co-treatment was performed with OT secondary to complex medical condition of pt. Pt requires assist of two to three to achieve and maintain transitional movements; requiring the need of skilled therapeutic intervention of two therapists to achieve the delivery of services.  PT/OT goals were addressed separately. PT intervention continues to be appropriate as the patient continues to be limited by pain, decreased lower extremity strength, impaired balance, decreased endurance, and decreased functional mobility. Continue to recommend STR. PT to continue to see patient in order to address the deficits listed above and provide interventions consistent with the POC in order to achieve STG's and optimize the patient's independence with functional mobility. Barriers to Discharge Inaccessible home environment;Decreased caregiver support   Goals   STG Expiration Date 10/13/23   Short Term Goal #1 In 10 days: Increase bilateral LE strength 1/2 grade to facilitate independent mobility, Perform all bed mobility tasks with mod A of 2 to decrease caregiver burden, Increase static sitting and dynamic sitting balance 1/2 grade to decrease risk for falls and PT to see and establish goals for transfers, gait, and standing balance when appropriate   PT Treatment Day 1   Plan   Treatment/Interventions Functional transfer training;LE strengthening/ROM; Therapeutic exercise; Endurance training;Patient/family training;Bed mobility;Continued evaluation;Spoke to nursing;OT;Family   Progress Progressing toward goals   PT Frequency 3-5x/wk   Recommendation   PT Discharge Recommendation Post acute rehabilitation services   AM-PAC Basic Mobility Inpatient   Turning in Flat Bed Without Bedrails 1   Lying on Back to Sitting on Edge of Flat Bed Without Bedrails 1   Moving Bed to Chair 1   Standing Up From Chair Using Arms 1   Walk in Room 1   Climb 3-5 Stairs With Railing 1   Basic Mobility Inpatient Raw Score 6   Turning Head Towards Sound 4   Follow Simple Instructions 3   Low Function Basic Mobility Raw Score  13   Low Function Basic Mobility Standardized Score  20.14   Highest Level Of Mobility   JH-HLM Goal 2: Bed activities/Dependent transfer   JH-HLM Achieved 3: Sit at edge of bed   Education   Education Provided Mobility training;Home exercise program   Patient Reinforcement needed   End of Consult   Patient Position at End of Consult Supine; All needs within reach  (family at bedside)     Chayito Diaz, PT, DPT    Time of PT treatment session: 8980-9106  28 minutes

## 2023-10-03 NOTE — PLAN OF CARE
Problem: OCCUPATIONAL THERAPY ADULT  Goal: Performs self-care activities at highest level of function for planned discharge setting. See evaluation for individualized goals. Description: Treatment Interventions: Functional transfer training, UE strengthening/ROM, Endurance training, Patient/family training          See flowsheet documentation for full assessment, interventions and recommendations. Note: Limitation: Decreased ADL status, Decreased UE strength, Decreased cognition, Decreased Safe judgement during ADL, Decreased endurance, Decreased self-care trans, Decreased high-level ADLs  Prognosis: Good  Assessment: Patient participated in Skilled OT session this date with interventions consisting of therapeutic exercise to: increase functional use of BUEs, increase BUE muscle strength ,  therapeutic activities to: increase activity tolerance, increase postural control, increase trunk control and increase OOB/ sitting tolerance . Patient agreeable to OT treatment session, upon arrival patient was found supine in bed and in no apparent distress. Patient completed jozef in bed with max assist of 2 and supine<>sit with max assist of 3. Pt tolerated sitting at EOB for ~10 minutes with min-mod assist for trunk control. While seated at EOB, pt completed 1 set x 10 reps of BUE exercises. In comparison to previous session, patient with improvements in sitting tolerance and progression to sitting at EOB. Patient requiring verbal cues for correct technique, one step directives and frequent rest periods. Patient continues to be functioning below baseline level, occupational performance remains limited secondary to factors listed above and increased risk for falls and injury. From OT standpoint, recommendation at time of d/c would be Post acute rehabilitation services.    Patient to benefit from continued Occupational Therapy treatment while in the hospital to address deficits as defined above and maximize level of functional independence with ADLs and functional mobility.      OT Discharge Recommendation: Post acute rehabilitation services        Svetlana Henry, OTR/L

## 2023-10-03 NOTE — PLAN OF CARE
Problem: PHYSICAL THERAPY ADULT  Goal: Performs mobility at highest level of function for planned discharge setting. See evaluation for individualized goals. Description: Treatment/Interventions: Functional transfer training, LE strengthening/ROM, Therapeutic exercise, Endurance training, Patient/family training, Bed mobility, Continued evaluation, Spoke to nursing, Spoke to advanced practitioner, OT          See flowsheet documentation for full assessment, interventions and recommendations. Outcome: Progressing  Note: Prognosis: Fair  Problem List: Decreased strength, Decreased endurance, Impaired balance, Decreased mobility, Obesity, Pain  Assessment: Chart reviewed. Patient was received supine in bed in NAD and agreeable to PT session. Today's PT treatment session consisted of therapeutic activity for facilitation of transitional movements and safe performance of correct technique for bed mobility and therapeutic exercise to increase lower extremity muscle strength. In comparison to the previous session the patient has made progress as evident by ability to perform supine to sit and sit to supine transfer. Pt continues to require assist of two to three for all functional mobility. Pt was able to tolerate sitting supported at EOB for approximately 10 minutes. Pt exhibited poor+/poor sitting balance. Pt tolerated all therapeutic exercise well without complaints. Pt was received on BiPAP, SpO2 remained >96% throughout the session. Overall, patient tolerated today's session well, but was limited by fatigue. Pt continues to be making progress towards achieving her STG's. Pt's STG's were updated this session. Patient's prognosis for achieving their STG's is fair. Co-treatment was performed with OT secondary to complex medical condition of pt.  Pt requires assist of two to three to achieve and maintain transitional movements; requiring the need of skilled therapeutic intervention of two therapists to achieve the delivery of services. PT/OT goals were addressed separately. PT intervention continues to be appropriate as the patient continues to be limited by pain, decreased lower extremity strength, impaired balance, decreased endurance, and decreased functional mobility. Continue to recommend STR. PT to continue to see patient in order to address the deficits listed above and provide interventions consistent with the POC in order to achieve STG's and optimize the patient's independence with functional mobility. Barriers to Discharge: Inaccessible home environment, Decreased caregiver support     PT Discharge Recommendation: Post acute rehabilitation services    See flowsheet documentation for full assessment.

## 2023-10-03 NOTE — PLAN OF CARE
Problem: RESPIRATORY - ADULT  Goal: Achieves optimal ventilation and oxygenation  Description: INTERVENTIONS:  - Assess for changes in respiratory status  - Assess for changes in mentation and behavior  - Position to facilitate oxygenation and minimize respiratory effort  - Oxygen administered by appropriate delivery if ordered  - Initiate smoking cessation education as indicated  - Encourage broncho-pulmonary hygiene including cough, deep breathe, Incentive Spirometry  - Assess the need for suctioning and aspirate as needed  - Assess and instruct to report SOB or any respiratory difficulty  - Respiratory Therapy support as indicated  10/3/2023 0345 by Ita Noyola RN  Outcome: Not Progressing   See nurses notes

## 2023-10-03 NOTE — OCCUPATIONAL THERAPY NOTE
Occupational Therapy Treatment Note     Patient Name: Rishabh Taylor  IWJYR'Y Date: 10/3/2023  Problem List  Principal Problem:    Acute on chronic respiratory failure with hypoxia and hypercapnia (HCC)  Active Problems:    Class 3 severe obesity with body mass index (BMI) greater than or equal to 70 in [facility])    Atrial fibrillation (HCC)    Obesity hypoventilation syndrome (HCC)    Acute encephalopathy    Elevated troponin    Elevated brain natriuretic peptide (BNP) level        10/03/23 1309   OT Last Visit   OT Visit Date 10/03/23   Note Type   Note Type Treatment   Pain Assessment   Pain Assessment Tool 0-10   Pain Score 7   Pain Location/Orientation Orientation: Bilateral;Location: Knee   Pain Onset/Description Onset: Ongoing;Frequency: Constant/Continuous   Hospital Pain Intervention(s) Ambulation/increased activity;Repositioned;Medication (See MAR)   Restrictions/Precautions   Weight Bearing Precautions Per Order No   Other Precautions Bed Alarm; Fall Risk;Pain;O2  (BiPAP)   Lifestyle   Autonomy Patient requires assistance with ADLs/IADLs, ambulatory with RW, and lives with family in a one level house   Reciprocal Relationships Sisters present during session   Service to Others Retired   [employer] R 2  Maximal assistance   Additional items Assist x 2;Bedrails; Increased time required;Verbal cues;LE management   Rolling L 2  Maximal assistance   Additional items Assist x 2;Bedrails; Increased time required;Verbal cues;LE management   Supine to Sit 2  Maximal assistance   Additional items Assist x 3;HOB elevated; Bedrails; Increased time required;Verbal cues;LE management   Sit to Supine 2  Maximal assistance   Additional items Assist x 3;Bedrails; Increased time required;Verbal cues;LE management   Additional Comments Pt sat EOB for ~10 mins with min-mod assist of 1 to maintain trunk control.    Therapeutic Exercise - ROM   UE-ROM Yes  (to increase strength and endurance)   ROM- Right Upper Extremities   R Shoulder AROM  (Protraction/Retraction)   R Elbow AROM;Elbow flexion;Elbow extension   R Position Seated;Against gravity   R Weight/Reps/Sets 1 set x 10 reps each   ROM - Left Upper Extremities    L Shoulder AROM  (Protraction/Retraction)   L Elbow AROM;Elbow flexion;Elbow extension   L Position Seated;Against gravity   L Weight/Reps/Sets 1 set x 10 reps each   Cognition   Overall Cognitive Status   (Questionable)   Arousal/Participation Alert; Responsive; Cooperative   Attention Attends with cues to redirect   Orientation Level Oriented to person;Oriented to place   Memory Decreased recall of recent events;Decreased short term memory   Following Commands Follows one step commands with increased time or repetition   Comments Pt agreeable to OT session   Activity Tolerance   Activity Tolerance Patient limited by fatigue;Patient limited by pain   Medical Staff Made Aware This session, pt required and most appropriately benefited from skilled OT/PT co-treat due to extensive physical assistance of two therapists, significant regression from functional baseline and decreased activity tolerance. Assessment   Assessment Patient participated in Skilled OT session this date with interventions consisting of therapeutic exercise to: increase functional use of BUEs, increase BUE muscle strength ,  therapeutic activities to: increase activity tolerance, increase postural control, increase trunk control and increase OOB/ sitting tolerance . Patient agreeable to OT treatment session, upon arrival patient was found supine in bed and in no apparent distress. Patient completed jozef in bed with max assist of 2 and supine<>sit with max assist of 3. Pt tolerated sitting at EOB for ~10 minutes with min-mod assist for trunk control. While seated at EOB, pt completed 1 set x 10 reps of BUE exercises. In comparison to previous session, patient with improvements in sitting tolerance and progression to sitting at EOB. Patient requiring verbal cues for correct technique, one step directives and frequent rest periods. Patient continues to be functioning below baseline level, occupational performance remains limited secondary to factors listed above and increased risk for falls and injury. From OT standpoint, recommendation at time of d/c would be Post acute rehabilitation services. Patient to benefit from continued Occupational Therapy treatment while in the hospital to address deficits as defined above and maximize level of functional independence with ADLs and functional mobility. Plan   Treatment Interventions Functional transfer training;UE strengthening/ROM; Endurance training;Patient/family training   Goal Expiration Date 10/13/23   OT Treatment Day 1   OT Frequency 3-5x/wk   Recommendation   OT Discharge Recommendation Post acute rehabilitation services   AM-PAC Daily Activity Inpatient   Lower Body Dressing 1   Bathing 1   Toileting 1   Upper Body Dressing 2   Grooming 2   Eating 3   Daily Activity Raw Score 10   Turning Head Towards Sound 3   Follow Simple Instructions 3   Low Function Daily Activity Raw Score 16   Low Function Daily Activity Standardized Score  27.65   AM-PAC Applied Cognition Inpatient   Following a Speech/Presentation 3   Understanding Ordinary Conversation 4   Taking Medications 2   Remembering Where Things Are Placed or Put Away 2   Remembering List of 4-5 Errands 2   Taking Care of Complicated Tasks 2   Applied Cognition Raw Score 15   Applied Cognition Standardized Score 33.54   Modified Hollywood Scale   Modified Hollywood Scale 5   End of Consult   Patient Position at End of Consult Supine; All needs within reach   Nurse Communication Nurse aware of consult     Kady MONTIEL, OTR/L

## 2023-10-03 NOTE — CASE MANAGEMENT
Case Management Discharge Planning Note    Patient name Jose Stager  Location /-81 MRN [id number]  : 1955 Date 10/3/2023       Current Admission Date: 2023  Current Admission Diagnosis:Acute on chronic respiratory failure with hypoxia and hypercapnia [facility])   Patient Active Problem List    Diagnosis Date Noted   • Acute on chronic respiratory failure with hypoxia and hypercapnia ([address]) 2023   • Acute encephalopathy 2023   • Elevated troponin 2023   • Elevated brain natriuretic peptide (BNP) level 2023   • Iron deficiency anemia 2023   • Atrial fibrillation ([address]) 2023   • Obesity hypoventilation syndrome ([address]) 2023   • Lipodermatosclerosis of both lower extremities 2021   • Class 3 severe obesity with body mass index (BMI) greater than or equal to 70 in adult [facility]) 2021   • Bakers cyst 10/27/2014   • Lymphedema of leg 10/27/2014      LOS (days): 5  Geometric Mean LOS (GMLOS) (days): 3.50  Days to GMLOS:-2     OBJECTIVE:  Risk of Unplanned Readmission Score: 13.61         Current admission status: Inpatient   Preferred Pharmacy:   [facility]/pharmacy #[id number]- [facility]AR[facility] - [address], [address] [place] [address]  Phone: [phone] Fax: [phone]    Primary Care Provider: Yury Bishop DO    Primary Insurance: [facility]  Secondary Insurance:     DISCHARGE DETAILS:                                          Other Referral/Resources/Interventions Provided:  Referral Comments: Still no MD signature from Dr. Anya Jimenez for NIV. Pulled back original signature request in [place], and request sent to FERNANDO Becerril for pulmomary. Awaiting her signature to have order completed. Treatment Team Recommendation: Short Term Rehab  Discharge Destination Plan[de-identified] Short Term Rehab, Other (spouse wants [facility])  Transport at Discharge :  Other (Comment)

## 2023-10-03 NOTE — ASSESSMENT & PLAN NOTE
Metabolic encephalopathy present on admission, a/e/b lethargy, disorientation, poor safety awareness and impulsivity. Improved with BiPAP use and monitored by neuro checks, fall and delirium precautions, sleep hygiene   · Likely secondary to acute on chronic hypoxia with hypercapnia. · No infectious etiology identified. · Intermittently encephalopathic when naps without her BiPAP.   Strongly suggest positive pressure ventilation for naps in addition to at at bedtime

## 2023-10-03 NOTE — ASSESSMENT & PLAN NOTE
Elevated on presentation, no complaint of chest pain and EKG without ischemia  • Downtrended  • Echo without wall motion abnormality  • Suspect non-cardiac elevation in the setting of severe respiratory distress with hypoxia/hypercapnia

## 2023-10-03 NOTE — PROGRESS NOTES
[address]  Progress Note  Name: Shantal Zapata  MRN: [id number]  Unit/Bed#: -01 I Date of Admission: 9/28/2023   Date of Service: 10/3/2023 I Hospital Day: 5    Assessment/Plan   * Acute on chronic respiratory failure with hypoxia and hypercapnia (HCC)  Assessment & Plan  Presented to hospital with AMS and respiratory distress; requiring ICU for ventilatory support with continuous BiPAP   • Continue non-invasive positive pressure qHS, continue to wean midflow NC as able, currently remains on 9 L  • Goal SpO2 >88%  • Respiratory protocol  • Per family, due to patient's size and mobility, has been unable to complete an outpatient sleep study  ? Pulm working on obtaining NIPPV for home     Acute encephalopathy  Assessment & Plan  Metabolic encephalopathy present on admission, a/e/b lethargy, disorientation, poor safety awareness and impulsivity. Improved with BiPAP use and monitored by neuro checks, fall and delirium precautions, sleep hygiene   · Likely secondary to acute on chronic hypoxia with hypercapnia. · No infectious etiology identified. · Intermittently encephalopathic when naps without her BiPAP. Strongly suggest positive pressure ventilation for naps in addition to at at bedtime    Class 3 severe obesity with body mass index (BMI) greater than or equal to 70 in adult [facility])  Assessment & Plan  Body mass index is 81.97 kg/m².  Super morbidly obese  • Recommend incorporating a more whole foods plant-predominant diet along with decreasing consumption of red meats and processed foods  • Per AHA guidelines, recommend moderate-vigorous intensity exercise for 30 minutes a day for 5 days a week or a total of 150 min/week    Obesity hypoventilation syndrome ([address])  Assessment & Plan  · Pulm working on obtaining NIPPV machine for patient at home  · Non-invasive positive pressure at bedtime and naps    Elevated troponin  Assessment & Plan  Elevated on presentation, no complaint of chest pain and EKG without ischemia  • Downtrended  • Echo without wall motion abnormality  • Suspect non-cardiac elevation in the setting of severe respiratory distress with hypoxia/hypercapnia     Atrial fibrillation [facility])  Assessment & Plan  • Review of outside records reports paroxysmal atrial fibrillation on Xarelto  • Family denies any history and does not take any blood thinners  • Pt noted to go into afib with RVR 9/30 requiring metoprolol boluses  • Now back in NSR  • Xarelto restarted  • Family reports that she had vaginal bleeding previously while on Xarelto-unable to obtain transvaginal US while admitted due to body habitus, consider GYN consult if bleeding restarts  • Presently hemodynamically stable, hgb stable without definitive s/sx of bleeding     Elevated brain natriuretic peptide (BNP) level  Assessment & Plan  · Does not appear overtly volume overloaded, will continue home dose torsemide            VTE Pharmacologic Prophylaxis: VTE Score: 9 High Risk (Score >/= 5) - Pharmacological DVT Prophylaxis Ordered: rivaroxaban (Xarelto). Sequential Compression Devices Ordered. Patient Centered Rounds: I performed bedside rounds with nursing staff today. Discussions with Specialists or Other Care Team Provider: case mgmt, pulmonolgoy     Education and Discussions with Family / Patient: Updated  (family at [facility]) at bedside. Total Time Spent on Date of Encounter in care of patient: 56 mins. This time was spent on one or more of the following: performing physical exam; counseling and coordination of care; obtaining or reviewing history; documenting in the medical record; reviewing/ordering tests, medications or procedures; communicating with other healthcare professionals and discussing with patient's family/caregivers.     Current Length of Stay: 5 day(s)  Current Patient Status: Inpatient   Certification Statement: The patient will continue to require additional inpatient hospital stay due to continued high O2 demand and hypercapnia requiring BiPAP and continued lab monitoring  Discharge Plan: Anticipate discharge in >72 hrs to D pending clinical course    Code Status: Level 1 - Full Code    Subjective:   Patient seen requesting BiPAP off to have a drink of water. She is in a little distress, presumably from being uncomfortable from the mask, but difficult to assess. Denies pain. Objective:     Vitals:   Temp (24hrs), Av.5 °F (36.9 °C), Min:97.9 °F (36.6 °C), Max:99 °F (37.2 °C)    Temp:  [97.9 °F (36.6 °C)-99 °F (37.2 °C)] 98.4 °F (36.9 °C)  HR:  [66-95] 95  Resp:  [16-34] 16  BP: ()/(52-73) 129/58  SpO2:  [89 %-98 %] 94 %  Body mass index is 81.97 kg/m². Input and Output Summary (last 24 hours): Intake/Output Summary (Last 24 hours) at 10/3/2023 1150  Last data filed at 10/2/2023 1601  Gross per 24 hour   Intake 300 ml   Output 1700 ml   Net -1400 ml       Physical Exam:   Physical Exam  Vitals and nursing note reviewed. Constitutional:       General: She is not in acute distress. Appearance: She is morbidly obese. She is ill-appearing. Cardiovascular:      Comments: Difficult to assess due to body habitus  Pulmonary:      Comments: BiPAP, labored  Musculoskeletal:      Comments: Lymphadema, lichenification   Skin:     Comments: Rutty appearing skin changes    Neurological:      Mental Status: She is alert. Comments: Able to follow voice ; unable to assess other neurologic status due to BiPAP use            Additional Data:     Labs:  Results from last 7 days   Lab Units 10/02/23  0546 23  0457 23  0437   WBC Thousand/uL 4.99   < > 5.62   HEMOGLOBIN g/dL 8.9*   < > 8.9*   HEMATOCRIT % 31.7*   < > 30.5*   PLATELETS Thousands/uL 167   < > 147*   NEUTROS PCT %  --   --  73   LYMPHS PCT %  --   --  11*   MONOS PCT %  --   --  7   EOS PCT %  --   --  8*    < > = values in this interval not displayed.      Results from last 7 days   Lab Units 10/02/23  0546 10/01/23  0438 09/30/23  0457 09/29/23  0437   SODIUM mmol/L 144 142   < > 140   POTASSIUM mmol/L 4.1 3.8   < > 5.0   CHLORIDE mmol/L 96 96   < > 95*   CO2 mmol/L >45* 44*   < > 39*   BUN mg/dL 13 16   < > 26*   CREATININE mg/dL 0.58* 0.61   < > 0.89   ANION GAP mmol/L  --  2   < > 6   CALCIUM mg/dL 8.7 8.7   < > 9.1   ALBUMIN g/dL  --   --   --  3.3*   TOTAL BILIRUBIN mg/dL  --   --   --  0.55   ALK PHOS U/L  --   --   --  71   ALT U/L  --   --   --  9   AST U/L  --   --   --  13   GLUCOSE RANDOM mg/dL 87 98   < > 104    < > = values in this interval not displayed. Results from last 7 days   Lab Units 09/28/23  0042   INR  1.04     Results from last 7 days   Lab Units 09/28/23  0040   POC GLUCOSE mg/dl 119         Results from last 7 days   Lab Units 09/28/23  1217 09/28/23  0544 09/28/23  0042   LACTIC ACID mmol/L  --  0.8 1.0   PROCALCITONIN ng/ml 0.23  --  0.16       Lines/Drains:  Invasive Devices     Peripheral Intravenous Line  Duration           Peripheral IV 09/29/23 Right;Ventral (anterior) Forearm 4 days          Drain  Duration           External Urinary Catheter 2 days                      Imaging: Reviewed radiology reports from this admission including: chest xray and Personally reviewed the following imaging: chest xray    Recent Cultures (last 7 days):   Results from last 7 days   Lab Units 09/28/23  0141 09/28/23  0042   BLOOD CULTURE  No Growth After 5 Days. No Growth After 5 Days.        Last 24 Hours Medication List:   Current Facility-Administered Medications   Medication Dose Route Frequency Provider Last Rate   • acetaminophen  975 mg Oral Q6H PRN Raisa Dimmer, CRNP     • ammonium lactate   Topical BID Raisa Dimmer, CRNP     • hydrocortisone   Topical 4x Daily PRN Raisa Dimmer, CRNP     • loratadine  5 mg Oral Daily Raisa Dimmer, CRNP     • methocarbamol  500 mg Oral Q6H PRN Raisa Dimmer, CRNP     • metoprolol tartrate  25 mg Oral BID EMIL Tom     • ondansetron  4 mg Intravenous Q6H PRN EMIL Lora     • rivaroxaban  20 mg Oral Daily With [address] EMIL Garcia     • torsemide  20 mg Oral Daily EMIL Lora          Today, Patient Was Seen By: Nnamdi Lin PA-C    **Please Note: This note may have been constructed using a voice recognition system. **

## 2023-10-03 NOTE — PROGRESS NOTES
1920: patient turned in bed for travis care. Quickly desaturated to 60% on 10L NC. Placed back in high fowlers position. Prolonged recovery time requiring NRB mask. Respiratory aware and at bedside. TT sent to house officer. 2100: patient placed on BIPAP for evening. 2121: Patient Spo2 82% on bipap. Respiratory called to bedside. House officer called to bedside to evaluate. Bipap adjusted to 70% oxygen. Spo2 slowly yudith to 96% and maintained. Patient Spo2 remained WNL throughout the rest of this overnight shift on current BIPAP settings.

## 2023-10-03 NOTE — QUICK NOTE
Updated by nursing, patient's O2 82% on BiPAP overnight. Patient without new complaint. BiPAP settings adjusted by respiratory team, with FiO2 increated to 70%. Patient's Oxygen saturation maintained in high 90's after setting adjustment.

## 2023-10-03 NOTE — RESPIRATORY THERAPY NOTE
RT Ventilator Management Note  Orville Divers 76 y.o. female MRN: [id number]  Unit/Bed#: -01 Encounter: [id number]      Daily Screen    No data found in the last 10 encounters.            Physical Exam:   Assessment Type: Assess only  General Appearance: Awake  Respiratory Pattern: Dyspnea with exertion  Chest Assessment: Chest expansion symmetrical  Bilateral Breath Sounds: Diminished  Location Specific: No  Cough: None  O2 Device: v60      Resp Comments: pt remains on bipap       10/03/23 0734   Respiratory Assessment   Assessment Type Assess only   General Appearance Awake   Respiratory Pattern Dyspnea with exertion   Chest Assessment Chest expansion symmetrical   Bilateral Breath Sounds Diminished   Resp Comments pt remains on bipap   O2 Device v60   Non-Invasive Information   O2 Interface Device Face mask   Non-Invasive Ventilation Mode BiPAP   $ Intermittent NIV Yes   SpO2 94 %   $ Pulse Oximetry Spot Check Charge Completed   Non-Invasive Settings   IPAP (cm) 20 cm   EPAP (cm) 8 cm   Rate (Set) 20   FiO2 (%) 70   Pressure Support (cm H2O) 12   Rise Time 3   Inspiratory Time (Set) 1   Temperature (Set) 31   Non-Invasive Readings   Skin Intervention Skin intact   Total Rate 25   MV (Mech) 11.1   Peak Pressure (Obs) 22   Spontaneous Vt (mL) 440   Heater Temperature (Obs) 0   Leak (lpm) 0   Non-Invasive Alarms   Insp Pressure High (cm H20) 35   Insp Pressure Low (cm H20) 6   Low Insp Pressure Time (sec) 6 sec   MV Low (L/min) 3   Vt High (mL) 1350   Vt Low (mL) 100   High Resp Rate (BPM) 45 BPM   Low Resp Rate (BPM) 8 BPM   Maintenance   Water bag changed No

## 2023-10-03 NOTE — ASSESSMENT & PLAN NOTE
Body mass index is 81.97 kg/m².  Super morbidly obese  • Recommend incorporating a more whole foods plant-predominant diet along with decreasing consumption of red meats and processed foods  • Per AHA guidelines, recommend moderate-vigorous intensity exercise for 30 minutes a day for 5 days a week or a total of 150 min/week

## 2023-10-03 NOTE — ASSESSMENT & PLAN NOTE
Presented to hospital with AMS and respiratory distress; requiring ICU for ventilatory support with continuous BiPAP   • Continue non-invasive positive pressure qHS, continue to wean midflow NC as able, currently remains on 9 L  • Goal SpO2 >88%  • Respiratory protocol  • Per family, due to patient's size and mobility, has been unable to complete an outpatient sleep study  ?  Pulm working on obtaining NIPPV for home

## 2023-10-03 NOTE — APP STUDENT NOTE
Assessment & Plan  Acute on chronic respiratory failure  - Maintain oxygen therapy  - Monitor VBG's to help guide therapy  - Follow with respiratory therapist and pulmonology    Acute encephalopathy from metabolic hypercapnia  - Regular reassessment of neuro exam   - CT head if no improvement    Elevated troponin  - Serial EKGs      Obesity  - Enforce lifestyle changes such as diet and exercise    Atrial Fibrillation  - Pt on Xarelto and has history of paroxysmal atrial fibrillation  - Start heparin infusion if changes in arrhythmia       VTE Pharmacologic Prophylaxis: VTE Score: 9 High Risk (Score >/= 5) - Pharmacological DVT Prophylaxis Ordered: rivaroxaban (Xarelto). Sequential Compression Devices Ordered. Discussions with Specialists or Other Care Team Provider: Respiratory therapist    Education and Discussions with Family / Patient: {Family Communication:84984}    Total Time Spent on Date of Encounter in care of patient: *** mins. This time was spent on one or more of the following: performing physical exam; counseling and coordination of care; obtaining or reviewing history; documenting in the medical record; reviewing/ordering tests, medications or procedures; communicating with other healthcare professionals and discussing with patient's family/caregivers. Current Length of Stay: 5 day(s)  Current Patient Status: Inpatient   Certification Statement: {Certification MVSJHNUUR:06641}  Discharge Plan: Anticipate discharge in >72 hrs to home. Code Status: Level 1 - Full Code    Subjective:   Patient presents with trouble breathing, SOB, and coughing. ROS was not obtained due to mentation. Objective:     Vitals:   Temp (24hrs), Av.5 °F (36.9 °C), Min:97.9 °F (36.6 °C), Max:99 °F (37.2 °C)    Temp:  [97.9 °F (36.6 °C)-99 °F (37.2 °C)] 98.4 °F (36.9 °C)  HR:  [66-95] 95  Resp:  [16-34] 16  BP: ()/(52-73) 129/58  SpO2:  [89 %-98 %] 94 %  Body mass index is 81.97 kg/m².      Input and Output Summary (last 24 hours): Intake/Output Summary (Last 24 hours) at 10/3/2023 0926  Last data filed at 10/2/2023 1601  Gross per 24 hour   Intake 300 ml   Output 1700 ml   Net -1400 ml       Physical Exam:   Physical Exam  Constitutional:       Appearance: She is ill-appearing. Cardiovascular:      Rate and Rhythm: Normal rate and regular rhythm. Pulses: Normal pulses. Heart sounds: Normal heart sounds. Pulmonary:      Effort: Tachypnea present. Breath sounds: Decreased air movement present. Comments: Patient is on BiPAP, appeared short of breath  Skin:     Capillary Refill: Capillary refill takes less than 2 seconds. Coloration: Skin is pale. Comments: Lichenification noted on bilateral lower legs, with dark brown discoloration of skin   Neurological:      Mental Status: She is alert and oriented to person, place, and time. ***    Additional Data:     Labs:  Results from last 7 days   Lab Units 10/02/23  0546 09/30/23 0457 09/29/23 0437   WBC Thousand/uL 4.99   < > 5.62   HEMOGLOBIN g/dL 8.9*   < > 8.9*   HEMATOCRIT % 31.7*   < > 30.5*   PLATELETS Thousands/uL 167   < > 147*   NEUTROS PCT %  --   --  73   LYMPHS PCT %  --   --  11*   MONOS PCT %  --   --  7   EOS PCT %  --   --  8*    < > = values in this interval not displayed. Results from last 7 days   Lab Units 10/02/23  0546 10/01/23  0438 09/30/23 0457 09/29/23 0437   SODIUM mmol/L 144 142   < > 140   POTASSIUM mmol/L 4.1 3.8   < > 5.0   CHLORIDE mmol/L 96 96   < > 95*   CO2 mmol/L >45* 44*   < > 39*   BUN mg/dL 13 16   < > 26*   CREATININE mg/dL 0.58* 0.61   < > 0.89   ANION GAP mmol/L  --  2   < > 6   CALCIUM mg/dL 8.7 8.7   < > 9.1   ALBUMIN g/dL  --   --   --  3.3*   TOTAL BILIRUBIN mg/dL  --   --   --  0.55   ALK PHOS U/L  --   --   --  71   ALT U/L  --   --   --  9   AST U/L  --   --   --  13   GLUCOSE RANDOM mg/dL 87 98   < > 104    < > = values in this interval not displayed.      Results from last 7 days   Lab Units 09/28/23  0042   INR  1.04     Results from last 7 days   Lab Units 09/28/23  0040   POC GLUCOSE mg/dl 119         Results from last 7 days   Lab Units 09/28/23  1217 09/28/23  0544 09/28/23  0042   LACTIC ACID mmol/L  --  0.8 1.0   PROCALCITONIN ng/ml 0.23  --  0.16       Lines/Drains:  Invasive Devices     Peripheral Intravenous Line  Duration           Peripheral IV 09/29/23 Right;Ventral (anterior) Forearm 4 days          Drain  Duration           External Urinary Catheter 2 days                  Telemetry:  Telemetry Orders (From admission, onward)             24 Hour Telemetry Monitoring  Continuous x 24 Hours (Telem)        Expiring   Question:  Reason for 24 Hour Telemetry  Answer:  Acute respiratory failure on BiPAP                 Telemetry Reviewed: {JMD Tele Review:51290}  Indication for Continued Telemetry Use: {Tele Indications:32629}             Imaging: {Radiology Review:89935}    Recent Cultures (last 7 days):   Results from last 7 days   Lab Units 09/28/23  0141 09/28/23  0042   BLOOD CULTURE  No Growth After 5 Days. No Growth After 5 Days. Last 24 Hours Medication List:   Current Facility-Administered Medications   Medication Dose Route Frequency Provider Last Rate   • acetaminophen  975 mg Oral Q6H PRN Zeyad Harvinder, CRNP     • ammonium lactate   Topical BID Zeyad Wylie, CRNP     • hydrocortisone   Topical 4x Daily PRN Zeyad Wylie, CRNP     • loratadine  5 mg Oral Daily Zeyad Harvinder, CRNP     • methocarbamol  500 mg Oral Q6H PRN Zeyad Harvinder, CRNP     • metoprolol tartrate  25 mg Oral BID Zeyad Harvinder, CRNP     • ondansetron  4 mg Intravenous Q6H PRN Zeyad Harvinder, CRNP     • rivaroxaban  20 mg Oral Daily With [address], CRNP     • torsemide  20 mg Oral Daily Zeyad Harvinder, CRNP          Today, Patient Was Seen By: JUANCARLOS Diehl    **Please Note: This note may have been constructed using a voice recognition system. **

## 2023-10-03 NOTE — ASSESSMENT & PLAN NOTE
• Review of outside records reports paroxysmal atrial fibrillation on Xarelto  • Family denies any history and does not take any blood thinners  • Pt noted to go into afib with RVR 9/30 requiring metoprolol boluses  • Now back in NSR  • Xarelto restarted  • Family reports that she had vaginal bleeding previously while on Xarelto-unable to obtain transvaginal US while admitted due to body habitus, consider GYN consult if bleeding restarts  • Presently hemodynamically stable, hgb stable without definitive s/sx of bleeding

## 2023-10-03 NOTE — RESPIRATORY THERAPY NOTE
RT Ventilator Management Note  Sang Kate 76 y.o. female MRN: [id number]  Unit/Bed#: -01 Encounter: [id number]      Daily Screen    No data found in the last 10 encounters.            Physical Exam:   Assessment Type: Assess only  General Appearance: Drowsy  Respiratory Pattern: Dyspnea with exertion, Dyspnea at rest  Chest Assessment: Chest expansion symmetrical  Bilateral Breath Sounds: Diminished  Location Specific: No  Cough: None  O2 Device: v60      Resp Comments: placed pt back on bipap due to low sat and pt was napping       10/03/23 1212   Respiratory Assessment   Assessment Type Assess only   General Appearance Drowsy   Respiratory Pattern Dyspnea with exertion;Dyspnea at rest   Chest Assessment Chest expansion symmetrical   Bilateral Breath Sounds Diminished   Resp Comments placed pt back on bipap due to low sat and pt was napping   Non-Invasive Information   O2 Interface Device Face mask   Non-Invasive Ventilation Mode BiPAP   SpO2 95 %   $ Pulse Oximetry Spot Check Charge Completed   Non-Invasive Settings   IPAP (cm) 20 cm   EPAP (cm) 8 cm   Rate (Set) 20   FiO2 (%) 70   Pressure Support (cm H2O) 12   Rise Time 2   Inspiratory Time (Set) 1   Non-Invasive Readings   Skin Intervention Skin intact   Total Rate 23   Vt (mL) (Mech) 517   MV (Mech) 11.9   Peak Pressure (Obs) 22   Spontaneous Vt (mL) 510   Leak (lpm) 0   Non-Invasive Alarms   Insp Pressure High (cm H20) 35   Insp Pressure Low (cm H20) 6   Low Insp Pressure Time (sec) 6 sec   MV Low (L/min) 3   Vt High (mL) 1350   Vt Low (mL) 100   High Resp Rate (BPM) 45 BPM   Low Resp Rate (BPM) 8 BPM   Apnea Rate 12

## 2023-10-04 LAB
BASE EX.OXY STD BLDV CALC-SCNC: 87.5 % (ref 60–80)
BASE EXCESS BLDV CALC-SCNC: 13.4 MMOL/L
BUN SERPL-MCNC: 10 MG/DL (ref 5–25)
CALCIUM SERPL-MCNC: 8.6 MG/DL (ref 8.4–10.2)
CHLORIDE SERPL-SCNC: 92 MMOL/L (ref 96–108)
CO2 SERPL-SCNC: >45 MMOL/L (ref 21–32)
CREAT SERPL-MCNC: 0.5 MG/DL (ref 0.6–1.3)
ERYTHROCYTE [DISTWIDTH] IN BLOOD BY AUTOMATED COUNT: 15.3 % (ref 11.6–15.1)
GFR SERPL CREATININE-BSD FRML MDRD: 99 ML/MIN/1.73SQ M
GLUCOSE SERPL-MCNC: 86 MG/DL (ref 65–140)
HCO3 BLDV-SCNC: 41.2 MMOL/L (ref 24–30)
HCT VFR BLD AUTO: 31.6 % (ref 34.8–46.1)
HGB BLD-MCNC: 9 G/DL (ref 11.5–15.4)
MCH RBC QN AUTO: 27 PG (ref 26.8–34.3)
MCHC RBC AUTO-ENTMCNC: 28.5 G/DL (ref 31.4–37.4)
MCV RBC AUTO: 95 FL (ref 82–98)
O2 CT BLDV-SCNC: 12.8 ML/DL
PCO2 BLDV: 73.3 MM HG (ref 42–50)
PH BLDV: 7.37 [PH] (ref 7.3–7.4)
PLATELET # BLD AUTO: 194 THOUSANDS/UL (ref 149–390)
PMV BLD AUTO: 10.3 FL (ref 8.9–12.7)
PO2 BLDV: 57.1 MM HG (ref 35–45)
POTASSIUM SERPL-SCNC: 3.6 MMOL/L (ref 3.5–5.3)
RBC # BLD AUTO: 3.33 MILLION/UL (ref 3.81–5.12)
SODIUM SERPL-SCNC: 141 MMOL/L (ref 135–147)
WBC # BLD AUTO: 5.67 THOUSAND/UL (ref 4.31–10.16)

## 2023-10-04 PROCEDURE — 99232 SBSQ HOSP IP/OBS MODERATE 35: CPT | Performed by: PHYSICIAN ASSISTANT

## 2023-10-04 PROCEDURE — 99232 SBSQ HOSP IP/OBS MODERATE 35: CPT | Performed by: INTERNAL MEDICINE

## 2023-10-04 PROCEDURE — 99223 1ST HOSP IP/OBS HIGH 75: CPT | Performed by: NURSE PRACTITIONER

## 2023-10-04 PROCEDURE — 80048 BASIC METABOLIC PNL TOTAL CA: CPT | Performed by: PHYSICIAN ASSISTANT

## 2023-10-04 PROCEDURE — 85027 COMPLETE CBC AUTOMATED: CPT | Performed by: PHYSICIAN ASSISTANT

## 2023-10-04 PROCEDURE — 94760 N-INVAS EAR/PLS OXIMETRY 1: CPT

## 2023-10-04 PROCEDURE — 82805 BLOOD GASES W/O2 SATURATION: CPT | Performed by: PHYSICIAN ASSISTANT

## 2023-10-04 RX ORDER — BUMETANIDE 0.25 MG/ML
1 INJECTION INTRAMUSCULAR; INTRAVENOUS 2 TIMES DAILY
Status: DISCONTINUED | OUTPATIENT
Start: 2023-10-04 | End: 2023-10-07

## 2023-10-04 RX ADMIN — Medication: at 17:14

## 2023-10-04 RX ADMIN — Medication: at 11:02

## 2023-10-04 RX ADMIN — BUMETANIDE 1 MG: 0.25 INJECTION INTRAMUSCULAR; INTRAVENOUS at 17:14

## 2023-10-04 RX ADMIN — METOPROLOL TARTRATE 25 MG: 25 TABLET, FILM COATED ORAL at 17:13

## 2023-10-04 RX ADMIN — LORATADINE 5 MG: 10 TABLET ORAL at 10:59

## 2023-10-04 RX ADMIN — RIVAROXABAN 20 MG: 20 TABLET, FILM COATED ORAL at 17:13

## 2023-10-04 RX ADMIN — TORSEMIDE 20 MG: 20 TABLET ORAL at 10:59

## 2023-10-04 RX ADMIN — METOPROLOL TARTRATE 25 MG: 25 TABLET, FILM COATED ORAL at 10:59

## 2023-10-04 NOTE — PROGRESS NOTES
[address]  Progress Note  Name: Eduardo Mata  MRN: [id number]  Unit/Bed#: -01 I Date of Admission: 9/28/2023   Date of Service: 10/4/2023 I Hospital Day: 6    Assessment/Plan   * Acute on chronic respiratory failure with hypoxia and hypercapnia (HCC)  Assessment & Plan  Presented to hospital with AMS and respiratory distress; requiring ICU for ventilatory support with continuous BiPAP   • Continue non-invasive positive pressure qHS, continue to wean midflow NC as able, currently remains on 9 L  • Goal SpO2 >88%  • Respiratory protocol  • Pulmonary consulted  • Consult palliative care team 10/4    • Per family, due to patient's size and mobility, has been unable to complete an outpatient sleep study  ? Pulm working on obtaining NIPPV for home     Acute encephalopathy  Assessment & Plan  Metabolic encephalopathy present on admission, a/e/b lethargy, disorientation, poor safety awareness and impulsivity. Improved with BiPAP use and monitored by neuro checks, fall and delirium precautions, sleep hygiene   · Likely secondary to acute on chronic hypoxia with hypercapnia. · No infectious etiology identified. · Intermittently encephalopathic when naps without her BiPAP. Strongly suggest positive pressure ventilation for naps in addition to at at bedtime    Class 3 severe obesity with body mass index (BMI) greater than or equal to 70 in adult [facility])  Assessment & Plan  Body mass index is 83.59 kg/m².  Super morbidly obese  • Recommend incorporating a more whole foods plant-predominant diet along with decreasing consumption of red meats and processed foods  • Per AHA guidelines, recommend moderate-vigorous intensity exercise for 30 minutes a day for 5 days a week or a total of 150 min/week    Obesity hypoventilation syndrome ([address])  Assessment & Plan  · Pulm working on obtaining NIPPV machine for patient at home  · Non-invasive positive pressure at bedtime and naps    Elevated troponin  Assessment & Plan  Elevated on presentation, no complaint of chest pain and EKG without ischemia  • Downtrended  • Echo without wall motion abnormality  • Suspect non-cardiac elevation in the setting of severe respiratory distress with hypoxia/hypercapnia     Atrial fibrillation [facility])  Assessment & Plan  • Review of outside records reports paroxysmal atrial fibrillation on Xarelto  • Family denies any history and does not take any blood thinners  • Pt noted to go into afib with RVR 9/30 requiring metoprolol boluses  • Now back in NSR  • Xarelto restarted  • Family reports that she had vaginal bleeding previously while on Xarelto-unable to obtain transvaginal US while admitted due to body habitus, consider GYN consult if bleeding restarts  • Presently hemodynamically stable, hgb stable without definitive s/sx of bleeding     Elevated brain natriuretic peptide (BNP) level  Assessment & Plan  · Does not appear overtly volume overloaded, will continue home dose torsemide          VTE Pharmacologic Prophylaxis: VTE Score: 9 High Risk (Score >/= 5) - Pharmacological DVT Prophylaxis Ordered: rivaroxaban (Xarelto). Sequential Compression Devices Ordered. Patient Centered Rounds: I performed bedside rounds with nursing staff today. Discussions with Specialists or Other Care Team Provider: case mgmt, pulmonology, palliative      Education and Discussions with Family / Patient: Updated  [facility]. Total Time Spent on Date of Encounter in care of patient: 56 mins. This time was spent on one or more of the following: performing physical exam; counseling and coordination of care; obtaining or reviewing history; documenting in the medical record; reviewing/ordering tests, medications or procedures; communicating with other healthcare professionals and discussing with patient's family/caregivers.     Current Length of Stay: 6 day(s)  Current Patient Status: Inpatient Certification Statement: The patient will continue to require additional inpatient hospital stay due to continued high O2 demand and hypercapnia requiring BiPAP and continued lab monitoring  Discharge Plan: Anticipate discharge in >72 hrs to D pending clinical course    Code Status: Level 1 - Full Code    Subjective:   Patient seen this morning, getting cleaned up with staff. Denies any chest pain at present time. Continues to feel short of breath, but does feel like it is a little bit better than when she first presented to the hospital.  I discussed with her that I am unsure whether or not we will be able to completely liberate her from both mid flow and BiPAP use. Advised patient on palliative care consultation, to which she is agreeable. Objective:     Vitals:   Temp (24hrs), Av.3 °F (36.8 °C), Min:98.1 °F (36.7 °C), Max:98.5 °F (36.9 °C)    Temp:  [98.1 °F (36.7 °C)-98.5 °F (36.9 °C)] 98.1 °F (36.7 °C)  HR:  [59-96] 59  Resp:  [15-26] 16  BP: (120-155)/(57-64) 145/61  SpO2:  [90 %-97 %] 90 %  Body mass index is 83.59 kg/m². Input and Output Summary (last 24 hours): Intake/Output Summary (Last 24 hours) at 10/4/2023 1004  Last data filed at 10/4/2023 0900  Gross per 24 hour   Intake 240 ml   Output 1800 ml   Net -1560 ml       Physical Exam:   Physical Exam  Vitals and nursing note reviewed. Constitutional:       General: She is not in acute distress. Appearance: She is morbidly obese. She is ill-appearing. Cardiovascular:      Rate and Rhythm: Normal rate and regular rhythm. Comments: Difficult to assess due to body habitus  Pulmonary:      Effort: Tachypnea and accessory muscle usage present. Breath sounds: Decreased breath sounds present. Comments: [address] @ 12 L   Musculoskeletal:         General: Swelling (chronic lymphadema b/l etremities ) present. Comments: Lymphadema, lichenification   Skin:     Coloration: Skin is not cyanotic or mottled.       Comments: Rutty appearing skin changes    Neurological:      Mental Status: She is alert. Comments: Able to follow voice ; unable to assess other neurologic status due to BiPAP use            Additional Data:     Labs:  Results from last 7 days   Lab Units 10/04/23  0456 10/03/23  1446   WBC Thousand/uL 5.67 5.61   HEMOGLOBIN g/dL 9.0* 9.6*   HEMATOCRIT % 31.6* 33.6*   PLATELETS Thousands/uL 194 167   NEUTROS PCT %  --  62   LYMPHS PCT %  --  20   MONOS PCT %  --  9   EOS PCT %  --  8*     Results from last 7 days   Lab Units 10/04/23  0456 10/03/23  1446 09/30/23  0457 09/29/23  0437   SODIUM mmol/L 141 140   < > 140   POTASSIUM mmol/L 3.6 3.6   < > 5.0   CHLORIDE mmol/L 92* 93*   < > 95*   CO2 mmol/L >45* 42*   < > 39*   BUN mg/dL 10 11   < > 26*   CREATININE mg/dL 0.50* 0.57*   < > 0.89   ANION GAP mmol/L  --  5   < > 6   CALCIUM mg/dL 8.6 8.6   < > 9.1   ALBUMIN g/dL  --   --   --  3.3*   TOTAL BILIRUBIN mg/dL  --   --   --  0.55   ALK PHOS U/L  --   --   --  71   ALT U/L  --   --   --  9   AST U/L  --   --   --  13   GLUCOSE RANDOM mg/dL 86 158*   < > 104    < > = values in this interval not displayed. Results from last 7 days   Lab Units 09/28/23  0042   INR  1.04     Results from last 7 days   Lab Units 09/28/23  0040   POC GLUCOSE mg/dl 119         Results from last 7 days   Lab Units 09/28/23  1217 09/28/23  0544 09/28/23  0042   LACTIC ACID mmol/L  --  0.8 1.0   PROCALCITONIN ng/ml 0.23  --  0.16       Lines/Drains:  Invasive Devices     Peripheral Intravenous Line  Duration           Peripheral IV 10/04/23 Right Antecubital <1 day          Drain  Duration           External Urinary Catheter 3 days                Imaging: Reviewed radiology reports from this admission including: chest xray and Personally reviewed the following imaging: chest xray    Recent Cultures (last 7 days):   Results from last 7 days   Lab Units 09/28/23  0141 09/28/23  0042   BLOOD CULTURE  No Growth After 5 Days.  No Growth After 5 Days.       Last 24 Hours Medication List:   Current Facility-Administered Medications   Medication Dose Route Frequency Provider Last Rate   • acetaminophen  975 mg Oral Q6H PRN EMIL Read     • ammonium lactate   Topical BID EMIL Read     • hydrocortisone   Topical 4x Daily PRN CAROLYN ReadNP     • loratadine  5 mg Oral Daily CAROLYN ReadNP     • methocarbamol  500 mg Oral Q6H PRN CAROLYN ReadNP     • metoprolol tartrate  25 mg Oral BID CAROLYN ReadNP     • ondansetron  4 mg Intravenous Q6H PRN CAROLYN ReadNP     • rivaroxaban  20 mg Oral Daily With [address], CRNP     • torsemide  20 mg Oral Daily EMIL Read          Today, Patient Was Seen By: Aristides Adames PA-C    **Please Note: This note may have been constructed using a voice recognition system. **

## 2023-10-04 NOTE — ASSESSMENT & PLAN NOTE
Body mass index is 83.59 kg/m².  Super morbidly obese  • Recommend incorporating a more whole foods plant-predominant diet along with decreasing consumption of red meats and processed foods  • Per AHA guidelines, recommend moderate-vigorous intensity exercise for 30 minutes a day for 5 days a week or a total of 150 min/week

## 2023-10-04 NOTE — CONSULTS
structure/ living situation        4. Follow up  • Palliative Care will continue to follow for symptom management and goals of care discussions will be ongoing. Please reach out via [employer] if questions or concerns arise. 5. Care Coordination  • Reviewed case with Thu Sutton PA-C                  I have reviewed the patient's controlled substance dispensing history in the Prescription Drug Monitoring Program in compliance with the [facility] regulations before prescribing any controlled substances. Last refills: Controlled Substance Review    [place] PDMP  Reviewed: no results found        Decisional apparatus:  Patient is competent on exam today. If competency is lost, patient's substitute decision maker would default to spouse by [place] Act 169. ER contacts: Linda Gutierrez, spouse, [phone]  Advance Directive/Living Will: No  POLST: No  POA Forms: No    We appreciate the invitation to be involved in this patient's care. We will continue to follow throughout this hospitalization. Please do not hesitate to reach our on call provider through our clinic answering service at [phone] should you have acute symptom control concerns. SAVANA Flores, CRNP  Palliative and Supportive Care  Clinic/Answering Service: [phone]  You can find me on [employer]!      IDENTIFICATION:  Inpatient consult to Palliative Care  Consult performed by: Shara Felty, CRNP  Consult ordered by: Ellis Elizabeth PA-C        Physician Requesting Consult: Genevieve Nelson MD  Date of admission: 09/28/2023  LOS: 5 days  Reason for Consult / Principal Problem: GOC counseling and SM secondary to severe COPD/JOSE/OHS, severfe CO2 retention and hypoxia, likely to return to hospital        History of Present Illness:  Salvador Zapata is a 76 y.o. female who presents with a palliative diagnosis of severe COPD/JOSE/OHS,  was [facility] [place] [facility] [place]23 secondary to altered mental status and respiratory distress. Per note review, patient's family reported that patient only takes diuretics at home "as a preventative"  Patient was seen in her room with her sister and friend present, spoke to spouse on the telephone. She states she has shortness of breath and difficulty ambulating. Denies chest pain, nausea or vomiting. She states she thinks she is breathing a little since in the hospital, but would like to talk to someone and ask if she is any better. Review of Systems   Constitutional: Positive for fatigue. Respiratory: Positive for shortness of breath. Cardiovascular: Negative for chest pain. Gastrointestinal: Negative for nausea and vomiting. Musculoskeletal: Positive for gait problem. Neurological: Positive for weakness.           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 ([address]) 8/2/2021   • Scalp laceration 3/17/2023   • Thoracic vertebral fracture ([address]) 3/31/2023     Past Surgical History:   Procedure Laterality Date   • NO PAST SURGERIES       Social History     Socioeconomic History   • Marital status: /Civil Union     Spouse name: Not on file   • Number of children: 2   • Years of education: Masters   • Highest education level: Master's degree (e.g., MA, MS, Rafael, MEd, MSW, IMELDA)   Occupational History   • Not on file   Tobacco Use   • Smoking status: Former     Packs/day: 0.25     Years: 3.00     Total pack years: 0.75     Types: Cigarettes   • Smokeless tobacco: Never   • Tobacco comments:     quit over >15 years ago    Substance and Sexual Activity   • Alcohol use: Never   • Drug use: Never   • Sexual activity: Not on file   Other Topics Concern   • Not on file   Social History Narrative    Lives with       Social Determinants of Health     Financial Resource Strain: Not on file   Food Insecurity: No Food Insecurity (9/28/2023)    Hunger Vital Sign    • Worried About Running Out of Food in the Last Year: Never true    • Ran Out of Food in the Last Year: Never true   Transportation Needs: No Transportation Needs (9/28/2023)    PRAPARE - Transportation    • Lack of Transportation (Medical): No    • Lack of Transportation (Non-Medical): No   Physical Activity: Not on file   Stress: Not on file   Social Connections: Not on file   Intimate Partner Violence: Not on file   Housing Stability: Low Risk  (9/28/2023)    Housing Stability Vital Sign    • Unable to Pay for Housing in the Last Year: No    • Number of Places Lived in the Last Year: 1    • Unstable Housing in the Last Year: No     Family History   Problem Relation Age of Onset   • Diabetes Mother    • Heart disease Mother    • Stroke Brother    • Diabetes Maternal Grandmother        Medications:  current meds:   Current Facility-Administered Medications   Medication Dose Route Frequency   • acetaminophen (TYLENOL) tablet 975 mg  975 mg Oral Q6H PRN   • ammonium lactate (LAC-HYDRIN) 12 % lotion   Topical BID   • hydrocortisone 1 % cream   Topical 4x Daily PRN   • loratadine (CLARITIN) tablet 5 mg  5 mg Oral Daily   • methocarbamol (ROBAXIN) tablet 500 mg  500 mg Oral Q6H PRN   • metoprolol tartrate (LOPRESSOR) tablet 25 mg  25 mg Oral BID   • ondansetron (ZOFRAN) injection 4 mg  4 mg Intravenous Q6H PRN   • rivaroxaban (XARELTO) tablet 20 mg  20 mg Oral Daily With Dinner   • torsemide (DEMADEX) tablet 20 mg  20 mg Oral Daily       Allergies   Allergen Reactions   • Other GI Intolerance     Food preservatives   • Sulfa Antibiotics GI Intolerance   • Nitrates, Organic - Food Allergy Rash   • Silver Rash       Objective:  /61   Pulse 59   Temp 98.1 °F (36.7 °C)   Resp 16   Ht 5' 7" (1.702 m)   Wt (!) 242 kg (533 lb 11.7 oz)   SpO2 90%   BMI 83.59 kg/m²     Physical Exam  Constitutional:       General: She is not in acute distress.      Appearance: She is ill-appearing. She is not toxic-appearing. Comments: Morbidly obese   Cardiovascular:      Rate and Rhythm: Normal rate. Pulmonary:      Comments: Labored respirations with conversation, tachypnea  Musculoskeletal:      Right lower leg: Edema present. Left lower leg: Edema present. Neurological:      Mental Status: She is alert and oriented to person, place, and time. Psychiatric:         Mood and Affect: Mood normal.           Lab Results:   CBC:   Lab Results   Component Value Date    WBC 5.67 10/04/2023    HGB 9.0 (L) 10/04/2023    HCT 31.6 (L) 10/04/2023    MCV 95 10/04/2023     10/04/2023    RBC 3.33 (L) 10/04/2023    MCH 27.0 10/04/2023    MCHC 28.5 (L) 10/04/2023    RDW 15.3 (H) 10/04/2023    MPV 10.3 10/04/2023    NRBC 0 10/03/2023   , CMP:   Lab Results   Component Value Date    SODIUM 141 10/04/2023    K 3.6 10/04/2023    CL 92 (L) 10/04/2023    CO2 >45 (HH) 10/04/2023    BUN 10 10/04/2023    CREATININE 0.50 (L) 10/04/2023    CALCIUM 8.6 10/04/2023    EGFR 99 10/04/2023     Imaging Studies: I have personally reviewed pertinent reports. Echo complete w/ contrast if indicated    Result Date: 9/28/2023  Narrative: •  This was a very technically difficult study. Echo contrast was used to opacify the left ventricle. •  Left ventricular function was preserved. LVEF was estimated at 55%. There were no diagnostic wall motion abnormalities however the possibility not be excluded on the basis of this study. •  The valves were not well visualized. There was possible mild aortic stenosis. •  Consider SUN or Cardiac MRI for further evlauation if clinically indicated. XR chest portable    Result Date: 9/28/2023  Narrative: CHEST INDICATION:   sob. COMPARISON:  None EXAM PERFORMED/VIEWS:  XR CHEST PORTABLE  AP semierect FINDINGS: Examination limited due to body habitus. Heart shadow is poorly seen due to body habitus and portable technique.  There is pulmonary vascular congestion seen with some interstitial prominence. There appears to be bibasilar bilateral airspace opacities greatest at the base of the left lung. No pneumothorax Visualized osseous structures appear within normal limits for patient age. Impression: Pulmonary vascular congestion suggests pulmonary edema with associated cardiomegaly. Superimposed pneumonia is not excluded. I have personally reviewed this study including all images.  / IKE Resident: Christina Rosario, the attending radiologist, have reviewed the images and agree with the final report above. Workstation performed: [id number]      EKG, Pathology, and Other Studies: I have personally reviewed pertinent reports. Counseling / Coordination of Care  Total floor / unit time spent today 40 minutes. Greater than 50% of total time was spent with the patient and / or family counseling and / or coordination of care. A description of the counseling / coordination of care: Reviewed chart, provided medical updates, determined goals of care, discussed palliative care and symptom management, discussed comfort care and hospice care, discussed code status, provided anticipatory guidance, determined competency and POA/HCA, determined social/family support, provided psychosocial support. Reviewed with SLIM. Portions of this document may have been created using dictation software and as such some "sound alike" terms may have been generated by the system. Do not hesitate to contact me with any questions or clarifications.

## 2023-10-04 NOTE — PROGRESS NOTES
Progress Note - Pulmonary   Sang Kate 76 y.o. female MRN: [id number]  Unit/Bed#: -01 Encounter: [id number]    Assessment:  1. Acute on chronic hypoxemic and hypercapnic respiratory failure  2. Obesity hypoventilation syndrome  3. Restrictive lung disease  4. Likely JOSE    Plan:  Acute on chronic hypoxemic hypercapnic respiratory failure with underlying obesity hypoventilation, restrictive lung disease and likely JOSE  Has had overall improvement not requiring continuous BiPAP, currently on 12 L mid flow with sats around 90%  Titrate O2 to maintain O2 sats 88 to 92%, would recommend out of bed to chair as able, use of incentive spirometer  Continues with BiPAP at bedtime, would also encourage use of BiPAP with naps  Diuresis per primary service  Noninvasive ventilator has been ordered and is awaiting insurance authorization. Spoke with patient about the importance of wearing this every night to sleep and during the day with naps  PCO2 has improved with the use of the BiPAP  Suspect she has had some baseline O2 requirement for some time given likely obesity hypoventilation, restrictive lung disease  Will follow intermittently, please call with questions    Subjective:   Patient resting in bed. She is feeling better with her breathing. Was able to tolerate the BiPAP better last evening. Objective:     Vitals: Blood pressure 145/61, pulse 59, temperature 98.1 °F (36.7 °C), resp. rate 16, height 5' 7" (1.702 m), weight (!) 242 kg (533 lb 11.7 oz), SpO2 90 %. ,Body mass index is 83.59 kg/m².       Intake/Output Summary (Last 24 hours) at 10/4/2023 1047  Last data filed at 10/4/2023 0900  Gross per 24 hour   Intake 240 ml   Output 2380 ml   Net -2140 ml       Invasive Devices     Peripheral Intravenous Line  Duration           Peripheral IV 10/04/23 Right Antecubital <1 day          Drain  Duration           External Urinary Catheter 3 days                Physical Exam: /61   Pulse 59   Temp 98.1 °F (36.7 °C)   Resp 16   Ht 5' 7" (1.702 m)   Wt (!) 242 kg (533 lb 11.7 oz)   SpO2 90%   BMI 83.59 kg/m²   General appearance: alert and oriented, in no acute distress  Head: Normocephalic, without obvious abnormality, atraumatic  Eyes: negative findings: conjunctivae and sclerae normal  Lungs: diminished anteriorly  Heart: regular rate and rhythm  Abdomen: normal findings: soft, non-tender  Extremities: bilateral LE edema, venous stasis changes  Skin: venous stasis changes  Neurologic: Mental status: Alert, oriented, thought content appropriate     Labs: I have personally reviewed pertinent lab results. , CBC:   Lab Results   Component Value Date    WBC 5.67 10/04/2023    HGB 9.0 (L) 10/04/2023    HCT 31.6 (L) 10/04/2023    MCV 95 10/04/2023     10/04/2023    RBC 3.33 (L) 10/04/2023    MCH 27.0 10/04/2023    MCHC 28.5 (L) 10/04/2023    RDW 15.3 (H) 10/04/2023    MPV 10.3 10/04/2023    NRBC 0 10/03/2023   , CMP:   Lab Results   Component Value Date    SODIUM 141 10/04/2023    K 3.6 10/04/2023    CL 92 (L) 10/04/2023    CO2 >45 (HH) 10/04/2023    BUN 10 10/04/2023    CREATININE 0.50 (L) 10/04/2023    CALCIUM 8.6 10/04/2023    EGFR 99 10/04/2023     Imaging and other studies: I have personally reviewed pertinent reports.    and I have personally reviewed pertinent films in PACS

## 2023-10-04 NOTE — APP STUDENT NOTE
Assessment & Plan  Acute on chronic hypoxemic and hypercapnic respiratory failure  - Has improved with BiPAP and is now on nasal cannula 12 L mid flow   - Continue to maintain O2 sats between 88%-90%  - Recommend use of BiPAP when sleeping or for naps  - Due to obesity it is likely she has a restrictive lung disease    Obesity Hypoventilation syndrome  - Body habitus restricts proper ventilation of lungs  - Continue to monitor for changes in O2    - Manage diet with low sodium and consider movement from bed to chair when able      VTE Pharmacologic Prophylaxis: VTE Score: 9 High Risk (Score >/= 5) - Pharmacological DVT Prophylaxis Ordered: rivaroxaban (Xarelto). Sequential Compression Devices Ordered. Education and Discussions with Family / Patient: Updated  (sister) at bedside. Total Time Spent on Date of Encounter in care of patient: 45 mins. This time was spent on one or more of the following: performing physical exam; counseling and coordination of care; obtaining or reviewing history; documenting in the medical record; reviewing/ordering tests, medications or procedures; communicating with other healthcare professionals and discussing with patient's family/caregivers. Current Length of Stay: 6 day(s)  Current Patient Status: Inpatient   Certification Statement: {Certification [id number]}  Discharge Plan: Anticipate discharge in 24-48 hrs to home. Code Status: Level 1 - Full Code    Subjective:   Patient was awake and states she is feeling better from yesterday. She was able to better tolerate the BiPAP and is now on a nasal cannula. Objective:     Vitals:   Temp (24hrs), Av.3 °F (36.8 °C), Min:98.1 °F (36.7 °C), Max:98.5 °F (36.9 °C)    Temp:  [98.1 °F (36.7 °C)-98.5 °F (36.9 °C)] 98.1 °F (36.7 °C)  HR:  [59-96] 60  Resp:  [15-26] 16  BP: (120-155)/(57-79) 129/79  SpO2:  [90 %-97 %] 90 %  Body mass index is 83.59 kg/m².      Input and Output Summary (last 24 hours): Intake/Output Summary (Last 24 hours) at 10/4/2023 1106  Last data filed at 10/4/2023 0900  Gross per 24 hour   Intake 240 ml   Output 2380 ml   Net -2140 ml       Physical Exam:   Physical Exam  Constitutional:       Appearance: She is obese. She is not ill-appearing. Cardiovascular:      Rate and Rhythm: Normal rate and regular rhythm. Heart sounds: Normal heart sounds. Pulmonary:      Effort: Pulmonary effort is normal.      Breath sounds: Decreased air movement present. Comments: Needed to catch breath after talking for long  Skin:     General: Skin is warm and dry. Capillary Refill: Capillary refill takes less than 2 seconds. Comments: Brown lichenification on bilaterally lower legs   Neurological:      Mental Status: She is alert and oriented to person, place, and time. ***    Additional Data:     Labs:  Results from last 7 days   Lab Units 10/04/23  0456 10/03/23  1446   WBC Thousand/uL 5.67 5.61   HEMOGLOBIN g/dL 9.0* 9.6*   HEMATOCRIT % 31.6* 33.6*   PLATELETS Thousands/uL 194 167   NEUTROS PCT %  --  62   LYMPHS PCT %  --  20   MONOS PCT %  --  9   EOS PCT %  --  8*     Results from last 7 days   Lab Units 10/04/23  0456 10/03/23  1446 09/30/23  0457 09/29/23  0437   SODIUM mmol/L 141 140   < > 140   POTASSIUM mmol/L 3.6 3.6   < > 5.0   CHLORIDE mmol/L 92* 93*   < > 95*   CO2 mmol/L >45* 42*   < > 39*   BUN mg/dL 10 11   < > 26*   CREATININE mg/dL 0.50* 0.57*   < > 0.89   ANION GAP mmol/L  --  5   < > 6   CALCIUM mg/dL 8.6 8.6   < > 9.1   ALBUMIN g/dL  --   --   --  3.3*   TOTAL BILIRUBIN mg/dL  --   --   --  0.55   ALK PHOS U/L  --   --   --  71   ALT U/L  --   --   --  9   AST U/L  --   --   --  13   GLUCOSE RANDOM mg/dL 86 158*   < > 104    < > = values in this interval not displayed.      Results from last 7 days   Lab Units 09/28/23  0042   INR  1.04     Results from last 7 days   Lab Units 09/28/23  0040   POC GLUCOSE mg/dl 119         Results from last 7 days   Lab Units 09/28/23  1217 09/28/23  0544 09/28/23  0042   LACTIC ACID mmol/L  --  0.8 1.0   PROCALCITONIN ng/ml 0.23  --  0.16       Lines/Drains:  Invasive Devices     Peripheral Intravenous Line  Duration           Peripheral IV 10/04/23 Right Antecubital <1 day          Drain  Duration           External Urinary Catheter 3 days                      Imaging: Personally reviewed the following imaging: chest xray    Recent Cultures (last 7 days):   Results from last 7 days   Lab Units 09/28/23  0141 09/28/23  0042   BLOOD CULTURE  No Growth After 5 Days. No Growth After 5 Days. Last 24 Hours Medication List:   Current Facility-Administered Medications   Medication Dose Route Frequency Provider Last Rate   • acetaminophen  975 mg Oral Q6H PRN Yola Fleet, CRNP     • ammonium lactate   Topical BID Yola Fleet, CRNP     • hydrocortisone   Topical 4x Daily PRN Yola Fleet, CRNP     • loratadine  5 mg Oral Daily Yola Fleet, CRNP     • methocarbamol  500 mg Oral Q6H PRN Yola Fleet, CRNP     • metoprolol tartrate  25 mg Oral BID Yola Fleet, CRNP     • ondansetron  4 mg Intravenous Q6H PRN Yola Fleet, CRNP     • rivaroxaban  20 mg Oral Daily With [address], [place]     • torsemide  20 mg Oral Daily Yola Fleet, CRNP          Today, Patient Was Seen By: JUANCARLOS Toure    **Please Note: This note may have been constructed using a voice recognition system. **

## 2023-10-04 NOTE — PLAN OF CARE
Problem: Knowledge Deficit  Goal: Patient/family/caregiver demonstrates understanding of disease process, treatment plan, medications, and discharge instructions  Description: Complete learning assessment and assess knowledge base.   Interventions:  - Provide teaching at level of understanding  - Provide teaching via preferred learning methods  Outcome: Progressing     Problem: RESPIRATORY - ADULT  Goal: Achieves optimal ventilation and oxygenation  Description: INTERVENTIONS:  - Assess for changes in respiratory status  - Assess for changes in mentation and behavior  - Position to facilitate oxygenation and minimize respiratory effort  - Oxygen administered by appropriate delivery if ordered  - Initiate smoking cessation education as indicated  - Encourage broncho-pulmonary hygiene including cough, deep breathe, Incentive Spirometry  - Assess the need for suctioning and aspirate as needed  - Assess and instruct to report SOB or any respiratory difficulty  - Respiratory Therapy support as indicated  Outcome: Not Progressing

## 2023-10-04 NOTE — ASSESSMENT & PLAN NOTE
Presented to hospital with AMS and respiratory distress; requiring ICU for ventilatory support with continuous BiPAP   • Continue non-invasive positive pressure qHS, continue to wean midflow NC as able, currently remains on 9 L  • Goal SpO2 >88%  • Respiratory protocol  • Pulmonary consulted  • Consult palliative care team 10/4  • Had goals of care discussion with family on 10/5, determined to continue level 1 status, continue discussion today with family in room as well as patient    • Per family, due to patient's size and mobility, has been unable to complete an outpatient sleep study  ?  Pulm working on obtaining NIPPV for home

## 2023-10-05 LAB
BASE EX.OXY STD BLDV CALC-SCNC: 94.7 % (ref 60–80)
BASE EXCESS BLDV CALC-SCNC: 19.3 MMOL/L
BUN SERPL-MCNC: 8 MG/DL (ref 5–25)
CALCIUM SERPL-MCNC: 8.6 MG/DL (ref 8.4–10.2)
CHLORIDE SERPL-SCNC: 92 MMOL/L (ref 96–108)
CO2 SERPL-SCNC: >45 MMOL/L (ref 21–32)
CREAT SERPL-MCNC: 0.44 MG/DL (ref 0.6–1.3)
ERYTHROCYTE [DISTWIDTH] IN BLOOD BY AUTOMATED COUNT: 14.9 % (ref 11.6–15.1)
GFR SERPL CREATININE-BSD FRML MDRD: 104 ML/MIN/1.73SQ M
GLUCOSE SERPL-MCNC: 84 MG/DL (ref 65–140)
HCO3 BLDV-SCNC: 47.4 MMOL/L (ref 24–30)
HCT VFR BLD AUTO: 32.4 % (ref 34.8–46.1)
HGB BLD-MCNC: 9.3 G/DL (ref 11.5–15.4)
MAGNESIUM SERPL-MCNC: 1.9 MG/DL (ref 1.9–2.7)
MCH RBC QN AUTO: 27.3 PG (ref 26.8–34.3)
MCHC RBC AUTO-ENTMCNC: 28.7 G/DL (ref 31.4–37.4)
MCV RBC AUTO: 95 FL (ref 82–98)
O2 CT BLDV-SCNC: 14.1 ML/DL
PCO2 BLDV: 78.3 MM HG (ref 42–50)
PH BLDV: 7.4 [PH] (ref 7.3–7.4)
PLATELET # BLD AUTO: 183 THOUSANDS/UL (ref 149–390)
PMV BLD AUTO: 9.8 FL (ref 8.9–12.7)
PO2 BLDV: 84.8 MM HG (ref 35–45)
POTASSIUM SERPL-SCNC: 3.5 MMOL/L (ref 3.5–5.3)
RBC # BLD AUTO: 3.41 MILLION/UL (ref 3.81–5.12)
SODIUM SERPL-SCNC: 143 MMOL/L (ref 135–147)
WBC # BLD AUTO: 5.54 THOUSAND/UL (ref 4.31–10.16)

## 2023-10-05 PROCEDURE — 94660 CPAP INITIATION&MGMT: CPT

## 2023-10-05 PROCEDURE — 99222 1ST HOSP IP/OBS MODERATE 55: CPT

## 2023-10-05 PROCEDURE — 80048 BASIC METABOLIC PNL TOTAL CA: CPT | Performed by: PHYSICIAN ASSISTANT

## 2023-10-05 PROCEDURE — 83735 ASSAY OF MAGNESIUM: CPT | Performed by: PHYSICIAN ASSISTANT

## 2023-10-05 PROCEDURE — 99232 SBSQ HOSP IP/OBS MODERATE 35: CPT | Performed by: PHYSICIAN ASSISTANT

## 2023-10-05 PROCEDURE — 99232 SBSQ HOSP IP/OBS MODERATE 35: CPT | Performed by: NURSE PRACTITIONER

## 2023-10-05 PROCEDURE — 97110 THERAPEUTIC EXERCISES: CPT

## 2023-10-05 PROCEDURE — 82805 BLOOD GASES W/O2 SATURATION: CPT | Performed by: PHYSICIAN ASSISTANT

## 2023-10-05 PROCEDURE — 97535 SELF CARE MNGMENT TRAINING: CPT

## 2023-10-05 PROCEDURE — 97530 THERAPEUTIC ACTIVITIES: CPT

## 2023-10-05 PROCEDURE — 85027 COMPLETE CBC AUTOMATED: CPT | Performed by: PHYSICIAN ASSISTANT

## 2023-10-05 PROCEDURE — 99401 PREV MED CNSL INDIV APPRX 15: CPT | Performed by: NURSE PRACTITIONER

## 2023-10-05 PROCEDURE — 94760 N-INVAS EAR/PLS OXIMETRY 1: CPT

## 2023-10-05 RX ORDER — POTASSIUM CHLORIDE 20 MEQ/1
40 TABLET, EXTENDED RELEASE ORAL DAILY
Status: DISCONTINUED | OUTPATIENT
Start: 2023-10-06 | End: 2023-10-18 | Stop reason: HOSPADM

## 2023-10-05 RX ORDER — POTASSIUM CHLORIDE 20 MEQ/1
20 TABLET, EXTENDED RELEASE ORAL DAILY
Status: DISCONTINUED | OUTPATIENT
Start: 2023-10-05 | End: 2023-10-09

## 2023-10-05 RX ADMIN — METOPROLOL TARTRATE 25 MG: 25 TABLET, FILM COATED ORAL at 07:55

## 2023-10-05 RX ADMIN — POTASSIUM CHLORIDE 20 MEQ: 1500 TABLET, EXTENDED RELEASE ORAL at 20:23

## 2023-10-05 RX ADMIN — BUMETANIDE 1 MG: 0.25 INJECTION INTRAMUSCULAR; INTRAVENOUS at 07:55

## 2023-10-05 RX ADMIN — METOPROLOL TARTRATE 25 MG: 25 TABLET, FILM COATED ORAL at 20:30

## 2023-10-05 RX ADMIN — Medication: at 20:31

## 2023-10-05 RX ADMIN — LORATADINE 5 MG: 10 TABLET ORAL at 07:55

## 2023-10-05 RX ADMIN — METHOCARBAMOL 500 MG: 500 TABLET ORAL at 20:31

## 2023-10-05 RX ADMIN — RIVAROXABAN 20 MG: 20 TABLET, FILM COATED ORAL at 20:23

## 2023-10-05 RX ADMIN — Medication: at 07:56

## 2023-10-05 RX ADMIN — BUMETANIDE 1 MG: 0.25 INJECTION INTRAMUSCULAR; INTRAVENOUS at 20:23

## 2023-10-05 NOTE — CONSULTS
Consultation - [id number] [facility] 76 y.o. female MRN: [id number]  Unit/Bed#: -Krista Encounter: [id number]    Assessment:  Open wound of the left lower extremity, initial encounter   Morbid obesity   Lymphedema       Plan:  • L posterior calf with full thickness wound with small amount of eschar noted.  o Wound appears stable in size with decreased amount eschar from last assessment. o Patient with reported silver allergy  o Discussed plan of care with patient and her  via telephone in great detail - recommend to stop silvasorb gel due to allergy to silver. Offered hydrogel for wound management as this also an amorphus wound gel, but they declined. Only accepting home regimen of anasept spray and anasept wound gel which are both non-formulary to [facility].  reports he will bring in the supplies from home. Discussed importance of covering wound in the meantime to reduce risk of infection - patient and  only agreeable to gauze and paper tape being applied, declined foam dressing. o Offered debridement of eschar which was also declined.   o No s/s of infection present  •  A1C results reviewed with the patient today. • Recommend to continue with preventative nursing skin care measures in place as per WOCN team  • Pressure relief- offloading of pressure with turning/repositioning as patient medically tolerates, heel elevation, foam wedges for offloading/repositioning, and waffle cushion to chair. • Nutrition is following  • Patient verbalized understanding of plan of care. • Bureau text wound care team with questions or concerns. • Routine wound care follow-up while admitted. AVS updated. • Follow-up with the [facility] wound care center as an outpatient, ambulatory referral placed. History of Present Illness:  Patient is a 69-year-old female who was admitted to the hospital with acute on chronic respiratory failure with hypoxia and hypercapnia.   Patient has a history of lymphedema, a-fib, and morbid obesity. Patient seen today for follow-up visit of left lower extremity wound noted on admission. Patient has been followed by [facility] team during this hospital stay, with current wound management of silvasorb gel and foam dressing daily. Patient seen in bed, alert and oriented x 3, cooperative and agreeable for the assessment. Patient on bariatric mattress. Patient unsure of how long she has had the wound but reports her  has been changing the dressings at home with anasept spray and anasept wound gel. Patient denies going to wound care center for the wound. Denies fever, chills, or increased pain related to the wound. Subjective:    Review of Systems   Constitutional: Negative for chills and fever. HENT: Negative for congestion and sneezing. Respiratory: Negative for cough and shortness of breath. Cardiovascular: Positive for leg swelling. Musculoskeletal: Positive for gait problem. Skin: Positive for wound. Psychiatric/Behavioral: Negative for agitation.        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 ([address]) 8/2/2021   • Scalp laceration 3/17/2023   • Thoracic vertebral fracture (HCC) 3/31/2023     Past Surgical History:   Procedure Laterality Date   • NO PAST SURGERIES       Social History   Social History     Substance and Sexual Activity   Alcohol Use Never     Social History     Substance and Sexual Activity   Drug Use Never     E-Cigarette/Vaping     E-Cigarette/Vaping Substances     Social History     Tobacco Use   Smoking Status Former   • Packs/day: 0.25   • Years: 3.00   • Total pack years: 0.75   • Types: Cigarettes   Smokeless Tobacco Never   Tobacco Comments    quit over >15 years ago      Family History:   Family History   Problem Relation Age of Onset   • Diabetes Mother • Heart disease Mother    • Stroke Brother    • Diabetes Maternal Grandmother        Meds/Allergies   current meds:   Current Facility-Administered Medications   Medication Dose Route Frequency   • acetaminophen (TYLENOL) tablet 975 mg  975 mg Oral Q6H PRN   • ammonium lactate (LAC-HYDRIN) 12 % lotion   Topical BID   • bumetanide (BUMEX) injection 1 mg  1 mg Intravenous BID   • hydrocortisone 1 % cream   Topical 4x Daily PRN   • loratadine (CLARITIN) tablet 5 mg  5 mg Oral Daily   • methocarbamol (ROBAXIN) tablet 500 mg  500 mg Oral Q6H PRN   • metoprolol tartrate (LOPRESSOR) tablet 25 mg  25 mg Oral BID   • ondansetron (ZOFRAN) injection 4 mg  4 mg Intravenous Q6H PRN   • rivaroxaban (XARELTO) tablet 20 mg  20 mg Oral Daily With Dinner     Allergies   Allergen Reactions   • Other GI Intolerance     Food preservatives   • Sulfa Antibiotics GI Intolerance   • Nitrates, Organic - Food Allergy Rash   • Silver Rash       Objective   Vitals: Blood pressure 140/68, pulse 71, temperature 98.8 °F (37.1 °C), resp. rate 20, height 5' 7" (1.702 m), weight (!) 242 kg (533 lb 11.7 oz), SpO2 95 %. Wounds:     1. L posterior leg - round/oval shaped full thickness wound. Measures: 1.0x1.0x0.2cm. Wound bed is approx: 30% dry loosely adhered eschar and 70% moist yellow slough. Edges fragile and attached without maceration. Wound is producing small amount of bloody/serous sanguineous drainage. Tiffany-wound is dry and intact with scaly skin. 2. B/l heels are dry and intact without redness or wounds. 3. No other open wounds noted to the b/l lower extremities  4. Patient declined to turn for full skin assessment. No other wounds noted in the chart. Physical Exam  Constitutional:       General: She is awake. She is not in acute distress. Appearance: She is morbidly obese. She is not diaphoretic. Interventions: Nasal cannula in place. HENT:      Head: Normocephalic and atraumatic.       Right Ear: External ear normal.      Left Ear: External ear normal.   Eyes:      Conjunctiva/sclera: Conjunctivae normal.   Cardiovascular:      Pulses:           Dorsalis pedis pulses are detected w/ Doppler on the right side and detected w/ Doppler on the left side. Pulmonary:      Effort: Pulmonary effort is normal. No respiratory distress. Genitourinary:     Comments: Incontinent of bowel and bladder. Pure-wick catheter in place. Musculoskeletal:      Right lower leg: Edema present. Left lower leg: Edema present. Comments: Dependent for care. Skin:     General: Skin is warm and dry. Findings: Wound present. No erythema. Comments: Refer to wound section for assessment details. No induration, fluctuance, odor, warmth/temperature differences, redness, or purulence noted to the above mentioned wounds and skin areas assessed. New dressings applied as noted above. Patient tolerated assessment well- denies pain and no s/s of non-verbal pain or discomfort observed during the encounter. Neurological:      Mental Status: She is alert and oriented to person, place, and time. Gait: Gait abnormal.   Psychiatric:         Behavior: Behavior is cooperative. Lab, Imaging and other studies: I have personally reviewed pertinent reports. Code Status: Level 1 - Full Code      Counseling / Coordination of Care  Total time spent today:    Total time (face-to-face and non-face-to-face) spent on today's visit was 31 minutes. This includes preparation for the visits (H&P on 928/23, previous wound care note/images on 9/28/23 and SLIM note on 10/4/23) performance of a medically appropriate history and examination, and orders for medications/treatments or testing. Discussed assessment findings, and plan of care/recommendations with patients RN. EMIL Leonard, [id number], MATT      Portions of the record may have been created with voice recognition software.   Occasional wrong word or "sound a like" substitutions may have occurred due to the inherent limitations of voice recognition software.   Read the chart carefully and recognize, using context, where substitutions have occurred

## 2023-10-05 NOTE — PROGRESS NOTES
PALLIATIVE AND SUPPORTIVE CARE FAMILY CONFERENCE:     Time of Meetin to 15:15     Participants: Patient,  Zayra Friend, Dr. Ailyn Garber, and Sam Turner, MSN, EMIL     Patient Participation: Patient was an active participant in the meeting. She is able to make her own medical decisions with the support of family. Patient Support System: Family     Meeting Location: bedside       A family meeting was necessary to allow for thorough discussion regarding patient's clinical presentation,  had questions related to medical diagnoses, management and plan. It was decided patient will continue current medical cares with no limitations at this time. Goals are clear, Palliative will sign off at this time.   If any questions or concerns arise, contact Palliative at [phone]

## 2023-10-05 NOTE — PROGRESS NOTES
[address]  Progress Note  Name: Karen Thurman  MRN: [id number]  Unit/Bed#: -01 I Date of Admission: 9/28/2023   Date of Service: 10/5/2023 I Hospital Day: 7    Assessment/Plan   * Acute on chronic respiratory failure with hypoxia and hypercapnia (HCC)  Assessment & Plan  Presented to hospital with AMS and respiratory distress; requiring ICU for ventilatory support with continuous BiPAP   • Continue non-invasive positive pressure qHS, continue to wean midflow NC as able, currently remains on 9 L  • Goal SpO2 >88%  • Respiratory protocol  • Pulmonary consulted  • Consult palliative care team 10/4  • Family meeting today @ 1500 with  at bedside    • Per family, due to patient's size and mobility, has been unable to complete an outpatient sleep study  ? Pulm working on obtaining NIPPV for home     Acute encephalopathy  Assessment & Plan  Metabolic encephalopathy present on admission, a/e/b lethargy, disorientation, poor safety awareness and impulsivity. Improved with BiPAP use and monitored by neuro checks, fall and delirium precautions, sleep hygiene   · Likely secondary to acute on chronic hypoxia with hypercapnia. · No infectious etiology identified. · Intermittently encephalopathic when naps without her BiPAP. Strongly suggest positive pressure ventilation for naps in addition to at at bedtime    Class 3 severe obesity with body mass index (BMI) greater than or equal to 70 in adult [facility])  Assessment & Plan  Body mass index is 83.59 kg/m².  Super morbidly obese  • Recommend incorporating a more whole foods plant-predominant diet along with decreasing consumption of red meats and processed foods  • Per AHA guidelines, recommend moderate-vigorous intensity exercise for 30 minutes a day for 5 days a week or a total of 150 min/week    Obesity hypoventilation syndrome ([address])  Assessment & Plan  · Pulm working on obtaining NIPPV machine for patient at home  · Non-invasive positive pressure at bedtime and naps    Elevated troponin  Assessment & Plan  Elevated on presentation, no complaint of chest pain and EKG without ischemia  • Downtrended  • Echo without wall motion abnormality  • Suspect non-cardiac elevation in the setting of severe respiratory distress with hypoxia/hypercapnia     Atrial fibrillation [facility])  Assessment & Plan  • Review of outside records reports paroxysmal atrial fibrillation on Xarelto  • Family denies any history and does not take any blood thinners  • Pt noted to go into afib with RVR 9/30 requiring metoprolol boluses  • Now back in NSR  • Xarelto restarted  • Family reports that she had vaginal bleeding previously while on Xarelto-unable to obtain transvaginal US while admitted due to body habitus, consider GYN consult if bleeding restarts  • Presently hemodynamically stable, hgb stable without definitive s/sx of bleeding     Elevated brain natriuretic peptide (BNP) level  Assessment & Plan  · Does not appear overtly volume overloaded, will continue home dose torsemide          VTE Pharmacologic Prophylaxis: VTE Score: 9 High Risk (Score >/= 5) - Pharmacological DVT Prophylaxis Ordered: rivaroxaban (Xarelto). Sequential Compression Devices Ordered. Patient Centered Rounds: I performed bedside rounds with nursing staff today. Discussions with Specialists or Other Care Team Provider: case mgmt, pulmonology, palliative      Education and Discussions with Family / Patient: Updated  (family at [facility]) at bedside. Further update later with  present     Total Time Spent on Date of Encounter in care of patient: 56 mins.  This time was spent on one or more of the following: performing physical exam; counseling and coordination of care; obtaining or reviewing history; documenting in the medical record; reviewing/ordering tests, medications or procedures; communicating with other healthcare professionals and discussing with patient's family/caregivers. Current Length of Stay: 7 day(s)  Current Patient Status: Inpatient   Certification Statement: The patient will continue to require additional inpatient hospital stay due to continued high O2 demand and hypercapnia requiring BiPAP and continued lab monitoring  Discharge Plan: Anticipate discharge in >72 hrs to D pending clinical course    Code Status: Level 1 - Full Code    Subjective:   Patient with repeated questioning regarding prognosis, does not seem to comprehend severity of her disease processes. Discussed having indepth convo with palliative team and , agreeable to the same. Objective:     Vitals:   Temp (24hrs), Av.5 °F (36.9 °C), Min:98.3 °F (36.8 °C), Max:98.8 °F (37.1 °C)    Temp:  [98.3 °F (36.8 °C)-98.8 °F (37.1 °C)] 98.8 °F (37.1 °C)  HR:  [59-71] 71  Resp:  [20] 20  BP: (129-169)/(55-68) 140/68  SpO2:  [94 %-97 %] 95 %  Body mass index is 83.59 kg/m². Input and Output Summary (last 24 hours): Intake/Output Summary (Last 24 hours) at 10/5/2023 1429  Last data filed at 10/5/2023 1153  Gross per 24 hour   Intake --   Output 2200 ml   Net -2200 ml       Physical Exam:   Physical Exam  Vitals and nursing note reviewed. Constitutional:       General: She is not in acute distress. Appearance: She is morbidly obese. She is ill-appearing. Cardiovascular:      Rate and Rhythm: Normal rate and regular rhythm. Comments: Difficult to assess due to body habitus  Pulmonary:      Effort: Tachypnea and accessory muscle usage present. Breath sounds: Decreased breath sounds present. Comments: [address] [facility] [address] @ 10 L   Musculoskeletal:         General: Swelling (chronic lymphadema b/l etremities ) present. Comments: Lymphadema, lichenification   Skin:     Coloration: Skin is not cyanotic or mottled. Comments: Rutty appearing skin changes    Neurological:      Mental Status: She is alert.       Comments: Able to follow voice ; unable to assess other neurologic status due to BiPAP use            Additional Data:     Labs:  Results from last 7 days   Lab Units 10/05/23  0719 10/04/23  0456 10/03/23  1446   WBC Thousand/uL 5.54   < > 5.61   HEMOGLOBIN g/dL 9.3*   < > 9.6*   HEMATOCRIT % 32.4*   < > 33.6*   PLATELETS Thousands/uL 183   < > 167   NEUTROS PCT %  --   --  62   LYMPHS PCT %  --   --  20   MONOS PCT %  --   --  9   EOS PCT %  --   --  8*    < > = values in this interval not displayed. Results from last 7 days   Lab Units 10/05/23  0719 10/04/23  0456 10/03/23  1446 09/30/23 0457 09/29/23  0437   SODIUM mmol/L 143   < > 140   < > 140   POTASSIUM mmol/L 3.5   < > 3.6   < > 5.0   CHLORIDE mmol/L 92*   < > 93*   < > 95*   CO2 mmol/L >45*   < > 42*   < > 39*   BUN mg/dL 8   < > 11   < > 26*   CREATININE mg/dL 0.44*   < > 0.57*   < > 0.89   ANION GAP mmol/L  --   --  5   < > 6   CALCIUM mg/dL 8.6   < > 8.6   < > 9.1   ALBUMIN g/dL  --   --   --   --  3.3*   TOTAL BILIRUBIN mg/dL  --   --   --   --  0.55   ALK PHOS U/L  --   --   --   --  71   ALT U/L  --   --   --   --  9   AST U/L  --   --   --   --  13   GLUCOSE RANDOM mg/dL 84   < > 158*   < > 104    < > = values in this interval not displayed. Lines/Drains:  Invasive Devices     Peripheral Intravenous Line  Duration           Peripheral IV 10/04/23 Right Antecubital 1 day          Drain  Duration           External Urinary Catheter 4 days                Imaging: No pertinent imaging reviewed.     Recent Cultures (last 7 days):         Last 24 Hours Medication List:   Current Facility-Administered Medications   Medication Dose Route Frequency Provider Last Rate   • acetaminophen  975 mg Oral Q6H PRN EMIL Musa     • ammonium lactate   Topical BID EMIL Musa     • bumetanide  1 mg Intravenous BID Beth Garre, PA-C     • hydrocortisone   Topical 4x Daily PRN EMIL Musa     • loratadine  5 mg Oral Daily EMIL Musa     • methocarbamol  500 mg Oral Q6H PRN EMIL Lovelace     • metoprolol tartrate  25 mg Oral BID EMIL Lovelace     • ondansetron  4 mg Intravenous Q6H PRN EMIL Lovelace     • rivaroxaban  20 mg Oral Daily With [address] EMIL Garcia          Today, Patient Was Seen By: Josef Peoples PA-C    **Please Note: This note may have been constructed using a voice recognition system. **

## 2023-10-05 NOTE — PLAN OF CARE
Problem: Prexisting or High Potential for Compromised Skin Integrity  Goal: Skin integrity is maintained or improved  Description: INTERVENTIONS:  - Identify patients at risk for skin breakdown  - Assess and monitor skin integrity  - Assess and monitor nutrition and hydration status  - Monitor labs   - Assess for incontinence   - Turn and reposition patient  - Assist with mobility/ambulation  - Relieve pressure over bony prominences  - Avoid friction and shearing  - Provide appropriate hygiene as needed including keeping skin clean and dry  - Evaluate need for skin moisturizer/barrier cream  - Collaborate with interdisciplinary team   - Patient/family teaching  - Consider wound care consult   Outcome: Progressing     Problem: Knowledge Deficit  Goal: Patient/family/caregiver demonstrates understanding of disease process, treatment plan, medications, and discharge instructions  Description: Complete learning assessment and assess knowledge base.   Interventions:  - Provide teaching at level of understanding  - Provide teaching via preferred learning methods  Outcome: Progressing     Problem: MOBILITY - ADULT  Goal: Maintain or return to baseline ADL function  Description: INTERVENTIONS:  -  Assess patient's ability to carry out ADLs; assess patient's baseline for ADL function and identify physical deficits which impact ability to perform ADLs (bathing, care of mouth/teeth, toileting, grooming, dressing, etc.)  - Assess/evaluate cause of self-care deficits   - Assess range of motion  - Assess patient's mobility; develop plan if impaired  - Assess patient's need for assistive devices and provide as appropriate  - Encourage maximum independence but intervene and supervise when necessary  - Involve family in performance of ADLs  - Assess for home care needs following discharge   - Consider OT consult to assist with ADL evaluation and planning for discharge  - Provide patient education as appropriate  Outcome: Not Progressing     Problem: SAFETY ADULT  Goal: Maintain or return to baseline ADL function  Description: INTERVENTIONS:  -  Assess patient's ability to carry out ADLs; assess patient's baseline for ADL function and identify physical deficits which impact ability to perform ADLs (bathing, care of mouth/teeth, toileting, grooming, dressing, etc.)  - Assess/evaluate cause of self-care deficits   - Assess range of motion  - Assess patient's mobility; develop plan if impaired  - Assess patient's need for assistive devices and provide as appropriate  - Encourage maximum independence but intervene and supervise when necessary  - Involve family in performance of ADLs  - Assess for home care needs following discharge   - Consider OT consult to assist with ADL evaluation and planning for discharge  - Provide patient education as appropriate  Outcome: Not Progressing     Problem: RESPIRATORY - ADULT  Goal: Achieves optimal ventilation and oxygenation  Description: INTERVENTIONS:  - Assess for changes in respiratory status  - Assess for changes in mentation and behavior  - Position to facilitate oxygenation and minimize respiratory effort  - Oxygen administered by appropriate delivery if ordered  - Initiate smoking cessation education as indicated  - Encourage broncho-pulmonary hygiene including cough, deep breathe, Incentive Spirometry  - Assess the need for suctioning and aspirate as needed  - Assess and instruct to report SOB or any respiratory difficulty  - Respiratory Therapy support as indicated  Outcome: Not Progressing

## 2023-10-05 NOTE — OCCUPATIONAL THERAPY NOTE
Occupational Therapy Progress Note     Patient Name: Renee Suarez  MWULO'O Date: 10/5/2023      Problem List  Principal Problem:    Acute on chronic respiratory failure with hypoxia and hypercapnia (HCC)  Active Problems:    Class 3 severe obesity with body mass index (BMI) greater than or equal to 70 in adult [facility])    Atrial fibrillation (HCC)    Obesity hypoventilation syndrome (HCC)    Acute encephalopathy    Elevated troponin    Elevated brain natriuretic peptide (BNP) level        10/05/23 1255   OT Last Visit   OT Visit Date 10/05/23   Note Type   Note Type Treatment   Pain Assessment   Pain Assessment Tool FLACC   Pain Score No Pain   Pain Location/Orientation Orientation: Right;Location: Knee   Pain Onset/Description Frequency: Intermittent  (with movement)   Pain Rating: FLACC (Rest) - Face 0   Pain Rating: FLACC (Rest) - Legs 0   Pain Rating: FLACC (Rest) - Activity 0   Pain Rating: FLACC (Rest) - Cry 0   Pain Rating: FLACC (Rest) - Consolability 0   Score: FLACC (Rest) 0   Pain Rating: FLACC (Activity) - Face 1   Pain Rating: FLACC (Activity) - Legs 1   Pain Rating: FLACC (Activity) - Activity 0   Pain Rating: FLACC (Activity) - Cry 1   Pain Rating: FLACC (Activity) - Consolability 0   Score: FLACC (Activity) 3   Restrictions/Precautions   Weight Bearing Precautions Per Order No   Other Precautions Bed Alarm;Multiple lines;Telemetry;O2;Fall Risk;Pain   ADL   Where Assessed Other (Comment)  (Assist levels for some self care tasks are based on functional assessment of performance skills and deficits observed during session.)   Grooming Assistance 5  Supervision/Setup   Grooming Deficit Setup;Supervision/safety; Increased time to complete   UB Bathing Assistance 3  Moderate Assistance   UB Bathing Deficit Setup;Supervision/safety; Increased time to complete   UB Dressing Assistance 3  Moderate Assistance   UB Dressing Deficit Setup;Supervision/safety; Increased time to complete   LB Dressing Assistance 1  Total Assistance   Toileting Assistance  1  Total Assistance   Bed Mobility   Supine to Sit Unable to assess  (pt reported fear of falling, and declined to sit EOB today. Pt was agreeable to perform long sitting/leaning forward x5 trials with SBA)   Additional Comments Fred Pedroza bed placed in bed chair position for session, exercises, and sitting   Transfers   Sit to Stand Unable to assess   Therapeutic Exercise - ROM   UE-ROM Yes   ROM- Right Upper Extremities   R Shoulder AAROM; Flexion; Horizontal ABduction   R Elbow AROM;Elbow flexion;Elbow extension   R Hand AROM  (composite  and extension)   R Position Seated  (bed chair position)   R Weight/Reps/Sets 1 set x15 reps each   ROM - Left Upper Extremities    L Shoulder AROM; Flexion; Horizontal ABduction   L Elbow AROM;Elbow flexion;Elbow extension   L Hand AROM  (composite  and extension)   L Position Seated  (bed chair position)   L Weight/Reps/Sets 1 x 15 reps each   Subjective   Subjective Pt reporting fear of falling while seated EOB, but agreeable and motivated to participate in therapy session   Cognition   Overall Cognitive Status [facility]   Arousal/Participation Alert; Cooperative   Attention Within functional limits   Orientation Level Oriented X4   Memory Decreased short term memory;Decreased recall of recent events   Following Commands Follows one step commands without difficulty   Activity Tolerance   Activity Tolerance Patient limited by fatigue   Medical Staff Made Aware Rosy Ambrocio RN  (Pt seen for co-treatment with Physical Therapist due to pt's medical complexity, functional limitations and limited activity tolerance.)   Assessment   Assessment Pt seen this date for skilled OT session focused on ADLs, functional transfers and mobility, safety education. The patient was received supine in bed, NAD, PIV access, supportive family present at the bedside.  She participated in BUE exercises after transitioning to bed chair position in [facility] bed. Pt participated well with all exercises, as well as pulling self forward to achieve long sitting in bed x5 trials. At end of session the patient was located supine in bed with call bell in reach and all needs met. Overall the patient remains below her functional baseline, and is primarily limited at this time due to pain, generalized deconditioning, and limited activity tolerance. OT will continue to follow while acute to address POC. At this time, recommend inpatient rehab upon d/c.   Plan   Treatment Interventions ADL retraining;Functional transfer training;UE strengthening/ROM; Endurance training;Patient/family training   Goal Expiration Date 10/13/23   OT Treatment Day 2   OT Frequency 3-5x/wk   Recommendation   OT Discharge Recommendation Post acute rehabilitation services   Additional Comments  The patient's raw score on the AM-PAC Daily Activity Inpatient Short Form is 10. A raw score of less than 19 suggests the patient may benefit from discharge to post-acute rehabilitation services. Please refer to the recommendation of the Occupational Therapist for safe discharge planning.    AM-PAC Daily Activity Inpatient   Lower Body Dressing 1   Bathing 1   Toileting 1   Upper Body Dressing 2   Grooming 2   Eating 3   Daily Activity Raw Score 10   Turning Head Towards Sound 4   Follow Simple Instructions 4   Low Function Daily Activity Raw Score 18   Low Function Daily Activity Standardized Score  30.17   AM-PAC Applied Cognition Inpatient   Following a Speech/Presentation 3   Understanding Ordinary Conversation 4   Taking Medications 2   Remembering Where Things Are Placed or Put Away 2   Remembering List of 4-5 Errands 2   Taking Care of Complicated Tasks 2   Applied Cognition Raw Score 15   Applied Cognition Standardized Score 33.54       Barnabas Lefort, OTR/L

## 2023-10-05 NOTE — CASE MANAGEMENT
Case Management Discharge Planning Note    Patient name Cameron Gaitan  Location /-49 MRN [id number]  : 1955 Date 10/5/2023       Current Admission Date: 2023  Current Admission Diagnosis:Acute on chronic respiratory failure with hypoxia and hypercapnia [facility])   Patient Active Problem List    Diagnosis Date Noted   • Acute on chronic respiratory failure with hypoxia and hypercapnia ([address]) 2023   • Acute encephalopathy 2023   • Elevated troponin 2023   • Elevated brain natriuretic peptide (BNP) level 2023   • Iron deficiency anemia 2023   • Atrial fibrillation ([address]) 2023   • Obesity hypoventilation syndrome ([address]) 2023   • Lipodermatosclerosis of both lower extremities 2021   • Class 3 severe obesity with body mass index (BMI) greater than or equal to 70 in adult [facility]) 2021   • Bakers cyst 10/27/2014   • Lymphedema of leg 10/27/2014      LOS (days): 7  Geometric Mean LOS (GMLOS) (days): 3.50  Days to GMLOS:-4     OBJECTIVE:  Risk of Unplanned Readmission Score: 14.03         Current admission status: Inpatient   Preferred Pharmacy:   [facility]/pharmacy #[id number]- [address] - [address] [place] [address]  Phone: [phone] Fax: [phone]    Primary Care Provider: Abdirahman Humphreys DO    Primary Insurance: [address]  Secondary Insurance:     DISCHARGE DETAILS:     CM has referral out to 133 SNF in a 100 mile radius, 7 acute in a 40 mile radius and 4 LTAC in a 80 mile radius.

## 2023-10-05 NOTE — CASE MANAGEMENT
Case Management Discharge Planning Note    Patient name Marleny Domingo  Location /-24 MRN [id number]  : 1955 Date 10/5/2023       Current Admission Date: 2023  Current Admission Diagnosis:Acute on chronic respiratory failure with hypoxia and hypercapnia [facility])   Patient Active Problem List    Diagnosis Date Noted   • Acute on chronic respiratory failure with hypoxia and hypercapnia ([address]) 2023   • Acute encephalopathy 2023   • Elevated troponin 2023   • Elevated brain natriuretic peptide (BNP) level 2023   • Iron deficiency anemia 2023   • Atrial fibrillation ([address]) 2023   • Obesity hypoventilation syndrome ([address]) 2023   • Lipodermatosclerosis of both lower extremities 2021   • Class 3 severe obesity with body mass index (BMI) greater than or equal to 70 in adult [facility]) 2021   • Bakers cyst 10/27/2014   • Lymphedema of leg 10/27/2014      LOS (days): 7  Geometric Mean LOS (GMLOS) (days): 3.50  Days to GMLOS:-4     OBJECTIVE:  Risk of Unplanned Readmission Score: 14.03         Current admission status: Inpatient   Preferred Pharmacy:   [facility]/pharmacy #[id number]- [address] - [address]  [address] [place] [address]  Phone: [phone] Fax: [phone]    Primary Care Provider: Lizbeth Lucas DO    Primary Insurance: [facility]  Secondary Insurance:     DISCHARGE DETAILS:     CM informed patient and  at bedside that [facility] has not responded. CM asked if they would consider other rehabs,  stated [facility] and [facility].

## 2023-10-05 NOTE — PROGRESS NOTES
Progress Note - Palliative & Supportive Care  Bozena Wheatley  76 y.o.  female  /-01   MRN: [id number]  Encounter: [id number]     Assessment:  • Acute on chronic respiratory failure with hypoxia and hypercapnia  • Palliative Care Encounter  • Goals of care counseling       Plan:  · Symptom management- per primary team  · Acute on chronic respiratory failure with hypoxia and hypercapnia  · Goals of Care Support and Discussion- patient did not want to have discussion, stated "stop pushing me, call my "   · Five Wishes and Advanced Directive information left in room. Call placed to , left a voicemail for return call. 2.   Goals:  Level 1 code status  • Disease focused care. • Concerns introduced today include: Five Wishes and Advanced Care Planning. • Will continue discussions regarding [address] [place] as patient's clinical presentation evolves. • Encouraged follow up with Palliative Medicine on an outpatient basis after discharge for continued symptom management. Our office will contact patient to schedule a hospital follow up. 3.  Social support:  · Supportive listening provided  · Normalized experience of patient  · Provided anxiety containment  · Provided anticipatory guidance      4. Follow up  • Palliative Care will continue to follow and goals of care discussions will be ongoing. • Please reach out via [employer] if questions or concerns arise. 5. Care Coordination  Reviewed case with Nereida GERONIMO PA-C      24 Hour History  Chart reviewed before visit. Patient was seen in room, awake and alert, no acute distress. No family present at time of visit. She asked this Provider to leave, did not wand to have discussion, stated "stop pushing me, call my "      Review of Systems   Constitutional: Positive for fatigue. Respiratory: Positive for shortness of breath. Cardiovascular: Negative for chest pain. Neurological: Positive for weakness. Medications    Current Facility-Administered Medications:   •  acetaminophen (TYLENOL) tablet 975 mg, 975 mg, Oral, Q6H PRN, Puposky Guess, CRNP, 975 mg at 10/03/23 1757  •  ammonium lactate (LAC-HYDRIN) 12 % lotion, , Topical, BID, Puposky Guess, CRNP, Given at 10/04/23 1714  •  bumetanide (BUMEX) injection 1 mg, 1 mg, Intravenous, BID, Vera Israel PA-C, 1 mg at 10/04/23 1714  •  hydrocortisone 1 % cream, , Topical, 4x Daily PRN, Puposky Guess, CRNP, Given at 09/30/23 9390  •  loratadine (CLARITIN) tablet 5 mg, 5 mg, Oral, Daily, Puposky Guess, CRNP, 5 mg at 10/04/23 1059  •  methocarbamol (ROBAXIN) tablet 500 mg, 500 mg, Oral, Q6H PRN, Puposky Guess, CRNP, 500 mg at 10/02/23 1654  •  metoprolol tartrate (LOPRESSOR) tablet 25 mg, 25 mg, Oral, BID, Puposky Guess, CRNP, 25 mg at 10/04/23 1713  •  ondansetron (ZOFRAN) injection 4 mg, 4 mg, Intravenous, Q6H PRN, Puposky Guess, CRNP  •  rivaroxaban (XARELTO) tablet 20 mg, 20 mg, Oral, Daily With Dinner, Puposky Guess, CRNP, 20 mg at 10/04/23 1713    Objective  /68   Pulse 71   Temp 98.8 °F (37.1 °C)   Resp 20   Ht 5' 7" (1.702 m)   Wt (!) 242 kg (533 lb 11.7 oz)   SpO2 97%   BMI 83.59 kg/m²   Physical Exam  Vitals reviewed. Constitutional:       General: She is not in acute distress. Appearance: She is obese. She is not toxic-appearing. Comments: Chronically ill appearing   Cardiovascular:      Rate and Rhythm: Normal rate. Pulmonary:      Effort: Pulmonary effort is normal.   Neurological:      Mental Status: She is alert and oriented to person, place, and time.    Psychiatric:         Mood and Affect: Mood normal.           Lab Results:   CBC:   Lab Results   Component Value Date    WBC 5.54 10/05/2023    HGB 9.3 (L) 10/05/2023    HCT 32.4 (L) 10/05/2023    MCV 95 10/05/2023     10/05/2023    RBC 3.41 (L) 10/05/2023    MCH 27.3 10/05/2023    MCHC 28.7 (L) 10/05/2023    RDW 14.9 10/05/2023    MPV 9.8 10/05/2023   , CMP:   Lab Results   Component Value Date    SODIUM 143 10/05/2023    K 3.5 10/05/2023    CL 92 (L) 10/05/2023    CO2 >45 (HH) 10/05/2023    BUN 8 10/05/2023    CREATININE 0.44 (L) 10/05/2023    CALCIUM 8.6 10/05/2023    EGFR 104 10/05/2023     Imaging Studies: I have personally reviewed pertinent reports. Echo complete w/ contrast if indicated    Result Date: 9/28/2023  Narrative: •  This was a very technically difficult study. Echo contrast was used to opacify the left ventricle. •  Left ventricular function was preserved. LVEF was estimated at 55%. There were no diagnostic wall motion abnormalities however the possibility not be excluded on the basis of this study. •  The valves were not well visualized. There was possible mild aortic stenosis. •  Consider SUN or Cardiac MRI for further evlauation if clinically indicated. XR chest portable    Result Date: 9/28/2023  Narrative: CHEST INDICATION:   sob. COMPARISON:  None EXAM PERFORMED/VIEWS:  XR CHEST PORTABLE  AP semierect FINDINGS: Examination limited due to body habitus. Heart shadow is poorly seen due to body habitus and portable technique. There is pulmonary vascular congestion seen with some interstitial prominence. There appears to be bibasilar bilateral airspace opacities greatest at the base of the left lung. No pneumothorax Visualized osseous structures appear within normal limits for patient age. Impression: Pulmonary vascular congestion suggests pulmonary edema with associated cardiomegaly. Superimposed pneumonia is not excluded. I have personally reviewed this study including all images.  / R.J.F. Resident: Aziza Matute, the attending radiologist, have reviewed the images and agree with the final report above. Workstation performed: [id number]      EKG, Pathology, and Other Studies: I have personally reviewed pertinent reports. Counseling / Coordination of Care  Total floor / unit time spent today 25 minutes.  Greater than 50% of total time was spent with the patient and / or family counseling and / or coordinating of care. A description of the counseling / coordination of care: Chart reviewed,  provided medical updates, discussed palliative care and symptom management,determined social/family support, provided psychosocial support. Li Lowery, MSN, CRNP  Palliative & Supportive Care    Portions of this document may have been created using dictation software and as such some "sound alike" terms may have been generated by the system. Do not hesitate to contact me with any questions or clarifications.

## 2023-10-05 NOTE — RESPIRATORY THERAPY NOTE
RT Ventilator Management Note  Negrito Howe 76 y.o. female MRN: [id number]  Unit/Bed#: -01 Encounter: [id number]      Daily Screen    No data found in the last 10 encounters.           10/05/23 0000   Respiratory Assessment   Assessment Type Assess only   General Appearance Awake   Respiratory Pattern Normal   Chest Assessment Chest expansion symmetrical   Resp Comments placed patient on BIPAP for hours of sleep   Non-Invasive Information   O2 Interface Device Face mask   Non-Invasive Ventilation Mode BiPAP   $ Intermittent NIV Yes   Non-Invasive Settings   IPAP (cm) 20 cm   EPAP (cm) 8 cm   Rate (Set) 20   FiO2 (%) 70   Pressure Support (cm H2O) 8   Rise Time 2   Inspiratory Time (Set) 1   Humidification   (HEATER)   Temperature (Set) 31   Non-Invasive Readings   Skin Intervention Skin intact   Total Rate 27   MV (Mech) 14.3   Peak Pressure (Obs) 21   Spontaneous Vt (mL) 539   Heater Temperature (Obs) 31   Leak (lpm) 0   Non-Invasive Alarms   Insp Pressure High (cm H20) 25   Insp Pressure Low (cm H20) 6   Low Insp Pressure Time (sec) 6 sec   MV Low (L/min) 3   Vt High (mL) 1350   Vt Low (mL) 100   High Resp Rate (BPM) 45 BPM   Low Resp Rate (BPM) 8 BPM   Apnea Rate 12   Maintenance   Water bag changed Yes         Physical Exam:   Assessment Type: Assess only  General Appearance: Awake  Respiratory Pattern: Normal  Chest Assessment: Chest expansion symmetrical  Bilateral Breath Sounds: Diminished  O2 Device: [id number]      Resp Comments: placed patient on BIPAP for hours of sleep

## 2023-10-05 NOTE — PHYSICAL THERAPY NOTE
PT Progress Note (23min)  (12:33-12:56)       10/05/23 1256   PT Last Visit   PT Visit Date 10/05/23   Note Type   Note Type Treatment   Pain Assessment   Pain Assessment Tool FLACC   Pain Rating: FLACC (Rest) - Face 0   Pain Rating: FLACC (Rest) - Legs 0   Pain Rating: FLACC (Rest) - Activity 0   Pain Rating: FLACC (Rest) - Cry 0   Pain Rating: FLACC (Rest) - Consolability 0   Score: FLACC (Rest) 0   Pain Rating: FLACC (Activity) - Face 1   Pain Rating: FLACC (Activity) - Legs 1   Pain Rating: FLACC (Activity) - Activity 0   Pain Rating: FLACC (Activity) - Cry 1   Pain Rating: FLACC (Activity) - Consolability 0   Score: FLACC (Activity) 3   Restrictions/Precautions   Weight Bearing Precautions Per Order No   Other Precautions Bed Alarm;Multiple lines;Telemetry;O2;Fall Risk;Pain   General   Chart Reviewed Yes   Response to Previous Treatment Patient with no complaints from previous session. Family/Caregiver Present Yes   Cognition   Orientation Level Oriented X4   Subjective   Subjective "I was afraid to do anything because I don't trust the bed". Bed Mobility   Supine to Sit Unable to assess   Additional Comments Kreg bed placed in chair position during PT session. Transfers   Sit to Stand Unable to assess   Stand to Sit Unable to assess   Additional Comments performed supported sitting c bed in chair position + use of bedrails; max sitting tolerance 1-1.5min x3 trials. Ambulation/Elevation   Gait pattern Not tested   Balance   Static Sitting Poor +   Dynamic Sitting Poor   Endurance Deficit   Endurance Deficit Yes   Activity Tolerance   Activity Tolerance Patient limited by fatigue;Patient limited by pain   Medical Staff Made Aware Angelic   Nurse Made Aware RN [facility]   Exercises   Quad Sets Sitting;10 reps;AROM; Bilateral   Hip Abduction Sitting;10 reps;AROM; Bilateral   Knee AROM Long Arc Quad Sitting;10 reps;AROM; Bilateral   Ankle Pumps Sitting;10 reps;AROM; Bilateral   Assessment   Prognosis Fair Problem List Decreased strength;Decreased endurance;Decreased range of motion; Impaired balance;Decreased mobility; Decreased skin integrity;Pain;Obesity   Assessment pt agreeable to PT session. co-treatment c OT 2* pt's multiple co-morbidities + mobility deficits requiring (A)x2-3 to complete mobility tasks. bed placed in chair position for entire PT session. performed supported sitting trials x3 c use of bed rails c max sitting tolerance 1.5min. completed seated AROM ther ex B/L LE x10 reps c min verbal cues for technique. cont skilled PT recommended to further maximize functional mobility + improve quality of life. AM-PAC raw score 7 indicating pt would benefit from inpt skilled PT, however please refer to PT d/c recommendation for safe d/c planning. Barriers to Discharge Inaccessible home environment;Decreased caregiver support   Goals   Patient Goals "to be able to use a commode". STG Expiration Date 10/13/23   PT Treatment Day 2   Plan   Treatment/Interventions Functional transfer training;LE strengthening/ROM; Therapeutic exercise; Endurance training;Patient/family training;Equipment eval/education; Bed mobility;Spoke to nursing;OT   Progress Progressing toward goals   PT Frequency 3-5x/wk   Recommendation   PT Discharge Recommendation Post acute rehabilitation services   AM-PAC Basic Mobility Inpatient   Turning in Flat Bed Without Bedrails 2   Lying on Back to Sitting on Edge of Flat Bed Without Bedrails 1   Moving Bed to Chair 1   Standing Up From Chair Using Arms 1   Walk in Room 1   Climb 3-5 Stairs With Railing 1   Basic Mobility Inpatient Raw Score 7   Turning Head Towards Sound 4   Follow Simple Instructions 4   Low Function Basic Mobility Raw Score  15   Low Function Basic Mobility Standardized Score  23.9   Highest Level Of Mobility   JH-HLM Goal 2: Bed activities/Dependent transfer   JH-HLM Achieved 2: Bed activities/Dependent transfer   Education   Education Provided Mobility training;Home exercise program   Patient Demonstrates acceptance/verbal understanding   End of Consult   Patient Position at End of Consult Other (comment)  (kreg bed in chair position)     Liv Samayoa , DPT

## 2023-10-05 NOTE — APP STUDENT NOTE
Assessment & Plan  Acute on chronic hypoxemic and hypercapnic respiratory failure  - Has improved with BiPAP and is now on nasal cannula 12 L mid flow   - Continue to maintain O2 sats between 88%-90%  - Recommend use of BiPAP when sleeping or for naps  - Due to obesity it is likely she has a restrictive lung disease  - Consult palliative care     Obesity Hypoventilation syndrome  - Body habitus restricts proper ventilation of lungs  - Continue to monitor for changes in O2    - Manage diet with low sodium and consider movement from bed to chair when able    Severe Obesity  - Educated patient on eating less processed foods, or take out  - Maintaining diet that consists of vegetables, and fruits was encouraged  - Low sodium diet encouraged   - Small exercises recommended       VTE Pharmacologic Prophylaxis: VTE Score: 9 Moderate Risk (Score 3-4) - Pharmacological DVT Prophylaxis Ordered: rivaroxaban (Xarelto). Education and Discussions with Family / Patient: Updated  (sister) at bedside. Total Time Spent on Date of Encounter in care of patient: 50 mins. This time was spent on one or more of the following: performing physical exam; counseling and coordination of care; obtaining or reviewing history; documenting in the medical record; reviewing/ordering tests, medications or procedures; communicating with other healthcare professionals and discussing with patient's family/caregivers. Current Length of Stay: 7 day(s)  Current Patient Status: Inpatient   Certification Statement: {Certification [id number]:53680}  Discharge Plan: {Discharge [facility]:07687}    Code Status: Level 1 - Full Code    Subjective:   Patient is awake and cooperative for the encounter. Her SOB has improved but still needs to catch her breath sometimes.     Objective:     Vitals:   Temp (24hrs), Av.5 °F (36.9 °C), Min:98.3 °F (36.8 °C), Max:98.8 °F (37.1 °C)    Temp:  [98.3 °F (36.8 °C)-98.8 °F (37.1 °C)] 98.8 °F (37.1 °C)  HR: [59-71] 71  Resp:  [20] 20  BP: (129-169)/(55-79) 140/68  SpO2:  [94 %-97 %] 95 %  Body mass index is 83.59 kg/m². Input and Output Summary (last 24 hours): Intake/Output Summary (Last 24 hours) at 10/5/2023 1049  Last data filed at 10/5/2023 9320  Gross per 24 hour   Intake --   Output 3530 ml   Net -3530 ml       Physical Exam:   Physical Exam  Constitutional:       General: She is not in acute distress. Appearance: She is obese. She is not toxic-appearing. Cardiovascular:      Rate and Rhythm: Normal rate and regular rhythm. Heart sounds: Normal heart sounds. Pulmonary:      Effort: Pulmonary effort is normal.      Breath sounds: Decreased air movement present. Skin:     General: Skin is warm and dry. Neurological:      Mental Status: She is alert and oriented to person, place, and time. ***    Additional Data:     Labs:  Results from last 7 days   Lab Units 10/05/23  0719 10/04/23  0456 10/03/23  1446   WBC Thousand/uL 5.54   < > 5.61   HEMOGLOBIN g/dL 9.3*   < > 9.6*   HEMATOCRIT % 32.4*   < > 33.6*   PLATELETS Thousands/uL 183   < > 167   NEUTROS PCT %  --   --  62   LYMPHS PCT %  --   --  20   MONOS PCT %  --   --  9   EOS PCT %  --   --  8*    < > = values in this interval not displayed.      Results from last 7 days   Lab Units 10/05/23  0719 10/04/23  0456 10/03/23  1446 09/30/23  0457 09/29/23  0437   SODIUM mmol/L 143   < > 140   < > 140   POTASSIUM mmol/L 3.5   < > 3.6   < > 5.0   CHLORIDE mmol/L 92*   < > 93*   < > 95*   CO2 mmol/L >45*   < > 42*   < > 39*   BUN mg/dL 8   < > 11   < > 26*   CREATININE mg/dL 0.44*   < > 0.57*   < > 0.89   ANION GAP mmol/L  --   --  5   < > 6   CALCIUM mg/dL 8.6   < > 8.6   < > 9.1   ALBUMIN g/dL  --   --   --   --  3.3*   TOTAL BILIRUBIN mg/dL  --   --   --   --  0.55   ALK PHOS U/L  --   --   --   --  71   ALT U/L  --   --   --   --  9   AST U/L  --   --   --   --  13   GLUCOSE RANDOM mg/dL 84   < > 158*   < > 104    < > = values in this interval not displayed. Results from last 7 days   Lab Units 09/28/23  1217   PROCALCITONIN ng/ml 0.23       Lines/Drains:  Invasive Devices     Peripheral Intravenous Line  Duration           Peripheral IV 10/04/23 Right Antecubital 1 day          Drain  Duration           External Urinary Catheter 4 days                      Imaging: {Radiology Review:39031}    Recent Cultures (last 7 days):         Last 24 Hours Medication List:   Current Facility-Administered Medications   Medication Dose Route Frequency Provider Last Rate   • acetaminophen  975 mg Oral Q6H PRN CAROLYN PersonNP     • ammonium lactate   Topical BID CAROLYN PersonNP     • bumetanide  1 mg Intravenous BID Antoinette Adler PA-C     • hydrocortisone   Topical 4x Daily PRN CAROLYN PersonNP     • loratadine  5 mg Oral Daily EMIL Person     • methocarbamol  500 mg Oral Q6H PRN CAROLYN PersonNP     • metoprolol tartrate  25 mg Oral BID CAROLYN PersonNP     • ondansetron  4 mg Intravenous Q6H PRN EMIL Person     • rivaroxaban  20 mg Oral Daily [address]          Today, Patient Was Seen By: JUANCARLOS Matson     **Please Note: This note may have been constructed using a voice recognition system. **

## 2023-10-05 NOTE — PLAN OF CARE
Problem: OCCUPATIONAL THERAPY ADULT  Goal: Performs self-care activities at highest level of function for planned discharge setting. See evaluation for individualized goals. Description: Treatment Interventions: ADL retraining, Functional transfer training, UE strengthening/ROM, Endurance training, Patient/family training          See flowsheet documentation for full assessment, interventions and recommendations. Outcome: Progressing  Note: Limitation: Decreased ADL status, Decreased UE strength, Decreased cognition, Decreased Safe judgement during ADL, Decreased endurance, Decreased self-care trans, Decreased high-level ADLs  Prognosis: Good  Assessment: Pt seen this date for skilled OT session focused on ADLs, functional transfers and mobility, safety education. The patient was received supine in bed, NAD, PIV access, supportive family present at the bedside. She participated in BUE exercises after transitioning to bed chair position in ana bed. Pt participated well with all exercises, as well as pulling self forward to achieve long sitting in bed x5 trials. At end of session the patient was located supine in bed with call bell in reach and all needs met. Overall the patient remains below her functional baseline, and is primarily limited at this time due to pain, generalized deconditioning, and limited activity tolerance. OT will continue to follow while acute to address POC.  At this time, recommend inpatient rehab upon d/c.     OT Discharge Recommendation: Post acute rehabilitation services        TERRANCE Serra/JANIS

## 2023-10-05 NOTE — PLAN OF CARE
Problem: PHYSICAL THERAPY ADULT  Goal: Performs mobility at highest level of function for planned discharge setting. See evaluation for individualized goals. Description: Treatment/Interventions: Functional transfer training, LE strengthening/ROM, Therapeutic exercise, Endurance training, Patient/family training, Bed mobility, Continued evaluation, Spoke to nursing, Spoke to advanced practitioner, OT          See flowsheet documentation for full assessment, interventions and recommendations. Outcome: Progressing  Note: Prognosis: Fair  Problem List: Decreased strength, Decreased endurance, Decreased range of motion, Impaired balance, Decreased mobility, Decreased skin integrity, Pain, Obesity  Assessment: pt agreeable to PT session. co-treatment c OT 2* pt's multiple co-morbidities + mobility deficits requiring (A)x2-3 to complete mobility tasks. bed placed in chair position for entire PT session. performed supported sitting trials x3 c use of bed rails c max sitting tolerance 1.5min. completed seated AROM ther ex B/L LE x10 reps c min verbal cues for technique. cont skilled PT recommended to further maximize functional mobility + improve quality of life. AM-PAC raw score 7 indicating pt would benefit from inpt skilled PT, however please refer to PT d/c recommendation for safe d/c planning. Barriers to Discharge: Inaccessible home environment, Decreased caregiver support     PT Discharge Recommendation: Post acute rehabilitation services    See flowsheet documentation for full assessment.

## 2023-10-06 LAB
BASE EX.OXY STD BLDV CALC-SCNC: 94.1 % (ref 60–80)
BASE EXCESS BLDV CALC-SCNC: 17.8 MMOL/L
BUN SERPL-MCNC: 8 MG/DL (ref 5–25)
CALCIUM SERPL-MCNC: 8.9 MG/DL (ref 8.4–10.2)
CHLORIDE SERPL-SCNC: 91 MMOL/L (ref 96–108)
CO2 SERPL-SCNC: >45 MMOL/L (ref 21–32)
CREAT SERPL-MCNC: 0.48 MG/DL (ref 0.6–1.3)
GFR SERPL CREATININE-BSD FRML MDRD: 101 ML/MIN/1.73SQ M
GLUCOSE SERPL-MCNC: 87 MG/DL (ref 65–140)
HCO3 BLDV-SCNC: 45.9 MMOL/L (ref 24–30)
O2 CT BLDV-SCNC: 15.4 ML/DL
PCO2 BLDV: 75.1 MM HG (ref 42–50)
PH BLDV: 7.4 [PH] (ref 7.3–7.4)
PO2 BLDV: 85.4 MM HG (ref 35–45)
POTASSIUM SERPL-SCNC: 3.8 MMOL/L (ref 3.5–5.3)
SODIUM SERPL-SCNC: 142 MMOL/L (ref 135–147)

## 2023-10-06 PROCEDURE — 82805 BLOOD GASES W/O2 SATURATION: CPT | Performed by: PHYSICIAN ASSISTANT

## 2023-10-06 PROCEDURE — 94760 N-INVAS EAR/PLS OXIMETRY 1: CPT

## 2023-10-06 PROCEDURE — 94660 CPAP INITIATION&MGMT: CPT

## 2023-10-06 PROCEDURE — 80048 BASIC METABOLIC PNL TOTAL CA: CPT | Performed by: PHYSICIAN ASSISTANT

## 2023-10-06 PROCEDURE — 99232 SBSQ HOSP IP/OBS MODERATE 35: CPT

## 2023-10-06 RX ADMIN — METOPROLOL TARTRATE 25 MG: 25 TABLET, FILM COATED ORAL at 16:48

## 2023-10-06 RX ADMIN — LORATADINE 5 MG: 10 TABLET ORAL at 10:30

## 2023-10-06 RX ADMIN — ACETAMINOPHEN 975 MG: 325 TABLET, FILM COATED ORAL at 07:36

## 2023-10-06 RX ADMIN — METHOCARBAMOL 500 MG: 500 TABLET ORAL at 07:36

## 2023-10-06 RX ADMIN — Medication: at 10:41

## 2023-10-06 RX ADMIN — Medication: at 16:52

## 2023-10-06 RX ADMIN — RIVAROXABAN 20 MG: 20 TABLET, FILM COATED ORAL at 16:46

## 2023-10-06 RX ADMIN — POTASSIUM CHLORIDE 40 MEQ: 1500 TABLET, EXTENDED RELEASE ORAL at 10:36

## 2023-10-06 RX ADMIN — POTASSIUM CHLORIDE 20 MEQ: 1500 TABLET, EXTENDED RELEASE ORAL at 16:46

## 2023-10-06 NOTE — RESPIRATORY THERAPY NOTE
10/06/23 0143   Respiratory Assessment   Resp Comments placed pt on NIV for HS use   O2 Device v60   Non-Invasive Information   O2 Interface Device Face mask   Non-Invasive Ventilation Mode BiPAP   $ Intermittent NIV Yes   SpO2 95 %   $ Pulse Oximetry Spot Check Charge Completed   Non-Invasive Settings   IPAP (cm) 20 cm   EPAP (cm) 8 cm   Rate (Set) 20   FiO2 (%) 70   Rise Time 2  (15 min ramp applied)   Inspiratory Time (Set) 1   Humidification   (heater)   Temperature (Set) 31   Non-Invasive Readings   Skin Intervention Skin intact   Total Rate 26   Vt (mL) (Mech) 404   MV (Mech) 10.6   Peak Pressure (Obs) 14   Heater Temperature (Obs)   (unit warming)   Leak (lpm) 0   Non-Invasive Alarms   Insp Pressure High (cm H20) 35   Insp Pressure Low (cm H20) 6   Low Insp Pressure Time (sec) 20 sec   MV Low (L/min) 3   Vt High (mL) 1150   Vt Low (mL) 150   High Resp Rate (BPM) 40 BPM   Low Resp Rate (BPM) 8 BPM   Maintenance   Water bag changed No     RN notified

## 2023-10-06 NOTE — PROGRESS NOTES
[address]  Progress Note  Name: Bharat Gamez  MRN: [id number]  Unit/Bed#: -01 I Date of Admission: 9/28/2023   Date of Service: 10/6/2023 I Hospital Day: 8    Assessment/Plan   * Acute on chronic respiratory failure with hypoxia and hypercapnia (HCC)  Assessment & Plan  Presented to hospital with AMS and respiratory distress; requiring ICU for ventilatory support with continuous BiPAP   • Continue non-invasive positive pressure qHS, continue to wean midflow NC as able, currently remains on 9 L  • Goal SpO2 >88%  • Respiratory protocol  • Pulmonary consulted  • Consult palliative care team 10/4  • Had goals of care discussion with family on 10/5, determined to continue level 1 status, continue discussion today with family in room as well as patient    • Per family, due to patient's size and mobility, has been unable to complete an outpatient sleep study  ? Pulm working on obtaining NIPPV for home     Elevated brain natriuretic peptide (BNP) level  Assessment & Plan  · Does not appear overtly volume overloaded, will continue home dose torsemide     Elevated troponin  Assessment & Plan  Elevated on presentation, no complaint of chest pain and EKG without ischemia  • Downtrended  • Echo without wall motion abnormality  • Suspect non-cardiac elevation in the setting of severe respiratory distress with hypoxia/hypercapnia     Acute encephalopathy  Assessment & Plan  Metabolic encephalopathy present on admission, a/e/b lethargy, disorientation, poor safety awareness and impulsivity. Improved with BiPAP use and monitored by neuro checks, fall and delirium precautions, sleep hygiene   · Likely secondary to acute on chronic hypoxia with hypercapnia. · No infectious etiology identified. · Intermittently encephalopathic when naps without her BiPAP.   Strongly suggest positive pressure ventilation for naps in addition to at at bedtime    Obesity hypoventilation syndrome (HCC)  Assessment & Plan  · Pulm working on obtaining NIPPV machine for patient at home  · Non-invasive positive pressure at bedtime and naps    Atrial fibrillation ([address])  Assessment & Plan  • Review of outside records reports paroxysmal atrial fibrillation on Xarelto  • Family denies any history and does not take any blood thinners  • Pt noted to go into afib with RVR 9/30 requiring metoprolol boluses  • Now back in NSR  • Xarelto restarted  • Family reports that she had vaginal bleeding previously while on Xarelto-unable to obtain transvaginal US while admitted due to body habitus, consider GYN consult if bleeding restarts  • Presently hemodynamically stable, hgb stable without definitive s/sx of bleeding     Class 3 severe obesity with body mass index (BMI) greater than or equal to 70 in adult [facility])  Assessment & Plan  Body mass index is 83.59 kg/m². Super morbidly obese  • Recommend incorporating a more whole foods plant-predominant diet along with decreasing consumption of red meats and processed foods  • Per AHA guidelines, recommend moderate-vigorous intensity exercise for 30 minutes a day for 5 days a week or a total of 150 min/week           VTE Pharmacologic Prophylaxis: VTE Score: 9 High Risk (Score >/= 5) - Pharmacological DVT Prophylaxis Ordered: rivaroxaban (Xarelto). Sequential Compression Devices Ordered. Patient Centered Rounds: I performed bedside rounds with nursing staff today. Discussions with Specialists or Other Care Team Provider: Cm, palliative     Education and Discussions with Family / Patient: Updated  (sister) at bedside. Total Time Spent on Date of Encounter in care of patient: 35 mins.  This time was spent on one or more of the following: performing physical exam; counseling and coordination of care; obtaining or reviewing history; documenting in the medical record; reviewing/ordering tests, medications or procedures; communicating with other healthcare professionals and discussing with patient's family/caregivers. Current Length of Stay: 8 day(s)  Current Patient Status: Inpatient   Certification Statement: The patient will continue to require additional inpatient hospital stay due to Continued respiratory failure with hypoxia  Discharge Plan: Anticipate discharge in >72 hrs to To be determined    Code Status: Level 1 - Full Code    Subjective:   Patient reports still having some shortness of breath that has not improved compared to yesterday. She reports having leg pain and denies chest pain/pressure, palpitations, and has, nausea,, or chills. Objective:     Vitals:   Temp (24hrs), Av.2 °F (36.8 °C), Min:97.4 °F (36.3 °C), Max:99 °F (37.2 °C)    Temp:  [97.4 °F (36.3 °C)-99 °F (37.2 °C)] 99 °F (37.2 °C)  HR:  [62-70] 70  Resp:  [23] 23  BP: (107-154)/(53-78) 107/53  SpO2:  [92 %-95 %] 92 %  Body mass index is 81.83 kg/m². Input and Output Summary (last 24 hours): Intake/Output Summary (Last 24 hours) at 10/6/2023 1154  Last data filed at 10/6/2023 0001  Gross per 24 hour   Intake 1200 ml   Output 2150 ml   Net -950 ml       Physical Exam:   Physical Exam  Vitals and nursing note reviewed. Constitutional:       Appearance: She is obese. HENT:      Head: Normocephalic. Nose: Nose normal.      Mouth/Throat:      Mouth: Mucous membranes are moist.      Pharynx: Oropharynx is clear. Eyes:      General: No scleral icterus. Conjunctiva/sclera: Conjunctivae normal.      Pupils: Pupils are equal, round, and reactive to light. Cardiovascular:      Rate and Rhythm: Normal rate and regular rhythm. Heart sounds: No murmur heard. No friction rub. No gallop. Pulmonary:      Breath sounds: No wheezing or rhonchi. Comments: Auscultation difficult to fully examine due to body habitus  Abdominal:      General: Abdomen is flat. Palpations: Abdomen is soft. Musculoskeletal:         General: Normal range of motion.       Cervical back: Normal range of motion and neck supple. Comments: Chronic lymphocytic bilateral lower extremity skin changes   Lymphadenopathy:      Cervical: No cervical adenopathy. Skin:     General: Skin is warm. Coloration: Skin is not jaundiced or pale. Findings: No bruising, erythema or lesion. Neurological:      General: No focal deficit present. Mental Status: She is alert and oriented to person, place, and time. Mental status is at baseline. Cranial Nerves: No cranial nerve deficit. Motor: No weakness. Psychiatric:         Mood and Affect: Mood normal.         Behavior: Behavior normal.         Thought Content: Thought content normal.          Additional Data:     Labs:  Results from last 7 days   Lab Units 10/05/23  0719 10/04/23  0456 10/03/23  1446   WBC Thousand/uL 5.54   < > 5.61   HEMOGLOBIN g/dL 9.3*   < > 9.6*   HEMATOCRIT % 32.4*   < > 33.6*   PLATELETS Thousands/uL 183   < > 167   NEUTROS PCT %  --   --  62   LYMPHS PCT %  --   --  20   MONOS PCT %  --   --  9   EOS PCT %  --   --  8*    < > = values in this interval not displayed. Results from last 7 days   Lab Units 10/06/23  0638 10/04/23  0456 10/03/23  1446   SODIUM mmol/L 142   < > 140   POTASSIUM mmol/L 3.8   < > 3.6   CHLORIDE mmol/L 91*   < > 93*   CO2 mmol/L >45*   < > 42*   BUN mg/dL 8   < > 11   CREATININE mg/dL 0.48*   < > 0.57*   ANION GAP mmol/L  --   --  5   CALCIUM mg/dL 8.9   < > 8.6   GLUCOSE RANDOM mg/dL 87   < > 158*    < > = values in this interval not displayed. Lines/Drains:  Invasive Devices     Peripheral Intravenous Line  Duration           Peripheral IV 10/04/23 Right Antecubital 2 days          Drain  Duration           External Urinary Catheter 5 days                      Imaging: No pertinent imaging reviewed.     Recent Cultures (last 7 days):         Last 24 Hours Medication List:   Current Facility-Administered Medications   Medication Dose Route Frequency Provider Last Rate   • acetaminophen  975 mg Oral Q6H PRN Annette Massa, CRNP     • ammonium lactate   Topical BID Annette Massa, CRNP     • bumetanide  1 mg Intravenous BID Aristides Adames PA-C     • hydrocortisone   Topical 4x Daily PRN Annette Massa, CRNP     • loratadine  5 mg Oral Daily Annette Massa, CRNP     • methocarbamol  500 mg Oral Q6H PRN Annette Massa, CRNP     • metoprolol tartrate  25 mg Oral BID Annette Massa, CRNP     • ondansetron  4 mg Intravenous Q6H PRN Annette Massa, CRNP     • potassium chloride  40 mEq Oral Daily Vera Israel PA-C      And   • potassium chloride  20 mEq Oral Daily Aristides Adames PA-C     • rivaroxaban  20 mg Oral Daily With [address]          Today, Patient Was Seen By: Alice Kirk PA-C    **Please Note: This note may have been constructed using a voice recognition system. **

## 2023-10-06 NOTE — PROGRESS NOTES
As per patient. Refuses q2hr repositioning.  Patients has been educated on  DTI's-Deep Tissue Injury prevention-RN-Mayusamane aware-mav-pca

## 2023-10-07 LAB
ANION GAP SERPL CALCULATED.3IONS-SCNC: 3 MMOL/L
BUN SERPL-MCNC: 7 MG/DL (ref 5–25)
CALCIUM SERPL-MCNC: 8.6 MG/DL (ref 8.4–10.2)
CHLORIDE SERPL-SCNC: 93 MMOL/L (ref 96–108)
CO2 SERPL-SCNC: 44 MMOL/L (ref 21–32)
CREAT SERPL-MCNC: 0.45 MG/DL (ref 0.6–1.3)
ERYTHROCYTE [DISTWIDTH] IN BLOOD BY AUTOMATED COUNT: 15.1 % (ref 11.6–15.1)
GFR SERPL CREATININE-BSD FRML MDRD: 103 ML/MIN/1.73SQ M
GLUCOSE SERPL-MCNC: 96 MG/DL (ref 65–140)
HCT VFR BLD AUTO: 33.8 % (ref 34.8–46.1)
HGB BLD-MCNC: 9.7 G/DL (ref 11.5–15.4)
MCH RBC QN AUTO: 27 PG (ref 26.8–34.3)
MCHC RBC AUTO-ENTMCNC: 28.7 G/DL (ref 31.4–37.4)
MCV RBC AUTO: 94 FL (ref 82–98)
PLATELET # BLD AUTO: 203 THOUSANDS/UL (ref 149–390)
PMV BLD AUTO: 9.7 FL (ref 8.9–12.7)
POTASSIUM SERPL-SCNC: 3.8 MMOL/L (ref 3.5–5.3)
RBC # BLD AUTO: 3.59 MILLION/UL (ref 3.81–5.12)
SODIUM SERPL-SCNC: 140 MMOL/L (ref 135–147)
WBC # BLD AUTO: 6.53 THOUSAND/UL (ref 4.31–10.16)

## 2023-10-07 PROCEDURE — 80048 BASIC METABOLIC PNL TOTAL CA: CPT

## 2023-10-07 PROCEDURE — 99232 SBSQ HOSP IP/OBS MODERATE 35: CPT | Performed by: PHYSICIAN ASSISTANT

## 2023-10-07 PROCEDURE — 85027 COMPLETE CBC AUTOMATED: CPT

## 2023-10-07 PROCEDURE — 94660 CPAP INITIATION&MGMT: CPT

## 2023-10-07 PROCEDURE — 94760 N-INVAS EAR/PLS OXIMETRY 1: CPT

## 2023-10-07 RX ORDER — TORSEMIDE 20 MG/1
20 TABLET ORAL DAILY
Status: DISCONTINUED | OUTPATIENT
Start: 2023-10-08 | End: 2023-10-14

## 2023-10-07 RX ADMIN — Medication: at 17:32

## 2023-10-07 RX ADMIN — BUMETANIDE 1 MG: 0.25 INJECTION INTRAMUSCULAR; INTRAVENOUS at 09:54

## 2023-10-07 RX ADMIN — METHOCARBAMOL 500 MG: 500 TABLET ORAL at 10:50

## 2023-10-07 RX ADMIN — POTASSIUM CHLORIDE 40 MEQ: 1500 TABLET, EXTENDED RELEASE ORAL at 09:54

## 2023-10-07 RX ADMIN — METOPROLOL TARTRATE 25 MG: 25 TABLET, FILM COATED ORAL at 17:30

## 2023-10-07 RX ADMIN — METOPROLOL TARTRATE 25 MG: 25 TABLET, FILM COATED ORAL at 09:54

## 2023-10-07 RX ADMIN — LORATADINE 5 MG: 10 TABLET ORAL at 09:54

## 2023-10-07 RX ADMIN — ACETAMINOPHEN 975 MG: 325 TABLET, FILM COATED ORAL at 10:49

## 2023-10-07 RX ADMIN — POTASSIUM CHLORIDE 20 MEQ: 1500 TABLET, EXTENDED RELEASE ORAL at 17:31

## 2023-10-07 RX ADMIN — Medication: at 09:59

## 2023-10-07 RX ADMIN — RIVAROXABAN 20 MG: 20 TABLET, FILM COATED ORAL at 17:30

## 2023-10-07 NOTE — PLAN OF CARE
Problem: Prexisting or High Potential for Compromised Skin Integrity  Goal: Skin integrity is maintained or improved  Description: INTERVENTIONS:  - Identify patients at risk for skin breakdown  - Assess and monitor skin integrity  - Assess and monitor nutrition and hydration status  - Monitor labs   - Assess for incontinence   - Turn and reposition patient  - Assist with mobility/ambulation  - Relieve pressure over bony prominences  - Avoid friction and shearing  - Provide appropriate hygiene as needed including keeping skin clean and dry  - Evaluate need for skin moisturizer/barrier cream  - Collaborate with interdisciplinary team   - Patient/family teaching  - Consider wound care consult   Outcome: Progressing     Problem: PAIN - ADULT  Goal: Verbalizes/displays adequate comfort level or baseline comfort level  Description: Interventions:  - Encourage patient to monitor pain and request assistance  - Assess pain using appropriate pain scale  - Administer analgesics based on type and severity of pain and evaluate response  - Implement non-pharmacological measures as appropriate and evaluate response  - Consider cultural and social influences on pain and pain management  - Notify physician/advanced practitioner if interventions unsuccessful or patient reports new pain  Outcome: Progressing     Problem: RESPIRATORY - ADULT  Goal: Achieves optimal ventilation and oxygenation  Description: INTERVENTIONS:  - Assess for changes in respiratory status  - Assess for changes in mentation and behavior  - Position to facilitate oxygenation and minimize respiratory effort  - Oxygen administered by appropriate delivery if ordered  - Initiate smoking cessation education as indicated  - Encourage broncho-pulmonary hygiene including cough, deep breathe, Incentive Spirometry  - Assess the need for suctioning and aspirate as needed  - Assess and instruct to report SOB or any respiratory difficulty  - Respiratory Therapy support as indicated  Outcome: Progressing     Problem: Knowledge Deficit  Goal: Patient/family/caregiver demonstrates understanding of disease process, treatment plan, medications, and discharge instructions  Description: Complete learning assessment and assess knowledge base.   Interventions:  - Provide teaching at level of understanding  - Provide teaching via preferred learning methods  Outcome: Progressing

## 2023-10-07 NOTE — PROGRESS NOTES
[address]  Progress Note  Name: Karen Thurman  MRN: [id number]  Unit/Bed#: -01 I Date of Admission: 9/28/2023   Date of Service: 10/7/2023 I Hospital Day: 9     Assessment/Plan   * Acute on chronic respiratory failure with hypoxia and hypercapnia (HCC)  Assessment & Plan  Presented to hospital with AMS and respiratory distress; requiring ICU for ventilatory support with continuous BiPAP   • BiPAP qHS and any other hours of sleep during daytime  • Continue to wean midflow NC as able, currently up to 12 L again  • Goal SpO2 >88% - advised nurse to trial weaning down again today 10/7  • Respiratory protocol  • Pulmonary consulted - will follow prn   • Goals of care discussion with family on 10/5, determined to continue level 1 status  •  states that BiPAP has been delivered to home    Acute encephalopathy  Assessment & Plan  Metabolic encephalopathy present on admission, a/e/b lethargy, disorientation, poor safety awareness and impulsivity. Improved with BiPAP use and monitored by neuro checks, fall and delirium precautions, sleep hygiene   · Likely secondary to acute on chronic hypoxia with hypercapnia. · No infectious etiology identified. Class 3 severe obesity with body mass index (BMI) greater than or equal to 70 in adult [facility])  Assessment & Plan  Body mass index is 81.83 kg/m².  Super morbidly obese  • Continued recommendation to work towards weight loss and improved exercise and dietary habits as able     Obesity hypoventilation syndrome (HCC)  Assessment & Plan  · Continue to manage with supplemental O2 support and BiPAP use     Elevated troponin  Assessment & Plan  Elevated on presentation, no complaint of chest pain and EKG without ischemia  • Down-trended in ICU   • Echo without wall motion abnormality  • Suspect non-cardiac elevation in the setting of severe respiratory distress with hypoxia/hypercapnia     Atrial fibrillation (HCC)  Assessment & Plan  • Outside records note paroxysmal atrial fibrillation on Xarelto  • Pt noted to go into afib with RVR 9/30 in ICU, s/p metoprolol   • Now continues to be in NSR on examinations   • Xarelto restarted by ICU team  • Family reports that she had vaginal bleeding previously while on Xarelto - unable to obtain transvaginal US while admitted due to body habitus, consider GYN consult if bleeding restarts  • Presently hemodynamically stable, hgb stable without definitive s/sx of bleeding     Elevated brain natriuretic peptide (BNP) level  Assessment & Plan  · Does not appear overtly volume overloaded however CXR revealing pulmonary vascular congestion  · S/p several days of IV bumex with no significant change to O2 requirements  · As patient complains of looser stools due to bumex, will d/c   · Restart torsemide 10/8         VTE Pharmacologic Prophylaxis: VTE Score: 9 High Risk (Score >/= 5) - Pharmacological DVT Prophylaxis Ordered: rivaroxaban (Xarelto). Sequential Compression Devices Ordered. Patient Centered Rounds: I evaluated the patient without nursing staff present due to RN unavailable; treatment plan discussed independent of examination  Discussions with Specialists or Other Care Team Provider:     Education and Discussions with Family / Patient: Updated  () at bedside. Sister present as well     Total Time Spent on Date of Encounter in care of patient: 42 mins. This time was spent on one or more of the following: performing physical exam; counseling and coordination of care; obtaining or reviewing history; documenting in the medical record; reviewing/ordering tests, medications or procedures; communicating with other healthcare professionals and discussing with patient's family/caregivers.     Current Length of Stay: 9 day(s)  Current Patient Status: Inpatient   Certification Statement: The patient will continue to require additional inpatient hospital stay due to continued high O2 demand and hypercapnia requiring BiPAP and continued lab monitoring  Discharge Plan: Anticipate discharge in >72 hrs to TBD pending clinical course    Code Status: Level 1 - Full Code    Subjective:   Arrived to see patient a few minutes prior to  and sister. Patient does report feeling better since admission. Denies having any respiratory issues, recounts to provider some of the occurrences of eating yesterday, stating she did not feel like her breathing was a issue at the time.  and sister walked in at this time, spouse asking questions regarding labs and "current status" of patient. We were able to review those at the bedside. Patient states that after bumex she has more frequent stools, 4-5x after last administration. We discussed transitioning back to torsemide 20 mg daily which is her home dose.  states that there was not any fluid on images, which I did tell him it is hard to tell with her body size, but that given her ongoing higher O2 demand than baseline, it was reasonable to attempt more aggressive diuresis in an attempt to improve her oxygenation status. Most recent CXR does actually show vascular congestion, as per radiology read.      Patient did request pain medication as provider was leaving, which was relayed to bedside nurse. Objective:     Vitals:   Temp (24hrs), Av.7 °F (37.1 °C), Min:98.4 °F (36.9 °C), Max:98.9 °F (37.2 °C)    Temp:  [98.4 °F (36.9 °C)-98.9 °F (37.2 °C)] 98.9 °F (37.2 °C)  HR:  [75] 75  Resp:  [21-26] 26  BP: (111-133)/(56-80) 133/64  SpO2:  [91 %-98 %] 96 %  Body mass index is 81.83 kg/m². Input and Output Summary (last 24 hours): Intake/Output Summary (Last 24 hours) at 10/7/2023 1112  Last data filed at 10/7/2023 0900  Gross per 24 hour   Intake 680 ml   Output 2850 ml   Net -2170 ml       Physical Exam  Vitals and nursing note reviewed. Constitutional:       General: She is awake. She is not in acute distress.      Appearance: She is morbidly obese. She is not ill-appearing. Cardiovascular:      Rate and Rhythm: Normal rate and regular rhythm. Comments: difficult to assess further due to body habitus  Pulmonary:      Effort: Pulmonary effort is normal.      Breath sounds: Examination of the right-lower field reveals decreased breath sounds. Decreased breath sounds present. Comments: [address] in place patient appearing more comfortable today  Abdominal:      General: Bowel sounds are normal.      Palpations: Abdomen is soft. Skin:     Comments: Lichenification of lower extremities with venous stasis dermatitis color changes    Neurological:      Mental Status: She is alert. Comments: Patient appears oriented, was concerned about a paper to be signed that I was holding and placed in my jacket; she was advised was this provider's patient list and nothing to be signed by her nor ; does not appear overtly confused at this time   Psychiatric:         Mood and Affect: Mood normal.         Behavior: Behavior is cooperative. Additional Data:     Labs:  Results from last 7 days   Lab Units 10/07/23  0641 10/04/23  0456 10/03/23  1446   WBC Thousand/uL 6.53   < > 5.61   HEMOGLOBIN g/dL 9.7*   < > 9.6*   HEMATOCRIT % 33.8*   < > 33.6*   PLATELETS Thousands/uL 203   < > 167   NEUTROS PCT %  --   --  62   LYMPHS PCT %  --   --  20   MONOS PCT %  --   --  9   EOS PCT %  --   --  8*    < > = values in this interval not displayed.      Results from last 7 days   Lab Units 10/07/23  0641   SODIUM mmol/L 140   POTASSIUM mmol/L 3.8   CHLORIDE mmol/L 93*   CO2 mmol/L 44*   BUN mg/dL 7   CREATININE mg/dL 0.45*   ANION GAP mmol/L 3   CALCIUM mg/dL 8.6   GLUCOSE RANDOM mg/dL 96                       Lines/Drains:  Invasive Devices     Peripheral Intravenous Line  Duration           Peripheral IV 10/04/23 Right Antecubital 3 days          Drain  Duration           External Urinary Catheter 6 days                Imaging: No pertinent imaging reviewed. Recent Cultures (last 7 days):         Last 24 Hours Medication List:   Current Facility-Administered Medications   Medication Dose Route Frequency Provider Last Rate   • acetaminophen  975 mg Oral Q6H PRN Yola Fleet, CRNP     • ammonium lactate   Topical BID Yola Fleet, CRNP     • hydrocortisone   Topical 4x Daily PRN Yola Fleet, CRNP     • loratadine  5 mg Oral Daily Yola Fleet, CRNP     • methocarbamol  500 mg Oral Q6H PRN Yola Fleet, CRNP     • metoprolol tartrate  25 mg Oral BID Yola Fleet, CRNP     • ondansetron  4 mg Intravenous Q6H PRN Yola Fleet, CRNP     • potassium chloride  40 mEq Oral Daily Vera Israel PA-C      And   • potassium chloride  20 mEq Oral Daily Vera Israel PA-C     • rivaroxaban  20 mg Oral Daily With [address], [facility]     • [START ON 10/8/2023] torsemide  20 mg Oral Daily Josef Peoples PA-C          Today, Patient Was Seen By: Josef Peoples PA-C    **Please Note: This note may have been constructed using a voice recognition system. **

## 2023-10-07 NOTE — ASSESSMENT & PLAN NOTE
· Does not appear overtly volume overloaded however CXR revealing pulmonary vascular congestion  · S/p several days of IV bumex with no significant change to O2 requirements  · Transitioned back to torsemide 10/8

## 2023-10-07 NOTE — ASSESSMENT & PLAN NOTE
Presented to hospital with AMS and respiratory distress; requiring ICU for ventilatory support with continuous BiPAP   • BiPAP qHS and any other hours of sleep during daytime  • Continue to wean midflow NC as able  • And is currently on 10 L mid flow, she is on 2 L nasal cannula at baseline  • Goal SpO2 >88%   • BMP/VBG intermittently to monitor progress  • Continue respiratory protocol  • Pulmonary consulted - following prn at this time   • Goals of care discussion with family on 10/5, determined to continue FULL CODE status  •  states that BiPAP has been delivered to home

## 2023-10-07 NOTE — ASSESSMENT & PLAN NOTE
Elevated on presentation, no complaint of chest pain and EKG without ischemia  • Down-trended in ICU   • Echo without regional wall motion abnormalities  • Suspect non-cardiac elevation in the setting of severe respiratory distress with hypoxia/hypercapnia  • Given no current cardiac symptoms, defer any additional workup to outpatient provider

## 2023-10-07 NOTE — ASSESSMENT & PLAN NOTE
• Outside records note paroxysmal atrial fibrillation on Xarelto  • Patient is in A-fib on EKG 10/10  • EKG completed on 10/10 does have episodes of RVR up to 110  • Patient was on metoprolol 12.5 mg twice daily and has increased to 25 mg twice daily monitor for improvement  • Continue Xarelto  • Family reports that she had vaginal bleeding previously while on Xarelto - unable to obtain transvaginal US while admitted due to body habitus, consider GYN consult if bleeding restarts

## 2023-10-07 NOTE — QUICK NOTE
Provider in to see patient this morning, arriving about 2 minutes or so prior to family arrival,  and sister. Patient's  reports "You stood me up" to this provider (referring to care team meeting on Thursday 10/5/23). Advised him that unfortunately this provider was in another family meeting that took longer than expected and was unable to attend. For this reason, had requested attending of record to be present, who was there to answer medical questions. Additionally, attending of record relayed concern from  to monitor potassium and BUN/creatinine, which was being actively monitored already.  then reports "I requested you call me and you never did, you stood me up". Again, advised him that this provider was unavailable for phone call at his requested time.  again reports "Well you didn't call me yesterday" (referring to Friday 10/6/23) to which advised him that this provider was not on service that day, thus would not have provided any clinical updates nor phone calls on that date.  then got in provider's personal space while reviewing patient's labs on computer and requested specific values of VBG pH and creatinine as well as potassium levels. Specifically,  states that creatinine value of 0.45 being low is his concern. Advised that we are not as concerned at this time with the low value, but more concerned with higher values indicating over-diuresis or other issues at play. Suspect that low creatinine is due to large body habitus and lack of expended energy, likely muscle wasting, given bed-bound status. As provider was leaving the room, advised patient and family that if they needed anything unable to be provided by nursing staff, that the staff could page me; to which  laughed and responded "Oh yes, we will find you".

## 2023-10-07 NOTE — RESPIRATORY THERAPY NOTE
10/07/23 0031   Respiratory Assessment   Assessment Type Assess only   General Appearance Alert; Awake   Respiratory Pattern Normal   Chest Assessment Chest expansion symmetrical   Bilateral Breath Sounds Diminished   Resp Comments patient placed on BiPAP for hs   O2 Device V60   Non-Invasive Information   O2 Interface Device Face mask   Non-Invasive Ventilation Mode BiPAP   $ Intermittent NIV Yes   SpO2 98 %   $ Pulse Oximetry Spot Check Charge Completed   Non-Invasive Settings   IPAP (cm) 20 cm   EPAP (cm) 8 cm   Rate (Set) 20   FiO2 (%) 70   Pressure Support (cm H2O) 8   Rise Time 2   Inspiratory Time (Set) 1   Non-Invasive Readings   Skin Intervention Skin intact   Total Rate 35   Peak Pressure (Obs) 14   Spontaneous Vt (mL) 429   Leak (lpm) 0   Spontaneous MV (mL) 15   Non-Invasive Alarms   Insp Pressure High (cm H20) 35   Insp Pressure Low (cm H20) 6   Low Insp Pressure Time (sec) 20 sec   MV Low (L/min) 3   Vt High (mL) 1150   Vt Low (mL) 150   High Resp Rate (BPM) 40 BPM   Low Resp Rate (BPM) 8 BPM   Apnea Interval (sec) 20   Apnea Rate 12

## 2023-10-07 NOTE — ASSESSMENT & PLAN NOTE
Metabolic encephalopathy present on admission, a/e/b lethargy, disorientation, poor safety awareness and impulsivity. Improved with BiPAP use and monitored by neuro checks, fall and delirium precautions, sleep hygiene   · Likely secondary to acute on chronic hypoxia with hypercapnia.    · No infectious etiology identified

## 2023-10-07 NOTE — ASSESSMENT & PLAN NOTE
Body mass index is 81.83 kg/m².  Super morbidly obese  • Continued recommendation to work towards weight loss and improved exercise and dietary habits as able   • PT/OT to work with patient to increase mobility, reports she is ambulatory at home with assistance

## 2023-10-08 LAB
BUN SERPL-MCNC: 8 MG/DL (ref 5–25)
CALCIUM SERPL-MCNC: 8.6 MG/DL (ref 8.4–10.2)
CHLORIDE SERPL-SCNC: 93 MMOL/L (ref 96–108)
CO2 SERPL-SCNC: >45 MMOL/L (ref 21–32)
CREAT SERPL-MCNC: 0.46 MG/DL (ref 0.6–1.3)
GFR SERPL CREATININE-BSD FRML MDRD: 102 ML/MIN/1.73SQ M
GLUCOSE SERPL-MCNC: 91 MG/DL (ref 65–140)
MAGNESIUM SERPL-MCNC: 2.1 MG/DL (ref 1.9–2.7)
POTASSIUM SERPL-SCNC: 4 MMOL/L (ref 3.5–5.3)
SODIUM SERPL-SCNC: 141 MMOL/L (ref 135–147)

## 2023-10-08 PROCEDURE — 99232 SBSQ HOSP IP/OBS MODERATE 35: CPT | Performed by: PHYSICIAN ASSISTANT

## 2023-10-08 PROCEDURE — 80048 BASIC METABOLIC PNL TOTAL CA: CPT | Performed by: PHYSICIAN ASSISTANT

## 2023-10-08 PROCEDURE — 83735 ASSAY OF MAGNESIUM: CPT | Performed by: PHYSICIAN ASSISTANT

## 2023-10-08 PROCEDURE — 94760 N-INVAS EAR/PLS OXIMETRY 1: CPT

## 2023-10-08 PROCEDURE — 94660 CPAP INITIATION&MGMT: CPT

## 2023-10-08 RX ADMIN — RIVAROXABAN 20 MG: 20 TABLET, FILM COATED ORAL at 18:00

## 2023-10-08 RX ADMIN — POTASSIUM CHLORIDE 40 MEQ: 1500 TABLET, EXTENDED RELEASE ORAL at 08:03

## 2023-10-08 RX ADMIN — METHOCARBAMOL 500 MG: 500 TABLET ORAL at 08:09

## 2023-10-08 RX ADMIN — METHOCARBAMOL 500 MG: 500 TABLET ORAL at 21:29

## 2023-10-08 RX ADMIN — TORSEMIDE 20 MG: 20 TABLET ORAL at 08:03

## 2023-10-08 RX ADMIN — METHOCARBAMOL 500 MG: 500 TABLET ORAL at 03:35

## 2023-10-08 RX ADMIN — Medication: at 08:09

## 2023-10-08 RX ADMIN — METOPROLOL TARTRATE 25 MG: 25 TABLET, FILM COATED ORAL at 08:03

## 2023-10-08 RX ADMIN — POTASSIUM CHLORIDE 20 MEQ: 1500 TABLET, EXTENDED RELEASE ORAL at 17:55

## 2023-10-08 RX ADMIN — ACETAMINOPHEN 975 MG: 325 TABLET, FILM COATED ORAL at 08:09

## 2023-10-08 RX ADMIN — ACETAMINOPHEN 975 MG: 325 TABLET, FILM COATED ORAL at 21:29

## 2023-10-08 RX ADMIN — Medication: at 21:29

## 2023-10-08 RX ADMIN — METOPROLOL TARTRATE 25 MG: 25 TABLET, FILM COATED ORAL at 17:55

## 2023-10-08 RX ADMIN — LORATADINE 5 MG: 10 TABLET ORAL at 08:03

## 2023-10-08 NOTE — RESPIRATORY THERAPY NOTE
10/08/23 0401   Respiratory Assessment   Resp Comments Pt found off bipap     RT Ventilator Management Note  Cameron Gaitan 76 y.o. female MRN: [id number]  Unit/Bed#: -01 Encounter: [id number]      Daily Screen    No data found in the last 10 encounters.            Physical Exam:   Assessment Type: Assess only  General Appearance: Alert, Awake  Respiratory Pattern: Normal  Chest Assessment: Chest expansion symmetrical  Bilateral Breath Sounds: Diminished  Location Specific: No  Cough: None  O2 Device: V60      Resp Comments: Pt found off bipap

## 2023-10-08 NOTE — RESPIRATORY THERAPY NOTE
10/08/23 0109   Respiratory Assessment   Assessment Type Assess only   General Appearance Alert; Awake   Respiratory Pattern Normal   Chest Assessment Chest expansion symmetrical   Bilateral Breath Sounds Diminished   Location Specific No   Cough None   Resp Comments Pt placed on bipap at this time   O2 Device V60   Non-Invasive Information   O2 Interface Device Face mask   Non-Invasive Ventilation Mode BiPAP   $ Intermittent NIV Yes   SpO2 98 %   $ Pulse Oximetry Spot Check Charge Completed   Non-Invasive Settings   IPAP (cm) 20 cm   EPAP (cm) 8 cm   Rate (Set) 20   FiO2 (%) 70   Pressure Support (cm H2O) 12   Rise Time 2   Inspiratory Time (Set) 1   Non-Invasive Readings   Skin Intervention Skin intact   Total Rate 27   Vt (mL) (Mech) 466   MV (Mech) 12   Peak Pressure (Obs) 21   Spontaneous Vt (mL) 455   Leak (lpm) 0   Spontaneous MV (mL) 12   Non-Invasive Alarms   Insp Pressure High (cm H20) 35   Insp Pressure Low (cm H20) 6   Low Insp Pressure Time (sec) 20 sec   MV Low (L/min) 3   Vt High (mL) 1150   Vt Low (mL) 150   High Resp Rate (BPM) 40 BPM   Low Resp Rate (BPM) 8 BPM   Apnea Interval (sec) 20     RT Ventilator Management Note  Inge Munson 76 y.o. female MRN: [id number]  Unit/Bed#: -01 Encounter: [id number]      Daily Screen    No data found in the last 10 encounters.            Physical Exam:   Assessment Type: Assess only  General Appearance: Alert, Awake  Respiratory Pattern: Normal  Chest Assessment: Chest expansion symmetrical  Bilateral Breath Sounds: Diminished  Location Specific: No  Cough: None  O2 Device: V60      Resp Comments: Pt placed on bipap at this time

## 2023-10-09 PROBLEM — R79.89 ELEVATED TROPONIN: Status: RESOLVED | Noted: 2023-09-28 | Resolved: 2023-10-09

## 2023-10-09 LAB
ALBUMIN SERPL BCP-MCNC: 2.9 G/DL (ref 3.5–5)
ALP SERPL-CCNC: 63 U/L (ref 34–104)
ALT SERPL W P-5'-P-CCNC: 6 U/L (ref 7–52)
AST SERPL W P-5'-P-CCNC: 10 U/L (ref 13–39)
BASE EX.OXY STD BLDV CALC-SCNC: 95.6 % (ref 60–80)
BASE EXCESS BLDV CALC-SCNC: 14.9 MMOL/L
BILIRUB SERPL-MCNC: 0.44 MG/DL (ref 0.2–1)
BUN SERPL-MCNC: 9 MG/DL (ref 5–25)
CALCIUM ALBUM COR SERPL-MCNC: 9.5 MG/DL (ref 8.3–10.1)
CALCIUM SERPL-MCNC: 8.6 MG/DL (ref 8.4–10.2)
CHLORIDE SERPL-SCNC: 94 MMOL/L (ref 96–108)
CO2 SERPL-SCNC: >45 MMOL/L (ref 21–32)
CREAT SERPL-MCNC: 0.52 MG/DL (ref 0.6–1.3)
ERYTHROCYTE [DISTWIDTH] IN BLOOD BY AUTOMATED COUNT: 15 % (ref 11.6–15.1)
GFR SERPL CREATININE-BSD FRML MDRD: 98 ML/MIN/1.73SQ M
GLUCOSE SERPL-MCNC: 83 MG/DL (ref 65–140)
HCO3 BLDV-SCNC: 42 MMOL/L (ref 24–30)
HCT VFR BLD AUTO: 33.4 % (ref 34.8–46.1)
HGB BLD-MCNC: 9.6 G/DL (ref 11.5–15.4)
MCH RBC QN AUTO: 27.1 PG (ref 26.8–34.3)
MCHC RBC AUTO-ENTMCNC: 28.7 G/DL (ref 31.4–37.4)
MCV RBC AUTO: 94 FL (ref 82–98)
O2 CT BLDV-SCNC: 15.4 ML/DL
PCO2 BLDV: 66.5 MM HG (ref 42–50)
PH BLDV: 7.42 [PH] (ref 7.3–7.4)
PLATELET # BLD AUTO: 217 THOUSANDS/UL (ref 149–390)
PMV BLD AUTO: 10 FL (ref 8.9–12.7)
PO2 BLDV: 203.2 MM HG (ref 35–45)
POTASSIUM SERPL-SCNC: 4.2 MMOL/L (ref 3.5–5.3)
PROT SERPL-MCNC: 6.3 G/DL (ref 6.4–8.4)
RBC # BLD AUTO: 3.54 MILLION/UL (ref 3.81–5.12)
SODIUM SERPL-SCNC: 142 MMOL/L (ref 135–147)
WBC # BLD AUTO: 5.55 THOUSAND/UL (ref 4.31–10.16)

## 2023-10-09 PROCEDURE — 82805 BLOOD GASES W/O2 SATURATION: CPT | Performed by: PHYSICIAN ASSISTANT

## 2023-10-09 PROCEDURE — 99232 SBSQ HOSP IP/OBS MODERATE 35: CPT | Performed by: PHYSICIAN ASSISTANT

## 2023-10-09 PROCEDURE — 94660 CPAP INITIATION&MGMT: CPT

## 2023-10-09 PROCEDURE — 80053 COMPREHEN METABOLIC PANEL: CPT | Performed by: PHYSICIAN ASSISTANT

## 2023-10-09 PROCEDURE — 94760 N-INVAS EAR/PLS OXIMETRY 1: CPT

## 2023-10-09 PROCEDURE — 85027 COMPLETE CBC AUTOMATED: CPT | Performed by: PHYSICIAN ASSISTANT

## 2023-10-09 RX ADMIN — Medication: at 18:26

## 2023-10-09 RX ADMIN — Medication 1 APPLICATION: at 09:48

## 2023-10-09 RX ADMIN — POTASSIUM CHLORIDE 40 MEQ: 1500 TABLET, EXTENDED RELEASE ORAL at 09:47

## 2023-10-09 RX ADMIN — METHOCARBAMOL 500 MG: 500 TABLET ORAL at 09:46

## 2023-10-09 RX ADMIN — RIVAROXABAN 20 MG: 20 TABLET, FILM COATED ORAL at 18:26

## 2023-10-09 RX ADMIN — LORATADINE 5 MG: 10 TABLET ORAL at 09:46

## 2023-10-09 RX ADMIN — METOPROLOL TARTRATE 25 MG: 25 TABLET, FILM COATED ORAL at 18:26

## 2023-10-09 RX ADMIN — METHOCARBAMOL 500 MG: 500 TABLET ORAL at 18:25

## 2023-10-09 RX ADMIN — TORSEMIDE 20 MG: 20 TABLET ORAL at 09:46

## 2023-10-09 RX ADMIN — METOPROLOL TARTRATE 25 MG: 25 TABLET, FILM COATED ORAL at 09:47

## 2023-10-09 NOTE — RESPIRATORY THERAPY NOTE
10/09/23 0100   Respiratory Assessment   Assessment Type Assess only   General Appearance Drowsy   Respiratory Pattern Normal   Chest Assessment Chest expansion symmetrical   Bilateral Breath Sounds Diminished   Location Specific No   Cough None   Resp Comments Pt placed on bipap at this time   O2 Device V60   Subjective Data +   Non-Invasive Information   O2 Interface Device Face mask   Non-Invasive Ventilation Mode BiPAP   $ Intermittent NIV Yes   SpO2 96 %   $ Pulse Oximetry Spot Check Charge Completed   Non-Invasive Settings   IPAP (cm) 20 cm   EPAP (cm) 8 cm   Rate (Set) 20   FiO2 (%) 70   Pressure Support (cm H2O) 12   Rise Time 2   Inspiratory Time (Set) 1   Non-Invasive Readings   Skin Intervention Skin intact   Total Rate 22   Vt (mL) (Mech) 478   MV (Mech) 13   Peak Pressure (Obs) 20   Spontaneous Vt (mL) 410   Leak (lpm) 0   Spontaneous MV (mL) 12   Non-Invasive Alarms   Insp Pressure High (cm H20) 35   Insp Pressure Low (cm H20) 6   Low Insp Pressure Time (sec) 20 sec   MV Low (L/min) 3   Vt High (mL) 1150   Vt Low (mL) 150   High Resp Rate (BPM) 40 BPM   Low Resp Rate (BPM) 8 BPM   Apnea Interval (sec) 20   Apnea Rate 12     RT Ventilator Management Note  Sang Kate 76 y.o. female MRN: [id number]  Unit/Bed#: [facility]-01 Encounter: [id number]      Daily Screen    No data found in the last 10 encounters.            Physical Exam:   Assessment Type: Assess only  General Appearance: Drowsy  Respiratory Pattern: Normal  Chest Assessment: Chest expansion symmetrical  Bilateral Breath Sounds: Diminished  Location Specific: No  Cough: None  O2 Device: V60  Subjective Data: +      Resp Comments: Pt placed on bipap at this time

## 2023-10-09 NOTE — PROGRESS NOTES
[address]  Progress Note  Name: Davy Koch  MRN: [id number]  Unit/Bed#: -01 I Date of Admission: 9/28/2023   Date of Service: 10/9/2023 I Hospital Day: 11     Assessment/Plan   * Acute on chronic respiratory failure with hypoxia and hypercapnia (HCC)  Assessment & Plan  Presented to hospital with AMS and respiratory distress; requiring ICU for ventilatory support with continuous BiPAP   • BiPAP qHS and any other hours of sleep during daytime  • Continue to wean midflow NC as able  • Goal SpO2 >88%   • BMP/VBG intermittently to monitor progress  • Continue respiratory protocol  • Pulmonary consulted - following prn at this time   • Goals of care discussion with family on 10/5, determined to continue FULL CODE status  •  states that BiPAP has been delivered to home    Acute encephalopathy  Assessment & Plan  Metabolic encephalopathy present on admission, a/e/b lethargy, disorientation, poor safety awareness and impulsivity. Improved with BiPAP use and monitored by neuro checks, fall and delirium precautions, sleep hygiene   · Likely secondary to acute on chronic hypoxia with hypercapnia. · No infectious etiology identified    Class 3 severe obesity with body mass index (BMI) greater than or equal to 70 in adult [facility])  Assessment & Plan  Body mass index is 81.83 kg/m².  Super morbidly obese  • Continued recommendation to work towards weight loss and improved exercise and dietary habits as able   • PT/OT to work with patient to increase mobility, reports she is ambulatory at home with assistance     Obesity hypoventilation syndrome ([address])  Assessment & Plan  · Continue to manage with supplemental O2 support and BiPAP use for sleep      Elevated troponin-resolved as of 10/9/2023  Assessment & Plan  Elevated on presentation, no complaint of chest pain and EKG without ischemia  • Down-trended in ICU   • Echo without regional wall motion abnormalities  • Suspect non-cardiac elevation in the setting of severe respiratory distress with hypoxia/hypercapnia  • Given no current cardiac symptoms, defer any additional workup to outpatient provider     Atrial fibrillation [facility])  Assessment & Plan  • Outside records note paroxysmal atrial fibrillation on Xarelto  • Pt noted to go into afib with RVR 9/30 in ICU, s/p metoprolol   • Now continues to be in NSR on auscultation  • Xarelto restarted by ICU team  • Family reports that she had vaginal bleeding previously while on Xarelto - unable to obtain transvaginal US while admitted due to body habitus, consider GYN consult if bleeding restarts  • Presently hemodynamically stable, hgb stable without definitive s/sx of bleeding     Elevated brain natriuretic peptide (BNP) level  Assessment & Plan  · Does not appear overtly volume overloaded however CXR revealing pulmonary vascular congestion  · S/p several days of IV bumex with no significant change to O2 requirements  · Transitioned back to torsemide 10/8         VTE Pharmacologic Prophylaxis: VTE Score: 9 High Risk (Score >/= 5) - Pharmacological DVT Prophylaxis Ordered: rivaroxaban (Xarelto). Sequential Compression Devices Ordered. Patient Centered Rounds: I evaluated the patient without nursing staff present due to RN unavailable; treatment plan discussed independent of examination  Discussions with Specialists or Other Care Team Provider:     Education and Discussions with Family / Patient: family to be present later; will update if requested. Total Time Spent on Date of Encounter in care of patient: 38 mins. This time was spent on one or more of the following: performing physical exam; counseling and coordination of care; obtaining or reviewing history; documenting in the medical record; reviewing/ordering tests, medications or procedures; communicating with other healthcare professionals and discussing with patient's family/caregivers.     Current Length of Stay: 11 day(s)  Current Patient Status: Inpatient   Certification Statement: The patient will continue to require additional inpatient hospital stay due to continued high O2 demand and hypercapnia requiring BiPAP and continued lab monitoring  Discharge Plan: Anticipate discharge in >72 hrs to D pending clinical course    Code Status: Level 1 - Full Code    Subjective:   Patient seen sleeping; per nurse was sleeping earlier off BiPAP and found to be hypoxic 60s. She denies any worsening respiratory symptoms, chest pain, or shortness of breath. Her complaint today is mainly of the leg pain, laterally. Objective:     Vitals:   Temp (24hrs), Av °F (36.7 °C), Min:97.8 °F (36.6 °C), Max:98.2 °F (36.8 °C)    Temp:  [97.8 °F (36.6 °C)-98.2 °F (36.8 °C)] 98.2 °F (36.8 °C)  HR:  [65-70] 70  Resp:  [14-22] 14  BP: (112-149)/(54-69) 146/69  SpO2:  [95 %-96 %] 96 %  Body mass index is 81.83 kg/m². Input and Output Summary (last 24 hours): Intake/Output Summary (Last 24 hours) at 10/9/2023 0815  Last data filed at 10/8/2023 1645  Gross per 24 hour   Intake --   Output 2500 ml   Net -2500 ml       Physical Exam:   Physical Exam  Vitals and nursing note reviewed. Constitutional:       General: She is awake. She is not in acute distress. Appearance: She is well-developed. She is obese. Cardiovascular:      Rate and Rhythm: Normal rate and regular rhythm. Heart sounds: Normal heart sounds, S1 normal and S2 normal. No murmur heard. Pulmonary:      Effort: Pulmonary effort is normal. No respiratory distress. Breath sounds: Normal breath sounds. Abdominal:      General: Abdomen is protuberant. Bowel sounds are normal. There is no distension. Palpations: Abdomen is soft. Neurological:      Mental Status: She is alert and oriented to person, place, and time. Psychiatric:         Mood and Affect: Mood normal.         Behavior: Behavior normal. Behavior is cooperative.          Additional Data:     Labs:  Results from last 7 days   Lab Units 10/09/23  0613 10/04/23  0456 10/03/23  1446   WBC Thousand/uL 5.55   < > 5.61   HEMOGLOBIN g/dL 9.6*   < > 9.6*   HEMATOCRIT % 33.4*   < > 33.6*   PLATELETS Thousands/uL 217   < > 167   NEUTROS PCT %  --   --  62   LYMPHS PCT %  --   --  20   MONOS PCT %  --   --  9   EOS PCT %  --   --  8*    < > = values in this interval not displayed. Results from last 7 days   Lab Units 10/09/23  0613 10/08/23  0801 10/07/23  0641   SODIUM mmol/L 142   < > 140   POTASSIUM mmol/L 4.2   < > 3.8   CHLORIDE mmol/L 94*   < > 93*   CO2 mmol/L >45*   < > 44*   BUN mg/dL 9   < > 7   CREATININE mg/dL 0.52*   < > 0.45*   ANION GAP mmol/L  --   --  3   CALCIUM mg/dL 8.6   < > 8.6   ALBUMIN g/dL 2.9*  --   --    TOTAL BILIRUBIN mg/dL 0.44  --   --    ALK PHOS U/L 63  --   --    ALT U/L 6*  --   --    AST U/L 10*  --   --    GLUCOSE RANDOM mg/dL 83   < > 96    < > = values in this interval not displayed. Lines/Drains:  Invasive Devices     Peripheral Intravenous Line  Duration           Peripheral IV 10/04/23 Right Antecubital 5 days          Drain  Duration           External Urinary Catheter 8 days                Imaging: No pertinent imaging reviewed.     Recent Cultures (last 7 days):         Last 24 Hours Medication List:   Current Facility-Administered Medications   Medication Dose Route Frequency Provider Last Rate   • acetaminophen  975 mg Oral Q6H PRN Zeyad Harvinder, CRNP     • ammonium lactate   Topical BID Zeyad Braidwood, CRNP     • hydrocortisone   Topical 4x Daily PRN Zeyad Harvinder, CRNP     • loratadine  5 mg Oral Daily Zeyad Braidwood, CRNP     • methocarbamol  500 mg Oral Q6H PRN Zeyad Braidwood, CRNP     • metoprolol tartrate  25 mg Oral BID Zeyad Braidwood, CRNP     • ondansetron  4 mg Intravenous Q6H PRN Zeyad Braidwood, CRNP     • potassium chloride  40 mEq Oral Daily Vera Israel PA-C      And   • potassium chloride  20 mEq Oral Daily Yu Ortiz PA-C     • rivaroxaban  20 mg Oral Daily With [employer], CRNP     • torsemide  20 mg Oral Daily Kishore Cummins PA-C          Today, Patient Was Seen By: Kishore Cummins PA-C    **Please Note: This note may have been constructed using a voice recognition system. **

## 2023-10-09 NOTE — PLAN OF CARE
Problem: Prexisting or High Potential for Compromised Skin Integrity  Goal: Skin integrity is maintained or improved  Description: INTERVENTIONS:  - Identify patients at risk for skin breakdown  - Assess and monitor skin integrity  - Assess and monitor nutrition and hydration status  - Monitor labs   - Assess for incontinence   - Turn and reposition patient  - Assist with mobility/ambulation  - Relieve pressure over bony prominences  - Avoid friction and shearing  - Provide appropriate hygiene as needed including keeping skin clean and dry  - Evaluate need for skin moisturizer/barrier cream  - Collaborate with interdisciplinary team   - Patient/family teaching  - Consider wound care consult   Outcome: Progressing     Problem: MOBILITY - ADULT  Goal: Maintain or return to baseline ADL function  Description: INTERVENTIONS:  -  Assess patient's ability to carry out ADLs; assess patient's baseline for ADL function and identify physical deficits which impact ability to perform ADLs (bathing, care of mouth/teeth, toileting, grooming, dressing, etc.)  - Assess/evaluate cause of self-care deficits   - Assess range of motion  - Assess patient's mobility; develop plan if impaired  - Assess patient's need for assistive devices and provide as appropriate  - Encourage maximum independence but intervene and supervise when necessary  - Involve family in performance of ADLs  - Assess for home care needs following discharge   - Consider OT consult to assist with ADL evaluation and planning for discharge  - Provide patient education as appropriate  Outcome: Progressing  Goal: Maintains/Returns to pre admission functional level  Description: INTERVENTIONS:  - Perform BMAT or MOVE assessment daily.   - Set and communicate daily mobility goal to care team and patient/family/caregiver.    - Collaborate with rehabilitation services on mobility goals if consulted  - Out of bed for toileting  - Record patient progress and toleration of activity level   Outcome: Progressing       Problem: Knowledge Deficit  Goal: Patient/family/caregiver demonstrates understanding of disease process, treatment plan, medications, and discharge instructions  Description: Complete learning assessment and assess knowledge base.   Interventions:  - Provide teaching at level of understanding  - Provide teaching via preferred learning methods  Outcome: Progressing

## 2023-10-09 NOTE — UTILIZATION REVIEW
Continued Stay Review    Date: 10/8                          Current Patient Class: IP   Current Level of Care: MS    HPI:68 y.o. female initially admitted on  9/28    Assessment/Plan:  Cont stay for hypercapnia . Cont monitoring of labs and weaning O2 .   On 12l NC , + 2 BLE edema     Vital Signs:   10/08/23 23:39:44 -- -- -- 123/56 78 -- -- -- -- -- --   10/08/23 2007 -- -- -- -- -- 95 % 50 12 L/min Mid flow nasal cannula -- --   10/08/23 17:57:34 -- -- -- 149/64 92 -- -- -- -- -- --   10/08/23 1755 -- 70 -- 149/64 -- -- -- -- -- -- --   10/08/23 15:39:52 97.8 °F (36.6 °C) 65 22 112/54 73 -- -- -- -- -- Lying   10/08/23 1539 -- -- -- -- -- 95 % -- 12 L/min Mid flow nasal cannula -- --   10/08/23 0803 -- -- -- -- -- -- 50 12 L/min Mid flow nasal cannula -- --   10/08/23 0800 -- -- -- -- -- 96 % -- 12 L/min Mid flow nasal cannula -- --   10/08/23 0757 97.4 °F (36.3 °C) Abnormal  94 22 126/51 76 -- -- -- -- -- Lying   10/08/23 0109 -- -- -- -- -- 98 % -- -- -- Face mask --   10/08/23 00:23:31 98.7 °F (37.1 °C) 69 23 Abnormal  113/72 86 98 % -- -- High flow nasal            Pertinent Labs/Diagnostic Results:       Results from last 7 days   Lab Units 10/07/23  0641 10/05/23  0719 10/04/23  0456 10/03/23  1446   WBC Thousand/uL 6.53 5.54 5.67 5.61   HEMOGLOBIN g/dL 9.7* 9.3* 9.0* 9.6*   HEMATOCRIT % 33.8* 32.4* 31.6* 33.6*   PLATELETS Thousands/uL 203 183 194 167   NEUTROS ABS Thousands/µL  --   --   --  3.45         Results from last 7 days   Lab Units 10/08/23  0801 10/07/23  0641 10/06/23  0638 10/05/23  0719 10/04/23  0456 10/03/23  1446   SODIUM mmol/L 141 140 142 143   < > 140   POTASSIUM mmol/L 4.0 3.8 3.8 3.5   < > 3.6   CHLORIDE mmol/L 93* 93* 91* 92*   < > 93*   CO2 mmol/L >45* 44* >45* >45*   < > 42*   ANION GAP mmol/L  --  3  --   --   --  5   BUN mg/dL 8 7 8 8   < > 11   CREATININE mg/dL 0.46* 0.45* 0.48* 0.44*   < > 0.57*   EGFR ml/min/1.73sq m 102 103 101 104   < > 95   CALCIUM mg/dL 8.6 8.6 8.9 8.6   < > 8. 6   MAGNESIUM mg/dL 2.1  --   --  1.9  --  2.0   PHOSPHORUS mg/dL  --   --   --   --   --  2.6    < > = values in this interval not displayed. Results from last 7 days   Lab Units 10/08/23  0801 10/07/23  0641 10/06/23  1858 10/05/23  0719 10/04/23  0456 10/03/23  1446   GLUCOSE RANDOM mg/dL 91 96 87 84 86 158*     Results from last 7 days   Lab Units 10/06/23  0638 10/05/23  0719   PH RANDY  7.404* 7.400   PCO2 RANDY mm Hg 75.1* 78.3*   PO2 RANDY mm Hg 85.4* 84.8*   HCO3 RANDY mmol/L 45.9* 47.4*   BASE EXC RANDY mmol/L 17.8 19.3   O2 CONTENT RANDY ml/dL 15.4 14.1   O2 HGB, VENOUS % 94.1* 94.7*     Medications:   Scheduled Medications:  ammonium lactate, , Topical, BID  loratadine, 5 mg, Oral, Daily  metoprolol tartrate, 25 mg, Oral, BID  potassium chloride, 40 mEq, Oral, Daily   And  potassium chloride, 20 mEq, Oral, Daily  rivaroxaban, 20 mg, Oral, Daily With Dinner  torsemide, 20 mg, Oral, Daily      Continuous IV Infusions:     PRN Meds:  acetaminophen, 975 mg, Oral, Q6H PRN  hydrocortisone, , Topical, 4x Daily PRN  methocarbamol, 500 mg, Oral, Q6H PRN  ondansetron, 4 mg, Intravenous, Q6H PRN        Discharge Plan: [facility]     Network Utilization Review Department  ATTENTION: Please call with any questions or concerns to [phone] and carefully listen to the prompts so that you are directed to the right person. All voicemails are confidential.   For Discharge needs, contact Care Management DC Support Team at [phone] opt. 2  Send all requests for admission clinical reviews, approved or denied determinations and any other requests to dedicated fax number below belonging to the campus where the patient is receiving treatment.  List of dedicated fax numbers for the Facilities:  [place] DENIALS (Administrative/Medical Necessity) [phone]   DISCHARGE SUPPORT TEAM (NETWORK) [id number] [address]ao (Maternity/NICU/Pediatrics) [address] [place] [address] Page [place] [phone]   [facility] [address] [facility] [phone] [facility] [facility] [address] [facility] [address] [place] [address] [phone]   [address] [address] [phone]

## 2023-10-09 NOTE — RESPIRATORY THERAPY NOTE
10/09/23 1403   Respiratory Assessment   Assessment Type Assess only   General Appearance Drowsy   Respiratory Pattern Normal   Chest Assessment Chest expansion symmetrical   Bilateral Breath Sounds Diminished   Resp Comments pt req to go on bipap at this time so she could rest.   O2 Device bipap   Non-Invasive Information   O2 Interface Device Face mask   Non-Invasive Ventilation Mode BiPAP   $ Intermittent NIV Yes   SpO2 95 %   $ Pulse Oximetry Spot Check Charge Completed   Non-Invasive Settings   IPAP (cm) 20 cm   EPAP (cm) 8 cm   Rate (Set) 20   FiO2 (%) 70   Pressure Support (cm H2O) 12   Rise Time 2   Inspiratory Time (Set) 1   Non-Invasive Readings   Skin Intervention Skin intact   Total Rate 29   Peak Pressure (Obs) 22   Spontaneous Vt (mL) 550   Spontaneous MV (mL) 16.1   Non-Invasive Alarms   Insp Pressure High (cm H20) 35   Insp Pressure Low (cm H20) 6   Low Insp Pressure Time (sec) 20 sec   MV Low (L/min) 3   Vt High (mL) 1150   Vt Low (mL) 150   High Resp Rate (BPM) 40 BPM   Low Resp Rate (BPM) 8 BPM   Apnea Interval (sec) 20   Maintenance   Water bag changed No

## 2023-10-09 NOTE — PROGRESS NOTES
[address]  Progress Note  Name: Maisha Jimenez  MRN: [id number]  Unit/Bed#: -01 I Date of Admission: 9/28/2023   Date of Service: 10/8/2023 I Hospital Day: 10     Assessment/Plan   * Acute on chronic respiratory failure with hypoxia and hypercapnia (HCC)  Assessment & Plan  Presented to hospital with AMS and respiratory distress; requiring ICU for ventilatory support with continuous BiPAP   • BiPAP qHS and any other hours of sleep during daytime  • Continue to wean midflow NC as able  • Goal SpO2 >88%   • BMP/VBG intermittently to monitor progress  • Continue respiratory protocol  • Pulmonary consulted - following prn at this time   • Goals of care discussion with family on 10/5, determined to continue FULL CODE status  •  states that BiPAP has been delivered to home    Acute encephalopathy  Assessment & Plan  Metabolic encephalopathy present on admission, a/e/b lethargy, disorientation, poor safety awareness and impulsivity. Improved with BiPAP use and monitored by neuro checks, fall and delirium precautions, sleep hygiene   · Likely secondary to acute on chronic hypoxia with hypercapnia. · No infectious etiology identified    Class 3 severe obesity with body mass index (BMI) greater than or equal to 70 in adult [facility])  Assessment & Plan  Body mass index is 81.83 kg/m².  Super morbidly obese  • Continued recommendation to work towards weight loss and improved exercise and dietary habits as able   • PT/OT to work with patient to increase mobility, reports she is ambulatory at home with assistance     Obesity hypoventilation syndrome ([address])  Assessment & Plan  · Continue to manage with supplemental O2 support and BiPAP use for sleep        Atrial fibrillation [facility])  Assessment & Plan  • Outside records note paroxysmal atrial fibrillation on Xarelto  • Pt noted to go into afib with RVR 9/30 in ICU, s/p metoprolol   • Now continues to be in NSR on auscultation  • Xarelto restarted by ICU team  • Family reports that she had vaginal bleeding previously while on Xarelto - unable to obtain transvaginal US while admitted due to body habitus, consider GYN consult if bleeding restarts  • Presently hemodynamically stable, hgb stable without definitive s/sx of bleeding     Elevated brain natriuretic peptide (BNP) level  Assessment & Plan  · Does not appear overtly volume overloaded however CXR revealing pulmonary vascular congestion  · S/p several days of IV bumex with no significant change to O2 requirements  · Transition back to torsemide 10/8        VTE Pharmacologic Prophylaxis: VTE Score: 9 High Risk (Score >/= 5) - Pharmacological DVT Prophylaxis Ordered: rivaroxaban (Xarelto). Sequential Compression Devices Ordered. Patient Centered Rounds: I evaluated the patient without nursing staff present due to RN unavailable; treatment plan discussed independent of examination  Discussions with Specialists or Other Care Team Provider:     Education and Discussions with Family / Patient: Updated  () via phone. Per patient request      Total Time Spent on Date of Encounter in care of patient: 38 mins. This time was spent on one or more of the following: performing physical exam; counseling and coordination of care; obtaining or reviewing history; documenting in the medical record; reviewing/ordering tests, medications or procedures; communicating with other healthcare professionals and discussing with patient's family/caregivers.     Current Length of Stay: 11 day(s)  Current Patient Status: Inpatient   Certification Statement: The patient will continue to require additional inpatient hospital stay due to continued high O2 demand and hypercapnia requiring BiPAP and continued lab monitoring  Discharge Plan: Anticipate discharge in >72 hrs to D pending clinical course    Code Status: Level 1 - Full Code    Subjective:   Patient seen sitting up in bed, reports feeling well today. We discussed improvement in her respiratory status making her more stable for ambulation attempts. Will consult PT to re-eval tomorrow. Otherwise, reviewed labs and discussed further O2 weaning process. Objective:     Vitals:   Temp (24hrs), Av °F (36.7 °C), Min:97.8 °F (36.6 °C), Max:98.2 °F (36.8 °C)    Temp:  [97.8 °F (36.6 °C)-98.2 °F (36.8 °C)] 98.2 °F (36.8 °C)  HR:  [65-70] 70  Resp:  [14-22] 14  BP: (112-149)/(54-69) 146/69  SpO2:  [95 %-96 %] 96 %  Body mass index is 81.83 kg/m². Input and Output Summary (last 24 hours): Intake/Output Summary (Last 24 hours) at 10/9/2023 0925  Last data filed at 10/8/2023 1645  Gross per 24 hour   Intake --   Output 2500 ml   Net -2500 ml       Physical Exam:   Physical Exam  Vitals and nursing note reviewed. Constitutional:       General: She is awake. Appearance: She is well-developed. She is morbidly obese. HENT:      Mouth/Throat:      Mouth: Mucous membranes are dry. Pharynx: Oropharynx is clear. Cardiovascular:      Rate and Rhythm: Normal rate. Pulmonary:      Effort: Pulmonary effort is normal. No respiratory distress. Breath sounds: No wheezing. Comments: Currently doing well on 10 L via NC; willing to attempt further wean. Conversational dyspnea has significantly improved since first arriving to the medsurge floor  Abdominal:      General: Abdomen is protuberant. Neurological:      Mental Status: She is alert and oriented to person, place, and time. Psychiatric:         Mood and Affect: Mood normal.         Behavior: Behavior normal. Behavior is cooperative.          Additional Data:     Labs:  Results from last 7 days   Lab Units 10/09/23  0613 10/04/23  0456 10/03/23  1446   WBC Thousand/uL 5.55   < > 5.61   HEMOGLOBIN g/dL 9.6*   < > 9.6*   HEMATOCRIT % 33.4*   < > 33.6*   PLATELETS Thousands/uL 217   < > 167   NEUTROS PCT %  --   --  62   LYMPHS PCT %  --   --  20   MONOS PCT %  --   --  9   EOS PCT %  -- --  8*    < > = values in this interval not displayed. Results from last 7 days   Lab Units 10/09/23  0613 10/08/23  0801 10/07/23  0641   SODIUM mmol/L 142   < > 140   POTASSIUM mmol/L 4.2   < > 3.8   CHLORIDE mmol/L 94*   < > 93*   CO2 mmol/L >45*   < > 44*   BUN mg/dL 9   < > 7   CREATININE mg/dL 0.52*   < > 0.45*   ANION GAP mmol/L  --   --  3   CALCIUM mg/dL 8.6   < > 8.6   ALBUMIN g/dL 2.9*  --   --    TOTAL BILIRUBIN mg/dL 0.44  --   --    ALK PHOS U/L 63  --   --    ALT U/L 6*  --   --    AST U/L 10*  --   --    GLUCOSE RANDOM mg/dL 83   < > 96    < > = values in this interval not displayed. Lines/Drains:  Invasive Devices     Peripheral Intravenous Line  Duration           Peripheral IV 10/04/23 Right Antecubital 5 days          Drain  Duration           External Urinary Catheter 8 days                Imaging: No pertinent imaging reviewed. Recent Cultures (last 7 days):         Last 24 Hours Medication List:   Current Facility-Administered Medications   Medication Dose Route Frequency Provider Last Rate   • acetaminophen  975 mg Oral Q6H PRN Lara Patch, CRNP     • ammonium lactate   Topical BID Lara Patch, CRNP     • hydrocortisone   Topical 4x Daily PRN Lara Patch, CRNP     • loratadine  5 mg Oral Daily Lara Patch, CRNP     • methocarbamol  500 mg Oral Q6H PRN Lara Patch, CRNP     • metoprolol tartrate  25 mg Oral BID Lara Patch, CRNP     • ondansetron  4 mg Intravenous Q6H PRN Lara Patch, CRNP     • potassium chloride  40 mEq Oral Daily Vera Israel PA-C      And   • potassium chloride  20 mEq Oral Daily Vera Israel PA-C     • rivaroxaban  20 mg Oral Daily With Dinner Lara Patch, CRNP     • torsemide  20 mg Oral Daily Sony Botello PA-C          Today, Patient Was Seen By: Sony Botello PA-C    **Please Note: This note may have been constructed using a voice recognition system. **

## 2023-10-09 NOTE — PLAN OF CARE
Problem: Prexisting or High Potential for Compromised Skin Integrity  Goal: Skin integrity is maintained or improved  Description: INTERVENTIONS:  - Identify patients at risk for skin breakdown  - Assess and monitor skin integrity  - Assess and monitor nutrition and hydration status  - Monitor labs   - Assess for incontinence   - Turn and reposition patient  - Assist with mobility/ambulation  - Relieve pressure over bony prominences  - Avoid friction and shearing  - Provide appropriate hygiene as needed including keeping skin clean and dry  - Evaluate need for skin moisturizer/barrier cream  - Collaborate with interdisciplinary team   - Patient/family teaching  - Consider wound care consult   Outcome: Progressing     Problem: MOBILITY - ADULT  Goal: Maintain or return to baseline ADL function  Description: INTERVENTIONS:  -  Assess patient's ability to carry out ADLs; assess patient's baseline for ADL function and identify physical deficits which impact ability to perform ADLs (bathing, care of mouth/teeth, toileting, grooming, dressing, etc.)  - Assess/evaluate cause of self-care deficits   - Assess range of motion  - Assess patient's mobility; develop plan if impaired  - Assess patient's need for assistive devices and provide as appropriate  - Encourage maximum independence but intervene and supervise when necessary  - Involve family in performance of ADLs  - Assess for home care needs following discharge   - Consider OT consult to assist with ADL evaluation and planning for discharge  - Provide patient education as appropriate  Outcome: Progressing  Goal: Maintains/Returns to pre admission functional level  Description: INTERVENTIONS:  - Perform BMAT or MOVE assessment daily.   - Set and communicate daily mobility goal to care team and patient/family/caregiver.    - Collaborate with rehabilitation services on mobility goals if consulted  - Out of bed for toileting  - Record patient progress and toleration of activity level   Outcome: Progressing     Problem: PAIN - ADULT  Goal: Verbalizes/displays adequate comfort level or baseline comfort level  Description: Interventions:  - Encourage patient to monitor pain and request assistance  - Assess pain using appropriate pain scale  - Administer analgesics based on type and severity of pain and evaluate response  - Implement non-pharmacological measures as appropriate and evaluate response  - Consider cultural and social influences on pain and pain management  - Notify physician/advanced practitioner if interventions unsuccessful or patient reports new pain  Outcome: Progressing     Problem: INFECTION - ADULT  Goal: Absence or prevention of progression during hospitalization  Description: INTERVENTIONS:  - Assess and monitor for signs and symptoms of infection  - Monitor lab/diagnostic results  - Monitor all insertion sites, i.e. indwelling lines, tubes, and drains  - Monitor endotracheal if appropriate and nasal secretions for changes in amount and color  - [place] appropriate cooling/warming therapies per order  - Administer medications as ordered  - Instruct and encourage patient and family to use good hand hygiene technique  - Identify and instruct in appropriate isolation precautions for identified infection/condition  Outcome: Progressing     Problem: SAFETY ADULT  Goal: Maintain or return to baseline ADL function  Description: INTERVENTIONS:  -  Assess patient's ability to carry out ADLs; assess patient's baseline for ADL function and identify physical deficits which impact ability to perform ADLs (bathing, care of mouth/teeth, toileting, grooming, dressing, etc.)  - Assess/evaluate cause of self-care deficits   - Assess range of motion  - Assess patient's mobility; develop plan if impaired  - Assess patient's need for assistive devices and provide as appropriate  - Encourage maximum independence but intervene and supervise when necessary  - Involve family in performance of ADLs  - Assess for home care needs following discharge   - Consider OT consult to assist with ADL evaluation and planning for discharge  - Provide patient education as appropriate  Outcome: Progressing  Goal: Maintains/Returns to pre admission functional level  Description: INTERVENTIONS:  - Perform BMAT or MOVE assessment daily.   - Set and communicate daily mobility goal to care team and patient/family/caregiver.    - Collaborate with rehabilitation services on mobility goals if consulted  - Out of bed for toileting  - Record patient progress and toleration of activity level   Outcome: Progressing  Goal: Patient will remain free of falls  Description: INTERVENTIONS:  - Educate patient/family on patient safety including physical limitations  - Instruct patient to call for assistance with activity   - Consult OT/PT to assist with strengthening/mobility   - Keep Call bell within reach  - Keep bed low and locked with side rails adjusted as appropriate  - Keep care items and personal belongings within reach  - Initiate and maintain comfort rounds  - Make Fall Risk Sign visible to staff  - Apply yellow socks and bracelet for high fall risk patients  - Consider moving patient to room near nurses station  Outcome: Progressing     Problem: DISCHARGE PLANNING  Goal: Discharge to home or other facility with appropriate resources  Description: INTERVENTIONS:  - Identify barriers to discharge w/patient and caregiver  - Arrange for needed discharge resources and transportation as appropriate  - Identify discharge learning needs (meds, wound care, etc.)  - Arrange for interpretive services to assist at discharge as needed  - Refer to Case Management Department for coordinating discharge planning if the patient needs post-hospital services based on physician/advanced practitioner order or complex needs related to functional status, cognitive ability, or social support system  Outcome: Progressing     Problem: Knowledge Deficit  Goal: Patient/family/caregiver demonstrates understanding of disease process, treatment plan, medications, and discharge instructions  Description: Complete learning assessment and assess knowledge base.   Interventions:  - Provide teaching at level of understanding  - Provide teaching via preferred learning methods  Outcome: Progressing     Problem: RESPIRATORY - ADULT  Goal: Achieves optimal ventilation and oxygenation  Description: INTERVENTIONS:  - Assess for changes in respiratory status  - Assess for changes in mentation and behavior  - Position to facilitate oxygenation and minimize respiratory effort  - Oxygen administered by appropriate delivery if ordered  - Initiate smoking cessation education as indicated  - Encourage broncho-pulmonary hygiene including cough, deep breathe, Incentive Spirometry  - Assess the need for suctioning and aspirate as needed  - Assess and instruct to report SOB or any respiratory difficulty  - Respiratory Therapy support as indicated  Outcome: Progressing

## 2023-10-09 NOTE — CASE MANAGEMENT
Case Management Progress Note    Patient name Sarah Lemus  Location /-50 MRN [id number]  : 1955 Date 10/9/2023       LOS (days): 11  Geometric Mean LOS (GMLOS) (days): 3.60  Days to GMLOS:-7.8        OBJECTIVE:        Current admission status: Inpatient  Preferred Pharmacy:   [facility]/pharmacy #[id number]- [address] - [address]  [address] [place], [place] [address]  Phone: [phone] Fax: [phone]    Primary Care Provider: Abe Rai DO    Primary Insurance: [employer]  Secondary Insurance:     PROGRESS NOTE:    Per rounding with SLIM, patient is still on 12L of O2 and continuing to wean as tolerated. Discharge is TBD at this time. CM department will continue to follow patient through discharge.

## 2023-10-10 ENCOUNTER — APPOINTMENT (INPATIENT)
Dept: RADIOLOGY | Facility: HOSPITAL | Age: 68
DRG: 189 | End: 2023-10-10
Payer: COMMERCIAL

## 2023-10-10 PROCEDURE — 97530 THERAPEUTIC ACTIVITIES: CPT

## 2023-10-10 PROCEDURE — 94003 VENT MGMT INPAT SUBQ DAY: CPT

## 2023-10-10 PROCEDURE — 93005 ELECTROCARDIOGRAM TRACING: CPT

## 2023-10-10 PROCEDURE — 71045 X-RAY EXAM CHEST 1 VIEW: CPT

## 2023-10-10 PROCEDURE — 94660 CPAP INITIATION&MGMT: CPT

## 2023-10-10 PROCEDURE — 94760 N-INVAS EAR/PLS OXIMETRY 1: CPT

## 2023-10-10 PROCEDURE — 99232 SBSQ HOSP IP/OBS MODERATE 35: CPT | Performed by: NURSE PRACTITIONER

## 2023-10-10 RX ADMIN — TORSEMIDE 20 MG: 20 TABLET ORAL at 09:45

## 2023-10-10 RX ADMIN — LORATADINE 5 MG: 10 TABLET ORAL at 09:45

## 2023-10-10 RX ADMIN — METHOCARBAMOL 500 MG: 500 TABLET ORAL at 18:06

## 2023-10-10 RX ADMIN — Medication: at 18:07

## 2023-10-10 RX ADMIN — METHOCARBAMOL 500 MG: 500 TABLET ORAL at 09:49

## 2023-10-10 RX ADMIN — POTASSIUM CHLORIDE 40 MEQ: 1500 TABLET, EXTENDED RELEASE ORAL at 09:45

## 2023-10-10 RX ADMIN — RIVAROXABAN 20 MG: 20 TABLET, FILM COATED ORAL at 18:06

## 2023-10-10 RX ADMIN — METOPROLOL TARTRATE 25 MG: 25 TABLET, FILM COATED ORAL at 09:45

## 2023-10-10 RX ADMIN — Medication 1 APPLICATION: at 09:46

## 2023-10-10 RX ADMIN — METOPROLOL TARTRATE 25 MG: 25 TABLET, FILM COATED ORAL at 18:06

## 2023-10-10 NOTE — RESPIRATORY THERAPY NOTE
RT Ventilator Management Note  Helayne Ahr 76 y.o. female MRN: [id number]  Unit/Bed#: -01 Encounter: [id number]      Daily Screen    No data found in the last 10 encounters.            Physical Exam:   Assessment Type: Assess only  General Appearance: Drowsy  Respiratory Pattern: Assisted, Spontaneous  Chest Assessment: Chest expansion symmetrical  Bilateral Breath Sounds: Diminished      Resp Comments: pt requested to be put on bipap for nap       10/10/23 0751   Respiratory Assessment   Assessment Type Assess only   General Appearance Drowsy   Respiratory Pattern Assisted;Spontaneous   Chest Assessment Chest expansion symmetrical   Bilateral Breath Sounds Diminished   Resp Comments pt requested to be put on bipap for nap   Non-Invasive Information   O2 Interface Device Face mask   Non-Invasive Ventilation Mode BiPAP   $ Continous NIV Subsequent   SpO2 94 %   $ Pulse Oximetry Spot Check Charge Completed   Non-Invasive Settings   IPAP (cm) 20 cm   EPAP (cm) 8 cm   Rate (Set) 20   FiO2 (%) 70   Pressure Support (cm H2O) 12   Rise Time 2   Inspiratory Time (Set) 1   Non-Invasive Readings   Skin Intervention Skin intact   Total Rate 32   Vt (mL) (Mech) 505   Peak Pressure (Obs) 22   Leak (lpm) 0   Spontaneous MV (mL) 16.1   Non-Invasive Alarms   Insp Pressure High (cm H20) 35   Insp Pressure Low (cm H20) 6   Low Insp Pressure Time (sec) 20 sec   MV Low (L/min) 3   Vt High (mL) 1150   Vt Low (mL) 150   High Resp Rate (BPM) 40 BPM   Low Resp Rate (BPM) 8 BPM   Apnea Interval (sec) 20   Apnea Rate 12   Maintenance   Water bag changed No

## 2023-10-10 NOTE — CASE MANAGEMENT
Case Management Discharge Planning Note    Patient name Anton Hilton  Location /-38 MRN [id number]  : 1955 Date 10/10/2023       Current Admission Date: 2023  Current Admission Diagnosis:Acute on chronic respiratory failure with hypoxia and hypercapnia [facility])   Patient Active Problem List    Diagnosis Date Noted   • Acute on chronic respiratory failure with hypoxia and hypercapnia ([address]) 2023   • Acute encephalopathy 2023   • Elevated brain natriuretic peptide (BNP) level 2023   • Iron deficiency anemia 2023   • Atrial fibrillation ([address]) 2023   • Obesity hypoventilation syndrome ([address]) 2023   • Lipodermatosclerosis of both lower extremities 2021   • Class 3 severe obesity with body mass index (BMI) greater than or equal to 70 in adult [facility]) 2021   • Bakers cyst 10/27/2014   • Lymphedema of leg 10/27/2014      LOS (days): 12  Geometric Mean LOS (GMLOS) (days): 3.60  Days to GMLOS:-9     OBJECTIVE:  Risk of Unplanned Readmission Score: 13.97         Current admission status: Inpatient   Preferred Pharmacy:   [facility]/pharmacy #[id number]- [address] - [address]  [address] [facility], [address] [place] [address]  Phone: [phone] Fax: [phone]    Primary Care Provider: Rodolfo Tucker DO    Primary Insurance: [address]  Secondary Insurance:     DISCHARGE DETAILS:     CM met with patient at bedside and informed patient that there are two accepting facilities. [facility] in [address] and [facility]. Patient said they would talk to  about it. Tumor Depth: Less than 6mm from granular layer and no invasion beyond the subcutaneous fat

## 2023-10-10 NOTE — PLAN OF CARE
Problem: Prexisting or High Potential for Compromised Skin Integrity  Goal: Skin integrity is maintained or improved  Description: INTERVENTIONS:  - Identify patients at risk for skin breakdown  - Assess and monitor skin integrity  - Assess and monitor nutrition and hydration status  - Monitor labs   - Assess for incontinence   - Turn and reposition patient  - Assist with mobility/ambulation  - Relieve pressure over bony prominences  - Avoid friction and shearing  - Provide appropriate hygiene as needed including keeping skin clean and dry  - Evaluate need for skin moisturizer/barrier cream  - Collaborate with interdisciplinary team   - Patient/family teaching  - Consider wound care consult   Outcome: Progressing     Problem: MOBILITY - ADULT  Goal: Maintain or return to baseline ADL function  Description: INTERVENTIONS:  -  Assess patient's ability to carry out ADLs; assess patient's baseline for ADL function and identify physical deficits which impact ability to perform ADLs (bathing, care of mouth/teeth, toileting, grooming, dressing, etc.)  - Assess/evaluate cause of self-care deficits   - Assess range of motion  - Assess patient's mobility; develop plan if impaired  - Assess patient's need for assistive devices and provide as appropriate  - Encourage maximum independence but intervene and supervise when necessary  - Involve family in performance of ADLs  - Assess for home care needs following discharge   - Consider OT consult to assist with ADL evaluation and planning for discharge  - Provide patient education as appropriate  Outcome: Progressing  Goal: Maintains/Returns to pre admission functional level  Description: INTERVENTIONS:  - Perform BMAT or MOVE assessment daily.   - Set and communicate daily mobility goal to care team and patient/family/caregiver.    - Collaborate with rehabilitation services on mobility goals if consulted  - Out of bed for toileting  - Record patient progress and toleration of activity level   Outcome: Progressing     Problem: PAIN - ADULT  Goal: Verbalizes/displays adequate comfort level or baseline comfort level  Description: Interventions:  - Encourage patient to monitor pain and request assistance  - Assess pain using appropriate pain scale  - Administer analgesics based on type and severity of pain and evaluate response  - Implement non-pharmacological measures as appropriate and evaluate response  - Consider cultural and social influences on pain and pain management  - Notify physician/advanced practitioner if interventions unsuccessful or patient reports new pain  Outcome: Progressing     Problem: INFECTION - ADULT  Goal: Absence or prevention of progression during hospitalization  Description: INTERVENTIONS:  - Assess and monitor for signs and symptoms of infection  - Monitor lab/diagnostic results  - Monitor all insertion sites, i.e. indwelling lines, tubes, and drains  - Monitor endotracheal if appropriate and nasal secretions for changes in amount and color  - [place] appropriate cooling/warming therapies per order  - Administer medications as ordered  - Instruct and encourage patient and family to use good hand hygiene technique  - Identify and instruct in appropriate isolation precautions for identified infection/condition  Outcome: Progressing     Problem: SAFETY ADULT  Goal: Maintain or return to baseline ADL function  Description: INTERVENTIONS:  -  Assess patient's ability to carry out ADLs; assess patient's baseline for ADL function and identify physical deficits which impact ability to perform ADLs (bathing, care of mouth/teeth, toileting, grooming, dressing, etc.)  - Assess/evaluate cause of self-care deficits   - Assess range of motion  - Assess patient's mobility; develop plan if impaired  - Assess patient's need for assistive devices and provide as appropriate  - Encourage maximum independence but intervene and supervise when necessary  - Involve family in performance of ADLs  - Assess for home care needs following discharge   - Consider OT consult to assist with ADL evaluation and planning for discharge  - Provide patient education as appropriate  Outcome: Progressing  Goal: Maintains/Returns to pre admission functional level  Description: INTERVENTIONS:  - Perform BMAT or MOVE assessment daily.   - Set and communicate daily mobility goal to care team and patient/family/caregiver.    - Collaborate with rehabilitation services on mobility goals if consulted  - Out of bed for toileting  - Record patient progress and toleration of activity level   Outcome: Progressing  Goal: Patient will remain free of falls  Description: INTERVENTIONS:  - Educate patient/family on patient safety including physical limitations  - Instruct patient to call for assistance with activity   - Consult OT/PT to assist with strengthening/mobility   - Keep Call bell within reach  - Keep bed low and locked with side rails adjusted as appropriate  - Keep care items and personal belongings within reach  - Initiate and maintain comfort rounds  - Make Fall Risk Sign visible to staff  - Apply yellow socks and bracelet for high fall risk patients  - Consider moving patient to room near nurses station  Outcome: Progressing     Problem: DISCHARGE PLANNING  Goal: Discharge to home or other facility with appropriate resources  Description: INTERVENTIONS:  - Identify barriers to discharge w/patient and caregiver  - Arrange for needed discharge resources and transportation as appropriate  - Identify discharge learning needs (meds, wound care, etc.)  - Arrange for interpretive services to assist at discharge as needed  - Refer to Case Management Department for coordinating discharge planning if the patient needs post-hospital services based on physician/advanced practitioner order or complex needs related to functional status, cognitive ability, or social support system  Outcome: Progressing     Problem: Knowledge Deficit  Goal: Patient/family/caregiver demonstrates understanding of disease process, treatment plan, medications, and discharge instructions  Description: Complete learning assessment and assess knowledge base.   Interventions:  - Provide teaching at level of understanding  - Provide teaching via preferred learning methods  Outcome: Progressing     Problem: RESPIRATORY - ADULT  Goal: Achieves optimal ventilation and oxygenation  Description: INTERVENTIONS:  - Assess for changes in respiratory status  - Assess for changes in mentation and behavior  - Position to facilitate oxygenation and minimize respiratory effort  - Oxygen administered by appropriate delivery if ordered  - Initiate smoking cessation education as indicated  - Encourage broncho-pulmonary hygiene including cough, deep breathe, Incentive Spirometry  - Assess the need for suctioning and aspirate as needed  - Assess and instruct to report SOB or any respiratory difficulty  - Respiratory Therapy support as indicated  Outcome: Progressing

## 2023-10-10 NOTE — PLAN OF CARE
Problem: PHYSICAL THERAPY ADULT  Goal: Performs mobility at highest level of function for planned discharge setting. See evaluation for individualized goals. Description: Treatment/Interventions: Functional transfer training, LE strengthening/ROM, Therapeutic exercise, Endurance training, Patient/family training, Bed mobility, Continued evaluation, Spoke to nursing, Spoke to advanced practitioner, OT          See flowsheet documentation for full assessment, interventions and recommendations. Outcome: Progressing  Note: Prognosis: Fair  Problem List: Decreased strength, Decreased range of motion, Decreased endurance, Impaired balance, Decreased mobility, Pain, Obesity  Assessment: Pt seen for PT treatment session this date, consisting of ther act focused on bed mobility and activity tolerance. Since previous session, pt has made good progress in terms of decreased assist required for bed mobility and increased tolerance so sitting EOB. Pt greeted supine in bed and agreeable to PT session reporting 7/10 BLE pain (R>L). Pt demonstrated improved ability to transition to sitting EOB with modAx2 and HOB elevated. Once sitting EOB, pt able to maintain static sitting ~4 minutes with supervision and BUE support. Pt on 8LO2 throughout with SPo2 maintained >/= 93%. Pt required maxAx2 to return to supine and increased time to position to [facility] with maxA2-3. Pertinent barriers during this session include pain and fatigue. Current goals and POC remain appropriate, pt continues to have rehab potential and is making good progress towards STGs. Pt prognosis for achieving goals is fair, pending pt progress with hospitalization/medical status improvements, and indicated by orientation, ability to follow directions, supportive family/caregivers, previous response to intervention and responsive to cues/strategies. Pt limited d/t the presence of intractable pain and avoidance behaviors.  PT recommends post acute rehabilitation services upon discharge. Pt continues to be functioning below baseline level, and remains limited 2* factors listed above. PT will continue to see pt during current hospitalization in order to address the deficits listed above and provide interventions consistent w/ POC in effort to achieve STGs. Barriers to Discharge: Inaccessible home environment, Decreased caregiver support     PT Discharge Recommendation: Post acute rehabilitation services    See flowsheet documentation for full assessment.    Elroy Wick; PT, DPT

## 2023-10-10 NOTE — PHYSICAL THERAPY NOTE
PHYSICAL THERAPY TREATMENT  NAME:  Yeison Roth  DATE: 10/10/23    AGE:   76 y.o. Mrn:   [id number]  ADMIT DX:  Respiratory distress [R06.03]  Acute respiratory failure with hypoxia and hypercapnia (HCC) [J96.01, J96.02]  Problem List:   Patient Active Problem List   Diagnosis    Lipodermatosclerosis of both lower extremities    Class 3 severe obesity with body mass index (BMI) greater than or equal to 70 in [facility])    Bakers cyst    Lymphedema of leg    Atrial fibrillation (HCC)    Obesity hypoventilation syndrome (HCC)    Iron deficiency anemia    Acute on chronic respiratory failure with hypoxia and hypercapnia (HCC)    Acute encephalopathy    Elevated brain natriuretic peptide (BNP) level       Past Medical History  Past Medical History:   Diagnosis Date    Acute respiratory failure with hypoxia ([address]) 3/27/2023    Morbid obesity ([address])     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 ([address]) 8/2/2021    Scalp laceration 3/17/2023    Thoracic vertebral fracture ([address]) 3/31/2023       Past Surgical History  Past Surgical History:   Procedure Laterality Date    NO PAST SURGERIES         Length Of Stay: 12  Performed at least 2 patient identifiers during session: Name and Birthday       10/10/23 1504   PT Last Visit   PT Visit Date 10/10/23   Note Type   Note Type Treatment   Pain Assessment   Pain Assessment Tool 0-10   Pain Score 7   Pain Location/Orientation Orientation: Bilateral;Location: Leg  (R>L)   Pain Onset/Description Onset: Ongoing;Frequency: Constant/Continuous   Patient's Stated Pain Goal No pain   Hospital Pain Intervention(s) Repositioned   Multiple Pain Sites No   Restrictions/Precautions   Weight Bearing Precautions Per Order No   Other Precautions Fall Risk;Pain;Multiple lines;O2  (8L mid flow)   General   Chart Reviewed Yes   Response to Previous Treatment Patient with no complaints from previous session.    Family/Caregiver Present Yes   Cognition   Overall Cognitive Status WFL   Arousal/Participation Responsive; Cooperative; Alert   Attention Within functional limits   Orientation Level Oriented X4   Memory Decreased recall of recent events   Following Commands Follows one step commands without difficulty   Comments Pt agreeable to PT session   Bed Mobility   Supine to Sit 3  Moderate assistance   Additional items Assist x 2; Increased time required;HOB elevated; Bedrails;LE management;Verbal cues   Sit to Supine 2  Maximal assistance   Additional items Assist x 2; Increased time required;Verbal cues;LE management;HOB elevated; Bedrails   Additional Comments Pt transitioned to sitting EOB with modAx2 and maintained static sitting balance with BUE support and supervision x4 minutes. SPo2 maintained WFL and pt without complaints of lightheadedness, SOB, chest pain, dizziness. Pt reports frequently placing KREG bed in chair position throughout the day and has been tolerating well. Transfers   Sit to Stand Unable to assess   Additional Comments discussed STS trials in upcoming sessions as able/appropriate. Pt agreeable; however, did appear mildly anxious about future attempt; PT and RN provided reassurance. Balance   Static Sitting Fair -   Endurance Deficit   Endurance Deficit Yes   Endurance Deficit Description decreased activity tolerance   Activity Tolerance   Activity Tolerance Patient limited by fatigue;Patient limited by pain   Nurse Made Aware MADELINE Bernstein confirmed pt appropriate for PT session and present during mobility   Assessment   Prognosis Fair   Problem List Decreased strength;Decreased range of motion;Decreased endurance; Impaired balance;Decreased mobility;Pain;Obesity   Assessment Pt seen for PT treatment session this date, consisting of ther act focused on bed mobility and activity tolerance. Since previous session, pt has made good progress in terms of decreased assist required for bed mobility and increased tolerance so sitting EOB.  Pt greeted supine in bed and agreeable to PT session reporting 7/10 BLE pain (R>L). Pt demonstrated improved ability to transition to sitting EOB with modAx2 and HOB elevated. Once sitting EOB, pt able to maintain static sitting ~4 minutes with supervision and BUE support. Pt on 8LO2 throughout with SPo2 maintained >/= 93%. Pt required maxAx2 to return to supine and increased time to position to [facility] with maxA2-3. Pertinent barriers during this session include pain and fatigue. Current goals and POC remain appropriate, pt continues to have rehab potential and is making good progress towards STGs. Pt prognosis for achieving goals is fair, pending pt progress with hospitalization/medical status improvements, and indicated by orientation, ability to follow directions, supportive family/caregivers, previous response to intervention and responsive to cues/strategies. Pt limited d/t the presence of intractable pain and avoidance behaviors. PT recommends post acute rehabilitation services upon discharge. Pt continues to be functioning below baseline level, and remains limited 2* factors listed above. PT will continue to see pt during current hospitalization in order to address the deficits listed above and provide interventions consistent w/ POC in effort to achieve STGs. Barriers to Discharge Inaccessible home environment;Decreased caregiver support   Goals   STG Expiration Date 10/13/23   PT Treatment Day 3   Plan   Treatment/Interventions Functional transfer training; Therapeutic exercise; Endurance training;Patient/family training;Bed mobility;Continued evaluation;Spoke to nursing   Progress Progressing toward goals   PT Frequency 3-5x/wk   Discharge Recommendation   PT Discharge Recommendation Post acute rehabilitation services   AM-PAC Basic Mobility Inpatient   Turning in Flat Bed Without Bedrails 1   Lying on Back to Sitting on Edge of Flat Bed Without Bedrails 1   Moving Bed to Chair 1   Standing Up From Chair Using Arms 1   Walk in Room 1   Climb 3-5 Stairs With Railing 1   Basic Mobility Inpatient Raw Score 6   Turning Head Towards Sound 4   Follow Simple Instructions 4   Low Function Basic Mobility Raw Score  14   Low Function Basic Mobility Standardized Score  22.01   Highest Level Of Mobility   -HLM Goal 2: Bed activities/Dependent transfer   JH-HLM Achieved 3: Sit at edge of bed   End of Consult   Patient Position at End of Consult Supine; All needs within reach       Time In: 1447  Time Out: 1510  Total Treatment Minutes: 23    Maria Guadalupe Mckenzie, PT

## 2023-10-10 NOTE — PROGRESS NOTES
[address]  Progress Note  Name: Lalito Graham  MRN: [id number]  Unit/Bed#: -01 I Date of Admission: 9/28/2023   Date of Service: 10/10/2023 I Hospital Day: 12    Assessment/Plan   * Acute on chronic respiratory failure with hypoxia and hypercapnia (HCC)  Assessment & Plan  Presented to hospital with AMS and respiratory distress; requiring ICU for ventilatory support with continuous BiPAP   • BiPAP qHS and any other hours of sleep during daytime  • Continue to wean midflow NC as able  • Goal SpO2 >88%   • BMP/VBG intermittently to monitor progress  • Continue respiratory protocol  • Pulmonary consulted - following prn at this time   • Goals of care discussion with family on 10/5, determined to continue FULL CODE status  •  states that BiPAP has been delivered to home    Elevated brain natriuretic peptide (BNP) level  Assessment & Plan  · Does not appear overtly volume overloaded however CXR revealing pulmonary vascular congestion  · S/p several days of IV bumex with no significant change to O2 requirements  · Transitioned back to torsemide 10/8    Acute encephalopathy  Assessment & Plan  Metabolic encephalopathy present on admission, a/e/b lethargy, disorientation, poor safety awareness and impulsivity. Improved with BiPAP use and monitored by neuro checks, fall and delirium precautions, sleep hygiene   · Likely secondary to acute on chronic hypoxia with hypercapnia.    · No infectious etiology identified    Obesity hypoventilation syndrome (HCC)  Assessment & Plan  · Continue to manage with supplemental O2 support and BiPAP use for sleep      Atrial fibrillation [facility])  Assessment & Plan  • Outside records note paroxysmal atrial fibrillation on Xarelto  • Pt noted to go into afib with RVR 9/30 in ICU, s/p metoprolol   • Now continues to be in NSR on auscultation  • Xarelto restarted by ICU team  • Family reports that she had vaginal bleeding previously while on Xarelto - unable to obtain transvaginal US while admitted due to body habitus, consider GYN consult if bleeding restarts  • Presently hemodynamically stable, hgb stable without definitive s/sx of bleeding     Class 3 severe obesity with body mass index (BMI) greater than or equal to 70 in adult [facility])  Assessment & Plan  Body mass index is 81.83 kg/m². Super morbidly obese  • Continued recommendation to work towards weight loss and improved exercise and dietary habits as able   • PT/OT to work with patient to increase mobility, reports she is ambulatory at home with assistance     Elevated troponin-resolved as of 10/9/2023  Assessment & Plan  Elevated on presentation, no complaint of chest pain and EKG without ischemia  • Down-trended in ICU   • Echo without regional wall motion abnormalities  • Suspect non-cardiac elevation in the setting of severe respiratory distress with hypoxia/hypercapnia  • Given no current cardiac symptoms, defer any additional workup to outpatient provider          VTE Pharmacologic Prophylaxis: VTE Score: 9 High Risk (Score >/= 5) - Pharmacological DVT Prophylaxis Ordered: rivaroxaban (Xarelto). Sequential Compression Devices Ordered. Patient Centered Rounds: I performed bedside rounds with nursing staff today. Discussions with Specialists or Other Care Team Provider: Reviewed notes    Education and Discussions with Family / Patient:     Total Time Spent on Date of Encounter in care of patient: 35 mins. This time was spent on one or more of the following: performing physical exam; counseling and coordination of care; obtaining or reviewing history; documenting in the medical record; reviewing/ordering tests, medications or procedures; communicating with other healthcare professionals and discussing with patient's family/caregivers.     Current Length of Stay: 12 day(s)  Current Patient Status: Inpatient   Certification Statement: The patient will continue to require additional inpatient hospital stay due to [address] her respiratory status  Discharge Plan: Anticipate discharge in 24-48 hrs to rehab facility. Code Status: Level 1 - Full Code    Subjective:   Is lying in bed does not appear to be in any acute distress she denies any chest pain chest tightness shortness of breath or difficulty breathing she does report some occasional chest heaviness denies any lightheadedness dizziness blurry vision or palpitations    Objective:     Vitals:   Temp (24hrs), Av °F (36.7 °C), Min:98 °F (36.7 °C), Max:98.1 °F (36.7 °C)    Temp:  [98 °F (36.7 °C)-98.1 °F (36.7 °C)] 98 °F (36.7 °C)  HR:  [65] 65  BP: (129-133)/(58-60) 133/58  SpO2:  [94 %-95 %] 94 %  Body mass index is 80.35 kg/m². Input and Output Summary (last 24 hours): Intake/Output Summary (Last 24 hours) at 10/10/2023 1004  Last data filed at 10/10/2023 0625  Gross per 24 hour   Intake 900 ml   Output 4500 ml   Net -3600 ml       Physical Exam:   Physical Exam  Constitutional:       Appearance: She is morbidly obese. HENT:      Head: Normocephalic. Cardiovascular:      Rate and Rhythm: Normal rate. Pulmonary:      Effort: No respiratory distress. Abdominal:      Palpations: Abdomen is soft. Skin:     General: Skin is warm. Coloration: Skin is pale. Neurological:      Mental Status: She is alert. Psychiatric:         Mood and Affect: Mood normal.         Behavior: Behavior is cooperative. Additional Data:     Labs:  Results from last 7 days   Lab Units 10/09/23  0613 10/04/23  0456 10/03/23  1446   WBC Thousand/uL 5.55   < > 5.61   HEMOGLOBIN g/dL 9.6*   < > 9.6*   HEMATOCRIT % 33.4*   < > 33.6*   PLATELETS Thousands/uL 217   < > 167   NEUTROS PCT %  --   --  62   LYMPHS PCT %  --   --  20   MONOS PCT %  --   --  9   EOS PCT %  --   --  8*    < > = values in this interval not displayed.      Results from last 7 days   Lab Units 10/09/23  0613 10/08/23  0801 10/07/23  0641   SODIUM mmol/L 142   < > 140   POTASSIUM mmol/L 4.2 < > 3.8   CHLORIDE mmol/L 94*   < > 93*   CO2 mmol/L >45*   < > 44*   BUN mg/dL 9   < > 7   CREATININE mg/dL 0.52*   < > 0.45*   ANION GAP mmol/L  --   --  3   CALCIUM mg/dL 8.6   < > 8.6   ALBUMIN g/dL 2.9*  --   --    TOTAL BILIRUBIN mg/dL 0.44  --   --    ALK PHOS U/L 63  --   --    ALT U/L 6*  --   --    AST U/L 10*  --   --    GLUCOSE RANDOM mg/dL 83   < > 96    < > = values in this interval not displayed. Lines/Drains:  Invasive Devices     Peripheral Intravenous Line  Duration           Peripheral IV 10/04/23 Right Antecubital 6 days          Drain  Duration           External Urinary Catheter 9 days                Recent Cultures (last 7 days):         Last 24 Hours Medication List:   Current Facility-Administered Medications   Medication Dose Route Frequency Provider Last Rate   • acetaminophen  975 mg Oral Q6H PRN Chrystine Course, CRNP     • ammonium lactate   Topical BID Chrystine Course, CRNP     • hydrocortisone   Topical 4x Daily PRN Chrystine Course, CRNP     • loratadine  5 mg Oral Daily Chrystine Course, CRNP     • methocarbamol  500 mg Oral Q6H PRN Chrystine Course, CRNP     • metoprolol tartrate  25 mg Oral BID Chrystine Course, CRNP     • ondansetron  4 mg Intravenous Q6H PRN Chrystine Course, CRNP     • potassium chloride  40 mEq Oral Daily Vera Israel PA-C     • rivaroxaban  20 mg Oral Daily With Dinner Chrystine Course, CRNP     • torsemide  20 mg Oral Daily Mary Hurley PA-C          Today, Patient Was Seen By: EMIL Russell    **Please Note: This note may have been constructed using a voice recognition system. **

## 2023-10-10 NOTE — ASSESSMENT & PLAN NOTE
Patient presented to the ER with complaints of altered mental status and respiratory distress on 9/28; she required admission to the ICU for ventilatory support with continuous BiPAP. Initial chest x-ray (9/28/23): Pulmonary vascular congestion suggests pulmonary edema with associated cardiomegaly. Superimposed pneumonia is not excluded. Of note, per family, unable to complete outpatient sleep study in setting of size and mobility. Patient downgraded to med/surg status on 10/2  Continue with BiPAP at bedtime and during naps. Patient currently still on 6 L of supplemental oxygen, continue to wean to maintain oxygen saturation levels greater than 88%. Pulmonology consulted, now following as needed. Case management following for dispo planning  BiPAP delivered to home per   Follow up chest x-ray on 10/10  Cardiomegaly with areas of atelectasis in the left lung. Very limited study due to body habitus.

## 2023-10-10 NOTE — NURSING NOTE
Patient SPO2 in the 60's. Assessed patient and found patient was off BIPAP. Nurse TT respiratory and asked for patient to be put on BIPAP when sleeping as ordered. BIPAP put on and patient resting comfortably. Was told in report that patient refused BIPAP night prior however no documentation was noted and patient states she never refused.     Will continue plan of care

## 2023-10-10 NOTE — PROGRESS NOTES
Pt. Refused Q2 turns at 2200. Pt was also educated on bed elevation and potential safety hazards that come with higher elevated beds. Pt continues to elevate their bed beyond safety measures. RN mg. Amy YU.

## 2023-10-10 NOTE — NURSING NOTE
Midflow titrated with RT from 10 L to 8 L    SPO2 94% on 8 L    Incentive spirometer given to patient and instructed to use every hour    Will continue to monitor

## 2023-10-10 NOTE — NURSING NOTE
Patients family notified nurse of patients heart rate. Currently 130's. Patient assessed. Asymptomatic. Will contact provider and obtain EKG. Will continue to monitor.

## 2023-10-10 NOTE — PLAN OF CARE
Problem: Knowledge Deficit  Goal: Patient/family/caregiver demonstrates understanding of disease process, treatment plan, medications, and discharge instructions  Description: Complete learning assessment and assess knowledge base.   Interventions:  - Provide teaching at level of understanding  - Provide teaching via preferred learning methods  Outcome: Progressing     Problem: RESPIRATORY - ADULT  Goal: Achieves optimal ventilation and oxygenation  Description: INTERVENTIONS:  - Assess for changes in respiratory status  - Assess for changes in mentation and behavior  - Position to facilitate oxygenation and minimize respiratory effort  - Oxygen administered by appropriate delivery if ordered  - Initiate smoking cessation education as indicated  - Encourage broncho-pulmonary hygiene including cough, deep breathe, Incentive Spirometry  - Assess the need for suctioning and aspirate as needed  - Assess and instruct to report SOB or any respiratory difficulty  - Respiratory Therapy support as indicated  Outcome: Progressing

## 2023-10-11 PROBLEM — Z71.89 GOALS OF CARE, COUNSELING/DISCUSSION: Status: ACTIVE | Noted: 2023-10-11

## 2023-10-11 LAB
BASE EX.OXY STD BLDV CALC-SCNC: 81.5 % (ref 60–80)
BASE EXCESS BLDV CALC-SCNC: 18.7 MMOL/L
BUN SERPL-MCNC: 11 MG/DL (ref 5–25)
CALCIUM SERPL-MCNC: 8.6 MG/DL (ref 8.4–10.2)
CHLORIDE SERPL-SCNC: 94 MMOL/L (ref 96–108)
CO2 SERPL-SCNC: >45 MMOL/L (ref 21–32)
CREAT SERPL-MCNC: 0.58 MG/DL (ref 0.6–1.3)
ERYTHROCYTE [DISTWIDTH] IN BLOOD BY AUTOMATED COUNT: 15 % (ref 11.6–15.1)
GFR SERPL CREATININE-BSD FRML MDRD: 95 ML/MIN/1.73SQ M
GLUCOSE SERPL-MCNC: 90 MG/DL (ref 65–140)
HCO3 BLDV-SCNC: 46.8 MMOL/L (ref 24–30)
HCT VFR BLD AUTO: 33.7 % (ref 34.8–46.1)
HGB BLD-MCNC: 10.1 G/DL (ref 11.5–15.4)
MCH RBC QN AUTO: 27.7 PG (ref 26.8–34.3)
MCHC RBC AUTO-ENTMCNC: 30 G/DL (ref 31.4–37.4)
MCV RBC AUTO: 93 FL (ref 82–98)
O2 CT BLDV-SCNC: 13.3 ML/DL
PCO2 BLDV: 75.6 MM HG (ref 42–50)
PH BLDV: 7.41 [PH] (ref 7.3–7.4)
PLATELET # BLD AUTO: 220 THOUSANDS/UL (ref 149–390)
PMV BLD AUTO: 10 FL (ref 8.9–12.7)
PO2 BLDV: 47.9 MM HG (ref 35–45)
POTASSIUM SERPL-SCNC: 4.1 MMOL/L (ref 3.5–5.3)
RBC # BLD AUTO: 3.64 MILLION/UL (ref 3.81–5.12)
SODIUM SERPL-SCNC: 141 MMOL/L (ref 135–147)
WBC # BLD AUTO: 6.01 THOUSAND/UL (ref 4.31–10.16)

## 2023-10-11 PROCEDURE — 94760 N-INVAS EAR/PLS OXIMETRY 1: CPT

## 2023-10-11 PROCEDURE — 85027 COMPLETE CBC AUTOMATED: CPT | Performed by: NURSE PRACTITIONER

## 2023-10-11 PROCEDURE — 97530 THERAPEUTIC ACTIVITIES: CPT

## 2023-10-11 PROCEDURE — 80048 BASIC METABOLIC PNL TOTAL CA: CPT | Performed by: NURSE PRACTITIONER

## 2023-10-11 PROCEDURE — 82805 BLOOD GASES W/O2 SATURATION: CPT | Performed by: NURSE PRACTITIONER

## 2023-10-11 PROCEDURE — 99232 SBSQ HOSP IP/OBS MODERATE 35: CPT | Performed by: NURSE PRACTITIONER

## 2023-10-11 RX ORDER — FUROSEMIDE 10 MG/ML
60 INJECTION INTRAMUSCULAR; INTRAVENOUS ONCE
Status: COMPLETED | OUTPATIENT
Start: 2023-10-11 | End: 2023-10-11

## 2023-10-11 RX ADMIN — RIVAROXABAN 20 MG: 20 TABLET, FILM COATED ORAL at 17:29

## 2023-10-11 RX ADMIN — FUROSEMIDE 60 MG: 10 INJECTION, SOLUTION INTRAMUSCULAR; INTRAVENOUS at 13:37

## 2023-10-11 RX ADMIN — METHOCARBAMOL 500 MG: 500 TABLET ORAL at 11:13

## 2023-10-11 RX ADMIN — Medication: at 17:35

## 2023-10-11 RX ADMIN — METHOCARBAMOL 500 MG: 500 TABLET ORAL at 17:33

## 2023-10-11 RX ADMIN — POTASSIUM CHLORIDE 40 MEQ: 1500 TABLET, EXTENDED RELEASE ORAL at 10:20

## 2023-10-11 RX ADMIN — METOPROLOL TARTRATE 25 MG: 25 TABLET, FILM COATED ORAL at 10:13

## 2023-10-11 RX ADMIN — Medication: at 10:13

## 2023-10-11 RX ADMIN — LORATADINE 5 MG: 10 TABLET ORAL at 10:20

## 2023-10-11 RX ADMIN — METOPROLOL TARTRATE 25 MG: 25 TABLET, FILM COATED ORAL at 17:28

## 2023-10-11 RX ADMIN — TORSEMIDE 20 MG: 20 TABLET ORAL at 10:20

## 2023-10-11 NOTE — RESPIRATORY THERAPY NOTE
10/11/23 1401   Respiratory Assessment   Assessment Type Assess only   General Appearance Awake; Alert   Respiratory Pattern Normal   Chest Assessment Chest expansion symmetrical   Bilateral Breath Sounds Diminished   Cough None   Resp Comments Pt transitioned to 4L nasal cannula at this time. Pt states she wears 2L at home; will cont to wean as able.    O2 Device NC   Oxygen Therapy/Pulse Ox   O2 Device (S)  Nasal cannula   O2 Therapy Oxygen humidified   Nasal Cannula O2 Flow Rate (L/min) (S)  4 L/min   Calculated FIO2 (%) - Nasal Cannula 36   SpO2 94 %   SpO2 Activity At Rest   $ Pulse Oximetry Spot Check Charge Completed

## 2023-10-11 NOTE — RESPIRATORY THERAPY NOTE
10/11/23 0351   Respiratory Assessment   Assessment Type Assess only   General Appearance Sleeping   Respiratory Pattern Normal   Chest Assessment Chest expansion symmetrical   Bilateral Breath Sounds Diminished   Location Specific No   Cough None   Resp Comments Pt remains on bipap   O2 Device V60   Non-Invasive Information   O2 Interface Device Face mask   Non-Invasive Ventilation Mode BiPAP   SpO2 96 %   $ Pulse Oximetry Spot Check Charge Completed   Non-Invasive Settings   IPAP (cm) 20 cm   EPAP (cm) 8 cm   Rate (Set) 20   FiO2 (%) 70   Pressure Support (cm H2O) 12   Rise Time 2   Inspiratory Time (Set) 1   Non-Invasive Readings   Skin Intervention Skin intact   Total Rate 22   Vt (mL) (Mech) 427   MV (Mech) 9   Peak Pressure (Obs) 21   Spontaneous Vt (mL) 755   Leak (lpm) 0   Spontaneous MV (mL) 9   Non-Invasive Alarms   Insp Pressure High (cm H20) 35   Insp Pressure Low (cm H20) 6   Low Insp Pressure Time (sec) 20 sec   MV Low (L/min) 3   Vt High (mL) 1150   Vt Low (mL) 150   High Resp Rate (BPM) 40 BPM   Low Resp Rate (BPM) 8 BPM   Apnea Interval (sec) 20   Apnea Rate 12     RT Ventilator Management Note  Aleksandra Belle 76 y.o. female MRN: [id number]  Unit/Bed#: -01 Encounter: [id number]      Daily Screen    No data found in the last 10 encounters.            Physical Exam:   Assessment Type: Assess only  General Appearance: Sleeping  Respiratory Pattern: Normal  Chest Assessment: Chest expansion symmetrical  Bilateral Breath Sounds: Diminished  Location Specific: No  Cough: None  O2 Device: V60      Resp Comments: Pt remains on bipap

## 2023-10-11 NOTE — CASE MANAGEMENT
Case Management Discharge Planning Note    Patient name Warren Kurtz  Location /-51 MRN [id number]  : 1955 Date 10/11/2023       Current Admission Date: 2023  Current Admission Diagnosis:Acute on chronic respiratory failure with hypoxia and hypercapnia [facility])   Patient Active Problem List    Diagnosis Date Noted    Acute on chronic respiratory failure with hypoxia and hypercapnia ([address]) 2023    Acute encephalopathy 2023    Elevated brain natriuretic peptide (BNP) level 2023    Iron deficiency anemia 2023    Atrial fibrillation ([address]) 2023    Obesity hypoventilation syndrome ([address]) 2023    Lipodermatosclerosis of both lower extremities 2021    Class 3 severe obesity with body mass index (BMI) greater than or equal to 70 in adult [facility]) 2021    Bakers cyst 10/27/2014    Lymphedema of leg 10/27/2014      LOS (days): 13  Geometric Mean LOS (GMLOS) (days): 3.60  Days to GMLOS:-9.7     OBJECTIVE:  Risk of Unplanned Readmission Score: 14.11         Current admission status: Inpatient   Preferred Pharmacy:   [facility]/pharmacy #[id number]- [place] - [address], [address]  [address] [facility]  Phone: [phone] Fax: [phone]    Primary Care Provider: Nneka Jernigan DO    Primary Insurance: [address]  Secondary Insurance:     DISCHARGE DETAILS:     CM spoke with patients  Kerrie Crawford (Spouse)   [phone] .  informed cm that patient was left uneasy after rehab conversation yesterday and would like to know what cm said. CM informed  that cm tried for over 134 rehabs. CM informed  that [facility] and [facility] did not respond. [facility] declined to accept patient. These 3 facilities were preferred choices of .  CM informed  that only two accepting facilities are the ones that were given to patient in [place] and [place].  stated both were far. CM explained that there were many attempts to get facilities closer but that rehabs have their own reviewers for patients. CM informed  that cm tried for closer facilities such as [facility], [facility],  [place], [facility], [facility], [facility], [facility], [facility] which all declined to accept patient. In total cm has tried for 134 snfs,  11 acute rehabs, and 4 LTCH and only 2 facilities have accepted. After cm phone call with  [facility] was contacted back by [facility] and informed that patient would need to be able to SPT, stand, pivot and transfer in order to be accepted to facility.  Only two accepting facilities are [facility] in [place] and [facility].

## 2023-10-11 NOTE — PHYSICAL THERAPY NOTE
Physical Therapy Treatment Note    Patient Name: Hiren Vega    Diagnosis: Respiratory distress [R06.03]  Acute respiratory failure with hypoxia and hypercapnia ([address]) [J96.01, J96.02]     10/11/23 1035   PT Last Visit   PT Visit Date 10/11/23   Note Type   Note Type Treatment   Pain Assessment   Pain Assessment Tool 0-10   Pain Score   (0/10 at rest; 5/10 with mobility)   Pain Location/Orientation Orientation: Bilateral;Location: Leg   Pain Onset/Description Onset: Ongoing   Hospital Pain Intervention(s) Repositioned; Ambulation/increased activity   Restrictions/Precautions   Weight Bearing Precautions Per Order No   Other Precautions Bed Alarm;Multiple lines;O2;Fall Risk;Pain  (+6L O2 via NC)   General   Chart Reviewed Yes   Response to Previous Treatment Patient with no complaints from previous session. Family/Caregiver Present Yes   Cognition   Overall Cognitive Status WFL   Arousal/Participation Alert; Responsive; Cooperative   Attention Attends with cues to redirect   Orientation Level Oriented X4   Memory Decreased recall of recent events   Following Commands Follows one step commands without difficulty   Comments Pt agreeable to PT with encouragement. Subjective   Subjective "I don't think I can stand."   Bed Mobility   Rolling R 3  Moderate assistance   Additional items Assist x 2;Bedrails; Increased time required;Verbal cues;LE management   Rolling L 3  Moderate assistance   Additional items Assist x 2;Bedrails; Increased time required;Verbal cues;LE management   Supine to Sit 3  Moderate assistance   Additional items Assist x 2;HOB elevated; Bedrails; Increased time required;Verbal cues;LE management   Sit to Supine 2  Maximal assistance   Additional items Assist x 2; Increased time required;Verbal cues;LE management   Additional Comments Pt tolerated sititng at EOB for ~15 minutes.    Transfers   Sit to Stand   (attempted to perform 3 sit to stand transfers; pt able to clear ~5% of buttocks from EOB with max assist x 2)   Balance   Static Sitting Fair -   Dynamic Sitting Poor +   Static Standing Zero   Endurance Deficit   Endurance Deficit Yes   Endurance Deficit Description decreased activity tolerance; pt requiring supplemetnal oxygen; pt was received on 6L O2 via NC   Activity Tolerance   Activity Tolerance Patient limited by fatigue;Patient limited by pain   Medical Staff Made Aware KEESHA Soto   Nurse Made Aware RN BROWN CTRANJAN [facility]   Exercises   Balance training  sit to stand transfer: 3x attempts   Assessment   Prognosis Fair   Problem List Decreased strength;Decreased endurance; Impaired balance;Decreased mobility;Obesity;Pain   Assessment Chart reviewed. Patient was received supine in NAD and agreeable to PT session with encouragement. Today's PT treatment session consisted of therapeutic activity for facilitation of transitional movements and safe performance of correct technique for bed mobility and  attempted sit to stand transfers. In comparison to the previous session the patient has made progress as evident by requiring decreased assistance with rolling. Pt continues to require assist of two for all functional mobility. Pt tolerated sitting at edge of bed for approximately 15 minutes and demonstrated fair-/poor+ sitting balance. With encouragement pt attempted to perform three sit to stand transfers; pt was able to clear approximately 5 % of buttocks from edge of bed with maximum assist of two. Pt educated on the importance of performing increased anterior weight shift to facilitate sit to stand transfers. Overall, patient tolerated today's session well, but was limited by fatigue and pain. Pt with slow progress towards achieving her STG's. Patient's prognosis for achieving their STG's is fair. PT intervention continues to be appropriate as the patient continues to be limited by pain, decreased lower extremity strength, impaired balance, decreased endurance, and decreased functional mobility.  Continue to recommend STR. PT to continue to see patient in order to address the deficits listed above and provide interventions consistent with the POC in order to achieve STG's and optimize the patient's independence with functional mobility. Barriers to Discharge Inaccessible home environment;Decreased caregiver support   Goals   STG Expiration Date 10/13/23   PT Treatment Day 4   Plan   Treatment/Interventions Functional transfer training;LE strengthening/ROM; Therapeutic exercise; Endurance training;Patient/family training;Bed mobility;Continued evaluation;Spoke to nursing;OT;Family;Spoke to advanced practitioner   Progress Slow progress, decreased activity tolerance   PT Frequency 3-5x/wk   Discharge Recommendation   PT Discharge Recommendation Post acute rehabilitation services   AM-PAC Basic Mobility Inpatient   Turning in Flat Bed Without Bedrails 1   Lying on Back to Sitting on Edge of Flat Bed Without Bedrails 1   Moving Bed to Chair 1   Standing Up From Chair Using Arms 1   Walk in Room 1   Climb 3-5 Stairs With Railing 1   Basic Mobility Inpatient Raw Score 6   Turning Head Towards Sound 4   Follow Simple Instructions 4   Low Function Basic Mobility Raw Score  14   Low Function Basic Mobility Standardized Score  22.01   Highest Level Of Mobility   JH-HLM Goal 2: Bed activities/Dependent transfer   JH-HLM Achieved 3: Sit at edge of bed   Education   Education Provided Mobility training   Patient Reinforcement needed   End of Consult   Patient Position at End of Consult Supine; All needs within reach     Rocky Islas PT, DPT    Time of PT treatment session: 9541-9554  27 minutes

## 2023-10-11 NOTE — WOUND OSTOMY CARE
Progress Note - Wound   Inge Munson 76 y.o. female MRN: [id number]  Unit/Bed#: -01 Encounter: [id number]      Assessment:   Patient admitted due to acute on chronic respiratory failure with hypoxia and hypercapnia. History of morbid obesity, a.fib. Wound care follow-up for lower extremity wound. Patient agreeable to assessment, alert and oriented x4, incontinent of bowel and bladder, pure-wick in place for urine management, assist of 3 to turn for assessment, on a bariatric mattress, is a moderate-heavy assist with care. Primary RN at beside for assessment. 1. Bilateral sacrum, buttock, hips, elbows and heels- skin is dry, intact, blanchable. 2. Left posterior tibial- Wound is oval in shape, true depth unknown, approx. 80% yellow adhered slough and 20% pink tissue, with scant amount of serosanguineous drainage noted. Tiffany-wound is dry, intact, blanchable, scaly skin.  -On first assessment patient stated that topical silver dressing for leg wound was able to be used for wound treatment; however family does not want topical silver dressing used. Introduced patient and family to Triad paste and are agreeable to use for treatment. 3. Posterior right thigh- Suspect etiology is friction related. Wound is irregular in shape, fragile wound edges with abraded appearance, partial thickness, 100% blanchable pink tissue, with scant amount of serous drainage noted. Tiffany-wound is dry, intact, fragile, blanchable. 4. MASD Right vulva- Suspect etiology related to moisture and friction. Wounds are scattered, irregular in shape, abraded appearance, partial thickness, 100% blanchable pink tissue, with scant amount of serous drainage noted. Tiffany-wounds are dry, intact, fragile, blanchable. No induration, fluctuance, odor, warmth, redness, or purulence noted to the above noted wound. New dressing applied. Patient tolerated well. Primary nurse aware of plan of care.  See flow sheets for more detailed assessment findings. Will follow along. Skin care plans:  1-Hydraguard to bilateral sacrum, buttock and heels BID and PRN  2-Elevate heels to offload pressure. 3-Ehob cushion in chair when out of bed. 4-Moisturize skin daily with skin nourishing cream.  5-Turn/reposition q2h or when medically stable for pressure re-distribution on skin. 6-Left posterior tibial- Cleanse wound with NSS, pat dry. Apply Triad paste over wound bed and cover with clover bordered Allevyn foam dressing. Change every other day and as needed for soilage/displacement. 7-Posterior right thigh- Cleanse wound with soap and water, pat dry. Apply Allevyn foam dressing over wound bed. Change every 3 days and as needed for soilage/displacement. 8- Right vulva/labia- Cleanse wounds with soap and water, pat dry. Apply thin layer of Calazime over wound beds 3 times a day and as needed with travis-care. 9-Bariatric low air loss mattress. Wound 09/28/23 Tibial Left;Posterior (Active)   Wound Image   10/11/23 1320   Wound Description Yellow;Slough;Pink 10/11/23 1320   Travis-wound Assessment Dry; Intact 10/11/23 1320   Wound Length (cm) 1.2 cm 10/11/23 1320   Wound Width (cm) 1.2 cm 10/11/23 1320   Wound Depth (cm) 0.1 cm 10/11/23 1320   Wound Surface Area (cm^2) 1.44 cm^2 10/11/23 1320   Wound Volume (cm^3) 0.144 cm^3 10/11/23 1320   Calculated Wound Volume (cm^3) 0.14 cm^3 10/11/23 1320   Change in Wound Size % -100 10/11/23 1320   Tunneling 0 cm 10/11/23 1320   Undermining 0 10/11/23 1320   Drainage Amount Scant 10/11/23 1320   Drainage Description Serosanguineous 10/11/23 1320   Non-staged Wound Description Not applicable 38/50/29 0251   Treatments Cleansed;Site care 10/11/23 1320   Dressing Other (Comment); Foam, Silicon (eg. Allevyn, etc) 10/11/23 1320   Wound packed? No 10/11/23 1320   Dressing Changed Changed 10/11/23 1320   Patient Tolerance Tolerated well 10/11/23 1320   Dressing Status Clean;Dry; Intact 10/11/23 1320       Wound 10/11/23 Thigh Distal;Posterior;Right (Active)   Wound Image   10/11/23 1322   Wound Description Fragile;Pink 10/11/23 1322   Tiffany-wound Assessment Dry; Intact; Hyperpigmented 10/11/23 1322   Wound Length (cm) 2.3 cm 10/11/23 1322   Wound Width (cm) 4 cm 10/11/23 1322   Wound Depth (cm) 0.1 cm 10/11/23 1322   Wound Surface Area (cm^2) 9.2 cm^2 10/11/23 1322   Wound Volume (cm^3) 0.92 cm^3 10/11/23 1322   Calculated Wound Volume (cm^3) 0.92 cm^3 10/11/23 1322   Tunneling 0 cm 10/11/23 1322   Undermining 0 10/11/23 1322   Drainage Amount Scant 10/11/23 1322   Drainage Description Serous 10/11/23 1322   Non-staged Wound Description Partial thickness 10/11/23 1322   Treatments Cleansed;Site care 10/11/23 1322   Dressing Foam, Silicon (eg. Allevyn, etc) 10/11/23 1322   Wound packed? No 10/11/23 1322   Dressing Changed Changed 10/11/23 1322   Patient Tolerance Tolerated well 10/11/23 1322   Dressing Status Clean;Dry; Intact 10/11/23 1322       Wound 10/11/23 MASD Groin (Active)   Wound Image   10/11/23 1326   Wound Description Pink;Fragile 10/11/23 1326   Tiffany-wound Assessment Dry; Intact 10/11/23 1326   Wound Length (cm) 6.5 cm 10/11/23 1326   Wound Width (cm) 2 cm 10/11/23 1326   Wound Depth (cm) 0.1 cm 10/11/23 1326   Wound Surface Area (cm^2) 13 cm^2 10/11/23 1326   Wound Volume (cm^3) 1.3 cm^3 10/11/23 1326   Calculated Wound Volume (cm^3) 1.3 cm^3 10/11/23 1326   Tunneling 0 cm 10/11/23 1326   Undermining 0 10/11/23 1326   Drainage Amount Scant 10/11/23 1326   Drainage Description Serous 10/11/23 1326   Non-staged Wound Description Partial thickness 10/11/23 1326   Treatments Cleansed;Site care 10/11/23 1326   Dressing Protective barrier 10/11/23 1326   Wound packed?  No 10/11/23 1326   Dressing Changed New 10/11/23 1326   Patient Tolerance Tolerated well 10/11/23 1326       Call or tigertext with any questions  Wound Care will continue to follow    Xiang ESTEVESN RN CWON  Wound and Ostomy care

## 2023-10-11 NOTE — PLAN OF CARE
Problem: OCCUPATIONAL THERAPY ADULT  Goal: Performs self-care activities at highest level of function for planned discharge setting. See evaluation for individualized goals. Description: Treatment Interventions: ADL retraining, Functional transfer training, Endurance training, Patient/family training          See flowsheet documentation for full assessment, interventions and recommendations. Note: Limitation: Decreased ADL status, Decreased UE strength, Decreased cognition, Decreased Safe judgement during ADL, Decreased endurance, Decreased self-care trans, Decreased high-level ADLs  Prognosis: Good  Assessment: Patient participated in Skilled OT session this date with interventions consisting of deep breathing technique,  therapeutic activities to: increase activity tolerance, increase trunk control, and increase OOB/ sitting tolerance. Patient agreeable to OT treatment session, upon arrival patient was found supine in bed and in no apparent distress. Patient completed rolling in bed with mod assist of 2 and supine>sit with mod assist of 2. Pt tolerated sitting at EOB for ~15 minutes with min-mod assist of 1. Attempted to perform 3 sit to stand transfers; pt able to clear ~5% of buttocks from EOB with max assist x 2. Pt returned BTB with max assist of 2. In comparison to previous session, patient with improvements in initiating standing while seated at EOB. Patient requiring one step directives and frequent rest periods. Patient continues to be functioning below baseline level, occupational performance remains limited secondary to factors listed above and increased risk for falls and injury. From OT standpoint, recommendation at time of d/c would be Post acute rehabilitation services. Patient to benefit from continued Occupational Therapy treatment while in the hospital to address deficits as defined above and maximize level of functional independence with ADLs and functional mobility.      OT Discharge Recommendation: Post acute rehabilitation services     [facility] OTD, OTR/L

## 2023-10-11 NOTE — ASSESSMENT & PLAN NOTE
Patient is s/p goals of care meeting on 10/4 with palliative care and other team members  Discussion with patient/, patient remains a full code at this time

## 2023-10-11 NOTE — PLAN OF CARE
Problem: Prexisting or High Potential for Compromised Skin Integrity  Goal: Skin integrity is maintained or improved  Description: INTERVENTIONS:  - Identify patients at risk for skin breakdown  - Assess and monitor skin integrity  - Assess and monitor nutrition and hydration status  - Monitor labs   - Assess for incontinence   - Turn and reposition patient  - Assist with mobility/ambulation  - Relieve pressure over bony prominences  - Avoid friction and shearing  - Provide appropriate hygiene as needed including keeping skin clean and dry  - Evaluate need for skin moisturizer/barrier cream  - Collaborate with interdisciplinary team   - Patient/family teaching  - Consider wound care consult   Outcome: Progressing     Problem: MOBILITY - ADULT  Goal: Maintain or return to baseline ADL function  Description: INTERVENTIONS:  -  Assess patient's ability to carry out ADLs; assess patient's baseline for ADL function and identify physical deficits which impact ability to perform ADLs (bathing, care of mouth/teeth, toileting, grooming, dressing, etc.)  - Assess/evaluate cause of self-care deficits   - Assess range of motion  - Assess patient's mobility; develop plan if impaired  - Assess patient's need for assistive devices and provide as appropriate  - Encourage maximum independence but intervene and supervise when necessary  - Involve family in performance of ADLs  - Assess for home care needs following discharge   - Consider OT consult to assist with ADL evaluation and planning for discharge  - Provide patient education as appropriate  Outcome: Progressing  Goal: Maintains/Returns to pre admission functional level  Description: INTERVENTIONS:  - Perform BMAT or MOVE assessment daily.   - Set and communicate daily mobility goal to care team and patient/family/caregiver.    - Collaborate with rehabilitation services on mobility goals if consulted  - Record patient progress and toleration of activity level   Outcome: Progressing     Problem: PAIN - ADULT  Goal: Verbalizes/displays adequate comfort level or baseline comfort level  Description: Interventions:  - Encourage patient to monitor pain and request assistance  - Assess pain using appropriate pain scale  - Administer analgesics based on type and severity of pain and evaluate response  - Implement non-pharmacological measures as appropriate and evaluate response  - Consider cultural and social influences on pain and pain management  - Notify physician/advanced practitioner if interventions unsuccessful or patient reports new pain  Outcome: Progressing     Problem: INFECTION - ADULT  Goal: Absence or prevention of progression during hospitalization  Description: INTERVENTIONS:  - Assess and monitor for signs and symptoms of infection  - Monitor lab/diagnostic results  - Monitor all insertion sites, i.e. indwelling lines, tubes, and drains  - Monitor endotracheal if appropriate and nasal secretions for changes in amount and color  - Nahma appropriate cooling/warming therapies per order  - Administer medications as ordered  - Instruct and encourage patient and family to use good hand hygiene technique  - Identify and instruct in appropriate isolation precautions for identified infection/condition  Outcome: Progressing     Problem: SAFETY ADULT  Goal: Maintain or return to baseline ADL function  Description: INTERVENTIONS:  -  Assess patient's ability to carry out ADLs; assess patient's baseline for ADL function and identify physical deficits which impact ability to perform ADLs (bathing, care of mouth/teeth, toileting, grooming, dressing, etc.)  - Assess/evaluate cause of self-care deficits   - Assess range of motion  - Assess patient's mobility; develop plan if impaired  - Assess patient's need for assistive devices and provide as appropriate  - Encourage maximum independence but intervene and supervise when necessary  - Involve family in performance of ADLs  - Assess for home care needs following discharge   - Consider OT consult to assist with ADL evaluation and planning for discharge  - Provide patient education as appropriate  Outcome: Progressing  Goal: Maintains/Returns to pre admission functional level  Description: INTERVENTIONS:  - Perform BMAT or MOVE assessment daily.   - Set and communicate daily mobility goal to care team and patient/family/caregiver.    - Out of bed for toileting  - Record patient progress and toleration of activity level   Outcome: Progressing  Goal: Patient will remain free of falls  Description: INTERVENTIONS:  - Educate patient/family on patient safety including physical limitations  - Instruct patient to call for assistance with activity   - Consult OT/PT to assist with strengthening/mobility   - Keep Call bell within reach  - Keep bed low and locked with side rails adjusted as appropriate  - Keep care items and personal belongings within reach  - Initiate and maintain comfort rounds  - Apply yellow socks and bracelet for high fall risk patients  - Consider moving patient to room near nurses station  Outcome: Progressing     Problem: DISCHARGE PLANNING  Goal: Discharge to home or other facility with appropriate resources  Description: INTERVENTIONS:  - Identify barriers to discharge w/patient and caregiver  - Arrange for needed discharge resources and transportation as appropriate  - Identify discharge learning needs (meds, wound care, etc.)  - Arrange for interpretive services to assist at discharge as needed  - Refer to Case Management Department for coordinating discharge planning if the patient needs post-hospital services based on physician/advanced practitioner order or complex needs related to functional status, cognitive ability, or social support system  Outcome: Progressing     Problem: Knowledge Deficit  Goal: Patient/family/caregiver demonstrates understanding of disease process, treatment plan, medications, and discharge instructions  Description: Complete learning assessment and assess knowledge base.   Interventions:  - Provide teaching at level of understanding  - Provide teaching via preferred learning methods  Outcome: Progressing     Problem: RESPIRATORY - ADULT  Goal: Achieves optimal ventilation and oxygenation  Description: INTERVENTIONS:  - Assess for changes in respiratory status  - Assess for changes in mentation and behavior  - Position to facilitate oxygenation and minimize respiratory effort  - Oxygen administered by appropriate delivery if ordered  - Initiate smoking cessation education as indicated  - Encourage broncho-pulmonary hygiene including cough, deep breathe, Incentive Spirometry  - Assess the need for suctioning and aspirate as needed  - Assess and instruct to report SOB or any respiratory difficulty  - Respiratory Therapy support as indicated  Outcome: Progressing

## 2023-10-11 NOTE — RESPIRATORY THERAPY NOTE
10/11/23 0810   Respiratory Assessment   Assessment Type Assess only   General Appearance Sleeping   Respiratory Pattern Normal   Chest Assessment Chest expansion symmetrical   Bilateral Breath Sounds Diminished   Cough None   Resp Comments Pt remains on [address] [address]. Liter flow decreased in an attempt to wean. Will cont to wean as able.    O2 Device MFNC   Oxygen Therapy/Pulse Ox   O2 Device Mid flow nasal cannula   O2 Therapy Oxygen humidified   O2 Flow Rate (L/min) (S)  6 L/min   SpO2 94 %   SpO2 Activity At Rest   $ Pulse Oximetry Spot Check Charge Completed

## 2023-10-11 NOTE — ASSESSMENT & PLAN NOTE
Body mass index is 77.83 kg/m².    Super morbidly obese  Continued recommendation to work towards weight loss and improved exercise and dietary habits as able   PT/OT to work with patient to increase mobility, reports she is ambulatory at home with assistance

## 2023-10-11 NOTE — OCCUPATIONAL THERAPY NOTE
Occupational Therapy Treatment Note     Patient Name: Leyla Villagran  MABRP'D Date: 10/11/2023  Problem List  Principal Problem:    Acute on chronic respiratory failure with hypoxia and hypercapnia (HCC)  Active Problems:    Class 3 severe obesity with body mass index (BMI) greater than or equal to 70 in [facility])    Atrial fibrillation (HCC)    Obesity hypoventilation syndrome (HCC)    Acute encephalopathy    Elevated brain natriuretic peptide (BNP) level    Goals of care, counseling/discussion        10/11/23 1102   OT Last Visit   OT Visit Date 10/11/23   Note Type   Note Type Treatment   Pain Assessment   Pain Assessment Tool 0-10   Pain Score 5  (c mobility; 0/10 at rest)   Pain Location/Orientation Orientation: Bilateral;Location: Leg   Pain Onset/Description Onset: Ongoing;Frequency: Constant/Continuous   Hospital Pain Intervention(s) Ambulation/increased activity;Repositioned;Medication (See MAR)   Restrictions/Precautions   Weight Bearing Precautions Per Order No   Other Precautions Fall Risk;Pain  (6 L O2 via NC)   Lifestyle   Autonomy Patient requires assistance with ADLs/IADLs, ambulatory with RW, and lives with family in a one level house   Reciprocal Relationships Sister present during session   Service to Others Retired   [employer] R 3  Moderate assistance   Additional items Assist x 2;HOB elevated; Bedrails; Increased time required;Verbal cues;LE management   Rolling L 3  Moderate assistance   Additional items Assist x 2;Bedrails; Increased time required;LE management;Verbal cues   Supine to Sit 3  Moderate assistance   Additional items Assist x 2;HOB elevated; Bedrails; Increased time required;Verbal cues;LE management   Sit to Supine 2  Maximal assistance   Additional items Assist x 2;Bedrails; Increased time required;Verbal cues;LE management   Additional Comments Pt required max assist of 2 to scoot/position self at EOB. Pt tolerated sitting EOB for ~15 minutes.  Pt requires min-mod assist to maintain trunck/posture. SpO2 decreased to 88% upon initially sitting at EOB. Encourged PLB and SpO2 returned to >90% within ~2 minutes of seated rest. Pt reported (+) dizziness upon sitting at EOB that subsided with rest. Pt utilizes bedrails for support while sitting. Transfers   Sit to Stand   (Attempted to perform 3 sit to stand transfers; pt able to clear ~5% of buttocks from EOB with max assist x 2)   Additional Comments Pt fearful and hesistant of standing trial d/t KREG bed height. Encouragement and emotional support provided. Cognition   Overall Cognitive Status WFL   Arousal/Participation Alert; Responsive; Cooperative   Attention Attends with cues to redirect   Orientation Level Oriented X4   Memory Decreased recall of recent events   Following Commands Follows one step commands without difficulty   Comments Pt agreeable to OT session. Activity Tolerance   Activity Tolerance Patient limited by fatigue;Patient limited by pain   Medical Staff Made Aware This session, pt required and most appropriately benefited from skilled OT/PT co-treat due to extensive physical assistance of two therapists, significant regression from functional baseline and decreased activity tolerance. Assessment   Assessment Patient participated in Skilled OT session this date with interventions consisting of deep breathing technique,  therapeutic activities to: increase activity tolerance, increase trunk control, and increase OOB/ sitting tolerance. Patient agreeable to OT treatment session, upon arrival patient was found supine in bed and in no apparent distress. Patient completed rolling in bed with mod assist of 2 and supine>sit with mod assist of 2. Pt tolerated sitting at EOB for ~15 minutes with min-mod assist of 1. Attempted to perform 3 sit to stand transfers; pt able to clear ~5% of buttocks from EOB with max assist x 2. Pt returned BTB with max assist of 2.  In comparison to previous session, patient with improvements in initiating standing while seated at EOB. Patient requiring one step directives and frequent rest periods. Patient continues to be functioning below baseline level, occupational performance remains limited secondary to factors listed above and increased risk for falls and injury. From OT standpoint, recommendation at time of d/c would be Post acute rehabilitation services. Patient to benefit from continued Occupational Therapy treatment while in the hospital to address deficits as defined above and maximize level of functional independence with ADLs and functional mobility. Plan   Treatment Interventions ADL retraining;Functional transfer training; Endurance training;Patient/family training   Goal Expiration Date 10/13/23   OT Treatment Day 3   OT Frequency 3-5x/wk   Discharge Recommendation   OT Discharge Recommendation Post acute rehabilitation services   AM-PAC Daily Activity Inpatient   Lower Body Dressing 1   Bathing 1   Toileting 1   Upper Body Dressing 1   Grooming 2   Eating 3   Daily Activity Raw Score 9   Turning Head Towards Sound 4   Follow Simple Instructions 3   Low Function Daily Activity Raw Score 16   Low Function Daily Activity Standardized Score  27.65   AM-PAC Applied Cognition Inpatient   Following a Speech/Presentation 3   Understanding Ordinary Conversation 4   Taking Medications 2   Remembering Where Things Are Placed or Put Away 2   Remembering List of 4-5 Errands 2   Taking Care of Complicated Tasks 2   Applied Cognition Raw Score 15   Applied Cognition Standardized Score 33.54   Modified [place] Scale   Modified [place] Scale 5   End of Consult   Patient Position at End of Consult Supine; All needs within reach   Nurse Communication Nurse aware of consult     Roscoe MONTIEL, OTR/L

## 2023-10-11 NOTE — PLAN OF CARE
Problem: PHYSICAL THERAPY ADULT  Goal: Performs mobility at highest level of function for planned discharge setting. See evaluation for individualized goals. Description: Treatment/Interventions: Functional transfer training, LE strengthening/ROM, Therapeutic exercise, Endurance training, Patient/family training, Bed mobility, Continued evaluation, Spoke to nursing, Spoke to advanced practitioner, OT          See flowsheet documentation for full assessment, interventions and recommendations. Outcome: Progressing  Note: Prognosis: Fair  Problem List: Decreased strength, Decreased endurance, Impaired balance, Decreased mobility, Obesity, Pain  Assessment: Chart reviewed. Patient was received supine in NAD and agreeable to PT session with encouragement. Today's PT treatment session consisted of therapeutic activity for facilitation of transitional movements and safe performance of correct technique for bed mobility and  attempted sit to stand transfers. In comparison to the previous session the patient has made progress as evident by requiring decreased assistance with rolling. Pt continues to require assist of two for all functional mobility. Pt tolerated sitting at edge of bed for approximately 15 minutes and demonstrated fair-/poor+ sitting balance. With encouragement pt attempted to perform three sit to stand transfers; pt was able to clear approximately 5 % of buttocks from edge of bed with maximum assist of two. Pt educated on the importance of performing increased anterior weight shift to facilitate sit to stand transfers. Overall, patient tolerated today's session well, but was limited by fatigue and pain. Pt with slow progress towards achieving her STG's. Patient's prognosis for achieving their STG's is fair.  PT intervention continues to be appropriate as the patient continues to be limited by pain, decreased lower extremity strength, impaired balance, decreased endurance, and decreased functional mobility. Continue to recommend STR. PT to continue to see patient in order to address the deficits listed above and provide interventions consistent with the POC in order to achieve STG's and optimize the patient's independence with functional mobility. Barriers to Discharge: Inaccessible home environment, Decreased caregiver support     PT Discharge Recommendation: Post acute rehabilitation services    See flowsheet documentation for full assessment.

## 2023-10-11 NOTE — PROGRESS NOTES
[address]  Progress Note  Name: Morgan Cifuentes  MRN: [id number]  Unit/Bed#: -01 I Date of Admission: 9/28/2023   Date of Service: 10/11/2023 I Hospital Day: 13    Assessment/Plan   * Acute on chronic respiratory failure with hypoxia and hypercapnia [facility])  Assessment & Plan  Patient presented to the ER with complaints of altered mental status and respiratory distress on 9/28; she required admission to the ICU for ventilatory support with continuous BiPAP. Initial chest x-ray (9/28/23): Pulmonary vascular congestion suggests pulmonary edema with associated cardiomegaly. Superimposed pneumonia is not excluded. Of note, per family, unable to complete outpatient sleep study in setting of size and mobility. Patient downgraded to med/surg status on 10/2  Continue with BiPAP at bedtime and during naps. Patient currently still on 6 L of supplemental oxygen, continue to wean to maintain oxygen saturation levels greater than 88%. Pulmonology consulted, now following as needed. Case management following for dispo planning  BiPAP delivered to home per   Follow up chest x-ray on 10/10  Cardiomegaly with areas of atelectasis in the left lung. Very limited study due to body habitus. Goals of care, counseling/discussion  Assessment & Plan  Patient is s/p goals of care meeting on 10/4 with palliative care and other team members  Discussion with patient/, patient remains a full code at this time    Obesity hypoventilation syndrome ([address])  Assessment & Plan  Continue to manage with supplemental O2 support and BiPAP use for sleep     Class 3 severe obesity with body mass index (BMI) greater than or equal to 70 in adult [facility])  Assessment & Plan  Body mass index is 77.83 kg/m².    Super morbidly obese  Continued recommendation to work towards weight loss and improved exercise and dietary habits as able   PT/OT to work with patient to increase mobility, reports she is ambulatory at home with assistance            VTE Pharmacologic Prophylaxis: VTE Score: 9 High Risk (Score >/= 5) - Pharmacological DVT Prophylaxis Ordered: rivaroxaban (Xarelto). Sequential Compression Devices Ordered. Patient Centered Rounds: I performed bedside rounds with nursing staff today. Discussions with Specialists or Other Care Team Provider: Case management    Education and Discussions with Family / Patient: Updated  () via phone. Liza Alberto at  am    Total Time Spent on Date of Encounter in care of patient: 37 mins. This time was spent on one or more of the following: performing physical exam; counseling and coordination of care; obtaining or reviewing history; documenting in the medical record; reviewing/ordering tests, medications or procedures; communicating with other healthcare professionals and discussing with patient's family/caregivers. Current Length of Stay: 13 day(s)  Current Patient Status: Inpatient   Certification Statement: The patient will continue to require additional inpatient hospital stay due to ongoing treatment in setting of pending placement. Discharge Plan:  Pending placement. Code Status: Level 1 - Full Code    Subjective:   Patient sitting on the edge of the bed, working with PT/OT. Patient reports feeling okay, notes that she is not having any active pain right now but that she does have chronic pain in her bilateral lower extremities due to lymph edema but right now painless. Reports her breathing is stable. No complaints. Discussed placement vs home with  via phone. He is in support of whatever the patient wants, however, wants her to be able to stand and possibly take a few steps before coming home.        Objective:     Vitals:   Temp (24hrs), Av.9 °F (36.6 °C), Min:97.6 °F (36.4 °C), Max:98.1 °F (36.7 °C)    Temp:  [97.6 °F (36.4 °C)-98.1 °F (36.7 °C)] 98 °F (36.7 °C)  HR:  [72-96] 72  Resp:  [18-20] 18  BP: (106-134)/() 131/104  SpO2:  [94 %-96 %] 96 %  Body mass index is 77.83 kg/m². Input and Output Summary (last 24 hours): Intake/Output Summary (Last 24 hours) at 10/11/2023 1216  Last data filed at 10/10/2023 2001  Gross per 24 hour   Intake 420 ml   Output 3600 ml   Net -3180 ml       Physical Exam:   Physical Exam  Vitals and nursing note reviewed. Constitutional:       General: She is not in acute distress. Appearance: She is obese. She is ill-appearing (chronically). Cardiovascular:      Rate and Rhythm: Normal rate. Pulses: Normal pulses. Heart sounds: Normal heart sounds. Pulmonary:      Effort: Pulmonary effort is normal. No tachypnea, bradypnea or respiratory distress. Breath sounds: Decreased breath sounds (bilateral bases; likely due to body habitus) present. Comments: 6 L, 91-93%. Lung sounds clear, decreased in bilateral bases. Abdominal:      General: Bowel sounds are normal.      Palpations: Abdomen is soft. Musculoskeletal:         General: Normal range of motion. Comments: Chronic lymph edema noted to BLE   Skin:     General: Skin is warm and dry. Neurological:      Mental Status: She is alert and oriented to person, place, and time.    Psychiatric:         Mood and Affect: Mood normal.          Additional Data:     Labs:  Results from last 7 days   Lab Units 10/11/23  0643   WBC Thousand/uL 6.01   HEMOGLOBIN g/dL 10.1*   HEMATOCRIT % 33.7*   PLATELETS Thousands/uL 220     Results from last 7 days   Lab Units 10/11/23  0643 10/09/23  0613 10/08/23  0801 10/07/23  0641   SODIUM mmol/L 141 142   < > 140   POTASSIUM mmol/L 4.1 4.2   < > 3.8   CHLORIDE mmol/L 94* 94*   < > 93*   CO2 mmol/L >45* >45*   < > 44*   BUN mg/dL 11 9   < > 7   CREATININE mg/dL 0.58* 0.52*   < > 0.45*   ANION GAP mmol/L  --   --   --  3   CALCIUM mg/dL 8.6 8.6   < > 8.6   ALBUMIN g/dL  --  2.9*  --   --    TOTAL BILIRUBIN mg/dL  --  0.44  --   --    ALK PHOS U/L  --  63  --   --    ALT U/L  -- 6*  --   --    AST U/L  --  10*  --   --    GLUCOSE RANDOM mg/dL 90 83   < > 96    < > = values in this interval not displayed. Lines/Drains:  Invasive Devices       Peripheral Intravenous Line  Duration             Peripheral IV 10/11/23 Left Antecubital <1 day              Drain  Duration             External Urinary Catheter 10 days                          Imaging: No pertinent imaging reviewed. Recent Cultures (last 7 days):         Last 24 Hours Medication List:   Current Facility-Administered Medications   Medication Dose Route Frequency Provider Last Rate    acetaminophen  975 mg Oral Q6H PRN Lourdes [place]ac[place], CRNP      ammonium lactate   Topical BID Lourdes [place], CRNP      hydrocortisone   Topical 4x Daily PRN Lourdes [place], CRNP      loratadine  5 mg Oral Daily Lourdes [place], CRNP      methocarbamol  500 mg Oral Q6H PRN Lourdes [place], CRNP      metoprolol tartrate  25 mg Oral BID Lourdes [place], CRNP      ondansetron  4 mg Intravenous Q6H PRN Lourdes [place], CRNP      potassium chloride  40 mEq Oral Daily Vera Israel PA-C      rivaroxaban  20 mg Oral Daily With [employer], CRNP      torsemide  20 mg Oral Daily Elmo Espinoza PA-C          Today, Patient Was Seen By: EMIL Resendiz    **Please Note: This note may have been constructed using a voice recognition system. **

## 2023-10-11 NOTE — RESPIRATORY THERAPY NOTE
10/10/23 3824   Respiratory Assessment   Assessment Type Assess only   General Appearance Drowsy   Respiratory Pattern Normal   Chest Assessment Chest expansion symmetrical   Bilateral Breath Sounds Diminished   Location Specific No   Cough None   Resp Comments Pt placed on bipap at this time   O2 Device V60   Non-Invasive Information   O2 Interface Device Face mask   Non-Invasive Ventilation Mode BiPAP   $ Intermittent NIV Yes   SpO2 95 %   $ Pulse Oximetry Spot Check Charge Completed   Non-Invasive Settings   IPAP (cm) 20 cm   EPAP (cm) 8 cm   Rate (Set) 20   FiO2 (%) 70   Pressure Support (cm H2O) 12   Rise Time 2   Non-Invasive Readings   Skin Intervention Skin intact   Total Rate 24   Vt (mL) (Mech) 568   MV (Mech) 16   Peak Pressure (Obs) 21   Spontaneous Vt (mL) 579   Leak (lpm) 0   Spontaneous MV (mL) 16   Non-Invasive Alarms   Insp Pressure High (cm H20) 35   Insp Pressure Low (cm H20) 6   Low Insp Pressure Time (sec) 20 sec   MV Low (L/min) 3   Vt High (mL) 44138   Vt Low (mL) 150   High Resp Rate (BPM) 40 BPM   Low Resp Rate (BPM) 8 BPM     RT Ventilator Management Note  Sarah Lemus 76 y.o. female MRN: [id number]  Unit/Bed#: -01 Encounter: [id number]      Daily Screen    No data found in the last 10 encounters.            Physical Exam:   Assessment Type: Assess only  General Appearance: Drowsy  Respiratory Pattern: Normal  Chest Assessment: Chest expansion symmetrical  Bilateral Breath Sounds: Diminished  Location Specific: No  Cough: None  O2 Device: V60      Resp Comments: Pt placed on bipap at this time

## 2023-10-12 PROBLEM — G93.40 ACUTE ENCEPHALOPATHY: Status: RESOLVED | Noted: 2023-09-28 | Resolved: 2023-10-12

## 2023-10-12 PROBLEM — R79.89 ELEVATED BRAIN NATRIURETIC PEPTIDE (BNP) LEVEL: Status: RESOLVED | Noted: 2023-09-28 | Resolved: 2023-10-12

## 2023-10-12 LAB
ANION GAP SERPL CALCULATED.3IONS-SCNC: 3 MMOL/L
ATRIAL RATE: 104 BPM
ATRIAL RATE: 113 BPM
ATRIAL RATE: 118 BPM
BUN SERPL-MCNC: 11 MG/DL (ref 5–25)
CALCIUM SERPL-MCNC: 8.6 MG/DL (ref 8.4–10.2)
CHLORIDE SERPL-SCNC: 94 MMOL/L (ref 96–108)
CO2 SERPL-SCNC: 44 MMOL/L (ref 21–32)
CREAT SERPL-MCNC: 0.59 MG/DL (ref 0.6–1.3)
ERYTHROCYTE [DISTWIDTH] IN BLOOD BY AUTOMATED COUNT: 15.2 % (ref 11.6–15.1)
GFR SERPL CREATININE-BSD FRML MDRD: 94 ML/MIN/1.73SQ M
GLUCOSE SERPL-MCNC: 93 MG/DL (ref 65–140)
HCT VFR BLD AUTO: 33.6 % (ref 34.8–46.1)
HGB BLD-MCNC: 9.9 G/DL (ref 11.5–15.4)
MCH RBC QN AUTO: 27 PG (ref 26.8–34.3)
MCHC RBC AUTO-ENTMCNC: 29.5 G/DL (ref 31.4–37.4)
MCV RBC AUTO: 92 FL (ref 82–98)
PLATELET # BLD AUTO: 212 THOUSANDS/UL (ref 149–390)
PMV BLD AUTO: 9.5 FL (ref 8.9–12.7)
POTASSIUM SERPL-SCNC: 3.8 MMOL/L (ref 3.5–5.3)
QRS AXIS: 14 DEGREES
QRS AXIS: 31 DEGREES
QRS AXIS: 45 DEGREES
QRSD INTERVAL: 88 MS
QRSD INTERVAL: 90 MS
QRSD INTERVAL: 92 MS
QT INTERVAL: 280 MS
QT INTERVAL: 330 MS
QT INTERVAL: 350 MS
QTC INTERVAL: 395 MS
QTC INTERVAL: 421 MS
QTC INTERVAL: 490 MS
RBC # BLD AUTO: 3.67 MILLION/UL (ref 3.81–5.12)
SODIUM SERPL-SCNC: 141 MMOL/L (ref 135–147)
T WAVE AXIS: 18 DEGREES
T WAVE AXIS: 9 DEGREES
T WAVE AXIS: 9 DEGREES
VENTRICULAR RATE: 118 BPM
VENTRICULAR RATE: 120 BPM
VENTRICULAR RATE: 98 BPM
WBC # BLD AUTO: 6.64 THOUSAND/UL (ref 4.31–10.16)

## 2023-10-12 PROCEDURE — 85027 COMPLETE CBC AUTOMATED: CPT | Performed by: NURSE PRACTITIONER

## 2023-10-12 PROCEDURE — 97530 THERAPEUTIC ACTIVITIES: CPT

## 2023-10-12 PROCEDURE — 94760 N-INVAS EAR/PLS OXIMETRY 1: CPT

## 2023-10-12 PROCEDURE — 99232 SBSQ HOSP IP/OBS MODERATE 35: CPT | Performed by: NURSE PRACTITIONER

## 2023-10-12 PROCEDURE — 93010 ELECTROCARDIOGRAM REPORT: CPT | Performed by: INTERNAL MEDICINE

## 2023-10-12 PROCEDURE — 94660 CPAP INITIATION&MGMT: CPT

## 2023-10-12 PROCEDURE — 94761 N-INVAS EAR/PLS OXIMETRY MLT: CPT

## 2023-10-12 PROCEDURE — 80048 BASIC METABOLIC PNL TOTAL CA: CPT | Performed by: NURSE PRACTITIONER

## 2023-10-12 PROCEDURE — 97535 SELF CARE MNGMENT TRAINING: CPT

## 2023-10-12 RX ADMIN — METOPROLOL TARTRATE 25 MG: 25 TABLET, FILM COATED ORAL at 17:40

## 2023-10-12 RX ADMIN — Medication: at 11:12

## 2023-10-12 RX ADMIN — METHOCARBAMOL 500 MG: 500 TABLET ORAL at 20:16

## 2023-10-12 RX ADMIN — POTASSIUM CHLORIDE 40 MEQ: 1500 TABLET, EXTENDED RELEASE ORAL at 09:49

## 2023-10-12 RX ADMIN — METOPROLOL TARTRATE 25 MG: 25 TABLET, FILM COATED ORAL at 09:49

## 2023-10-12 RX ADMIN — RIVAROXABAN 20 MG: 20 TABLET, FILM COATED ORAL at 17:40

## 2023-10-12 RX ADMIN — METHOCARBAMOL 500 MG: 500 TABLET ORAL at 11:12

## 2023-10-12 RX ADMIN — Medication: at 17:42

## 2023-10-12 RX ADMIN — LORATADINE 5 MG: 10 TABLET ORAL at 09:49

## 2023-10-12 RX ADMIN — TORSEMIDE 20 MG: 20 TABLET ORAL at 09:49

## 2023-10-12 NOTE — PLAN OF CARE
Problem: PHYSICAL THERAPY ADULT  Goal: Performs mobility at highest level of function for planned discharge setting. See evaluation for individualized goals. Description: Treatment/Interventions: Functional transfer training, LE strengthening/ROM, Therapeutic exercise, Endurance training, Patient/family training, Bed mobility, Continued evaluation, Spoke to nursing, Spoke to advanced practitioner, OT          See flowsheet documentation for full assessment, interventions and recommendations. Outcome: Progressing  Note: Prognosis: Good  Problem List: Decreased strength, Decreased endurance, Impaired balance, Decreased mobility, Obesity, Pain  Assessment: Pt seen for PT treatment session this date, consisting of ther act focused on bed mobility, transfer training, sitting EOB activity tolerance  . Since previous session, pt has made very good progress in terms of progression to STS transfers from EOB to standard walker with assist of two and standing tolerance up to 1 minute 40 seconds. Pt greeted supine in bed on 2LO2 via nc; Spo2 maintained 89-93% throughout session. Pt motivated and participatory throughout with sister present providing positive reinforcement and encouragement. Pt required modAx2 for sup > sit EOB and tolerated sitting EOB x25 minutes with supervision and bilateral UE support without LOB in any direction. Pt progressed to STS transfers from EOB to standard walker modAx2; pt completed two standing trials with first trial up to 30 seconds and second trial up 1 minute 40 seconds with BUE support and minAx2 for support. Pt unable to progress to side steps this date due to overall fatigue and pt did verbalize fearful of falling with interest in attempting in upcoming sessions. Pertinent barriers during this session include  fatigue and fearfulness of falling . Current goals and POC remain appropriate, pt continues to have rehab potential and is making good progress towards STGs.  Pt prognosis for achieving goals is good, pending pt progress with hospitalization/medical status improvements, and indicated by orientation, ability to follow directions, motivation, supportive family/caregivers, and awareness. Pt limited d/t fear of falling. PT recommends post acute rehabilitation services upon discharge. Pt continues to be functioning below baseline level, and remains limited 2* factors listed above. PT will continue to see pt during current hospitalization in order to address the deficits listed above and provide interventions consistent w/ POC in effort to achieve STGs. Barriers to Discharge: Inaccessible home environment, Decreased caregiver support     PT Discharge Recommendation: Post acute rehabilitation services    See flowsheet documentation for full assessment.    Kishore Dubose; PT, DPT

## 2023-10-12 NOTE — RESPIRATORY THERAPY NOTE
Home Oxygen Qualifying Test     Patient name: Chuy Hu        : 1955   Date of Test:  2023  Diagnosis: ARF w/ hypoxia and hypercapnia   Home Oxygen Test:    **Medicare Guidelines require item(s) 1-5 on all ambulatory patients or 1 and 2 on non-ambulatory patients. 1. Baseline SPO2 on Room Air at rest 87 %   If <= 88% on Room Air add O2 via NC to obtain SpO2 >=88%. If LPM needed, document LPM 2 needed to reach =>88%    SPO2 during exertion on Room Air N/A  %  During exertion monitor SPO2. If SPO2 increases >=89%, do not add supplemental oxygen    SPO2 on Oxygen at Rest N/A % at N/A LPM    SPO2 during exertion on Oxygen N/A % at N/A LPM    Test performed during exertion activity. [x]  Supplemental Home Oxygen is indicated. []  Client does not qualify for home oxygen. Respiratory Additional Notes- Pt lying in bed in no distress. O2 turned off and sats dropped to 87%. Pt placed on 2L.     Tala Cornejo

## 2023-10-12 NOTE — ASSESSMENT & PLAN NOTE
Patient presented to the ER with complaints of altered mental status and respiratory distress on 9/28; she required admission to the ICU for ventilatory support with continuous BiPAP. Chronically wears 2 L of supplemental oxygen at home  Initial chest x-ray (9/28/23): Pulmonary vascular congestion suggests pulmonary edema with associated cardiomegaly. Superimposed pneumonia is not excluded. Of note, per family, unable to complete outpatient sleep study in setting of size and mobility. Patient downgraded to med/surg status on 10/2  Continue with BiPAP at bedtime and during naps. Patient currently still on 4 L of supplemental oxygen, continue to wean to maintain oxygen saturation levels greater than 88%. Pulmonology consulted, now following as needed. Case management following for dispo planning  BiPAP delivered to home per   Follow up chest x-ray on 10/10  Cardiomegaly with areas of atelectasis in the left lung. Very limited study due to body habitus.

## 2023-10-12 NOTE — OCCUPATIONAL THERAPY NOTE
Occupational Therapy Progress Note     Patient Name: Warren Kurtz  BNKJA'E Date: 10/12/2023  Problem List  Principal Problem:    Acute on chronic respiratory failure with hypoxia and hypercapnia (HCC)  Active Problems:    Class 3 severe obesity with body mass index (BMI) greater than or equal to 70 in adult [facility])    Atrial fibrillation (HCC)    Obesity hypoventilation syndrome ([address] )    Goals of care, counseling/discussion          10/12/23 1114   OT Last Visit   OT Visit Date 10/12/23   Note Type   Note Type Treatment   Pain Assessment   Pain Assessment Tool FLACC   Pain Location/Orientation Location: Back   Pain Rating: FLACC (Rest) - Face 0   Pain Rating: FLACC (Rest) - Legs 0   Pain Rating: FLACC (Rest) - Activity 0   Pain Rating: FLACC (Rest) - Cry 0   Pain Rating: FLACC (Rest) - Consolability 0   Score: FLACC (Rest) 0   Pain Rating: FLACC (Activity) - Face 1   Pain Rating: FLACC (Activity) - Legs 1   Pain Rating: FLACC (Activity) - Activity 0   Pain Rating: FLACC (Activity) - Cry 1   Pain Rating: FLACC (Activity) - Consolability 0   Score: FLACC (Activity) 3   Restrictions/Precautions   Weight Bearing Precautions Per Order No   Other Precautions Fall Risk;Pain;O2  (2L O2)   ADL   Where Assessed Other (Comment)  (Assist levels for some self care tasks are based on functional assessment of performance skills and deficits observed during session.)   Eating Assistance 5  Supervision/Setup   Eating Deficit Setup   Grooming Assistance 5  Supervision/Setup   Grooming Deficit Setup;Supervision/safety; Increased time to complete  (while supine)   UB Bathing Assistance 3  Moderate Assistance   UB Bathing Deficit Setup;Supervision/safety; Increased time to complete   LB Bathing Assistance 1  Total Assistance   UB Dressing Assistance 3  Moderate Assistance   UB Dressing Deficit Setup;Supervision/safety; Increased time to complete   LB Dressing Assistance 1  Total Assistance   Toileting Assistance  1  Total Assistance Functional Standing Tolerance   Time 1 minute, 20 seconds   Activity static standing   Comments mod A x2 with RW   Bed Mobility   Rolling R 3  Moderate assistance   Additional items Assist x 2;HOB elevated; Bedrails; Increased time required;Verbal cues;LE management   Rolling L 3  Moderate assistance   Additional items Assist x 2;HOB elevated; Bedrails; Increased time required;Verbal cues   Supine to Sit 3  Moderate assistance   Additional items Assist x 2;HOB elevated; Bedrails; Increased time required;LE management   Sit to Supine 2  Maximal assistance   Additional items Assist x 3; Increased time required;LE management   Transfers   Sit to Stand 3  Moderate assistance   Additional items Assist x 2; Increased time required;Verbal cues  (with RW, 2 trials performed. Pt achieved full standing posture)   Stand to Sit 3  Moderate assistance   Additional items Assist x 2   Functional Mobility   Functional Mobility 3  Moderate assistance   Additional Comments assist x2   Additional items Rolling walker   Subjective   Subjective "I really want to do my best. I want to get to the rehab."   Cognition   Overall Cognitive Status [facility]   Arousal/Participation Alert; Cooperative   Attention Within functional limits   Orientation Level Oriented X4   Memory Within functional limits   Following Commands Follows all commands and directions without difficulty   Activity Tolerance   Activity Tolerance Patient tolerated treatment well   Medical Staff Made Aware Consuelo Lyles PT  (Pt seen for co-treatment with Physical Therapist due to pt's medical complexity, functional limitations and limited activity tolerance.)   Assessment   Assessment Pt seen this date for skilled OT session focused on ADLs, functional transfers and mobility, safety education. The patient was received supine in bed, NAD, PIV access, 2L O2 NC, masimo.  She was very motivated to participate with therapy, and performed functional bed mobility, sitting EOB ~30 minutes, and 2 sit to stand trials with Moderate Assistance x2 with RW. Pt's second standing trial the pt maintained full standing 1 minute and 20 seconds. At end of session the patient was located supine in bed with call bell in reach and all needs met. Overall the patient remains below her functional baseline, and is primarily limited at this time due to generalized deconditioning and decreased activity tolerance. OT will continue to follow while acute to address POC. At this time, recommend inpatient rehab upon d/c.   Plan   Treatment Interventions ADL retraining;Functional transfer training;UE strengthening/ROM; Endurance training;Patient/family training;Equipment evaluation/education   Goal Expiration Date 10/26/23   OT Treatment Day 4   OT Frequency 3-5x/wk   Discharge Recommendation   OT Discharge Recommendation Post acute rehabilitation services   Additional Comments  The patient's raw score on the AM-PAC Daily Activity Inpatient Short Form is 11. A raw score of less than 19 suggests the patient may benefit from discharge to post-acute rehabilitation services. Please refer to the recommendation of the Occupational Therapist for safe discharge planning.    -PAC Daily Activity Inpatient   Lower Body Dressing 1   Bathing 1   Toileting 1   Upper Body Dressing 2   Grooming 3   Eating 3   Daily Activity Raw Score 11   Daily Activity Standardized Score (Calc for Raw Score >=11) 29.04   Turning Head Towards Sound 4   Follow Simple Instructions 4   Low Function Daily Activity Raw Score 19   Low Function Daily Activity Standardized Score  31.34       Updated Goal Date: 10/26/23, previously established goals remain appropriate      Thuy Bray OTR/L

## 2023-10-12 NOTE — RESPIRATORY THERAPY NOTE
Spoke with the patient regarding use of the BiPAP.  Patient is requesting to be put on BiPAP at 0100

## 2023-10-12 NOTE — CASE MANAGEMENT
Case Management Discharge Planning Note    Patient name Marleny Domingo  Location /-66 MRN [id number]  : 1955 Date 10/12/2023       Current Admission Date: 2023  Current Admission Diagnosis:Acute on chronic respiratory failure with hypoxia and hypercapnia [facility])   Patient Active Problem List    Diagnosis Date Noted    Goals of care, counseling/discussion 10/11/2023    Acute on chronic respiratory failure with hypoxia and hypercapnia ([address]) 2023    Iron deficiency anemia 2023    Atrial fibrillation ([address]) 2023    Obesity hypoventilation syndrome ([address]) 2023    Lipodermatosclerosis of both lower extremities 2021    Class 3 severe obesity with body mass index (BMI) greater than or equal to 70 in adult [facility]) 2021    Bakers cyst 10/27/2014    Lymphedema of leg 10/27/2014      LOS (days): 14  Geometric Mean LOS (GMLOS) (days): 3.60  Days to GMLOS:-11     OBJECTIVE:  Risk of Unplanned Readmission Score: 14.39         Current admission status: Inpatient   Preferred Pharmacy:   [facility]/pharmacy #[id number]- [place] [address] [facility] [address] [address]  Phone: [phone] Fax: [phone]    Primary Care Provider: Lizbeth Lucas DO    Primary Insurance: [address] [place] [address]  Secondary Insurance:     DISCHARGE DETAILS:     CM informed by Sunni Julian from [facility] that patient was accepted into acute. [facility] submitted for auth. CM awaiting for authorization.  was informed.

## 2023-10-12 NOTE — PLAN OF CARE
Problem: OCCUPATIONAL THERAPY ADULT  Goal: Performs self-care activities at highest level of function for planned discharge setting. See evaluation for individualized goals. Description: Treatment Interventions: ADL retraining, Functional transfer training, UE strengthening/ROM, Endurance training, Patient/family training, Equipment evaluation/education          See flowsheet documentation for full assessment, interventions and recommendations. Outcome: Progressing  Note: Limitation: Decreased ADL status, Decreased UE strength, Decreased cognition, Decreased Safe judgement during ADL, Decreased endurance, Decreased self-care trans, Decreased high-level ADLs  Prognosis: Good  Assessment: Pt seen this date for skilled OT session focused on ADLs, functional transfers and mobility, safety education. The patient was received supine in bed, NAD, PIV access, 2L O2 NC, masimo. She was very motivated to participate with therapy, and performed functional bed mobility, sitting EOB ~30 minutes, and 2 sit to stand trials with Moderate Assistance x2 with RW. Pt's second standing trial the pt maintained full standing 1 minute and 20 seconds. At end of session the patient was located supine in bed with call bell in reach and all needs met. Overall the patient remains below her functional baseline, and is primarily limited at this time due to generalized deconditioning and decreased activity tolerance. OT will continue to follow while acute to address POC.  At this time, recommend inpatient rehab upon d/c.     OT Discharge Recommendation: Post acute rehabilitation services        Dorethea Claude, OTR/JANIS

## 2023-10-12 NOTE — ASSESSMENT & PLAN NOTE
Outside records note paroxysmal atrial fibrillation on Xarelto  Patient is in A-fib on EKG 10/10  EKG completed on 10/10 does have episodes of RVR up to 110  Patient was on metoprolol 12.5 mg twice daily and has increased to 25 mg twice daily monitor for improvement  Continue Xarelto  Family reports that she had vaginal bleeding previously while on Xarelto - unable to obtain transvaginal US while admitted due to body habitus, consider GYN consult if bleeding restarts

## 2023-10-12 NOTE — PHYSICAL THERAPY NOTE
PHYSICAL THERAPY TREATMENT  NAME:  Jeb Overton  DATE: 10/12/23    AGE:   76 y.o. Mrn:   [id number]  ADMIT DX:  Respiratory distress [R06.03]  Acute respiratory failure with hypoxia and hypercapnia (HCC) [J96.01, J96.02]  Problem List:   Patient Active Problem List   Diagnosis    Lipodermatosclerosis of both lower extremities    Class 3 severe obesity with body mass index (BMI) greater than or equal to 70 in [facility])    Bakers cyst    Lymphedema of leg    Atrial fibrillation (HCC)    Obesity hypoventilation syndrome (HCC)    Iron deficiency anemia    Acute on chronic respiratory failure with hypoxia and hypercapnia (HCC)    Goals of care, counseling/discussion       Past Medical History  Past Medical History:   Diagnosis Date    Acute respiratory failure with hypoxia ([address]) 3/27/2023    Morbid obesity ([address])     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 User may not have seen previous data.)   Restrictions/Precautions   Weight Bearing Precautions Per Order No   Other Precautions Fall Risk;Multiple lines;O2  (2LO2)   General   Chart Reviewed Yes   Response to Previous Treatment Patient with no complaints from previous session. Family/Caregiver Present Yes  (pts sister)   Cognition   Overall Cognitive Status WFL   Arousal/Participation Alert; Responsive; Cooperative   Attention Within functional limits   Orientation Level Oriented X4   Memory Within functional limits   Following Commands Follows all commands and directions without difficulty   Comments Pt agreeable to PT session   Bed Mobility   Supine to Sit 3  Moderate assistance   Additional items Assist x 2;HOB elevated; Bedrails; Increased time required;Verbal cues;LE management   Sit to Supine 2  Maximal assistance   Additional items Assist x 2;HOB elevated; Bedrails; Increased time required;Verbal cues;LE management   Additional Comments Pt required modAx2 for scooting to EOB to place BLEs flat on floor in preparation for standing transfers. pt seated EOB ~25 minutes with bilateral UE support and supervisio without LOB in any direction   Transfers   Sit to Stand 3  Moderate assistance   Additional items Assist x 2; Increased time required;Verbal cues   Stand to Sit 3  Moderate assistance   Additional items Assist x 2; Increased time required;Verbal cues   Stand pivot Unable to assess   Additional Comments STS from EOB to standard walker. STS x2 trials with standing tolerance 30seconds, 1 minute 40 seconds. Pt unable to progress to side steps/stand pivot this date due to overall fatigue and fearfullness of falling; however, pt motivated to attempt during upcoming sessions   Balance   Static Sitting Fair   Dynamic Sitting Fair   Static Standing Poor   Endurance Deficit   Endurance Deficit Yes   Endurance Deficit Description decreased activity tolerance   Activity Tolerance   Activity Tolerance Patient limited by fatigue   Medical Staff Made Aware GLADYS Massey made aware of session outcomes and PT reccomendations. Co treatment with KEESHA Ravi secondary to complex medical condition of pt, possible A of 2 required to achieve and maintain transitional movements, requiring the need of skilled therapeutic intervention of 2 therapists to achieve delivery of services. Nurse Made Aware MADELINE Triana   Exercises   Knee AROM Long Arc Quad 15 reps; Sitting;AROM; Bilateral   Ankle Pumps 15 reps; Sitting;AROM; Bilateral   Assessment   Prognosis Good   Problem List Decreased strength;Decreased endurance; Impaired balance;Decreased mobility;Obesity;Pain   Assessment Pt seen for PT treatment session this date, consisting of ther act focused on bed mobility, transfer training, sitting EOB activity tolerance  . Since previous session, pt has made very good progress in terms of progression to STS transfers from EOB to standard walker with assist of two and standing tolerance up to 1 minute 40 seconds. Pt greeted supine in bed on 2LO2 via nc; Spo2 maintained 89-93% throughout session. Pt motivated and participatory throughout with sister present providing positive reinforcement and encouragement. Pt required modAx2 for sup > sit EOB and tolerated sitting EOB x25 minutes with supervision and bilateral UE support without LOB in any direction. Pt progressed to STS transfers from EOB to standard walker modAx2; pt completed two standing trials with first trial up to 30 seconds and second trial up 1 minute 40 seconds with BUE support and minAx2 for support. Pt unable to progress to side steps this date due to overall fatigue and pt did verbalize fearful of falling with interest in attempting in upcoming sessions. Pertinent barriers during this session include  fatigue and fearfulness of falling . Current goals and POC remain appropriate, pt continues to have rehab potential and is making good progress towards STGs. Pt prognosis for achieving goals is good, pending pt progress with hospitalization/medical status improvements, and indicated by orientation, ability to follow directions, motivation, supportive family/caregivers, and awareness. Pt limited d/t fear of falling. PT recommends post acute rehabilitation services upon discharge. Pt continues to be functioning below baseline level, and remains limited 2* factors listed above. PT will continue to see pt during current hospitalization in order to address the deficits listed above and provide interventions consistent w/ POC in effort to achieve STGs.    Barriers to Discharge Inaccessible home environment;Decreased caregiver support   Goals   STG Expiration Date 10/22/23   Short Term Goal #1 PT upgraded patients goals and extended goal date . In 10 days: Increase bilateral LE strength 1 grade to facilitate independent mobility, Perform all bed mobility tasks with mod A of 1 to decrease caregiver burden, PT to see and establish goals for ambulation when appropriate, and complete STS transfers with Dony of one and SPT modAx2 with LRAD and tolerate static standing 2-4 minutes with BUE support and Dony. PT Treatment Day 5   Plan   Treatment/Interventions Functional transfer training; Therapeutic exercise; Endurance training;Patient/family training;Bed mobility;Spoke to nursing;Continued evaluation;OT;Spoke to case management   Progress Progressing toward goals   PT Frequency 3-5x/wk   Discharge Recommendation   PT Discharge Recommendation Post acute rehabilitation services   AM-PAC Basic Mobility Inpatient   Turning in Flat Bed Without Bedrails 1   Lying on Back to Sitting on Edge of Flat Bed Without Bedrails 1   Moving Bed to Chair 1   Standing Up From Chair Using Arms 1   Walk in Room 1   Climb 3-5 Stairs With Railing 1   Basic Mobility Inpatient Raw Score 6   Turning Head Towards Sound 4   Follow Simple Instructions 4   Low Function Basic Mobility Raw Score  14   Low Function Basic Mobility Standardized Score  22.01   Highest Level Of Mobility   JH-HLM Goal 2: Bed activities/Dependent transfer   JH-HLM Achieved 5: Stand (1 or more minutes)   Education   Education Provided Mobility training;Assistive device   Patient Demonstrates acceptance/verbal understanding   End of Consult   Patient Position at End of Consult All needs within reach; Supine  (family to remain at bedside)       Time In: 0958  Time Out: 1046  Total Treatment Minutes: [address], [place]

## 2023-10-12 NOTE — ASSESSMENT & PLAN NOTE
Body mass index is 77.31 kg/m².    Super morbidly obese  Continued recommendation to work towards weight loss and improved exercise and dietary habits as able   PT/OT to work with patient to increase mobility, reports she is ambulatory at home with assistance

## 2023-10-12 NOTE — PROGRESS NOTES
[address]  Progress Note  Name: Bobby Echeverria  MRN: [id number]  Unit/Bed#: -01 I Date of Admission: 9/28/2023   Date of Service: 10/12/2023 I Hospital Day: 14    Assessment/Plan   * Acute on chronic respiratory failure with hypoxia and hypercapnia [facility])  Assessment & Plan  Patient presented to the ER with complaints of altered mental status and respiratory distress on 9/28; she required admission to the ICU for ventilatory support with continuous BiPAP. Chronically wears 2 L of supplemental oxygen at home  Initial chest x-ray (9/28/23): Pulmonary vascular congestion suggests pulmonary edema with associated cardiomegaly. Superimposed pneumonia is not excluded. Of note, per family, unable to complete outpatient sleep study in setting of size and mobility. Patient downgraded to med/surg status on 10/2  Continue with BiPAP at bedtime and during naps. Patient currently still on 4 L of supplemental oxygen, continue to wean to maintain oxygen saturation levels greater than 88%. Pulmonology consulted, now following as needed. Case management following for dispo planning  BiPAP delivered to home per   Follow up chest x-ray on 10/10  Cardiomegaly with areas of atelectasis in the left lung. Very limited study due to body habitus.     Goals of care, counseling/discussion  Assessment & Plan  Patient is s/p goals of care meeting on 10/4 with palliative care and other team members  Discussion with patient/, patient remains a full code at this time    Obesity hypoventilation syndrome ([address])  Assessment & Plan  Continue to manage with supplemental O2 support and BiPAP use for sleep     Atrial fibrillation ([address])  Assessment & Plan  Outside records note paroxysmal atrial fibrillation on Xarelto  Patient is in A-fib on EKG 10/10  EKG completed on 10/10 does have episodes of RVR up to 110  Patient was on metoprolol 12.5 mg twice daily and has increased to 25 mg twice daily monitor for improvement  Continue Xarelto  Family reports that she had vaginal bleeding previously while on Xarelto - unable to obtain transvaginal US while admitted due to body habitus, consider GYN consult if bleeding restarts    Class 3 severe obesity with body mass index (BMI) greater than or equal to 70 in adult [facility])  Assessment & Plan  Body mass index is 77.31 kg/m². Super morbidly obese  Continued recommendation to work towards weight loss and improved exercise and dietary habits as able   PT/OT to work with patient to increase mobility, reports she is ambulatory at home with assistance              VTE Pharmacologic Prophylaxis: VTE Score: 9 High Risk (Score >/= 5) - Pharmacological DVT Prophylaxis Ordered: rivaroxaban (Xarelto). Sequential Compression Devices Ordered. Patient Centered Rounds: I performed bedside rounds with nursing staff today. Discussions with Specialists or Other Care Team Provider: Case Management    Education and Discussions with Family / Patient: Updated  (Family) at bedside. Total Time Spent on Date of Encounter in care of patient: 32 mins. This time was spent on one or more of the following: performing physical exam; counseling and coordination of care; obtaining or reviewing history; documenting in the medical record; reviewing/ordering tests, medications or procedures; communicating with other healthcare professionals and discussing with patient's family/caregivers. Current Length of Stay: 14 day(s)  Current Patient Status: Inpatient   Certification Statement: The patient will continue to require additional inpatient hospital stay due to pending accepting rehab facilties, follow-up with LTACH regarding their acceptance as patient has improved. Discharge Plan:  pending rehab acceptance vs home if family opts for home although high readmission risk if patient goes home.     Code Status: Level 1 - Full Code    Subjective:   Patient seen working with PT/OT this morning. Sitting on the edge of the bed. Reports feeling okay physically but is stressed out about choosing rehab vs home and is anxious about going to a rehab that is far from home. Discussed with patient that she has made great progress and was able to stand twice today, one time which was for one minute. Objective:     Vitals:   Temp (24hrs), Av.6 °F (36.4 °C), Min:96.9 °F (36.1 °C), Max:98.2 °F (36.8 °C)    Temp:  [96.9 °F (36.1 °C)-98.2 °F (36.8 °C)] 96.9 °F (36.1 °C)  HR:  [61-72] 61  Resp:  [15-19] 19  BP: (123-126)/(58-64) 126/60  SpO2:  [94 %-95 %] 95 %  Body mass index is 77.31 kg/m². Input and Output Summary (last 24 hours): Intake/Output Summary (Last 24 hours) at 10/12/2023 1116  Last data filed at 10/12/2023 8043  Gross per 24 hour   Intake 480 ml   Output 3650 ml   Net -3170 ml       Physical Exam:   Physical Exam  Vitals and nursing note reviewed. Constitutional:       General: She is not in acute distress. Appearance: She is obese. She is ill-appearing. Cardiovascular:      Rate and Rhythm: Normal rate. Pulses: Normal pulses. Heart sounds: Normal heart sounds. Pulmonary:      Effort: Pulmonary effort is normal. No tachypnea, bradypnea or respiratory distress. Breath sounds: Decreased breath sounds (in setting of body habitus; clear otherwise) present. Comments: 2 L, 88-90% with activity  Abdominal:      General: Bowel sounds are normal.      Palpations: Abdomen is soft. Musculoskeletal:         General: Normal range of motion. Comments: + chronic lymph edema noted to BLE   Skin:     General: Skin is warm and dry. Neurological:      Mental Status: She is alert and oriented to person, place, and time.    Psychiatric:         Mood and Affect: Mood normal.         Additional Data:     Labs:  Results from last 7 days   Lab Units 10/12/23  0512   WBC Thousand/uL 6.64   HEMOGLOBIN g/dL 9.9*   HEMATOCRIT % 33.6*   PLATELETS Thousands/uL 212 Results from last 7 days   Lab Units 10/12/23  0512 10/11/23  0643 10/09/23  0613   SODIUM mmol/L 141   < > 142   POTASSIUM mmol/L 3.8   < > 4.2   CHLORIDE mmol/L 94*   < > 94*   CO2 mmol/L 44*   < > >45*   BUN mg/dL 11   < > 9   CREATININE mg/dL 0.59*   < > 0.52*   ANION GAP mmol/L 3  --   --    CALCIUM mg/dL 8.6   < > 8.6   ALBUMIN g/dL  --   --  2.9*   TOTAL BILIRUBIN mg/dL  --   --  0.44   ALK PHOS U/L  --   --  63   ALT U/L  --   --  6*   AST U/L  --   --  10*   GLUCOSE RANDOM mg/dL 93   < > 83    < > = values in this interval not displayed. Lines/Drains:  Invasive Devices       Peripheral Intravenous Line  Duration             Peripheral IV 10/11/23 Left Antecubital 1 day              Drain  Duration             External Urinary Catheter 11 days                          Imaging: No pertinent imaging reviewed. Recent Cultures (last 7 days):         Last 24 Hours Medication List:   Current Facility-Administered Medications   Medication Dose Route Frequency Provider Last Rate    acetaminophen  975 mg Oral Q6H PRN Charolett Emms, CRNP      ammonium lactate   Topical BID Charolett Emms, CRNP      hydrocortisone   Topical 4x Daily PRN Charolett Emms, CRNP      loratadine  5 mg Oral Daily Charolett Emms, CRNP      methocarbamol  500 mg Oral Q6H PRN Charolett Emms, CRNP      metoprolol tartrate  25 mg Oral BID Charolett Emms, CRNP      ondansetron  4 mg Intravenous Q6H PRN Charolett Emms, CRNP      potassium chloride  40 mEq Oral Daily Vera Israel PA-C      rivaroxaban  20 mg Oral Daily With [employer], CRNP      torsemide  20 mg Oral Daily Ayana Chávez PA-C          Today, Patient Was Seen By: EMIL Donato    **Please Note: This note may have been constructed using a voice recognition system. **

## 2023-10-13 PROCEDURE — 97530 THERAPEUTIC ACTIVITIES: CPT

## 2023-10-13 PROCEDURE — 99232 SBSQ HOSP IP/OBS MODERATE 35: CPT | Performed by: NURSE PRACTITIONER

## 2023-10-13 PROCEDURE — 97535 SELF CARE MNGMENT TRAINING: CPT

## 2023-10-13 PROCEDURE — 94660 CPAP INITIATION&MGMT: CPT

## 2023-10-13 PROCEDURE — 97110 THERAPEUTIC EXERCISES: CPT

## 2023-10-13 PROCEDURE — 94760 N-INVAS EAR/PLS OXIMETRY 1: CPT

## 2023-10-13 RX ADMIN — METOPROLOL TARTRATE 25 MG: 25 TABLET, FILM COATED ORAL at 17:56

## 2023-10-13 RX ADMIN — Medication: at 18:00

## 2023-10-13 RX ADMIN — TORSEMIDE 20 MG: 20 TABLET ORAL at 10:55

## 2023-10-13 RX ADMIN — METHOCARBAMOL 500 MG: 500 TABLET ORAL at 10:55

## 2023-10-13 RX ADMIN — RIVAROXABAN 20 MG: 20 TABLET, FILM COATED ORAL at 17:56

## 2023-10-13 RX ADMIN — METOPROLOL TARTRATE 25 MG: 25 TABLET, FILM COATED ORAL at 10:55

## 2023-10-13 RX ADMIN — Medication: at 10:58

## 2023-10-13 RX ADMIN — POTASSIUM CHLORIDE 40 MEQ: 1500 TABLET, EXTENDED RELEASE ORAL at 10:55

## 2023-10-13 RX ADMIN — LORATADINE 5 MG: 10 TABLET ORAL at 10:55

## 2023-10-13 RX ADMIN — METHOCARBAMOL 500 MG: 500 TABLET ORAL at 17:59

## 2023-10-13 NOTE — PLAN OF CARE
Problem: OCCUPATIONAL THERAPY ADULT  Goal: Performs self-care activities at highest level of function for planned discharge setting. See evaluation for individualized goals. Description: Treatment Interventions: ADL retraining, Functional transfer training, UE strengthening/ROM, Endurance training, Patient/family training, Compensatory technique education          See flowsheet documentation for full assessment, interventions and recommendations. Outcome: Progressing  Note: Limitation: Decreased ADL status, Decreased UE strength, Decreased cognition, Decreased Safe judgement during ADL, Decreased endurance, Decreased self-care trans, Decreased high-level ADLs  Prognosis: Good  Assessment: Patient participated in Skilled OT session this date with interventions consisting of ADL re training with the use of correct body mechnaics, therapeutic exercise to: increase functional use of BUEs, increase BUE muscle strength ,  therapeutic activities to: increase activity tolerance, increase dynamic sit/ stand balance during functional activity , and increase OOB/ sitting tolerance . Patient agreeable to OT treatment session, upon arrival patient was found supine in bed and in no apparent distress. Patient rolled in bed with mod assist of 2 and required total assist for travis care. Pt completed supine<>sit with max assist of 2. Pt completed 3 STS transfers from EOB with mod assist of 2. Once supine in bed, pt completed 1 set x 10 reps of BUE exercises to increase strength and endurance. In comparison to previous session, patient with improvements in standing tolerance and progression to weight shifting in standing. Patient requiring one step directives and frequent rest periods. Patient continues to be functioning below baseline level, occupational performance remains limited secondary to factors listed above and increased risk for falls and injury.    From OT standpoint, recommendation at time of d/c would be Post acute rehabilitation services. Patient to benefit from continued Occupational Therapy treatment while in the hospital to address deficits as defined above and maximize level of functional independence with ADLs and functional mobility.      OT Discharge Recommendation: Post acute rehabilitation services        Juliana MONTIEL, OTR/L

## 2023-10-13 NOTE — OCCUPATIONAL THERAPY NOTE
Occupational Therapy Treatment Note     Patient Name: Inge Munson  FRNDM'L Date: 10/13/2023  Problem List  Principal Problem:    Acute on chronic respiratory failure with hypoxia and hypercapnia (HCC)  Active Problems:    Class 3 severe obesity with body mass index (BMI) greater than or equal to 70 in adult [facility])    Atrial fibrillation (HCC)    Obesity hypoventilation syndrome (HCC)    Goals of care, counseling/discussion        10/13/23 1030   OT Last Visit   OT Visit Date 10/13/23   Note Type   Note Type Treatment   Pain Assessment   Pain Assessment Tool 0-10   Pain Score 8   Pain Location/Orientation Orientation: Bilateral;Location: Leg   Pain Onset/Description Onset: Ongoing;Frequency: Constant/Continuous   Hospital Pain Intervention(s) Ambulation/increased activity;Repositioned;Medication (See MAR)   Restrictions/Precautions   Weight Bearing Precautions Per Order No   Other Precautions Fall Risk;Pain;O2  (2 L O2 via NC)   Lifestyle   Autonomy Patient requires assistance with ADLs/IADLs, ambulatory with RW, and lives with family in a one level house   Reciprocal Relationships Sister present during session   Service to Others Retired   ADL   [address]  1  Total Assistance   Toileting Deficit Increased time to complete;Supervison/safety;Grab bar use;Perineal hygiene   Toileting Comments Pt required total assist for travis care while supine in bed. Functional Standing Tolerance   Time 30 seconds; 90 seconds; 15 seconds   Activity Pt stood for 3 trials. Pt able to weight-shift on 3rd attempt. Pt utilized standard walker for BUE support. Bed Mobility   Rolling R 3  Moderate assistance   Additional items Assist x 2; Increased time required;Verbal cues;LE management; Bedrails   Rolling L 3  Moderate assistance   Additional items Assist x 2;Bedrails; Increased time required;Verbal cues;LE management   Supine to Sit 2  Maximal assistance   Additional items Assist x 2;HOB elevated; Bedrails; Increased time required;Verbal cues;LE management   Sit to Supine 2  Maximal assistance   Additional items Assist x 2;Bedrails; Increased time required;Verbal cues;LE management   Additional Comments SpO2 decreased to 86% upon sitting at EOB. Encouraged PLB. Returned to >90% after ~2 minute seated rest.   Transfers   Sit to Stand 3  Moderate assistance   Additional items Assist x 2;Bedrails; Increased time required;Verbal cues   Stand to Sit 3  Moderate assistance   Additional items Assist x 2;Bedrails; Increased time required;Verbal cues   Therapeutic Exercise - ROM   UE-ROM Yes  (to increase strength and endurance)   ROM- Right Upper Extremities   R Shoulder AROM; Flexion; Extension   R Elbow AROM;Elbow flexion;Elbow extension   R Position Supine;Against gravity   R Weight/Reps/Sets 1 set x 10 reps each   ROM - Left Upper Extremities    L Shoulder AROM; Flexion; Extension   L Elbow AROM;Elbow flexion;Elbow extension   L Position Supine;Against gravity   L Weight/Reps/Sets 1 set x 10 reps each   Cognition   Overall Cognitive Status WFL   Arousal/Participation Alert; Responsive; Cooperative   Attention Within functional limits   Orientation Level Oriented X4   Memory Within functional limits   Following Commands Follows all commands and directions without difficulty   Comments Pt agreeable and motivated throughout session   Activity Tolerance   Activity Tolerance Patient limited by fatigue;Patient limited by pain   Medical Staff Made Aware This session, pt required and most appropriately benefited from skilled OT/PT co-treat due to extensive physical assistance of two therapists, significant regression from functional baseline and decreased activity tolerance.    Assessment   Assessment Patient participated in Skilled OT session this date with interventions consisting of ADL re training with the use of correct body mechnaics, therapeutic exercise to: increase functional use of BUEs, increase BUE muscle strength ,  therapeutic activities to: increase activity tolerance, increase dynamic sit/ stand balance during functional activity , and increase OOB/ sitting tolerance . Patient agreeable to OT treatment session, upon arrival patient was found supine in bed and in no apparent distress. Patient rolled in bed with mod assist of 2 and required total assist for travis care. Pt completed supine<>sit with max assist of 2. Pt completed 3 STS transfers from EOB with mod assist of 2. Once supine in bed, pt completed 1 set x 10 reps of BUE exercises to increase strength and endurance. In comparison to previous session, patient with improvements in standing tolerance and progression to weight shifting in standing. Patient requiring one step directives and frequent rest periods. Patient continues to be functioning below baseline level, occupational performance remains limited secondary to factors listed above and increased risk for falls and injury. From OT standpoint, recommendation at time of d/c would be Post acute rehabilitation services. Patient to benefit from continued Occupational Therapy treatment while in the hospital to address deficits as defined above and maximize level of functional independence with ADLs and functional mobility. Plan   Treatment Interventions ADL retraining;Functional transfer training;UE strengthening/ROM; Endurance training;Patient/family training; Compensatory technique education   Goal Expiration Date 10/27/23   OT Treatment Day 5   OT Frequency 3-5x/wk   Discharge Recommendation   OT Discharge Recommendation Post acute rehabilitation services   AM-PAC Daily Activity Inpatient   Lower Body Dressing 1   Bathing 1   Toileting 1   Upper Body Dressing 2   Grooming 3   Eating 3   Daily Activity Raw Score 11   Daily Activity Standardized Score (Calc for Raw Score >=11) 29.04   AM-PAC Applied Cognition Inpatient   Following a Speech/Presentation 4   Understanding Ordinary Conversation 4   Taking Medications 3 Remembering Where Things Are Placed or Put Away 3   Remembering List of 4-5 Errands 3   Taking Care of Complicated Tasks 3   Applied Cognition Raw Score 20   Applied Cognition Standardized Score 41.76   End of Consult   Patient Position at End of Consult Supine; All needs within reach   Nurse Communication Nurse aware of consult     Trav Esteves OTD, OTR/L

## 2023-10-13 NOTE — UTILIZATION REVIEW
Continued Stay Review    Date: 10/13/2023                          Current Patient Class: Inpatient  Current Level of Care: med surg    HPI:68 y.o. female initially admitted on 9/28/23     Assessment/Plan:   10/13   Continue  PT/OT. Needs rehab at discharge. Remains  on  O2  2L  NC, sats  88 -90 % with activity. Chronic lymphedema  BL  LE. Continue current meds/treatment plan.       Vital Signs: 98.9-67-18     144/67     sats  93  %  2 L     Pertinent Labs/Diagnostic Results:       Results from last 7 days   Lab Units 10/12/23  0512 10/11/23  0643 10/09/23  0613 10/07/23  0641   WBC Thousand/uL 6.64 6.01 5.55 6.53   HEMOGLOBIN g/dL 9.9* 10.1* 9.6* 9.7*   HEMATOCRIT % 33.6* 33.7* 33.4* 33.8*   PLATELETS Thousands/uL 212 220 217 203         Results from last 7 days   Lab Units 10/12/23  0512 10/11/23  0643 10/09/23  0613 10/08/23  0801 10/07/23  0641   SODIUM mmol/L 141 141 142 141 140   POTASSIUM mmol/L 3.8 4.1 4.2 4.0 3.8   CHLORIDE mmol/L 94* 94* 94* 93* 93*   CO2 mmol/L 44* >45* >45* >45* 44*   ANION GAP mmol/L 3  --   --   --  3   BUN mg/dL 11 11 9 8 7   CREATININE mg/dL 0.59* 0.58* 0.52* 0.46* 0.45*   EGFR ml/min/1.73sq m 94 95 98 102 103   CALCIUM mg/dL 8.6 8.6 8.6 8.6 8.6   MAGNESIUM mg/dL  --   --   --  2.1  --      Results from last 7 days   Lab Units 10/09/23  0613   AST U/L 10*   ALT U/L 6*   ALK PHOS U/L 63   TOTAL PROTEIN g/dL 6.3*   ALBUMIN g/dL 2.9*   TOTAL BILIRUBIN mg/dL 0.44         Results from last 7 days   Lab Units 10/12/23  0512 10/11/23  0643 10/09/23  0613 10/08/23  0801 10/07/23  0641   GLUCOSE RANDOM mg/dL 93 90 83 91 96         Results from last 7 days   Lab Units 10/11/23  0643 10/09/23  0613   PH RANDY  7.410* 7.418*   PCO2 RANDY mm Hg 75.6* 66.5*   PO2 RANDY mm Hg 47.9* 203.2*   HCO3 RANDY mmol/L 46.8* 42.0*   BASE EXC RANDY mmol/L 18.7 14.9   O2 CONTENT RANDY ml/dL 13.3 15.4   O2 HGB, VENOUS % 81.5* 95.6*             Medications:   Scheduled Medications:  ammonium lactate, , Topical, BID  loratadine, 5 mg, Oral, Daily  metoprolol tartrate, 25 mg, Oral, BID  potassium chloride, 40 mEq, Oral, Daily  rivaroxaban, 20 mg, Oral, Daily With Dinner  torsemide, 20 mg, Oral, Daily      Continuous IV Infusions:     PRN Meds:  acetaminophen, 975 mg, Oral, Q6H PRN  hydrocortisone, , Topical, 4x Daily PRN  methocarbamol, 500 mg, Oral, Q6H PRN  ondansetron, 4 mg, Intravenous, Q6H PRN        Discharge Plan: D    Network Utilization Review Department  ATTENTION: Please call with any questions or concerns to [phone] and carefully listen to the prompts so that you are directed to the right person. All voicemails are confidential.   For Discharge needs, contact Care Management DC Support Team at [phone] opt. 2  Send all requests for admission clinical reviews, approved or denied determinations and any other requests to dedicated fax number below belonging to the campus where the patient is receiving treatment.  List of dedicated fax numbers for the Facilities:  [place] DENIALS (Administrative/Medical Necessity) [phone]   DISCHARGE SUPPORT TEAM (NETWORK) [address] [facility] (Maternity/NICU/Pediatrics) [phone]   [address] [facility] [phone]   [address] [place] [address] [address] [address] [facility] [address] [phone]

## 2023-10-13 NOTE — ASSESSMENT & PLAN NOTE
Body mass index is 77.34 kg/m².    Super morbidly obese  Continued recommendation to work towards weight loss and improved exercise and dietary habits as able   PT/OT to work with patient to increase mobility, reports she is ambulatory at home with assistance

## 2023-10-13 NOTE — PLAN OF CARE
Problem: PHYSICAL THERAPY ADULT  Goal: Performs mobility at highest level of function for planned discharge setting. See evaluation for individualized goals. Description: Treatment/Interventions: Functional transfer training, LE strengthening/ROM, Therapeutic exercise, Endurance training, Patient/family training, Bed mobility, Continued evaluation, Spoke to nursing, Spoke to advanced practitioner, OT          See flowsheet documentation for full assessment, interventions and recommendations. Outcome: Progressing  Note: Prognosis: Fair  Problem List: Decreased strength, Decreased endurance, Impaired balance, Decreased mobility, Obesity, Pain  Assessment: Pt seen for PT treatment session this date with interventions consisting of therapeutic exercise to improve strength to improve functional mobility and therapeutic activity to improve transfers and increase activity tolerance with functional mobility to decrease fall risk. Pt agreeable to PT treatment session upon arrival, pt found supine in bed, in no apparent distress. Since previous session, pt has made good progress as evidenced by increased activity tolerance and improved balance  Barriers during this session include pain, SOB, decreased SaO2 levels, and fatigue. Pt continues to be functioning below baseline level, and remains limited 2* factors listed above and including decreased strength, impaired activity tolerance, impaired  balance, pain, decreased endurance, and decreased mobility. Pt prognosis for achieving goals is fair, pending pt progress with hospitalization/medical status improvements, and indicated by motivated to participate in therapy, ability to follow cues, and supportive family. PT will continue to see pt during current hospitalization in order to address the deficits listed above and provide interventions consistent w/ POC in effort to achieve goals.  Current goals and POC remain appropriate, pt continues to have rehab potential Continue to recommend post acute rehabilitation services at time of d/c in order to maximize pt's functional independence and safety w/ mobility. Upon conclusion pt supine in bed. The patient's AM-PAC Basic Mobility Inpatient Short Form Raw Score is 6. A Raw score of less than or equal to 16 suggests the patient may benefit from discharge to post-acute rehabilitation services. Please also refer to the recommendation of the Physical Therapist for safe discharge planning. RN verbalized pt appropriate for PT session. This session, pt required and most appropriately benefited from skilled OT/PT co-treat due to extensive physical assistance of two therapists, significant regression from functional baseline and decreased activity tolerance. Barriers to Discharge: Decreased caregiver support, Inaccessible home environment     PT Discharge Recommendation: Post acute rehabilitation services    See flowsheet documentation for full assessment.

## 2023-10-13 NOTE — ASSESSMENT & PLAN NOTE
Outside records note paroxysmal atrial fibrillation on Xarelto  Patient noted A-fib on EKG 10/10 [Was taking 12.5 mg Metoprolol BID at home]  Continue higher dose of Metoprolol, 25 mg BID  Continue Xarelto  Family reports that she had vaginal bleeding previously while on Xarelto - unable to obtain transvaginal US while admitted due to body habitus, consider GYN consult if bleeding restarts  No bleeding noted at this time.

## 2023-10-13 NOTE — PROGRESS NOTES
[address]  Progress Note  Name: Antoinette Sawyer  MRN: [id number]  Unit/Bed#: -01 I Date of Admission: 9/28/2023   Date of Service: 10/13/2023 I Hospital Day: 15    Assessment/Plan   * Acute on chronic respiratory failure with hypoxia and hypercapnia [facility])  Assessment & Plan  Patient presented to the ER with complaints of altered mental status and respiratory distress on 9/28; she required admission to the ICU for ventilatory support with continuous BiPAP. Chronically wears 2 L of supplemental oxygen at home  Initial chest x-ray (9/28/23): Pulmonary vascular congestion suggests pulmonary edema with associated cardiomegaly. Superimposed pneumonia is not excluded. Of note, per family, unable to complete outpatient sleep study in setting of size and mobility. Patient downgraded to med/surg status on 10/2  Continue with BiPAP at bedtime and during naps. Patient now on baseline 2 L of supplemental oxygen, continue to wean to maintain oxygen saturation levels greater than 88%. Pulmonology consulted, now following as needed. Case management following for dispo planning  BiPAP delivered to home per   Follow up chest x-ray on 10/10  Cardiomegaly with areas of atelectasis in the left lung. Very limited study due to body habitus. Obesity hypoventilation syndrome (HCC)  Assessment & Plan  Continue to manage with supplemental O2 support and BiPAP use for sleep     Atrial fibrillation (HCC)  Assessment & Plan  Outside records note paroxysmal atrial fibrillation on Xarelto  Patient noted A-fib on EKG 10/10 [Was taking 12.5 mg Metoprolol BID at home]  Continue higher dose of Metoprolol, 25 mg BID  Continue Xarelto  Family reports that she had vaginal bleeding previously while on Xarelto - unable to obtain transvaginal US while admitted due to body habitus, consider GYN consult if bleeding restarts  No bleeding noted at this time.     Class 3 severe obesity with body mass index (BMI) greater than or equal to 70 in adult [facility])  Assessment & Plan  Body mass index is 77.34 kg/m². Super morbidly obese  Continued recommendation to work towards weight loss and improved exercise and dietary habits as able   PT/OT to work with patient to increase mobility, reports she is ambulatory at home with assistance              VTE Pharmacologic Prophylaxis: VTE Score: 9 High Risk (Score >/= 5) - Pharmacological DVT Prophylaxis Ordered: rivaroxaban (Xarelto). Sequential Compression Devices Ordered. Patient Centered Rounds: I performed bedside rounds with nursing staff today. Discussions with Specialists or Other Care Team Provider: Case Management    Education and Discussions with Family / Patient: Patient declined call to . Total Time Spent on Date of Encounter in care of patient: 26 mins. This time was spent on one or more of the following: performing physical exam; counseling and coordination of care; obtaining or reviewing history; documenting in the medical record; reviewing/ordering tests, medications or procedures; communicating with other healthcare professionals and discussing with patient's family/caregivers. Current Length of Stay: 15 day(s)  Current Patient Status: Inpatient   Certification Statement: The patient will continue to require additional inpatient hospital stay due to ongoing treatment in setting of pending acute rehab acceptance/auth. Discharge Plan:  pending good [employer] auth. Code Status: Level 1 - Full Code    Subjective:   Patient resting in bed, reports feeling well today. Offers no complaints. No overnight events. Breathing well on her baseline oxygen. No chest pains. No leg pain.     Objective:     Vitals:   Temp (24hrs), Av.4 °F (36.9 °C), Min:98.1 °F (36.7 °C), Max:98.9 °F (37.2 °C)    Temp:  [98.1 °F (36.7 °C)-98.9 °F (37.2 °C)] 98.9 °F (37.2 °C)  HR:  [67-69] 67  Resp:  [17-20] 18  BP: (118-144)/(50-67) 144/67  SpO2:  [93 %-95 %] 93 %  Body mass index is 77.34 kg/m². Input and Output Summary (last 24 hours): Intake/Output Summary (Last 24 hours) at 10/13/2023 1210  Last data filed at 10/13/2023 0900  Gross per 24 hour   Intake 320 ml   Output 3250 ml   Net -2930 ml       Physical Exam:   Physical Exam  Vitals and nursing note reviewed. Constitutional:       General: She is not in acute distress. Appearance: She is obese. She is ill-appearing. Cardiovascular:      Pulses: Normal pulses. Heart sounds: Normal heart sounds. Pulmonary:      Effort: Pulmonary effort is normal. No tachypnea, bradypnea or respiratory distress. Breath sounds: Decreased breath sounds (likely in setting of body habitus) present. Comments: 2 L O2 via NC, 94%  Abdominal:      General: Bowel sounds are normal.      Palpations: Abdomen is soft. Musculoskeletal:         General: Normal range of motion. Right lower leg: No edema. Left lower leg: No edema. Skin:     General: Skin is warm and dry. Neurological:      Mental Status: She is alert and oriented to person, place, and time. Psychiatric:         Mood and Affect: Mood normal.          Additional Data:     Labs:  Results from last 7 days   Lab Units 10/12/23  0512   WBC Thousand/uL 6.64   HEMOGLOBIN g/dL 9.9*   HEMATOCRIT % 33.6*   PLATELETS Thousands/uL 212     Results from last 7 days   Lab Units 10/12/23  0512 10/11/23  0643 10/09/23  0613   SODIUM mmol/L 141   < > 142   POTASSIUM mmol/L 3.8   < > 4.2   CHLORIDE mmol/L 94*   < > 94*   CO2 mmol/L 44*   < > >45*   BUN mg/dL 11   < > 9   CREATININE mg/dL 0.59*   < > 0.52*   ANION GAP mmol/L 3  --   --    CALCIUM mg/dL 8.6   < > 8.6   ALBUMIN g/dL  --   --  2.9*   TOTAL BILIRUBIN mg/dL  --   --  0.44   ALK PHOS U/L  --   --  63   ALT U/L  --   --  6*   AST U/L  --   --  10*   GLUCOSE RANDOM mg/dL 93   < > 83    < > = values in this interval not displayed.                        Lines/Drains:  Invasive Devices Peripheral Intravenous Line  Duration             Peripheral IV 10/11/23 Left Antecubital 2 days              Drain  Duration             External Urinary Catheter 12 days                    Imaging: No pertinent imaging reviewed. Recent Cultures (last 7 days):         Last 24 Hours Medication List:   Current Facility-Administered Medications   Medication Dose Route Frequency Provider Last Rate    acetaminophen  975 mg Oral Q6H PRN Luordes [place], CRNP      ammonium lactate   Topical BID Lourdes [place], CRNP      hydrocortisone   Topical 4x Daily PRN Lourdes [place], CRNP      loratadine  5 mg Oral Daily Lourdes [place], CRNP      methocarbamol  500 mg Oral Q6H PRN Lourdes [place], CRNP      metoprolol tartrate  25 mg Oral BID Lourdes [place], CRNP      ondansetron  4 mg Intravenous Q6H PRN Lourdes [place], CRNP      potassium chloride  40 mEq Oral Daily Vera Israle PA-C      rivaroxaban  20 mg Oral Daily With [employer], CRNP      torsemide  20 mg Oral Daily Elmo Espinoza PA-C          Today, Patient Was Seen By: EMIL Resendiz    **Please Note: This note may have been constructed using a voice recognition system. **

## 2023-10-13 NOTE — CASE MANAGEMENT
Case Management Discharge Planning Note    Patient name Chris Cano  Location /-58 MRN [id number]  : 1955 Date 10/13/2023       Current Admission Date: 2023  Current Admission Diagnosis:Acute on chronic respiratory failure with hypoxia and hypercapnia [facility])   Patient Active Problem List    Diagnosis Date Noted    Goals of care, counseling/discussion 10/11/2023    Acute on chronic respiratory failure with hypoxia and hypercapnia ([address]) 2023    Iron deficiency anemia 2023    Atrial fibrillation ([address]) 2023    Obesity hypoventilation syndrome ([address]) 2023    Lipodermatosclerosis of both lower extremities 2021    Class 3 severe obesity with body mass index (BMI) greater than or equal to 70 in adult [facility]) 2021    Bakers cyst 10/27/2014    Lymphedema of leg 10/27/2014      LOS (days): 15  Geometric Mean LOS (GMLOS) (days): 3.60  Days to GMLOS:-11.9     OBJECTIVE:  Risk of Unplanned Readmission Score: 14.57         Current admission status: Inpatient   Preferred Pharmacy:   [facility]/pharmacy #[id number]- [address] - [address], [facility] [address] [address]  Phone: [phone] Fax: [phone]    Primary Care Provider: Gretel Rasheed DO    Primary Insurance: [address]  Secondary Insurance:     DISCHARGE DETAILS:  CM received this message on aidin from [employer] liasion breezy. "Just got info from my benefit office:  Spoke with Jony Kim at [id number] [place] [address] at [facility]. Authorization is currently under review with the Medical Director.   Pending  # [id number]

## 2023-10-13 NOTE — ASSESSMENT & PLAN NOTE
Patient presented to the ER with complaints of altered mental status and respiratory distress on 9/28; she required admission to the ICU for ventilatory support with continuous BiPAP. Chronically wears 2 L of supplemental oxygen at home  Initial chest x-ray (9/28/23): Pulmonary vascular congestion suggests pulmonary edema with associated cardiomegaly. Superimposed pneumonia is not excluded. Of note, per family, unable to complete outpatient sleep study in setting of size and mobility. Patient downgraded to med/surg status on 10/2  Continue with BiPAP at bedtime and during naps. Patient now on baseline 2 L of supplemental oxygen, continue to wean to maintain oxygen saturation levels greater than 88%. Pulmonology consulted, now following as needed. Case management following for dispo planning  BiPAP delivered to home per   Follow up chest x-ray on 10/10  Cardiomegaly with areas of atelectasis in the left lung. Very limited study due to body habitus.

## 2023-10-13 NOTE — PLAN OF CARE
Problem: Prexisting or High Potential for Compromised Skin Integrity  Goal: Skin integrity is maintained or improved  Description: INTERVENTIONS:  - Identify patients at risk for skin breakdown  - Assess and monitor skin integrity  - Assess and monitor nutrition and hydration status  - Monitor labs   - Assess for incontinence   - Turn and reposition patient  - Assist with mobility/ambulation  - Relieve pressure over bony prominences  - Avoid friction and shearing  - Provide appropriate hygiene as needed including keeping skin clean and dry  - Evaluate need for skin moisturizer/barrier cream  - Collaborate with interdisciplinary team   - Patient/family teaching  - Consider wound care consult   10/13/2023 1149 by Neva Gilbert RN  Outcome: Progressing  10/13/2023 1149 by Neva Gilbert RN  Outcome: Progressing  10/13/2023 1149 by Neva Gilbert RN  Outcome: Progressing     Problem: RESPIRATORY - ADULT  Goal: Achieves optimal ventilation and oxygenation  Description: INTERVENTIONS:  - Assess for changes in respiratory status  - Assess for changes in mentation and behavior  - Position to facilitate oxygenation and minimize respiratory effort  - Oxygen administered by appropriate delivery if ordered  - Initiate smoking cessation education as indicated  - Encourage broncho-pulmonary hygiene including cough, deep breathe, Incentive Spirometry  - Assess the need for suctioning and aspirate as needed  - Assess and instruct to report SOB or any respiratory difficulty  - Respiratory Therapy support as indicated  10/13/2023 1149 by Neva Gilbert RN  Outcome: Progressing  10/13/2023 1149 by Nvea Gilbert RN  Outcome: Progressing  10/13/2023 1149 by Neva Gilbert RN  Outcome: Progressing

## 2023-10-13 NOTE — PHYSICAL THERAPY NOTE
Letter created and faxed as requested. PT Treatment Note    NAME:  Gris Ahr  DATE: 10/13/23    AGE:   76 y.o. Mrn:   [id number]  ADMIT DX:  Respiratory distress [R06.03]  Acute respiratory failure with hypoxia and hypercapnia (HCC) [J96.01, J96.02]  Performed at least 2 patient identifiers during session: Name and Birthday       10/13/23 1028   PT Last Visit   PT Visit Date 10/13/23   Note Type   Note Type Treatment   Pain Assessment   Pain Assessment Tool 0-10   Pain Score 8   Pain Location/Orientation Orientation: Bilateral;Location: Leg   Hospital Pain Intervention(s) Repositioned   Restrictions/Precautions   Weight Bearing Precautions Per Order No   Other Precautions Fall Risk;Pain;O2   General   Chart Reviewed Yes   Family/Caregiver Present Yes  (sister very involved with care)   Cognition   Overall Cognitive Status WFL   Arousal/Participation Alert   Attention Within functional limits   Orientation Level Oriented X4   Memory Within functional limits   Following Commands Follows all commands and directions without difficulty   Bed Mobility   Rolling R 3  Moderate assistance   Additional items Assist x 2;LE management;Verbal cues; Increased time required; Bedrails   Rolling L 3  Moderate assistance   Additional items Assist x 2;LE management;Verbal cues; Increased time required; Bedrails  (multiple rolls to adjust and change sheets/ padding)   Supine to Sit 2  Maximal assistance   Additional items Assist x 2;LE management;Verbal cues; Increased time required   Sit to Supine 2  Maximal assistance   Additional items Assist x 2;LE management;Verbal cues; Increased time required   Additional Comments pt requires Max A to scoot whoel sitting EOB and supine in bed (trendelenburg position); pt denied dizziness however RENE noted   Transfers   Sit to Stand 3  Moderate assistance   Additional items Assist x 2;Verbal cues; Increased time required   Stand to Sit 3  Moderate assistance   Additional items Assist x 2;Verbal cues; Increased time required Additional Comments pt stood with SW x3 with Min A x2; pt stood for 30 sec, 1 min 20 sec and 15 sec; on the last stand pt able to perform weight shifts   Balance   Static Sitting Fair   Dynamic Sitting Fair   Static Standing Poor   Dynamic Standing Poor -   Endurance Deficit   Endurance Deficit Yes   Endurance Deficit Description decreased SaO2 to 88%; RENE noted   Activity Tolerance   Activity Tolerance Patient limited by fatigue;Patient limited by pain   Exercises   Quad Sets Sitting;10 reps;AROM; Bilateral   Ankle Pumps Sitting;10 reps;AROM; Bilateral   Assessment   Prognosis Fair   Assessment Pt seen for PT treatment session this date with interventions consisting of therapeutic exercise to improve strength to improve functional mobility and therapeutic activity to improve transfers and increase activity tolerance with functional mobility to decrease fall risk. Pt agreeable to PT treatment session upon arrival, pt found supine in bed, in no apparent distress. Since previous session, pt has made good progress as evidenced by increased activity tolerance and improved balance  Barriers during this session include pain, SOB, decreased SaO2 levels, and fatigue. Pt continues to be functioning below baseline level, and remains limited 2* factors listed above and including decreased strength, impaired activity tolerance, impaired  balance, pain, decreased endurance, and decreased mobility. Pt prognosis for achieving goals is fair, pending pt progress with hospitalization/medical status improvements, and indicated by motivated to participate in therapy, ability to follow cues, and supportive family. PT will continue to see pt during current hospitalization in order to address the deficits listed above and provide interventions consistent w/ POC in effort to achieve goals.  Current goals and POC remain appropriate, pt continues to have rehab potential  Continue to recommend post acute rehabilitation services at time of d/c in order to maximize pt's functional independence and safety w/ mobility. Upon conclusion pt supine in bed. The patient's AM-PAC Basic Mobility Inpatient Short Form Raw Score is 6. A Raw score of less than or equal to 16 suggests the patient may benefit from discharge to post-acute rehabilitation services. Please also refer to the recommendation of the Physical Therapist for safe discharge planning. RN verbalized pt appropriate for PT session. This session, pt required and most appropriately benefited from skilled OT/PT co-treat due to extensive physical assistance of two therapists, significant regression from functional baseline and decreased activity tolerance. Barriers to Discharge Decreased caregiver support; Inaccessible home environment   Goals   Patient Goals to do therapy   STG Expiration Date 10/22/23   PT Treatment Day 6   Plan   Treatment/Interventions Functional transfer training;LE strengthening/ROM; Therapeutic exercise; Endurance training;Bed mobility; Equipment eval/education   Progress Progressing toward goals   PT Frequency 3-5x/wk   Discharge Recommendation   PT Discharge Recommendation Post acute rehabilitation services   AM-PAC Basic Mobility Inpatient   Turning in Flat Bed Without Bedrails 1   Lying on Back to Sitting on Edge of Flat Bed Without Bedrails 1   Moving Bed to Chair 1   Standing Up From Chair Using Arms 1   Walk in Room 1   Climb 3-5 Stairs With Railing 1   Basic Mobility Inpatient Raw Score 6   Turning Head Towards Sound 4   Highest Level Of Mobility   JH-HLM Goal 2: Bed activities/Dependent transfer   JH-HLM Achieved 3: Sit at edge of bed         Time In: 0933  Time Out: 1028  Total Treatment Minutes: Eli Ross PT

## 2023-10-14 PROCEDURE — 94660 CPAP INITIATION&MGMT: CPT

## 2023-10-14 PROCEDURE — 99232 SBSQ HOSP IP/OBS MODERATE 35: CPT | Performed by: FAMILY MEDICINE

## 2023-10-14 RX ORDER — FUROSEMIDE 10 MG/ML
40 INJECTION INTRAMUSCULAR; INTRAVENOUS ONCE
Status: DISCONTINUED | OUTPATIENT
Start: 2023-10-14 | End: 2023-10-14

## 2023-10-14 RX ORDER — FUROSEMIDE 10 MG/ML
60 INJECTION INTRAMUSCULAR; INTRAVENOUS
Status: DISCONTINUED | OUTPATIENT
Start: 2023-10-14 | End: 2023-10-14

## 2023-10-14 RX ADMIN — Medication: at 09:40

## 2023-10-14 RX ADMIN — Medication: at 18:17

## 2023-10-14 RX ADMIN — TORSEMIDE 20 MG: 20 TABLET ORAL at 09:38

## 2023-10-14 RX ADMIN — POTASSIUM CHLORIDE 40 MEQ: 1500 TABLET, EXTENDED RELEASE ORAL at 09:37

## 2023-10-14 RX ADMIN — METOPROLOL TARTRATE 25 MG: 25 TABLET, FILM COATED ORAL at 09:38

## 2023-10-14 RX ADMIN — RIVAROXABAN 20 MG: 20 TABLET, FILM COATED ORAL at 18:17

## 2023-10-14 RX ADMIN — LORATADINE 5 MG: 10 TABLET ORAL at 09:37

## 2023-10-14 RX ADMIN — METOPROLOL TARTRATE 25 MG: 25 TABLET, FILM COATED ORAL at 18:17

## 2023-10-14 NOTE — PROGRESS NOTES
[address]  Progress Note  Name: Juan Jose Cuevas  MRN: [id number]  Unit/Bed#: -01 I Date of Admission: 9/28/2023   Date of Service: 10/14/2023 I Hospital Day: 16    Assessment/Plan   Goals of care, counseling/discussion  Assessment & Plan  Patient is s/p goals of care meeting on 10/4 with palliative care and other team members  Discussion with patient/, patient remains a full code at this time    Obesity hypoventilation syndrome ([address])  Assessment & Plan  Continue to manage with supplemental O2 support and BiPAP use for sleep     Atrial fibrillation ([address])  Assessment & Plan  Outside records note paroxysmal atrial fibrillation on Xarelto  Patient noted A-fib on EKG 10/10 [Was taking 12.5 mg Metoprolol BID at home]  Continue higher dose of Metoprolol, 25 mg BID  Continue Xarelto  Family reports that she had vaginal bleeding previously while on Xarelto - unable to obtain transvaginal US while admitted due to body habitus, consider GYN consult if bleeding restarts  No bleeding noted at this time. Class 3 severe obesity with body mass index (BMI) greater than or equal to 70 in adult [facility])  Assessment & Plan  Body mass index is 76.41 kg/m². Super morbidly obese  Continued recommendation to work towards weight loss and improved exercise and dietary habits as able   PT/OT to work with patient to increase mobility, reports she is ambulatory at home with assistance. Patient is awaiting insurance authorization for acute rehab at [facility]    * Acute on chronic respiratory failure with hypoxia and hypercapnia [facility])  Assessment & Plan  Patient presented to the ER with complaints of altered mental status and respiratory distress on 9/28; she required admission to the ICU for ventilatory support with continuous BiPAP.   Chronically wears 2 L of supplemental oxygen at home  Initial chest x-ray (9/28/23): Pulmonary vascular congestion suggests pulmonary edema with associated cardiomegaly. Superimposed pneumonia is not excluded. Of note, per family, unable to complete outpatient sleep study in setting of size and mobility. Patient downgraded to med/surg status on 10/2  Continue with BiPAP at bedtime and during naps. Patient now on baseline 2 L of supplemental oxygen, continue to wean to maintain oxygen saturation levels greater than 88%. Pulmonology consulted, now following as needed. Case management following for dispo planning  BiPAP delivered to home per   Follow up chest x-ray on 10/10  Cardiomegaly with areas of atelectasis in the left lung. Very limited study due to body habitus. I am going to give the patient a little bit of Lasix 60 mg IV x2 doses    Acute encephalopathy-resolved as of 10/12/2023  Assessment & Plan  Metabolic encephalopathy present on admission, a/e/b lethargy, disorientation, poor safety awareness and impulsivity. Improved with BiPAP use and monitored by neuro checks, fall and delirium precautions, sleep hygiene   Likely secondary to acute on chronic hypoxia with hypercapnia. No infectious etiology identified           VTE Pharmacologic Prophylaxis:   Pharmacologic: Rivaroxaban (Xarelto)  Mechanical VTE Prophylaxis in Place: Yes    Patient Centered Rounds: I have performed bedside rounds with nursing staff today. Time Spent for Care: 20 minutes. More than 50% of total time spent on counseling and coordination of care as described above. Current Length of Stay: 16 day(s)    Current Patient Status: Inpatient   Certification Statement: The patient will continue to require additional inpatient hospital stay due to needing placement    Discharge Plan: Hopeful discharge by Monday if insurance authorization is obtained    Code Status: Level 1 - Full Code      Subjective:   Patient seen and examined.   States that her legs are swollen    Objective:     Vitals:   Temp (24hrs), Av.2 °F (36.8 °C), Min:97.3 °F (36.3 °C), Max:98.8 °F (37.1 °C)    Temp:  [97.3 °F (36.3 °C)-98.8 °F (37.1 °C)] 98.8 °F (37.1 °C)  HR:  [58-72] 72  Resp:  [18-28] 28  BP: (118-135)/(58-67) 121/58  SpO2:  [91 %-93 %] 91 %  Body mass index is 76.41 kg/m². Input and Output Summary (last 24 hours): Intake/Output Summary (Last 24 hours) at 10/14/2023 1237  Last data filed at 10/14/2023 1135  Gross per 24 hour   Intake 360 ml   Output 3850 ml   Net -3490 ml       Physical Exam:     Physical Exam  (   General Appearance:    Alert, cooperative, no distress, appears stated age                               Lungs:     Clear to auscultation bilaterally, respirations unlabored       Heart:    Regular rate and rhythm, S1 and S2 normal, no murmur, rub    or gallop   Abdomen:     Soft, non-tender, bowel sounds active all four quadrants,     no masses, no organomegaly           Extremities: Lymphedema       Additional Data:     Labs:    Results from last 7 days   Lab Units 10/12/23  0512   WBC Thousand/uL 6.64   HEMOGLOBIN g/dL 9.9*   HEMATOCRIT % 33.6*   PLATELETS Thousands/uL 212     Results from last 7 days   Lab Units 10/12/23  0512 10/11/23  0643 10/09/23  0613   SODIUM mmol/L 141   < > 142   POTASSIUM mmol/L 3.8   < > 4.2   CHLORIDE mmol/L 94*   < > 94*   CO2 mmol/L 44*   < > >45*   BUN mg/dL 11   < > 9   CREATININE mg/dL 0.59*   < > 0.52*   ANION GAP mmol/L 3  --   --    CALCIUM mg/dL 8.6   < > 8.6   ALBUMIN g/dL  --   --  2.9*   TOTAL BILIRUBIN mg/dL  --   --  0.44   ALK PHOS U/L  --   --  63   ALT U/L  --   --  6*   AST U/L  --   --  10*   GLUCOSE RANDOM mg/dL 93   < > 83    < > = values in this interval not displayed. * I Have Reviewed All Lab Data Listed Above. * Additional Pertinent Lab Tests Reviewed:  All [facility] Admission Reviewed      Recent Cultures (last 7 days):           Last 24 Hours Medication List:   Current Facility-Administered Medications   Medication Dose Route Frequency Provider Last Rate    acetaminophen  973 mg Oral Q6H PRN Xi Mederos, CRNP      ammonium lactate   Topical BID Xi Mederos, CRNP      furosemide  60 mg Intravenous BID (diuretic) Scot Brown MD      hydrocortisone   Topical 4x Daily PRN Xi Mederos, CRNP      loratadine  5 mg Oral Daily Xi Mederos, CRNP      methocarbamol  500 mg Oral Q6H PRN Xi Mederos, CRNP      metoprolol tartrate  25 mg Oral BID Xi Mederos, CRNP      ondansetron  4 mg Intravenous Q6H PRN Xi Mederos, CRNP      potassium chloride  40 mEq Oral Daily Vera Israel PA-C      rivaroxaban  20 mg Oral Daily With [address] EMIL Garcia          Today, Patient Was Seen By: Judson Welsh MD    ** Please Note: Dictation voice to text software may have been used in the creation of this document.  **

## 2023-10-14 NOTE — CASE MANAGEMENT
Case Management Discharge Planning Note    Patient name Marleny Domingo  Location /-70 MRN [id number]  : 1955 Date 10/14/2023       Current Admission Date: 2023  Current Admission Diagnosis:Acute on chronic respiratory failure with hypoxia and hypercapnia [facility])   Patient Active Problem List    Diagnosis Date Noted    Goals of care, counseling/discussion 10/11/2023    Acute on chronic respiratory failure with hypoxia and hypercapnia ([address]) 2023    Iron deficiency anemia 2023    Atrial fibrillation ([address]) 2023    Obesity hypoventilation syndrome ([address]) 2023    Lipodermatosclerosis of both lower extremities 2021    Class 3 severe obesity with body mass index (BMI) greater than or equal to 70 in adult [facility]) 2021    Bakers cyst 10/27/2014    Lymphedema of leg 10/27/2014      LOS (days): 16  Geometric Mean LOS (GMLOS) (days): 3.60  Days to GMLOS:-12.8     OBJECTIVE:  Risk of Unplanned Readmission Score: 14.79         Current admission status: Inpatient   Preferred Pharmacy:   [facility]/pharmacy #[id number]- [address] - [address]  [address] [address], [address] [place] [address]  Phone: [phone] Fax: [phone]    Primary Care Provider: Lizbeth Lucas DO    Primary Insurance: [address]  Secondary Insurance:     DISCHARGE DETAILS:       Accepting Facility Name, [address] [place] [address] : [address] [place] [address]  Receiving Facility/Agency Phone Number: Phone: [phone]  Facility/Agency Fax Number: Fax: [phone]

## 2023-10-14 NOTE — QUICK NOTE
I updated the  over the phone. I told him the reason for the extra Lasix because she has leg swelling and they were concerned about that and that the only way to get the extra fluid off and her kidney function is fine. He was a little concerned about giving her 2 doses.   I told him I will only give her 1 dose

## 2023-10-14 NOTE — ASSESSMENT & PLAN NOTE
Metabolic encephalopathy present on admission, a/e/b lethargy, disorientation, poor safety awareness and impulsivity. Improved with BiPAP use and monitored by neuro checks, fall and delirium precautions, sleep hygiene   Likely secondary to acute on chronic hypoxia with hypercapnia.    No infectious etiology identified

## 2023-10-14 NOTE — ASSESSMENT & PLAN NOTE
Body mass index is 76.41 kg/m². Super morbidly obese  Continued recommendation to work towards weight loss and improved exercise and dietary habits as able   PT/OT to work with patient to increase mobility, reports she is ambulatory at home with assistance.   Patient is awaiting insurance authorization for acute rehab at [facility]

## 2023-10-14 NOTE — NURSING NOTE
PCA reported pt refusing Q2 turns. Educated pt on risk of skin breakdown and benefits of repositioning. Encouraged pt to shift her weight as often as possible. Pt allowed pillow positioning to help offload pressure. Care continues.

## 2023-10-14 NOTE — ASSESSMENT & PLAN NOTE
Patient presented to the ER with complaints of altered mental status and respiratory distress on 9/28; she required admission to the ICU for ventilatory support with continuous BiPAP. Chronically wears 2 L of supplemental oxygen at home  Initial chest x-ray (9/28/23): Pulmonary vascular congestion suggests pulmonary edema with associated cardiomegaly. Superimposed pneumonia is not excluded. Of note, per family, unable to complete outpatient sleep study in setting of size and mobility. Patient downgraded to med/surg status on 10/2  Continue with BiPAP at bedtime and during naps. Patient now on baseline 2 L of supplemental oxygen, continue to wean to maintain oxygen saturation levels greater than 88%. Pulmonology consulted, now following as needed. Case management following for dispo planning  BiPAP delivered to home per   Follow up chest x-ray on 10/10  Cardiomegaly with areas of atelectasis in the left lung. Very limited study due to body habitus.   I am going to give the patient a little bit of Lasix 60 mg IV x2 doses

## 2023-10-14 NOTE — PLAN OF CARE
Problem: Prexisting or High Potential for Compromised Skin Integrity  Goal: Skin integrity is maintained or improved  Description: INTERVENTIONS:  - Identify patients at risk for skin breakdown  - Assess and monitor skin integrity  - Assess and monitor nutrition and hydration status  - Monitor labs   - Assess for incontinence   - Turn and reposition patient  - Assist with mobility/ambulation  - Relieve pressure over bony prominences  - Avoid friction and shearing  - Provide appropriate hygiene as needed including keeping skin clean and dry  - Evaluate need for skin moisturizer/barrier cream  - Collaborate with interdisciplinary team   - Patient/family teaching  - Consider wound care consult   Outcome: Progressing     Problem: MOBILITY - ADULT  Goal: Maintain or return to baseline ADL function  Description: INTERVENTIONS:  -  Assess patient's ability to carry out ADLs; assess patient's baseline for ADL function and identify physical deficits which impact ability to perform ADLs (bathing, care of mouth/teeth, toileting, grooming, dressing, etc.)  - Assess/evaluate cause of self-care deficits   - Assess range of motion  - Assess patient's mobility; develop plan if impaired  - Assess patient's need for assistive devices and provide as appropriate  - Encourage maximum independence but intervene and supervise when necessary  - Involve family in performance of ADLs  - Assess for home care needs following discharge   - Consider OT consult to assist with ADL evaluation and planning for discharge  - Provide patient education as appropriate  Outcome: Progressing  Goal: Maintains/Returns to pre admission functional level  Description: INTERVENTIONS:  - Perform BMAT or MOVE assessment daily.   - Set and communicate daily mobility goal to care team and patient/family/caregiver.    - Collaborate with rehabilitation services on mobility goals if consulted  - Out of bed for toileting  - Record patient progress and toleration of activity level   Outcome: Progressing       Problem: RESPIRATORY - ADULT  Goal: Achieves optimal ventilation and oxygenation  Description: INTERVENTIONS:  - Assess for changes in respiratory status  - Assess for changes in mentation and behavior  - Position to facilitate oxygenation and minimize respiratory effort  - Oxygen administered by appropriate delivery if ordered  - Initiate smoking cessation education as indicated  - Encourage broncho-pulmonary hygiene including cough, deep breathe, Incentive Spirometry  - Assess the need for suctioning and aspirate as needed  - Assess and instruct to report SOB or any respiratory difficulty  - Respiratory Therapy support as indicated  Outcome: Progressing     Problem: Knowledge Deficit  Goal: Patient/family/caregiver demonstrates understanding of disease process, treatment plan, medications, and discharge instructions  Description: Complete learning assessment and assess knowledge base.   Interventions:  - Provide teaching at level of understanding  - Provide teaching via preferred learning methods  Outcome: Progressing

## 2023-10-15 LAB
ANION GAP SERPL CALCULATED.3IONS-SCNC: 2 MMOL/L
BUN SERPL-MCNC: 10 MG/DL (ref 5–25)
CALCIUM SERPL-MCNC: 8.5 MG/DL (ref 8.4–10.2)
CHLORIDE SERPL-SCNC: 98 MMOL/L (ref 96–108)
CO2 SERPL-SCNC: 39 MMOL/L (ref 21–32)
CREAT SERPL-MCNC: 0.52 MG/DL (ref 0.6–1.3)
GFR SERPL CREATININE-BSD FRML MDRD: 98 ML/MIN/1.73SQ M
GLUCOSE SERPL-MCNC: 88 MG/DL (ref 65–140)
POTASSIUM SERPL-SCNC: 3.8 MMOL/L (ref 3.5–5.3)
SODIUM SERPL-SCNC: 139 MMOL/L (ref 135–147)

## 2023-10-15 PROCEDURE — 80048 BASIC METABOLIC PNL TOTAL CA: CPT | Performed by: FAMILY MEDICINE

## 2023-10-15 PROCEDURE — 99232 SBSQ HOSP IP/OBS MODERATE 35: CPT | Performed by: FAMILY MEDICINE

## 2023-10-15 PROCEDURE — 94660 CPAP INITIATION&MGMT: CPT

## 2023-10-15 RX ORDER — TORSEMIDE 20 MG/1
20 TABLET ORAL DAILY
Status: DISCONTINUED | OUTPATIENT
Start: 2023-10-15 | End: 2023-10-18 | Stop reason: HOSPADM

## 2023-10-15 RX ADMIN — LORATADINE 5 MG: 10 TABLET ORAL at 09:41

## 2023-10-15 RX ADMIN — POTASSIUM CHLORIDE 40 MEQ: 1500 TABLET, EXTENDED RELEASE ORAL at 09:42

## 2023-10-15 RX ADMIN — RIVAROXABAN 20 MG: 20 TABLET, FILM COATED ORAL at 17:13

## 2023-10-15 RX ADMIN — Medication: at 09:40

## 2023-10-15 RX ADMIN — TORSEMIDE 20 MG: 20 TABLET ORAL at 11:22

## 2023-10-15 RX ADMIN — METOPROLOL TARTRATE 25 MG: 25 TABLET, FILM COATED ORAL at 17:13

## 2023-10-15 RX ADMIN — Medication: at 17:13

## 2023-10-15 RX ADMIN — METOPROLOL TARTRATE 25 MG: 25 TABLET, FILM COATED ORAL at 09:40

## 2023-10-15 NOTE — ASSESSMENT & PLAN NOTE
Elevated on presentation, no complaint of chest pain and EKG without ischemia  Down-trended in ICU   Echo without regional wall motion abnormalities  Suspect non-cardiac elevation in the setting of severe respiratory distress with hypoxia/hypercapnia  Given no current cardiac symptoms, defer any additional workup to outpatient provider

## 2023-10-15 NOTE — RESPIRATORY THERAPY NOTE
10/15/23 0025   Respiratory Assessment   Resp Comments placed pt on NIV for HS use, RN aware   O2 Device v60   Non-Invasive Information   O2 Interface Device Face mask   Non-Invasive Ventilation Mode BiPAP   $ Intermittent NIV Yes   Non-Invasive Settings   IPAP (cm) 20 cm   EPAP (cm) 8 cm   Rate (Set) 20   FiO2 (%) 36   Rise Time 2  (15 min ramp applied)   Inspiratory Time (Set) 1   Humidification   (heater)   Temperature (Set) 31   Non-Invasive Readings   Skin Intervention Skin intact   Total Rate 25   Vt (mL) (Mech) 474   MV (Mech) 12   Peak Pressure (Obs) 14   Heater Temperature (Obs)   (unit warming)   Leak (lpm) 0   Non-Invasive Alarms   Insp Pressure High (cm H20) 35   Insp Pressure Low (cm H20) 6   Low Insp Pressure Time (sec) 20 sec   MV Low (L/min) 3   Vt High (mL) 1150   Vt Low (mL) 150   High Resp Rate (BPM) 40 BPM   Low Resp Rate (BPM) 8 BPM   Maintenance   Water bag changed No

## 2023-10-15 NOTE — PROGRESS NOTES
[address]  Progress Note  Name: Rasheed Perales  MRN: [id number]  Unit/Bed#: -01 I Date of Admission: 9/28/2023   Date of Service: 10/15/2023 I Hospital Day: 16    Assessment/Plan   Goals of care, counseling/discussion  Assessment & Plan  Patient is s/p goals of care meeting on 10/4 with palliative care and other team members  Discussion with patient/, patient remains a full code at this time    Obesity hypoventilation syndrome ([address])  Assessment & Plan  Continue to manage with supplemental O2 support and BiPAP use for sleep     Atrial fibrillation ([address])  Assessment & Plan  Outside records note paroxysmal atrial fibrillation on Xarelto  Patient noted A-fib on EKG 10/10 [Was taking 12.5 mg Metoprolol BID at home]  Continue higher dose of Metoprolol, 25 mg BID  Continue Xarelto  Family reports that she had vaginal bleeding previously while on Xarelto - unable to obtain transvaginal US while admitted due to body habitus, consider GYN consult if bleeding restarts  No bleeding noted at this time. Class 3 severe obesity with body mass index (BMI) greater than or equal to 70 in adult [facility])  Assessment & Plan  Body mass index is 76.41 kg/m². Super morbidly obese  Continued recommendation to work towards weight loss and improved exercise and dietary habits as able   PT/OT to work with patient to increase mobility, reports she is ambulatory at home with assistance. Patient is awaiting insurance authorization for acute rehab at [facility]    * Acute on chronic respiratory failure with hypoxia and hypercapnia (HCC)  Assessment & Plan    Continue with BiPAP at bedtime and during naps. Patient now on baseline 2 L of supplemental oxygen, continue to wean to maintain oxygen saturation levels greater than 88%. Pulmonology consulted, now following as needed.   Case management following for dispo planning  BiPAP delivered to home per   Follow up chest x-ray on 10/10  Cardiomegaly with areas of atelectasis in the left lung. Very limited study due to body habitus. Patient states that she feels okay she does not feel the need for extra diuretics. Patient states she is at her baseline as far as her swelling goes    Elevated troponin-resolved as of 10/9/2023  Assessment & Plan  Elevated on presentation, no complaint of chest pain and EKG without ischemia  Down-trended in ICU   Echo without regional wall motion abnormalities  Suspect non-cardiac elevation in the setting of severe respiratory distress with hypoxia/hypercapnia  Given no current cardiac symptoms, defer any additional workup to outpatient provider     Acute encephalopathy-resolved as of 10/12/2023  Assessment & Plan  Metabolic encephalopathy present on admission, a/e/b lethargy, disorientation, poor safety awareness and impulsivity. Improved with BiPAP use and monitored by neuro checks, fall and delirium precautions, sleep hygiene   Likely secondary to acute on chronic hypoxia with hypercapnia. Now back down to 2 L  No infectious etiology identified             VTE Pharmacologic Prophylaxis:   Pharmacologic: Rivaroxaban (Xarelto)  Mechanical VTE Prophylaxis in Place: Yes    Patient Centered Rounds: I have performed bedside rounds with nursing staff today. Time Spent for Care: 20 minutes. More than 50% of total time spent on counseling and coordination of care as described above. Current Length of Stay: 17 day(s)    Current Patient Status: Inpatient   Certification Statement: The patient will continue to require additional inpatient hospital stay due to needing to hear from insurance company for authorization for hopeful acute rehab    Discharge Plan: Patient hopefully can be discharged tomorrow if insurance authorization is obtained    Code Status: Level 1 - Full Code      Subjective:   Patient seen and examined. States she feels okay today.     Objective:     Vitals:   Temp (24hrs), Av.1 °F (36.7 °C), Min:98 °F (36.7 °C), Max:98.1 °F (36.7 °C)    Temp:  [98 °F (36.7 °C)-98.1 °F (36.7 °C)] 98 °F (36.7 °C)  HR:  [73] 73  Resp:  [15] 15  BP: (113-133)/(57-58) 133/57  Body mass index is 76.41 kg/m². Input and Output Summary (last 24 hours): Intake/Output Summary (Last 24 hours) at 10/15/2023 1300  Last data filed at 10/15/2023 1238  Gross per 24 hour   Intake --   Output 2900 ml   Net -2900 ml       Physical Exam:     Physical Exam  (   General Appearance:    Alert, cooperative, morbidly obese no distress, appears stated age                               Lungs:   Decreased at the bases       Heart:    Regular rate and rhythm, S1 and S2 normal, no murmur, rub    or gallop   Abdomen:     Soft, non-tender, bowel sounds active all four quadrants,     no masses, no organomegaly           Extremities: Lymphedema     Additional Data:     Labs:    Results from last 7 days   Lab Units 10/12/23  0512   WBC Thousand/uL 6.64   HEMOGLOBIN g/dL 9.9*   HEMATOCRIT % 33.6*   PLATELETS Thousands/uL 212     Results from last 7 days   Lab Units 10/15/23  0652 10/11/23  0643 10/09/23  0613   SODIUM mmol/L 139   < > 142   POTASSIUM mmol/L 3.8   < > 4.2   CHLORIDE mmol/L 98   < > 94*   CO2 mmol/L 39*   < > >45*   BUN mg/dL 10   < > 9   CREATININE mg/dL 0.52*   < > 0.52*   ANION GAP mmol/L 2   < >  --    CALCIUM mg/dL 8.5   < > 8.6   ALBUMIN g/dL  --   --  2.9*   TOTAL BILIRUBIN mg/dL  --   --  0.44   ALK PHOS U/L  --   --  63   ALT U/L  --   --  6*   AST U/L  --   --  10*   GLUCOSE RANDOM mg/dL 88   < > 83    < > = values in this interval not displayed. * I Have Reviewed All Lab Data Listed Above. * Additional Pertinent Lab Tests Reviewed:  All [facility] Admission Reviewed        Recent Cultures (last 7 days):           Last 24 Hours Medication List:   Current Facility-Administered Medications   Medication Dose Route Frequency Provider Last Rate    acetaminophen  975 mg Oral Q6H PRN Lara Patch, CRNP      ammonium lactate   Topical BID Lara Patch, CRNP      hydrocortisone   Topical 4x Daily PRN Lara Patch, CRNP      loratadine  5 mg Oral Daily Lara Patch, CRNP      methocarbamol  500 mg Oral Q6H PRN Lara Patch, CRNP      metoprolol tartrate  25 mg Oral BID Lara Patch, CRNP      ondansetron  4 mg Intravenous Q6H PRN Lara Patch, CRNP      potassium chloride  40 mEq Oral Daily Vera Israel PA-C      rivaroxaban  20 mg Oral Daily With [employer], CRNP      torsemide  20 mg Oral Daily Scot David MD          Today, Patient Was Seen By: Sheryl Thornton MD    ** Please Note: Dictation voice to text software may have been used in the creation of this document.  **

## 2023-10-15 NOTE — ASSESSMENT & PLAN NOTE
Continue with BiPAP at bedtime and during naps. Patient now on baseline 2 L of supplemental oxygen, continue to wean to maintain oxygen saturation levels greater than 88%. Pulmonology consulted, now following as needed. Case management following for dispo planning  BiPAP delivered to home per   Follow up chest x-ray on 10/10  Cardiomegaly with areas of atelectasis in the left lung. Very limited study due to body habitus. Patient states that she feels okay she does not feel the need for extra diuretics.   Patient states she is at her baseline as far as her swelling goes

## 2023-10-15 NOTE — ASSESSMENT & PLAN NOTE
Metabolic encephalopathy present on admission, a/e/b lethargy, disorientation, poor safety awareness and impulsivity. Improved with BiPAP use and monitored by neuro checks, fall and delirium precautions, sleep hygiene   Likely secondary to acute on chronic hypoxia with hypercapnia.   Now back down to 2 L  No infectious etiology identified

## 2023-10-15 NOTE — CASE MANAGEMENT
Case Management Discharge Planning Note    Patient name Jose Stager  Location /-47 MRN [id number]  : 1955 Date 10/15/2023       Current Admission Date: 2023  Current Admission Diagnosis:Acute on chronic respiratory failure with hypoxia and hypercapnia [facility])   Patient Active Problem List    Diagnosis Date Noted    Goals of care, counseling/discussion 10/11/2023    Acute on chronic respiratory failure with hypoxia and hypercapnia ([address]) 2023    Iron deficiency anemia 2023    Atrial fibrillation ([address]) 2023    Obesity hypoventilation syndrome ([address]) 2023    Lipodermatosclerosis of both lower extremities 2021    Class 3 severe obesity with body mass index (BMI) greater than or equal to 70 in adult [facility]) 2021    Bakers cyst 10/27/2014    Lymphedema of leg 10/27/2014      LOS (days): 17  Geometric Mean LOS (GMLOS) (days): 3.60  Days to GMLOS:-13.7     OBJECTIVE:  Risk of Unplanned Readmission Score: 13.5         Current admission status: Inpatient   Preferred Pharmacy:   [facility]/pharmacy #[id number]- [address] - [address], [facility] [address]  [address] [place] [place] [address]  Phone: [phone] Fax: [phone]    Primary Care Provider: Yury Bishop DO    Primary Insurance: [address] [place] [address]  Secondary Insurance:     DISCHARGE DETAILS:                                          Other Referral/Resources/Interventions Provided:  Interventions: Acute Rehab  Referral Comments: [facility] for [facility]   CM will continue to follow

## 2023-10-16 PROCEDURE — 94660 CPAP INITIATION&MGMT: CPT

## 2023-10-16 PROCEDURE — 94760 N-INVAS EAR/PLS OXIMETRY 1: CPT

## 2023-10-16 PROCEDURE — 97530 THERAPEUTIC ACTIVITIES: CPT

## 2023-10-16 PROCEDURE — 97535 SELF CARE MNGMENT TRAINING: CPT

## 2023-10-16 PROCEDURE — 99232 SBSQ HOSP IP/OBS MODERATE 35: CPT | Performed by: FAMILY MEDICINE

## 2023-10-16 PROCEDURE — 97110 THERAPEUTIC EXERCISES: CPT

## 2023-10-16 RX ADMIN — Medication: at 17:26

## 2023-10-16 RX ADMIN — METOPROLOL TARTRATE 25 MG: 25 TABLET, FILM COATED ORAL at 17:22

## 2023-10-16 RX ADMIN — TORSEMIDE 20 MG: 20 TABLET ORAL at 08:20

## 2023-10-16 RX ADMIN — Medication: at 08:24

## 2023-10-16 RX ADMIN — POTASSIUM CHLORIDE 40 MEQ: 1500 TABLET, EXTENDED RELEASE ORAL at 08:21

## 2023-10-16 RX ADMIN — RIVAROXABAN 20 MG: 20 TABLET, FILM COATED ORAL at 17:22

## 2023-10-16 RX ADMIN — METOPROLOL TARTRATE 25 MG: 25 TABLET, FILM COATED ORAL at 08:21

## 2023-10-16 RX ADMIN — LORATADINE 5 MG: 10 TABLET ORAL at 08:20

## 2023-10-16 NOTE — RESPIRATORY THERAPY NOTE
10/16/23 0840   Respiratory Assessment   Assessment Type Assess only   General Appearance Awake; Alert   Respiratory Pattern Normal   Chest Assessment Chest expansion symmetrical   Bilateral Breath Sounds Diminished   Resp Comments pt placed on NIV at this time, pt requested to take a nap at this time   O2 Device v60   Non-Invasive Information   O2 Interface Device Face mask   Non-Invasive Ventilation Mode BiPAP   $ Intermittent NIV Yes   SpO2 91 %   $ Pulse Oximetry Spot Check Charge Completed   Non-Invasive Settings   IPAP (cm) 20 cm   EPAP (cm) 8 cm   Rate (Set) 20   FiO2 (%) 36   Pressure Support (cm H2O) 12   Rise Time 2   Inspiratory Time (Set) 1   Non-Invasive Readings   Skin Intervention Skin intact   Total Rate 31   MV (Mech) 20   Peak Pressure (Obs) 21   Spontaneous Vt (mL) 639   Heater Temperature (Obs) 31   Leak (lpm) 0   Spontaneous MV (mL) 639   Non-Invasive Alarms   Insp Pressure High (cm H20) 35   Insp Pressure Low (cm H20) 6   Low Insp Pressure Time (sec) 20 sec   MV Low (L/min) 3   Vt High (mL) 1150   Vt Low (mL) 150   High Resp Rate (BPM) 40 BPM   Low Resp Rate (BPM) 8 BPM   Apnea Interval (sec) 20   Apnea Rate 12   Maintenance   Water bag changed No

## 2023-10-16 NOTE — NURSING NOTE
This nurse was notified by [facility] that patient's oxygen saturation decreased to 57%. Upon entering room, patient was on 2L via NC but appeared as if she was waking up. Patient normally uses bipap when sleeping. SpO2 increased to low 90's upon patient being fully awake. Patient informed nurse that she wants to take a small nap before eating breakfast. Respiratory therapist called by this nurse, requesting for bipap to be applied.

## 2023-10-16 NOTE — OCCUPATIONAL THERAPY NOTE
Occupational Therapy Progress Note     Patient Name: Jose Benjamin  JGAMF'V Date: 10/16/2023  Problem List  Principal Problem:    Acute on chronic respiratory failure with hypoxia and hypercapnia (HCC)  Active Problems:    Class 3 severe obesity with body mass index (BMI) greater than or equal to 70 in [facility])    Atrial fibrillation (HCC)    Obesity hypoventilation syndrome (HCC)    Goals of care, counseling/discussion        10/16/23 1150   OT Last Visit   OT Visit Date 10/16/23   Note Type   Note Type Treatment   Pain Assessment   Pain Assessment Tool 0-10   Pain Score No Pain   Restrictions/Precautions   Weight Bearing Precautions Per Order No   Other Precautions O2;Fall Risk   ADL   Where Assessed Other (Comment)  (Assist levels for some self care tasks are based on functional assessment of performance skills and deficits observed during session.)   Eating Assistance 5  Supervision/Setup   Eating Deficit Setup   Grooming Assistance 5  Supervision/Setup   Grooming Deficit Setup;Supervision/safety; Increased time to complete  (while supine)   UB Bathing Assistance 3  Moderate Assistance   UB Bathing Deficit Setup;Supervision/safety; Increased time to complete   LB Bathing Assistance 1  Total Assistance   UB Dressing Assistance 3  Moderate Assistance   UB Dressing Deficit Setup;Supervision/safety; Increased time to complete   LB Dressing Assistance 1  Total Assistance   Toileting Assistance  1  Total Assistance   Functional Standing Tolerance   Time ~1 minute, ~1 minute 30 seconds   Activity static standing; stand pivot transfer   Comments min A x2 with RW   Bed Mobility   Supine to Sit 5  Supervision   Additional items HOB elevated; Bedrails; Increased time required   Sit to Supine 2  Maximal assistance   Additional items Assist x 2;Bedrails; Increased time required;Verbal cues;LE management   Transfers   Sit to Stand 4  Minimal assistance   Additional items Assist x 2; Increased time required;Verbal cues Stand to Sit 4  Minimal assistance   Additional items Assist x 2; Increased time required;Verbal cues   Stand pivot 4  Minimal assistance   Additional items Assist x 2; Increased time required;Verbal cues   Additional Comments with ana RW   Functional Mobility   Functional Mobility 4  Minimal assistance   Additional Comments assist x2   Additional items Rolling walker   Subjective   Subjective Pt motivated to participate and agreeable to therapy session   Cognition   Overall Cognitive Status [facility]   Arousal/Participation Alert; Cooperative   Attention Within functional limits   Orientation Level Oriented X4   Memory Within functional limits   Following Commands Follows all commands and directions without difficulty   Comments Pt with mild anxiety regarding mobility, but able to be redirected and is highly motivated to work with therapy   Activity Tolerance   Activity Tolerance Patient tolerated treatment well   Medical Staff Made Aware Maykel Avalos PT  (Pt seen for co-treatment with Physical Therapist due to pt's medical complexity, functional limitations and limited activity tolerance.)   Assessment   Assessment Pt seen this date for skilled OT session focused on ADLs, functional transfers and mobility, safety education. The patient was received supine in bed, NAD, PIV access, on 2L O2 NC. She remains highly motivated to work with therapy, and participated in functional bed mobility, dynamic seated balance, sit to stand trials, and stand pivot to bed side chair this date. Pt required Minimal Assistance x 2 to Moderate Assistance x2 during mobility, and Moderate Assistance to total assistance for some LB ADLs. At end of session the patient was located supine in bed with call bell in reach and all needs met. Overall the patient remains below her functional baseline, and is primarily limited at this time due to the deficits listed above. OT will continue to follow while acute to address POC.  At this time, recommend inpatient rehab upon d/c.   Plan   Treatment Interventions ADL retraining;Functional transfer training; Endurance training;Patient/family training;Equipment evaluation/education   Goal Expiration Date 10/27/23   OT Treatment Day 6   OT Frequency 3-5x/wk   Discharge Recommendation   OT Discharge Recommendation Post acute rehabilitation services   Additional Comments  The patient's raw score on the AM-PAC Daily Activity Inpatient Short Form is 12. A raw score of less than 19 suggests the patient may benefit from discharge to post-acute rehabilitation services. Please refer to the recommendation of the Occupational Therapist for safe discharge planning.    AM-PAC Daily Activity Inpatient   Lower Body Dressing 1   Bathing 2   Toileting 1   Upper Body Dressing 2   Grooming 3   Eating 3   Daily Activity Raw Score 12   Daily Activity Standardized Score (Calc for Raw Score >=11) 30.6   AM-PAC Applied Cognition Inpatient   Following a Speech/Presentation 4   Understanding Ordinary Conversation 4   Taking Medications 3   Remembering Where Things Are Placed or Put Away 3   Remembering List of 4-5 Errands 3   Taking Care of Complicated Tasks 3   Applied Cognition Raw Score 20   Applied Cognition Standardized Score 41.76       Yessenia Orozco, OTR/L

## 2023-10-16 NOTE — QUICK NOTE
I called the - updated.   Awaiting review from insurance company for [facility]

## 2023-10-16 NOTE — PHYSICAL THERAPY NOTE
PHYSICAL THERAPY TREATMENT  NAME:  Cameron Gaitan  DATE: 10/16/23    AGE:   76 y.o. Mrn:   [id number]  ADMIT DX:  Respiratory distress [R06.03]  Acute respiratory failure with hypoxia and hypercapnia (HCC) [J96.01, J96.02]  Problem List:   Patient Active Problem List   Diagnosis    Lipodermatosclerosis of both lower extremities    Class 3 severe obesity with body mass index (BMI) greater than or equal to 70 in 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 ([address]) 8/2/2021    Scalp laceration 3/17/2023    Thoracic vertebral fracture ([address]) 3/31/2023       Past Surgical History  Past Surgical History:   Procedure Laterality Date    NO PAST SURGERIES         Length Of Stay: 18  Performed at least 2 patient identifiers during session: Name and Birthday     10/16/23 1049   PT Last Visit   PT Visit Date 10/16/23   Note Type   Note Type Treatment   Pain Assessment   Pain Assessment Tool 0-10   Pain Score No Pain   Restrictions/Precautions   Weight Bearing Precautions Per Order No   Other Precautions Fall Risk;Pain;O2   General   Chart Reviewed Yes   Response to Previous Treatment Patient with no complaints from previous session. Family/Caregiver Present Yes  (sister)   Cognition   Overall Cognitive Status WFL   Arousal/Participation Alert; Responsive; Cooperative   Attention Within functional limits   Orientation Level Oriented X4   Memory Within functional limits   Following Commands Follows all commands and directions without difficulty   Comments Pt agreeable and motivated to participate in PT session   Bed Mobility   Supine to Sit 5  Supervision   Additional items HOB elevated; Increased time required; Bedrails   Sit to Supine 3  Moderate assistance   Additional items Assist x 2; Increased time required;Verbal cues;LE management; Bedrails   Additional Comments Pt without complaints of lightheadedness, SOB, chest pain, dizziness throughout session. Pt on 2Lo2 via nc and Spo2 maintained >/= 89% throughout   Transfers   Sit to Stand 4  Minimal assistance   Additional items Assist x 2; Increased time required;Verbal cues   Stand to Sit 4  Minimal assistance   Additional items Assist x 2; Increased time required;Verbal cues   Stand pivot 4  Minimal assistance   Additional items Assist x 2; Increased time required;Verbal cues   Additional Comments bariatric RW used during transfers. Pt tolerated standing x1.5 minutes minAx2 with BUE support. Pt progressed to SPT this date to bedside chair; however, pt fearful of ability to rise from chair later so requesting to return to bed   Balance   Static Sitting Good   Dynamic Sitting Fair   Static Standing Fair -   Dynamic Standing Poor +   Activity Tolerance   Activity Tolerance Patient limited by fatigue   Medical Staff Made Aware Co treatment with KEESHA Roberto secondary to complex medical condition of pt, possible A of 2 required to achieve and maintain transitional movements, requiring the need of skilled therapeutic intervention of 2 therapists to achieve delivery of services. Nurse Made Aware MADELINE Triana   Exercises   Knee AROM Long Arc Quad Sitting;15 reps;AROM; Bilateral   Ankle Pumps Sitting;15 reps;AROM; Bilateral   Assessment   Prognosis Good   Problem List Decreased strength;Decreased endurance; Impaired balance;Decreased mobility; Decreased safety awareness; Obesity   Assessment Pt seen for PT treatment session this date, consisting of ther act focused on bed mobility and transfer training and therapeutic exercise focusing on endurance .  Since previous session, pt has made very good progress in terms of progressing to SPT with use of bariatric RW and requires decreased physical assist from PT/OT to complete bed mobility and transfers. This date, patient greeted supine and agreeable to PT session reporting motivation to continue to progress. Pts sister present throughout session providing encouragement and support to patient as well. Pt demonstrates improved performance with sup > sit to complete with HOB elevated and supervision as well as scooting to EOB supervision to prepare for transfers. Once sitting EOB, pt completed STS x2 trials with minAx2 for stand to bariatric RW and maintained standing up to 1.5 minutes Dony x2 with weight shifting and able to take 2 backward steps to get closer to bed prior to sitting. During second stand, pt able to complete SPT bed <> bedside chair with ana RW minAx2; once standing in front of bedside chair, pt fearful of ability to rise from chair later so requesting to return to bed. Pt tolerated sitting EOB x20 minutes with distant supervision completing seated LAQ and ankle DF/PF. Pertinent barriers during this session include  fearful of falling . Current goals and POC remain appropriate, pt continues to have rehab potential and is making very good progress towards STGs. Pt prognosis for achieving goals is good, pending pt progress with hospitalization/medical status improvements, and indicated by orientation, ability to follow directions, motivation, learning potential, awareness, previous response to intervention, and responsive to cues/strategies. Pt limited d/t fear of pain provocation and fear of falling. PT recommends post acute rehabilitation services upon discharge. Pt continues to be functioning below baseline level, and remains limited 2* factors listed above.  PT will continue to see pt during current hospitalization in order to address the deficits listed above and provide interventions consistent w/ POC in effort to achieve STGs.   Barriers to Discharge Inaccessible home environment;Decreased caregiver support   Goals   Patient Goals to go to rehab   STG Expiration Date 10/26/23   Short Term Goal #1 PT upgraded patient goals this date. In 10 days: Increase bilateral LE strength 1/2 grade to facilitate independent mobility, Perform all bed mobility tasks with close S to decrease caregiver burden, Perform all transfers with min A of 1 to improve independence, Ambulate > 10 ft. with RW and bariatric Dony of 2 w/o LOB and w/ normalized gait pattern 100% of the time, Increase all balance 1 grade to decrease risk for falls, and Tolerate standing 3-5 minutes to facilitate functional task performance   PT Treatment Day 7   Plan   Treatment/Interventions Functional transfer training; Therapeutic exercise; Endurance training;Patient/family training;Bed mobility;Gait training;Spoke to nursing;Continued evaluation;OT   Progress Progressing toward goals   PT Frequency 3-5x/wk   Discharge Recommendation   PT Discharge Recommendation Post acute rehabilitation services   AM-PAC Basic Mobility Inpatient   Turning in Flat Bed Without Bedrails 3   Lying on Back to Sitting on Edge of Flat Bed Without Bedrails 3   Moving Bed to Chair 2   Standing Up From Chair Using Arms 2   Walk in Room 1   Climb 3-5 Stairs With Railing 1   Basic Mobility Inpatient Raw Score 12   Basic Mobility Standardized Score 32.23   Highest Level Of Mobility   JH-HLM Goal 4: Move to chair/commode   JH-HLM Achieved 4: Move to [employer] Provided Assistive device; Mobility training   Patient Demonstrates acceptance/verbal understanding   End of Consult   Patient Position at End of Consult All needs within reach; Supine  (sister at bedside)         Time In: 3965  Time Out: 1150  Total Treatment Minutes: [address] [place], [place]

## 2023-10-16 NOTE — CASE MANAGEMENT
Case Management Discharge Planning Note    Patient name Hiren Vega  Location /-90 MRN [id number]  : 1955 Date 10/16/2023       Current Admission Date: 2023  Current Admission Diagnosis:Acute on chronic respiratory failure with hypoxia and hypercapnia [facility])   Patient Active Problem List    Diagnosis Date Noted    Goals of care, counseling/discussion 10/11/2023    Acute on chronic respiratory failure with hypoxia and hypercapnia ([address]) 2023    Iron deficiency anemia 2023    Atrial fibrillation ([address]) 2023    Obesity hypoventilation syndrome ([address]) 2023    Lipodermatosclerosis of both lower extremities 2021    Class 3 severe obesity with body mass index (BMI) greater than or equal to 70 in adult [facility]) 2021    Bakers cyst 10/27/2014    Lymphedema of leg 10/27/2014      LOS (days): 18  Geometric Mean LOS (GMLOS) (days): 3.60  Days to GMLOS:-14.8     OBJECTIVE:  Risk of Unplanned Readmission Score: 13.88         Current admission status: Inpatient   Preferred Pharmacy:   [facility]/pharmacy #[id number]- [place] [address], [facility] [address] [facility] [address]  Phone: [phone] Fax: [phone]    Primary Care Provider: Nguyễn Mckinney DO    Primary Insurance: [facility]  Secondary Insurance:     DISCHARGE DETAILS:    CM spoke with patients  and informed  that we are still waiting on authorization. Radha Lebo (Spouse)   [phone]     CM spoke with Madelin Barger from [facility], CM was informed insurance is still reviewing it.       Irineo Logan at [address] [place] [address]

## 2023-10-16 NOTE — ASSESSMENT & PLAN NOTE
Body mass index is 76.38 kg/m². Super morbidly obese  Continued recommendation to work towards weight loss and improved exercise and dietary habits as able   PT/OT to work with patient to increase mobility, reports she is ambulatory at home with assistance. Patient is awaiting insurance authorization for acute rehab at [facility].   Currently insurance is still reviewing

## 2023-10-16 NOTE — PLAN OF CARE
Problem: OCCUPATIONAL THERAPY ADULT  Goal: Performs self-care activities at highest level of function for planned discharge setting. See evaluation for individualized goals. Description: Treatment Interventions: ADL retraining, Functional transfer training, Endurance training, Patient/family training, Equipment evaluation/education          See flowsheet documentation for full assessment, interventions and recommendations. Outcome: Progressing  Note: Limitation: Decreased ADL status, Decreased UE strength, Decreased cognition, Decreased Safe judgement during ADL, Decreased endurance, Decreased self-care trans, Decreased high-level ADLs  Prognosis: Good  Assessment: Pt seen this date for skilled OT session focused on ADLs, functional transfers and mobility, safety education. The patient was received supine in bed, NAD, PIV access, on 2L O2 NC. She remains highly motivated to work with therapy, and participated in functional bed mobility, dynamic seated balance, sit to stand trials, and stand pivot to bed side chair this date. Pt required Minimal Assistance x 2 to Moderate Assistance x2 during mobility, and Moderate Assistance to total assistance for some LB ADLs. At end of session the patient was located supine in bed with call bell in reach and all needs met. Overall the patient remains below her functional baseline, and is primarily limited at this time due to the deficits listed above. OT will continue to follow while acute to address POC.  At this time, recommend inpatient rehab upon d/c.     OT Discharge Recommendation: Post acute rehabilitation services        Juan Diego Pickering OTR/L

## 2023-10-16 NOTE — PROGRESS NOTES
[address]  Progress Note  Name: Catalina Luevano  MRN: [id number]  Unit/Bed#: -01 I Date of Admission: 9/28/2023   Date of Service: 10/16/2023 I Hospital Day: 25    Assessment/Plan   Goals of care, counseling/discussion  Assessment & Plan  Patient is s/p goals of care meeting on 10/4 with palliative care and other team members  Discussion with patient/, patient remains a full code at this time    Obesity hypoventilation syndrome ([address])  Assessment & Plan  Continue to manage with supplemental O2 support and BiPAP use for sleep     Atrial fibrillation ([address])  Assessment & Plan  Outside records note paroxysmal atrial fibrillation on Xarelto  Patient noted A-fib on EKG 10/10 [Was taking 12.5 mg Metoprolol BID at home]  Continue higher dose of Metoprolol, 25 mg BID  Continue Xarelto  Family reports that she had vaginal bleeding previously while on Xarelto - unable to obtain transvaginal US while admitted due to body habitus, consider GYN consult if bleeding restarts  No bleeding noted at this time. Class 3 severe obesity with body mass index (BMI) greater than or equal to 70 in adult [facility])  Assessment & Plan  Body mass index is 76.38 kg/m². Super morbidly obese  Continued recommendation to work towards weight loss and improved exercise and dietary habits as able   PT/OT to work with patient to increase mobility, reports she is ambulatory at home with assistance. Patient is awaiting insurance authorization for acute rehab at [facility]. Currently insurance is still reviewing    * Acute on chronic respiratory failure with hypoxia and hypercapnia (HCC)  Assessment & Plan    Continue with BiPAP at bedtime and during naps. Patient now on baseline 2 L of supplemental oxygen, continue to wean to maintain oxygen saturation levels greater than 88%. Pulmonology consulted, now following as needed.   Case management following for dispo planning  BiPAP delivered to home per   Follow up chest x-ray on 10/10  Cardiomegaly with areas of atelectasis in the left lung. Very limited study due to body habitus. Patient states that she feels okay she does not feel the need for extra diuretics. Patient states she is at her baseline as far as her swelling goes  Continue with the supplemental oxygen. Elevated brain natriuretic peptide (BNP) level-resolved as of 10/12/2023  Assessment & Plan  Does not appear overtly volume overloaded however CXR revealing pulmonary vascular congestion  S/p several days of IV bumex with no significant change to O2 requirements  Transitioned back to torsemide 10/8    Acute encephalopathy-resolved as of 10/12/2023  Assessment & Plan  Metabolic encephalopathy present on admission, a/e/b lethargy, disorientation, poor safety awareness and impulsivity. Improved with BiPAP use and monitored by neuro checks, fall and delirium precautions, sleep hygiene   Likely secondary to acute on chronic hypoxia with hypercapnia. Now back down to 2 L  No infectious etiology identified           VTE Pharmacologic Prophylaxis:   Pharmacologic: Rivaroxaban (Xarelto)  Mechanical VTE Prophylaxis in Place: Yes    Patient Centered Rounds: I have performed bedside rounds with nursing staff today. Discussion      Time Spent for Care: 20 minutes. More than 50% of total time spent on counseling and coordination of care as described above. Current Length of Stay: 18 day(s)    Current Patient Status: Inpatient       Discharge Plan: awaiting insurance auth    Code Status: Level 1 - Full Code      Subjective:   Patient seen and examined. No acute events reported    Objective:     Vitals:   Temp (24hrs), Av.1 °F (36.7 °C), Min:98.1 °F (36.7 °C), Max:98.1 °F (36.7 °C)    Temp:  [98.1 °F (36.7 °C)] 98.1 °F (36.7 °C)  HR:  [68-88] 88  Resp:  [18-20] 18  BP: (133-138)/(61-63) 133/61  SpO2:  [91 %-92 %] 91 %  Body mass index is 76.38 kg/m².      Input and Output Summary (last 24 hours): Intake/Output Summary (Last 24 hours) at 10/16/2023 1605  Last data filed at 10/16/2023 1400  Gross per 24 hour   Intake 360 ml   Output 3050 ml   Net -2690 ml       Physical Exam:     Physical Exam  (   General Appearance:    Alert, cooperative, no distress, appears stated age                               Lungs:     Clear to auscultation bilaterally, respirations unlabored       Heart:    Regular rate and rhythm, S1 and S2 normal, no murmur, rub    or gallop   Abdomen:     Soft, non-tender, bowel sounds active all four quadrants,     no masses, no organomegaly           Extremities:   Extremities normal, atraumatic, no cyanosis or edema       Additional Data:     Labs:    Results from last 7 days   Lab Units 10/12/23  0512   WBC Thousand/uL 6.64   HEMOGLOBIN g/dL 9.9*   HEMATOCRIT % 33.6*   PLATELETS Thousands/uL 212     Results from last 7 days   Lab Units 10/15/23  0652   SODIUM mmol/L 139   POTASSIUM mmol/L 3.8   CHLORIDE mmol/L 98   CO2 mmol/L 39*   BUN mg/dL 10   CREATININE mg/dL 0.52*   ANION GAP mmol/L 2   CALCIUM mg/dL 8.5   GLUCOSE RANDOM mg/dL 88                           * I Have Reviewed All Lab Data Listed Above. * Additional Pertinent Lab Tests Reviewed:  [address] Admission Reviewed        Recent Cultures (last 7 days):           Last 24 Hours Medication List:   Current Facility-Administered Medications   Medication Dose Route Frequency Provider Last Rate    acetaminophen  975 mg Oral Q6H PRN Lara Patch, CRNP      ammonium lactate   Topical BID Lara Patch, CRNP      hydrocortisone   Topical 4x Daily PRN Lara Patch, CRNP      loratadine  5 mg Oral Daily Lara Patch, CRNP      methocarbamol  500 mg Oral Q6H PRN Lara Patch, CRNP      metoprolol tartrate  25 mg Oral BID Lara Patch, CRNP      ondansetron  4 mg Intravenous Q6H PRN Lara Patch, CRNP      potassium chloride  40 mEq Oral Daily Vera Israel PA-C      rivaroxaban  20 mg Oral Daily With [address] EMIL Garcia      torsemide  20 mg Oral Daily Scot Ward MD          Today, Patient Was Seen By: Lavern Estevez MD    ** Please Note: Dictation voice to text software may have been used in the creation of this document.  **

## 2023-10-16 NOTE — PLAN OF CARE
Problem: PHYSICAL THERAPY ADULT  Goal: Performs mobility at highest level of function for planned discharge setting. See evaluation for individualized goals. Description: Treatment/Interventions: Functional transfer training, LE strengthening/ROM, Therapeutic exercise, Endurance training, Patient/family training, Bed mobility, Continued evaluation, Spoke to nursing, Spoke to advanced practitioner, OT          See flowsheet documentation for full assessment, interventions and recommendations. Outcome: Progressing  Note: Prognosis: Good  Problem List: Decreased strength, Decreased endurance, Impaired balance, Decreased mobility, Decreased safety awareness, Obesity  Assessment: Pt seen for PT treatment session this date, consisting of ther act focused on bed mobility and transfer training and therapeutic exercise focusing on endurance . Since previous session, pt has made very good progress in terms of progressing to SPT with use of bariatric RW and requires decreased physical assist from PT/OT to complete bed mobility and transfers. This date, patient greeted supine and agreeable to PT session reporting motivation to continue to progress. Pts sister present throughout session providing encouragement and support to patient as well. Pt demonstrates improved performance with sup > sit to complete with HOB elevated and supervision as well as scooting to EOB supervision to prepare for transfers. Once sitting EOB, pt completed STS x2 trials with minAx2 for stand to bariatric RW and maintained standing up to 1.5 minutes Dony x2 with weight shifting and able to take 2 backward steps to get closer to bed prior to sitting. During second stand, pt able to complete SPT bed <> bedside chair with ana RW minAx2; once standing in front of bedside chair, pt fearful of ability to rise from chair later so requesting to return to bed.  Pt tolerated sitting EOB x20 minutes with distant supervision completing seated LAQ and ankle DF/PF. Pertinent barriers during this session include  fearful of falling . Current goals and POC remain appropriate, pt continues to have rehab potential and is making very good progress towards STGs. Pt prognosis for achieving goals is good, pending pt progress with hospitalization/medical status improvements, and indicated by orientation, ability to follow directions, motivation, learning potential, awareness, previous response to intervention, and responsive to cues/strategies. Pt limited d/t fear of pain provocation and fear of falling. PT recommends post acute rehabilitation services upon discharge. Pt continues to be functioning below baseline level, and remains limited 2* factors listed above. PT will continue to see pt during current hospitalization in order to address the deficits listed above and provide interventions consistent w/ POC in effort to achieve STGs. Barriers to Discharge: Inaccessible home environment, Decreased caregiver support     PT Discharge Recommendation: Post acute rehabilitation services    See flowsheet documentation for full assessment.       Isiah Wallis; PT, DPT

## 2023-10-16 NOTE — ASSESSMENT & PLAN NOTE
Continue with BiPAP at bedtime and during naps. Patient now on baseline 2 L of supplemental oxygen, continue to wean to maintain oxygen saturation levels greater than 88%. Pulmonology consulted, now following as needed. Case management following for dispo planning  BiPAP delivered to home per   Follow up chest x-ray on 10/10  Cardiomegaly with areas of atelectasis in the left lung. Very limited study due to body habitus. Patient states that she feels okay she does not feel the need for extra diuretics. Patient states she is at her baseline as far as her swelling goes  Continue with the supplemental oxygen.

## 2023-10-16 NOTE — ASSESSMENT & PLAN NOTE
Does not appear overtly volume overloaded however CXR revealing pulmonary vascular congestion  S/p several days of IV bumex with no significant change to O2 requirements  Transitioned back to torsemide 10/8

## 2023-10-17 LAB
ANION GAP SERPL CALCULATED.3IONS-SCNC: 5 MMOL/L
BUN SERPL-MCNC: 13 MG/DL (ref 5–25)
CALCIUM SERPL-MCNC: 8.5 MG/DL (ref 8.4–10.2)
CHLORIDE SERPL-SCNC: 97 MMOL/L (ref 96–108)
CO2 SERPL-SCNC: 37 MMOL/L (ref 21–32)
CREAT SERPL-MCNC: 0.6 MG/DL (ref 0.6–1.3)
ERYTHROCYTE [DISTWIDTH] IN BLOOD BY AUTOMATED COUNT: 15 % (ref 11.6–15.1)
GFR SERPL CREATININE-BSD FRML MDRD: 93 ML/MIN/1.73SQ M
GLUCOSE SERPL-MCNC: 89 MG/DL (ref 65–140)
HCT VFR BLD AUTO: 34.1 % (ref 34.8–46.1)
HGB BLD-MCNC: 10.2 G/DL (ref 11.5–15.4)
MCH RBC QN AUTO: 27.2 PG (ref 26.8–34.3)
MCHC RBC AUTO-ENTMCNC: 29.9 G/DL (ref 31.4–37.4)
MCV RBC AUTO: 91 FL (ref 82–98)
PLATELET # BLD AUTO: 223 THOUSANDS/UL (ref 149–390)
PMV BLD AUTO: 10.1 FL (ref 8.9–12.7)
POTASSIUM SERPL-SCNC: 3.9 MMOL/L (ref 3.5–5.3)
RBC # BLD AUTO: 3.75 MILLION/UL (ref 3.81–5.12)
SODIUM SERPL-SCNC: 139 MMOL/L (ref 135–147)
WBC # BLD AUTO: 6.27 THOUSAND/UL (ref 4.31–10.16)

## 2023-10-17 PROCEDURE — 97535 SELF CARE MNGMENT TRAINING: CPT

## 2023-10-17 PROCEDURE — 94660 CPAP INITIATION&MGMT: CPT

## 2023-10-17 PROCEDURE — 80048 BASIC METABOLIC PNL TOTAL CA: CPT | Performed by: FAMILY MEDICINE

## 2023-10-17 PROCEDURE — 97530 THERAPEUTIC ACTIVITIES: CPT

## 2023-10-17 PROCEDURE — 97110 THERAPEUTIC EXERCISES: CPT

## 2023-10-17 PROCEDURE — 99232 SBSQ HOSP IP/OBS MODERATE 35: CPT | Performed by: STUDENT IN AN ORGANIZED HEALTH CARE EDUCATION/TRAINING PROGRAM

## 2023-10-17 PROCEDURE — 97116 GAIT TRAINING THERAPY: CPT

## 2023-10-17 PROCEDURE — 85027 COMPLETE CBC AUTOMATED: CPT | Performed by: FAMILY MEDICINE

## 2023-10-17 RX ADMIN — Medication: at 10:26

## 2023-10-17 RX ADMIN — METOPROLOL TARTRATE 25 MG: 25 TABLET, FILM COATED ORAL at 10:23

## 2023-10-17 RX ADMIN — ACETAMINOPHEN 975 MG: 325 TABLET, FILM COATED ORAL at 22:55

## 2023-10-17 RX ADMIN — METHOCARBAMOL 500 MG: 500 TABLET ORAL at 22:56

## 2023-10-17 RX ADMIN — RIVAROXABAN 20 MG: 20 TABLET, FILM COATED ORAL at 17:45

## 2023-10-17 RX ADMIN — METOPROLOL TARTRATE 25 MG: 25 TABLET, FILM COATED ORAL at 17:45

## 2023-10-17 RX ADMIN — METHOCARBAMOL 500 MG: 500 TABLET ORAL at 17:45

## 2023-10-17 RX ADMIN — TORSEMIDE 20 MG: 20 TABLET ORAL at 10:23

## 2023-10-17 RX ADMIN — LORATADINE 5 MG: 10 TABLET ORAL at 10:23

## 2023-10-17 RX ADMIN — POTASSIUM CHLORIDE 40 MEQ: 1500 TABLET, EXTENDED RELEASE ORAL at 10:23

## 2023-10-17 RX ADMIN — Medication: at 17:47

## 2023-10-17 NOTE — CASE MANAGEMENT
Received confirmation fax from [facility] that [address] request has been received. Assigned Case#: [id number]. To check status use P#: [phone]. CM notified.

## 2023-10-17 NOTE — RESPIRATORY THERAPY NOTE
10/17/23 0009   Respiratory Assessment   Resp Comments placed on bipap for hours of sleep   O2 Device v60   Non-Invasive Information   O2 Interface Device Face mask   Non-Invasive Ventilation Mode BiPAP   $ Intermittent NIV Yes   Non-Invasive Settings   IPAP (cm) 20 cm   EPAP (cm) 8 cm   Rate (Set) 20   FiO2 (%) 36   Pressure Support (cm H2O) 12   Rise Time 2   Inspiratory Time (Set) 1   Humidification   (heater)   Temperature (Set) 31   Non-Invasive Readings   Skin Intervention Skin intact   Total Rate 29   MV (Mech) 16.6   Peak Pressure (Obs) 22   Spontaneous Vt (mL) 574   Heater Temperature (Obs) 31   Leak (lpm) 0   Non-Invasive Alarms   Insp Pressure High (cm H20) 35   Insp Pressure Low (cm H20) 6   Low Insp Pressure Time (sec) 20 sec   MV Low (L/min) 3   Vt High (mL) 1150   Vt Low (mL) 150   High Resp Rate (BPM) 40 BPM   Low Resp Rate (BPM) 8 BPM   Apnea Interval (sec) 20   Maintenance   Water bag changed Yes

## 2023-10-17 NOTE — ASSESSMENT & PLAN NOTE
Outside records note paroxysmal atrial fibrillation on Xarelto  Patient noted A-fib on EKG 10/10 [Was taking 12.5 mg Metoprolol BID at home]  Continue higher dose of Metoprolol, 25 mg BID  Continue Xarelto  Family reported to previous providers of vaginal bleeding, none during admission, unable to obtain transvaginal ultrasound while inpatient

## 2023-10-17 NOTE — PLAN OF CARE
Problem: Prexisting or High Potential for Compromised Skin Integrity  Goal: Skin integrity is maintained or improved  Description: INTERVENTIONS:  - Identify patients at risk for skin breakdown  - Assess and monitor skin integrity  - Assess and monitor nutrition and hydration status  - Monitor labs   - Assess for incontinence   - Turn and reposition patient  - Assist with mobility/ambulation  - Relieve pressure over bony prominences  - Avoid friction and shearing  - Provide appropriate hygiene as needed including keeping skin clean and dry  - Evaluate need for skin moisturizer/barrier cream  - Collaborate with interdisciplinary team   - Patient/family teaching  - Consider wound care consult   Outcome: Progressing     Problem: MOBILITY - ADULT  Goal: Maintain or return to baseline ADL function  Description: INTERVENTIONS:  -  Assess patient's ability to carry out ADLs; assess patient's baseline for ADL function and identify physical deficits which impact ability to perform ADLs (bathing, care of mouth/teeth, toileting, grooming, dressing, etc.)  - Assess/evaluate cause of self-care deficits   - Assess range of motion  - Assess patient's mobility; develop plan if impaired  - Assess patient's need for assistive devices and provide as appropriate  - Encourage maximum independence but intervene and supervise when necessary  - Involve family in performance of ADLs  - Assess for home care needs following discharge   - Consider OT consult to assist with ADL evaluation and planning for discharge  - Provide patient education as appropriate  Outcome: Progressing     Problem: PAIN - ADULT  Goal: Verbalizes/displays adequate comfort level or baseline comfort level  Description: Interventions:  - Encourage patient to monitor pain and request assistance  - Assess pain using appropriate pain scale  - Administer analgesics based on type and severity of pain and evaluate response  - Implement non-pharmacological measures as appropriate and evaluate response  - Consider cultural and social influences on pain and pain management  - Notify physician/advanced practitioner if interventions unsuccessful or patient reports new pain  Outcome: Progressing     Problem: INFECTION - ADULT  Goal: Absence or prevention of progression during hospitalization  Description: INTERVENTIONS:  - Assess and monitor for signs and symptoms of infection  - Monitor lab/diagnostic results  - Monitor all insertion sites, i.e. indwelling lines, tubes, and drains  - Monitor endotracheal if appropriate and nasal secretions for changes in amount and color  - [place] appropriate cooling/warming therapies per order  - Administer medications as ordered  - Instruct and encourage patient and family to use good hand hygiene technique  - Identify and instruct in appropriate isolation precautions for identified infection/condition  Outcome: Progressing

## 2023-10-17 NOTE — ASSESSMENT & PLAN NOTE
Present on admission requiring ICU level of care, eventually downgraded to SLIM for further management  Prolonged hospitalization since 9/28  Goals of care discussions have been completed with palliative care  On Bipap PRN and qhs  On 2 liters nasal canula at baseline  Not a candidate for acute rehab, pending Peer 2 peer  Acute respiratory failure resolved, medically stable for eventual dispo pending peer 2 peer

## 2023-10-17 NOTE — PLAN OF CARE
Problem: PHYSICAL THERAPY ADULT  Goal: Performs mobility at highest level of function for planned discharge setting. See evaluation for individualized goals. Description: Treatment/Interventions: Functional transfer training, LE strengthening/ROM, Therapeutic exercise, Endurance training, Patient/family training, Bed mobility, Continued evaluation, Spoke to nursing, Spoke to advanced practitioner, OT          See flowsheet documentation for full assessment, interventions and recommendations. Outcome: Progressing  Note: Prognosis: Good  Problem List: Decreased strength, Decreased endurance, Impaired balance, Decreased mobility, Obesity  Assessment: Pt seen for PT treatment session this date, consisting of ther act focused on transfer training and bed mobility, gt training on level surfaces to improve pt safety in household environment, and therapeutic exercise focusing on LE TE and seated endurance . Since previous session, pt has made very good progress in terms of initiation of gait training with standard walker and two person assist and good tolerance to therapy session. Pt motivated and participatory throughout session responding well to visual demonstration of transfers to increase confidence and understanding of mobility. Pt completed sup > sit Dony with assist required to guide RLE off bed; pt completed with HOB elevated and use of bed rails. Pt tolerated sitting EOB ~20 minutes with distant supervision and completing LE TE and weight shifting R/L. Pt progressed to STS transfer from EOB to standard walker x3 trials Dony x2 tolerating stand up to 2.5 minutes with CGA and BUE support. PT located bariatric bedside commode with arm rests and brakes and pt completed SPT to/from with standard walker minAx2 and able to rise from commode with supervision assist. Pt completed 3 + 4 side steps with standard walker minAx2 during transfer to/from bedside commode.  Pertinent barriers during this session include fatigue, fear of falling . Current goals and POC remain appropriate, pt continues to have rehab potential and is making good progress towards STGs. Pt prognosis for achieving goals is good, pending pt progress with hospitalization/medical status improvements, and indicated by Stimulability, orientation, motivation, supportive family/caregivers, awareness, previous response to intervention, and responsive to cues/strategies. Pt limited d/t fear of falling. PT recommends post acute rehabilitation services upon discharge. Pt continues to be functioning below baseline level, and remains limited 2* factors listed above. PT will continue to see pt during current hospitalization in order to address the deficits listed above and provide interventions consistent w/ POC in effort to achieve STGs. Barriers to Discharge: Inaccessible home environment, Decreased caregiver support     PT Discharge Recommendation: Post acute rehabilitation services    See flowsheet documentation for full assessment.    Sean Hooks; PT, DPT

## 2023-10-17 NOTE — PROGRESS NOTES
[address]  Progress Note  Name: Alexander Monreal  MRN: [id number]  Unit/Bed#: -01 I Date of Admission: 9/28/2023   Date of Service: 10/17/2023 I Hospital Day: 23    Assessment/Plan   * Acute on chronic respiratory failure with hypoxia and hypercapnia [facility])  Assessment & Plan  Present on admission requiring ICU level of care, eventually downgraded to SLIM for further management  Prolonged hospitalization since 9/28  Goals of care discussions have been completed with palliative care  On Bipap PRN and qhs  On 2 liters nasal canula at baseline  Not a candidate for acute rehab, pending Peer 2 peer  Acute respiratory failure resolved, medically stable for eventual dispo pending peer 2 peer    Goals of care, counseling/discussion  Assessment & Plan  Patient is s/p goals of care meeting on 10/4 with palliative care and other team members  Discussion with patient/, patient remains a full code at this time    Obesity hypoventilation syndrome ([address])  Assessment & Plan  Continue to manage with supplemental O2 support and BiPAP use for sleep     Atrial fibrillation ([address])  Assessment & Plan  Outside records note paroxysmal atrial fibrillation on Xarelto  Patient noted A-fib on EKG 10/10 [Was taking 12.5 mg Metoprolol BID at home]  Continue higher dose of Metoprolol, 25 mg BID  Continue Xarelto  Family reported to previous providers of vaginal bleeding, none during admission, unable to obtain transvaginal ultrasound while inpatient               VTE Pharmacologic Prophylaxis: VTE Score: 9 High Risk (Score >/= 5) - Pharmacological DVT Prophylaxis Ordered: rivaroxaban (Xarelto). Sequential Compression Devices Ordered. Patient Centered Rounds: I performed bedside rounds with nursing staff today. Discussions with Specialists or Other Care Team Provider: radha    Education and Discussions with Family / Patient: Updated  () via phone.     Total Time Spent on Date of Encounter in care of patient: 30+ mins. This time was spent on one or more of the following: performing physical exam; counseling and coordination of care; obtaining or reviewing history; documenting in the medical record; reviewing/ordering tests, medications or procedures; communicating with other healthcare professionals and discussing with patient's family/caregivers. Current Length of Stay: 19 day(s)  Current Patient Status: Inpatient   Certification Statement: The patient will continue to require additional inpatient hospital stay due to dispo planning  Discharge Plan: Anticipate discharge tomorrow to rehab facility. Code Status: Level 1 - Full Code    Subjective:   Patient was on bipap at time of evaluation    Objective:     Vitals:   Temp (24hrs), Av.1 °F (36.7 °C), Min:98 °F (36.7 °C), Max:98.3 °F (36.8 °C)    Temp:  [98 °F (36.7 °C)-98.3 °F (36.8 °C)] 98 °F (36.7 °C)  HR:  [58-87] 87  Resp:  [18] 18  BP: (114-130)/(50-67) 124/67  SpO2:  [94 %-95 %] 95 %  Body mass index is 75.65 kg/m². Input and Output Summary (last 24 hours): Intake/Output Summary (Last 24 hours) at 10/17/2023 1455  Last data filed at 10/17/2023 1401  Gross per 24 hour   Intake 520 ml   Output 2180 ml   Net -1660 ml       Physical Exam:   Physical Exam  Constitutional:       General: She is not in acute distress. Appearance: Normal appearance. She is not toxic-appearing. Cardiovascular:      Rate and Rhythm: Normal rate and regular rhythm. Heart sounds: Normal heart sounds. No murmur heard. Pulmonary:      Effort: Pulmonary effort is normal. No respiratory distress. Breath sounds: No wheezing. Abdominal:      General: Abdomen is flat. There is no distension. Palpations: Abdomen is soft. Tenderness: There is no abdominal tenderness. Neurological:      General: No focal deficit present. Motor: No weakness.           Additional Data:     Labs:  Results from last 7 days   Lab Units 10/17/23  0527   WBC Thousand/uL 6.27   HEMOGLOBIN g/dL 10.2*   HEMATOCRIT % 34.1*   PLATELETS Thousands/uL 223     Results from last 7 days   Lab Units 10/17/23  0527   SODIUM mmol/L 139   POTASSIUM mmol/L 3.9   CHLORIDE mmol/L 97   CO2 mmol/L 37*   BUN mg/dL 13   CREATININE mg/dL 0.60   ANION GAP mmol/L 5   CALCIUM mg/dL 8.5   GLUCOSE RANDOM mg/dL 89                       Lines/Drains:  Invasive Devices       Peripheral Intravenous Line  Duration             Peripheral IV 10/15/23 Right;Ventral (anterior) Forearm 2 days              Drain  Duration             External Urinary Catheter 16 days                          Imaging: Reviewed radiology reports from this admission including: chest xray    Recent Cultures (last 7 days):         Last 24 Hours Medication List:   Current Facility-Administered Medications   Medication Dose Route Frequency Provider Last Rate    acetaminophen  975 mg Oral Q6H PRN Leyla Eminence, CRNP      ammonium lactate   Topical BID Leyla Eminence, CRNP      hydrocortisone   Topical 4x Daily PRN Leyla Eminence, CRNP      loratadine  5 mg Oral Daily Leyla Eminence, CRNP      methocarbamol  500 mg Oral Q6H PRN Leyla Eminence, CRNP      metoprolol tartrate  25 mg Oral BID Leyla Eminence, CRNP      ondansetron  4 mg Intravenous Q6H PRN Leyla Eminence, CRNP      potassium chloride  40 mEq Oral Daily Vera Israel PA-C      rivaroxaban  20 mg Oral Daily With [employer], CRNP      torsemide  20 mg Oral Daily Scot Smallwood MD          Today, Patient Was Seen By: Shwetha Renteria MD    **Please Note: This note may have been constructed using a voice recognition system. **

## 2023-10-17 NOTE — OCCUPATIONAL THERAPY NOTE
Occupational Therapy Progress Note     Patient Name: Abiodun Anderson  PHQEP'V Date: 10/17/2023  Problem List  Principal Problem:    Acute on chronic respiratory failure with hypoxia and hypercapnia (HCC)  Active Problems:    Class 3 severe obesity with body mass index (BMI) greater than or equal to 70 in [facility])    Atrial fibrillation (HCC)    Obesity hypoventilation syndrome (HCC)    Goals of care, counseling/discussion          10/17/23 1123   OT Last Visit   OT Visit Date 10/17/23   Note Type   Note Type Treatment   Pain Assessment   Pain Assessment Tool 0-10   Pain Score No Pain   Restrictions/Precautions   Weight Bearing Precautions Per Order No   Other Precautions O2;Fall Risk   ADL   Where Assessed Other (Comment)  (Assist levels for some self care tasks are based on functional assessment of performance skills and deficits observed during session.)   Eating Assistance 5  Supervision/Setup   Eating Deficit Setup   Grooming Assistance 5  Supervision/Setup   Grooming Deficit Setup;Supervision/safety; Increased time to complete  (while seated)   UB Bathing Assistance 3  Moderate Assistance   UB Bathing Deficit Setup;Supervision/safety; Increased time to complete   LB Bathing Assistance 1  Total Assistance   UB Dressing Assistance 3  Moderate Assistance   UB Dressing Deficit Setup;Supervision/safety; Increased time to complete   LB Dressing Assistance 1  Total Assistance   Toileting Assistance  2  Maximal Assistance   Toileting Deficit Setup;Supervison/safety; Increased time to complete  (pt able to get to [facility] today)   Functional Standing Tolerance   Time 3 standing trials; longest trial 2 min 25 seconds   Activity static standing, stand pivot trasnfer   Comments min A x2 with standard walker   Bed Mobility   Rolling R 3  Moderate assistance   Additional items Assist x 2;HOB elevated; Bedrails; Increased time required;Verbal cues;LE management   Rolling L 3  Moderate assistance   Additional items Assist x 2;Increased time required;Verbal cues;LE management   Supine to Sit 5  Supervision   Additional items HOB elevated; Bedrails; Increased time required   Sit to Supine 2  Maximal assistance   Additional items Assist x 2; Increased time required;Verbal cues;LE management   Transfers   Sit to Stand 4  Minimal assistance   Additional items Assist x 2; Increased time required   Stand to Sit 4  Minimal assistance   Additional items Assist x 2; Increased time required   Stand pivot 4  Minimal assistance   Additional items Assist x 2; Increased time required;Verbal cues   Toilet transfer 5  Supervision   Additional items Increased time required;Standard toilet;Commode  (ana [facility],)   Additional Comments with ana standard walker   Functional Mobility   Functional Mobility 4  Minimal assistance   Additional Comments assist x2   Additional items Rolling walker   Subjective   Subjective Pt motivated to participate with therapy   Cognition   Overall Cognitive Status [facility]   Arousal/Participation Alert; Cooperative   Attention Within functional limits   Orientation Level Oriented X4   Memory Within functional limits   Following Commands Follows all commands and directions without difficulty   Activity Tolerance   Activity Tolerance Patient tolerated treatment well   Medical Staff Made Aware Kt Post PT  (Pt seen for co-treatment with Physical Therapist due to pt's medical complexity, functional limitations and limited activity tolerance.)   Assessment   Assessment Pt seen this date for skilled OT session focused on ADLs, functional transfers and mobility, safety education. The patient was received supine in bed, NAD, PIV access, on 2L O2 NC, masimo. She participated in functional bed mobility, dynamic sitting EOB, sit to stand transfers, stand pivot transfers, and toileting ADLs.  Pt required supervision to Minimal Assistancex 2 with standard walker for transfers, Standby Assistance for toilet trasnfer, and Maximal Assistance for toileting ADL. At end of session the patient was located supine in bed with call bell in reach and all needs met. Overall the patient continues to make functional progress, is highly motivated, and tolerates >1 hr long treatment sessions in the acute setting. She remains below her functional baseline, and is primarily limited at this time due to the deficits listed above. OT will continue to follow while acute to address POC. At this time, recommend inpatient acute  upon d/c.   Plan   Treatment Interventions ADL retraining;Functional transfer training;UE strengthening/ROM; Endurance training;Patient/family training   Goal Expiration Date 10/27/23   OT Treatment Day 7   OT Frequency 3-5x/wk   Discharge Recommendation   OT Discharge Recommendation Post acute rehabilitation services   Additional Comments  The patient's raw score on the AM-PAC Daily Activity Inpatient Short Form is 13. A raw score of less than 19 suggests the patient may benefit from discharge to post-acute rehabilitation services. Please refer to the recommendation of the Occupational Therapist for safe discharge planning.    AM-PAC Daily Activity Inpatient   Lower Body Dressing 1   Bathing 2   Toileting 2   Upper Body Dressing 2   Grooming 3   Eating 3   Daily Activity Raw Score 13   Daily Activity Standardized Score (Calc for Raw Score >=11) 32.03   AM-PAC Applied Cognition Inpatient   Following a Speech/Presentation 4   Understanding Ordinary Conversation 4   Taking Medications 3   Remembering Where Things Are Placed or Put Away 3   Remembering List of 4-5 Errands 3   Taking Care of Complicated Tasks 3   Applied Cognition Raw Score 20   Applied Cognition Standardized Score 41.76       Dorethea Claude, OTR/L

## 2023-10-17 NOTE — CASE MANAGEMENT
[facility] received request for authorization from Care Manager. Authorization request for: [facility]  Facility Name: [facility] NPI: [id number]  Facility MD: Dr. Thong Tellez NPI: [id number]  Authorization initiated by contacting insurance: [employer] Via: Fax. Clinicals submitted via: Fax.[phone]    Pt not managed by [facility].

## 2023-10-18 VITALS
RESPIRATION RATE: 27 BRPM | SYSTOLIC BLOOD PRESSURE: 124 MMHG | WEIGHT: 293 LBS | DIASTOLIC BLOOD PRESSURE: 59 MMHG | HEIGHT: 67 IN | TEMPERATURE: 98.4 F | HEART RATE: 62 BPM | OXYGEN SATURATION: 96 % | BODY MASS INDEX: 45.99 KG/M2

## 2023-10-18 PROCEDURE — 94660 CPAP INITIATION&MGMT: CPT

## 2023-10-18 RX ORDER — ACETAMINOPHEN 325 MG/1
975 TABLET ORAL EVERY 6 HOURS PRN
Refills: 0
Start: 2023-10-18

## 2023-10-18 RX ORDER — DIAPER,BRIEF,INFANT-TODD,DISP
EACH MISCELLANEOUS 4 TIMES DAILY PRN
Refills: 0
Start: 2023-10-18

## 2023-10-18 RX ADMIN — Medication: at 09:51

## 2023-10-18 RX ADMIN — ACETAMINOPHEN 975 MG: 325 TABLET, FILM COATED ORAL at 17:20

## 2023-10-18 RX ADMIN — METOPROLOL TARTRATE 25 MG: 25 TABLET, FILM COATED ORAL at 09:50

## 2023-10-18 RX ADMIN — LORATADINE 5 MG: 10 TABLET ORAL at 09:50

## 2023-10-18 RX ADMIN — METHOCARBAMOL 500 MG: 500 TABLET ORAL at 09:49

## 2023-10-18 RX ADMIN — METHOCARBAMOL 500 MG: 500 TABLET ORAL at 17:20

## 2023-10-18 RX ADMIN — RIVAROXABAN 20 MG: 20 TABLET, FILM COATED ORAL at 17:20

## 2023-10-18 RX ADMIN — METOPROLOL TARTRATE 25 MG: 25 TABLET, FILM COATED ORAL at 17:20

## 2023-10-18 RX ADMIN — TORSEMIDE 20 MG: 20 TABLET ORAL at 09:50

## 2023-10-18 RX ADMIN — POTASSIUM CHLORIDE 40 MEQ: 1500 TABLET, EXTENDED RELEASE ORAL at 09:49

## 2023-10-18 NOTE — CASE MANAGEMENT
Case Management Discharge Planning Note    Patient name Cameron Gaitan  Location /-58 MRN [id number]  : 1955 Date 10/18/2023       Current Admission Date: 2023  Current Admission Diagnosis:Acute on chronic respiratory failure with hypoxia and hypercapnia [facility])   Patient Active Problem List    Diagnosis Date Noted    Goals of care, counseling/discussion 10/11/2023    Acute on chronic respiratory failure with hypoxia and hypercapnia ([address]) 2023    Iron deficiency anemia 2023    Atrial fibrillation ([address]) 2023    Obesity hypoventilation syndrome ([address]) 2023    Lipodermatosclerosis of both lower extremities 2021    Class 3 severe obesity with body mass index (BMI) greater than or equal to 70 in adult [facility]) 2021    Bakers cyst 10/27/2014    Lymphedema of leg 10/27/2014      LOS (days): 20  Geometric Mean LOS (GMLOS) (days): 3.60  Days to GMLOS:-17     OBJECTIVE:  Risk of Unplanned Readmission Score: 16.24         Current admission status: Inpatient   Preferred Pharmacy:   [id number] [address]  [address] [place] [address]  Phone: [phone] Fax: [phone]    Primary Care Provider: Abdirahman Humphreys DO    Primary Insurance: [address]  Secondary Insurance:     DISCHARGE DETAILS:     [facility] claimed the ride for Cameron Gaitan in unit/room  bed -01, and will arrive on 10/18/2023 at 6:30pm EDT. Contact them at [phone].  Ride details here: [email or web address]    Bed [address]  phone: [phone]  fax: [phone]

## 2023-10-18 NOTE — CASE MANAGEMENT
Case Management Discharge Planning Note    Patient name Orville Divers  Location /-28 MRN [id number]  : 1955 Date 10/18/2023       Current Admission Date: 2023  Current Admission Diagnosis:Acute on chronic respiratory failure with hypoxia and hypercapnia [facility])   Patient Active Problem List    Diagnosis Date Noted    Goals of care, counseling/discussion 10/11/2023    Acute on chronic respiratory failure with hypoxia and hypercapnia ([address]) 2023    Iron deficiency anemia 2023    Atrial fibrillation ([address]) 2023    Obesity hypoventilation syndrome ([address]) 2023    Lipodermatosclerosis of both lower extremities 2021    Class 3 severe obesity with body mass index (BMI) greater than or equal to 70 in adult [facility]) 2021    Bakers cyst 10/27/2014    Lymphedema of leg 10/27/2014      LOS (days): 20  Geometric Mean LOS (GMLOS) (days): 3.60  Days to GMLOS:-16.6     OBJECTIVE:  Risk of Unplanned Readmission Score: 15.66         Current admission status: Inpatient   Preferred Pharmacy:   [facility]/pharmacy #[id number]- [address] - [address], [address]  [address] [place], [address], [address] [place] [address]  Phone: [phone] Fax: [phone]    Primary Care Provider: Raffy Birch DO    Primary Insurance: [address]  Secondary Insurance:     [address]  Number: [id number]

## 2023-10-18 NOTE — CASE MANAGEMENT
[facility] has received approved authorization from insurance:  [facility] received for: SNF  Facility: [address] [place] [address] Drive #: [id number]  Start of Care: 10/17  Next Review Date: 10/23  Continued Stay Care Coordinator:  none given  Submit next review to: [id number] notified:  Ivy Spencer

## 2023-10-18 NOTE — CASE MANAGEMENT
Case Management Discharge Planning Note    Patient name Aguila Malcolm  Location /-09 MRN [id number]  : 1955 Date 10/18/2023       Current Admission Date: 2023  Current Admission Diagnosis:Acute on chronic respiratory failure with hypoxia and hypercapnia [facility])   Patient Active Problem List    Diagnosis Date Noted    Goals of care, counseling/discussion 10/11/2023    Acute on chronic respiratory failure with hypoxia and hypercapnia ([address]) 2023    Iron deficiency anemia 2023    Atrial fibrillation ([address]) 2023    Obesity hypoventilation syndrome ([address]) 2023    Lipodermatosclerosis of both lower extremities 2021    Class 3 severe obesity with body mass index (BMI) greater than or equal to 70 in adult [facility]) 2021    Bakers cyst 10/27/2014    Lymphedema of leg 10/27/2014      LOS (days): 20  Geometric Mean LOS (GMLOS) (days): 3.60  Days to GMLOS:-16.8     OBJECTIVE:  Risk of Unplanned Readmission Score: 15.69         Current admission status: Inpatient   Preferred Pharmacy:   [facility]/pharmacy #[id number]- [place] - [address]  [address] [facility], [address], [address] [place] [address]  Phone: [phone] Fax: [phone]    Primary Care Provider: Wendy Frazier DO    Primary Insurance: [address]  Secondary Insurance:     DISCHARGE DETAILS:       Accepting Facility Name, [address] [place] [address] : [address] [place] [address]  Receiving Facility/Agency Phone Number: Phone: [phone]  Facility/Agency Fax Number: Fax: [phone]       CM spoke with Varinder Mercado from [facility] aidin. CM was informed that patient  is agreeable to [facility] private pay. CM contacted  and  confirmed.  CM booked transport for 4PM.

## 2023-10-18 NOTE — PLAN OF CARE
Problem: Prexisting or High Potential for Compromised Skin Integrity  Goal: Skin integrity is maintained or improved  Description: INTERVENTIONS:  - Identify patients at risk for skin breakdown  - Assess and monitor skin integrity  - Assess and monitor nutrition and hydration status  - Monitor labs   - Assess for incontinence   - Turn and reposition patient  - Assist with mobility/ambulation  - Relieve pressure over bony prominences  - Avoid friction and shearing  - Provide appropriate hygiene as needed including keeping skin clean and dry  - Evaluate need for skin moisturizer/barrier cream  - Collaborate with interdisciplinary team   - Patient/family teaching  - Consider wound care consult   Outcome: Adequate for Discharge     Problem: MOBILITY - ADULT  Goal: Maintain or return to baseline ADL function  Description: INTERVENTIONS:  -  Assess patient's ability to carry out ADLs; assess patient's baseline for ADL function and identify physical deficits which impact ability to perform ADLs (bathing, care of mouth/teeth, toileting, grooming, dressing, etc.)  - Assess/evaluate cause of self-care deficits   - Assess range of motion  - Assess patient's mobility; develop plan if impaired  - Assess patient's need for assistive devices and provide as appropriate  - Encourage maximum independence but intervene and supervise when necessary  - Involve family in performance of ADLs  - Assess for home care needs following discharge   - Consider OT consult to assist with ADL evaluation and planning for discharge  - Provide patient education as appropriate  Outcome: Adequate for Discharge  Goal: Maintains/Returns to pre admission functional level  Description: INTERVENTIONS:  - Perform BMAT or MOVE assessment daily.   - Set and communicate daily mobility goal to care team and patient/family/caregiver.    - Collaborate with rehabilitation services on mobility goals if consulted  - Out of bed for toileting  - Record patient progress and toleration of activity level   Outcome: Adequate for Discharge     Problem: PAIN - ADULT  Goal: Verbalizes/displays adequate comfort level or baseline comfort level  Description: Interventions:  - Encourage patient to monitor pain and request assistance  - Assess pain using appropriate pain scale  - Administer analgesics based on type and severity of pain and evaluate response  - Implement non-pharmacological measures as appropriate and evaluate response  - Consider cultural and social influences on pain and pain management  - Notify physician/advanced practitioner if interventions unsuccessful or patient reports new pain  Outcome: Adequate for Discharge     Problem: INFECTION - ADULT  Goal: Absence or prevention of progression during hospitalization  Description: INTERVENTIONS:  - Assess and monitor for signs and symptoms of infection  - Monitor lab/diagnostic results  - Monitor all insertion sites, i.e. indwelling lines, tubes, and drains  - Monitor endotracheal if appropriate and nasal secretions for changes in amount and color  - [place] appropriate cooling/warming therapies per order  - Administer medications as ordered  - Instruct and encourage patient and family to use good hand hygiene technique  - Identify and instruct in appropriate isolation precautions for identified infection/condition  Outcome: Adequate for Discharge     Problem: SAFETY ADULT  Goal: Maintain or return to baseline ADL function  Description: INTERVENTIONS:  -  Assess patient's ability to carry out ADLs; assess patient's baseline for ADL function and identify physical deficits which impact ability to perform ADLs (bathing, care of mouth/teeth, toileting, grooming, dressing, etc.)  - Assess/evaluate cause of self-care deficits   - Assess range of motion  - Assess patient's mobility; develop plan if impaired  - Assess patient's need for assistive devices and provide as appropriate  - Encourage maximum independence but intervene and supervise when necessary  - Involve family in performance of ADLs  - Assess for home care needs following discharge   - Consider OT consult to assist with ADL evaluation and planning for discharge  - Provide patient education as appropriate  Outcome: Adequate for Discharge  Goal: Maintains/Returns to pre admission functional level  Description: INTERVENTIONS:  - Perform BMAT or MOVE assessment daily.   - Set and communicate daily mobility goal to care team and patient/family/caregiver.    - Collaborate with rehabilitation services on mobility goals if consulted  - Out of bed for toileting  - Record patient progress and toleration of activity level   Outcome: Adequate for Discharge  Goal: Patient will remain free of falls  Description: INTERVENTIONS:  - Educate patient/family on patient safety including physical limitations  - Instruct patient to call for assistance with activity   - Consult OT/PT to assist with strengthening/mobility   - Keep Call bell within reach  - Keep bed low and locked with side rails adjusted as appropriate  - Keep care items and personal belongings within reach  - Initiate and maintain comfort rounds  - Make Fall Risk Sign visible to staff  - Apply yellow socks and bracelet for high fall risk patients  - Consider moving patient to room near nurses station  Outcome: Adequate for Discharge     Problem: DISCHARGE PLANNING  Goal: Discharge to home or other facility with appropriate resources  Description: INTERVENTIONS:  - Identify barriers to discharge w/patient and caregiver  - Arrange for needed discharge resources and transportation as appropriate  - Identify discharge learning needs (meds, wound care, etc.)  - Arrange for interpretive services to assist at discharge as needed  - Refer to Case Management Department for coordinating discharge planning if the patient needs post-hospital services based on physician/advanced practitioner order or complex needs related to functional status, cognitive ability, or social support system  Outcome: Adequate for Discharge     Problem: Knowledge Deficit  Goal: Patient/family/caregiver demonstrates understanding of disease process, treatment plan, medications, and discharge instructions  Description: Complete learning assessment and assess knowledge base.   Interventions:  - Provide teaching at level of understanding  - Provide teaching via preferred learning methods  Outcome: Adequate for Discharge     Problem: RESPIRATORY - ADULT  Goal: Achieves optimal ventilation and oxygenation  Description: INTERVENTIONS:  - Assess for changes in respiratory status  - Assess for changes in mentation and behavior  - Position to facilitate oxygenation and minimize respiratory effort  - Oxygen administered by appropriate delivery if ordered  - Initiate smoking cessation education as indicated  - Encourage broncho-pulmonary hygiene including cough, deep breathe, Incentive Spirometry  - Assess the need for suctioning and aspirate as needed  - Assess and instruct to report SOB or any respiratory difficulty  - Respiratory Therapy support as indicated  Outcome: Adequate for Discharge

## 2023-10-18 NOTE — WOUND OSTOMY CARE
Progress Note - Wound   Renee Suarez 76 y.o. female MRN: [id number]  Unit/Bed#: -01 Encounter: [id number]      Assessment:   Patient admitted due to acute on chronic respiratory failure with hypoxia and hypercapnia. History of morbid obesity, a.fib. Wound care follow-up for lower extremity, posterior thigh, and travis-area wounds. Patient agreeable to assessment, alert and oriented x4, incontinent of bowel and bladder, pure-wick in place for urine management, assist of 3 to turn for assessment, on a bariatric mattress, is a moderate assist with care. Primary RN made aware of assessment. 1. Bilateral sacrum, buttock, hips, elbows and heels- skin is dry, intact, blanchable. 2. Left posterior tibial- Wound is improving. Wound is oval in shape, true depth unknown, approx. 70% yellow adhered slough and 30% pink tissue, with scant amount of serosanguineous drainage noted. Travis-wound is dry, intact, blanchable, scaly skin. 3. Posterior right thigh- Wound on assessment has resolved. Skin is dry, intact, blanchable, scaly skin. 4. MASD Right vulva- Wound on assessment has resolved, skin is dry, intact, blanchable. No induration, fluctuance, odor, warmth, redness, or purulence noted to the above noted wound. New dressing applied. Patient tolerated well. Primary nurse aware of plan of care. See flow sheets for more detailed assessment findings. Will follow along. Skin care plans:  1-Hydraguard to bilateral sacrum, buttock, posterior thigh scaly skin, and heels BID and PRN  2-Elevate heels to offload pressure. 3-Ehob cushion in chair when out of bed. 4-Moisturize skin daily with skin nourishing cream.  5-Turn/reposition q2h or when medically stable for pressure re-distribution on skin. 6-Left posterior tibial- Cleanse wound with NSS, pat dry. Apply Triad paste over wound bed and cover with clover bordered Allevyn foam dressing.  Change every other day and as needed for soilage/displacement. 7-Bariatric low air loss mattress. Wound 09/28/23 Tibial Left;Posterior (Active)   Wound Image   10/18/23 1028   Wound Description Yellow;Pink;Slough 10/18/23 1028   Tiffany-wound Assessment Dry; Intact;Scaly 10/18/23 1028   Wound Length (cm) 1 cm 10/18/23 1028   Wound Width (cm) 0.7 cm 10/18/23 1028   Wound Depth (cm) 0.1 cm 10/18/23 1028   Wound Surface Area (cm^2) 0.7 cm^2 10/18/23 1028   Wound Volume (cm^3) 0.07 cm^3 10/18/23 1028   Calculated Wound Volume (cm^3) 0.07 cm^3 10/18/23 1028   Change in Wound Size % 0 10/18/23 1028   Tunneling 0 cm 10/18/23 1028   Undermining 0 10/18/23 1028   Drainage Amount Scant 10/18/23 1028   Drainage Description Serosanguineous 10/18/23 1028   Non-staged Wound Description Not applicable 96/22/72 3849   Treatments Cleansed;Irrigation with NSS;Site care 10/18/23 1028   Dressing Foam, Silicon (eg. Allevyn, etc) 10/18/23 1028   Wound packed? No 10/18/23 1028   Dressing Changed Changed 10/18/23 1028   Patient Tolerance Tolerated well 10/18/23 1028   Dressing Status Clean;Dry; Intact 10/18/23 1028       Wound 10/11/23 Thigh Distal;Posterior;Right (Active)   Wound Image   10/18/23 1028   Wound Description Dry; Intact 10/18/23 1028   Tiffany-wound Assessment Dry; Intact;Scaly 10/18/23 1028   Wound Length (cm) 0 cm 10/18/23 1028   Wound Width (cm) 0 cm 10/18/23 1028   Wound Depth (cm) 0 cm 10/18/23 1028   Wound Surface Area (cm^2) 0 cm^2 10/18/23 1028   Wound Volume (cm^3) 0 cm^3 10/18/23 1028   Calculated Wound Volume (cm^3) 0 cm^3 10/18/23 1028   Change in Wound Size % 100 10/18/23 1028   Tunneling 0 cm 10/18/23 1028   Undermining 0 10/18/23 1028   Drainage Amount None 10/18/23 1028   Drainage Description Serous 10/11/23 1322   Non-staged Wound Description Not applicable 70/72/92 3730   Treatments Cleansed;Site care 10/18/23 1028   Dressing Foam, Silicon (eg. Allevyn, etc) 10/18/23 1028   Wound packed?  No 10/18/23 1028   Dressing Changed Changed 10/18/23 1028   Patient Tolerance Tolerated well 10/18/23 1028   Dressing Status Clean;Dry; Intact 10/18/23 1028       Wound 10/11/23 MASD Groin (Active)   Wound Image   10/18/23 1033   Wound Description Dry; Intact 10/18/23 1033   Tiffany-wound Assessment Dry; Intact 10/18/23 1033   Wound Length (cm) 0 cm 10/18/23 1033   Wound Width (cm) 0 cm 10/18/23 1033   Wound Depth (cm) 0 cm 10/18/23 1033   Wound Surface Area (cm^2) 0 cm^2 10/18/23 1033   Wound Volume (cm^3) 0 cm^3 10/18/23 1033   Calculated Wound Volume (cm^3) 0 cm^3 10/18/23 1033   Change in Wound Size % 100 10/18/23 1033   Tunneling 0 cm 10/18/23 1033   Undermining 0 10/18/23 1033   Drainage Amount None 10/18/23 1033   Drainage Description Serosanguineous 10/17/23 1030   Non-staged Wound Description Not applicable 58/59/51 0179   Treatments Cleansed;Site care 10/18/23 1033   Dressing Protective barrier 10/18/23 1033   Wound packed?  No 10/18/23 1033   Dressing Changed New 10/18/23 1033   Patient Tolerance Tolerated well 10/18/23 1033     Call or tigertext with any questions  Wound Care will continue to follow    Brien ESTEVESN RN CWON  Wound and Ostomy care

## 2023-10-18 NOTE — CASE MANAGEMENT
Case Management Discharge Planning Note    Patient name Hiren Vega  Location /-73 MRN [id number]  : 1955 Date 10/18/2023       Current Admission Date: 2023  Current Admission Diagnosis:Acute on chronic respiratory failure with hypoxia and hypercapnia [facility])   Patient Active Problem List    Diagnosis Date Noted    Goals of care, counseling/discussion 10/11/2023    Acute on chronic respiratory failure with hypoxia and hypercapnia ([address]) 2023    Iron deficiency anemia 2023    Atrial fibrillation ([address]) 2023    Obesity hypoventilation syndrome ([address]) 2023    Lipodermatosclerosis of both lower extremities 2021    Class 3 severe obesity with body mass index (BMI) greater than or equal to 70 in adult [facility]) 2021    Bakers cyst 10/27/2014    Lymphedema of leg 10/27/2014      LOS (days): 20  Geometric Mean LOS (GMLOS) (days): 3.60  Days to GMLOS:-16.7     OBJECTIVE:  Risk of Unplanned Readmission Score: 15.66         Current admission status: Inpatient   Preferred Pharmacy:   [facility]/pharmacy #[id number]- [address] - [address]  [address] [place], [address], [place] [address]  Phone: [phone] Fax: [phone]    Primary Care Provider: Nguyễn Mckinney DO    Primary Insurance: [address]  Secondary Insurance:     DISCHARGE DETAILS:  CM informed by patients  that family is considering private paying for acute [facility].

## 2023-10-19 ENCOUNTER — TRANSITIONAL CARE MANAGEMENT (OUTPATIENT)
Dept: FAMILY MEDICINE CLINIC | Facility: CLINIC | Age: 68
End: 2023-10-19

## 2023-10-24 NOTE — ASSESSMENT & PLAN NOTE
Present on admission requiring ICU level of care, eventually downgraded to SLIM for further management  Prolonged hospitalization since 9/28  Goals of care discussions have been completed with palliative care  On Bipap PRN and qhs  On 2 liters nasal canula at baseline  Not a candidate for acute rehab, completed peer 2 peer  Acute respiratory failure resolved  Stable for discharge to short term rehab

## 2023-10-24 NOTE — ASSESSMENT & PLAN NOTE
Outside records note paroxysmal atrial fibrillation on Xarelto  Patient noted A-fib on EKG 10/10 [Was taking 12.5 mg Metoprolol BID at home]  Continue higher dose of Metoprolol, 25 mg BID  Continue Xarelto  Family reported to previous providers of vaginal bleeding, none during admission, unable to obtain transvaginal ultrasound while inpatient  Follow up with PCP as an outpatient to facilitate

## 2023-10-24 NOTE — ASSESSMENT & PLAN NOTE
Body mass index is 75.17 kg/m². Super morbidly obese  Continued recommendation to work towards weight loss and improved exercise and dietary habits as able   PT/OT to work with patient to increase mobility, reports she is ambulatory at home with assistance. Patient is awaiting insurance authorization for acute rehab at [facility].   Currently insurance is still reviewing

## 2023-10-24 NOTE — DISCHARGE SUMMARY
[address]  Discharge- Rossiter Para 1955, 76 y.o. female MRN: [id number]  Unit/Bed#: -Krista Encounter: [id number]  Primary Care Provider: Aleksandra Rapp DO   Date and time admitted to hospital: 9/28/2023 12:35 AM    * Acute on chronic respiratory failure with hypoxia and hypercapnia [facility])  Assessment & Plan  Present on admission requiring ICU level of care, eventually downgraded to SLIM for further management  Prolonged hospitalization since 9/28  Goals of care discussions have been completed with palliative care  On Bipap PRN and qhs  On 2 liters nasal canula at baseline  Not a candidate for acute rehab, completed peer 2 peer  Acute respiratory failure resolved  Stable for discharge to short term rehab    Goals of care, counseling/discussion  Assessment & Plan  Patient is s/p goals of care meeting on 10/4 with palliative care and other team members  Discussion with patient/, patient remains a full code at this time    Obesity hypoventilation syndrome ([address])  Assessment & Plan  Continue to manage with supplemental O2 support and BiPAP use for sleep     Atrial fibrillation ([address])  Assessment & Plan  Outside records note paroxysmal atrial fibrillation on Xarelto  Patient noted A-fib on EKG 10/10 [Was taking 12.5 mg Metoprolol BID at home]  Continue higher dose of Metoprolol, 25 mg BID  Continue Xarelto  Family reported to previous providers of vaginal bleeding, none during admission, unable to obtain transvaginal ultrasound while inpatient  Follow up with PCP as an outpatient to facilitate    Class 3 severe obesity with body mass index (BMI) greater than or equal to 70 in adult [facility])  Assessment & Plan  Body mass index is 75.17 kg/m². Super morbidly obese  Continued recommendation to work towards weight loss and improved exercise and dietary habits as able   PT/OT to work with patient to increase mobility, reports she is ambulatory at home with assistance.   Patient is awaiting insurance authorization for acute rehab at [facility]. Currently insurance is still reviewing    Elevated brain natriuretic peptide (BNP) level-resolved as of 10/12/2023  Assessment & Plan  Does not appear overtly volume overloaded however CXR revealing pulmonary vascular congestion  S/p several days of IV bumex with no significant change to O2 requirements  Transitioned back to torsemide 10/8    Elevated troponin-resolved as of 10/9/2023  Assessment & Plan  Elevated on presentation, no complaint of chest pain and EKG without ischemia  Down-trended in ICU   Echo without regional wall motion abnormalities  Suspect non-cardiac elevation in the setting of severe respiratory distress with hypoxia/hypercapnia  Given no current cardiac symptoms, defer any additional workup to outpatient provider     Acute encephalopathy-resolved as of 10/12/2023  Assessment & Plan  Metabolic encephalopathy present on admission, a/e/b lethargy, disorientation, poor safety awareness and impulsivity. Improved with BiPAP use and monitored by neuro checks, fall and delirium precautions, sleep hygiene   Likely secondary to acute on chronic hypoxia with hypercapnia.   Now back down to 2 L  No infectious etiology identified        Medical Problems       Resolved Problems  Date Reviewed: 10/16/2023            Resolved    Acute encephalopathy 10/12/2023     Resolved by  EMIL Molina    Elevated troponin 10/9/2023     Resolved by  Jackie Waldron PA-C    Elevated brain natriuretic peptide (BNP) level 10/12/2023     Resolved by  Bing Aviles, [address] [place] [address]        Discharging Physician / Practitioner: Yola Rivera MD  PCP: Jerardo Rangel DO  Admission Date:   Admission Orders (From admission, onward)       Ordered        09/28/23 0235  INPATIENT ADMISSION  Once                          Discharge Date: 10/19/23    Consultations During Hospital Stay:  IP CONSULT TO CASE MANAGEMENT  IP CONSULT TO PULMONOLOGY  IP CONSULT TO PALLIATIVE CARE      Procedures Performed:   imaging    Significant Findings / Test Results:   Principal Problem:    Acute on chronic respiratory failure with hypoxia and hypercapnia (HCC)  Active Problems:    Class 3 severe obesity with body mass index (BMI) greater than or equal to 70 in adult [facility])    Atrial fibrillation (HCC)    Obesity hypoventilation syndrome (HCC)    Goals of care, counseling/discussion  Resolved Problems:    Acute encephalopathy    Elevated troponin    Elevated brain natriuretic peptide (BNP) level        Incidental Findings:   See above    Test Results Pending at Discharge (will require follow up):   na     Outpatient Tests Requested: Follow up with PCP to facilitate transvaginal ultrasound    Complications:  see above     Reason for Admission: encephalopathy    Hospital Course:   Aleksandra Belle is a 76 y.o. female patient who originally presented to the hospital on 9/28/2023 due to acute encephalopathy likely from hypercapnia and respiratory failure due to vascular congestion from decompensated heart failure. She was IV diuresed and Bipap was used PRN which resolved encephalopathy and acute on chronic respiratory failure. She was stabilized for discharge to short term rehab. Condition at Discharge: fair    Discharge Day Visit / Exam:   * Please refer to separate progress note for these details *    Discussion with Family: Updated  () via phone. Discharge instructions/Information to patient and family:   See after visit summary for information provided to patient and family. Provisions for Follow-Up Care:  See after visit summary for information related to follow-up care and any pertinent home health orders. Disposition:   Other: short term rehab    Planned Readmission: none     Discharge Statement:  I spent 30+ minutes discharging the patient. This time was spent on the day of discharge.  I had direct contact with the patient on the day of discharge. Greater than 50% of the total time was spent examining patient, answering all patient questions, arranging and discussing plan of care with patient as well as directly providing post-discharge instructions. Additional time then spent on discharge activities. Discharge Medications:  See after visit summary for reconciled discharge medications provided to patient and/or family.       **Please Note: This note may have been constructed using a voice recognition system**

## 2023-10-30 DIAGNOSIS — R05.2 SUBACUTE COUGH: ICD-10-CM

## 2023-10-30 RX ORDER — CETIRIZINE HYDROCHLORIDE 10 MG/1
10 TABLET ORAL DAILY
Qty: 90 TABLET | Refills: 1 | Status: SHIPPED | OUTPATIENT
Start: 2023-10-30

## 2023-11-10 ENCOUNTER — TELEPHONE (OUTPATIENT)
Dept: FAMILY MEDICINE CLINIC | Facility: CLINIC | Age: 68
End: 2023-11-10

## 2023-11-10 NOTE — TELEPHONE ENCOUNTER
Spoke to the patient, she is more than willing to use that home visit to discuss her stay at rehab and complete the TCM.

## 2023-11-10 NOTE — TELEPHONE ENCOUNTER
She actually has a home visit scheduled with Candace on 11/16 -- would she like to use that for her follow up visit?

## 2023-11-10 NOTE — TELEPHONE ENCOUNTER
Pt called was in slm then rehab x 2weeks, was discharged sat 4th, wants to set up [facility] virtual but you have no openings. , She said you told her you would always squeeze her in I needed to ask you . She would not schedule with anyone else. Can you see her for a virtual visit? What to do?

## 2023-11-16 ENCOUNTER — OFFICE VISIT (OUTPATIENT)
Dept: FAMILY MEDICINE CLINIC | Facility: CLINIC | Age: 68
End: 2023-11-16
Payer: COMMERCIAL

## 2023-11-16 VITALS
OXYGEN SATURATION: 92 % | TEMPERATURE: 97.9 F | HEART RATE: 51 BPM | SYSTOLIC BLOOD PRESSURE: 136 MMHG | DIASTOLIC BLOOD PRESSURE: 84 MMHG

## 2023-11-16 DIAGNOSIS — E66.2 OBESITY HYPOVENTILATION SYNDROME (HCC): ICD-10-CM

## 2023-11-16 DIAGNOSIS — E66.01 CLASS 3 SEVERE OBESITY WITH SERIOUS COMORBIDITY AND BODY MASS INDEX (BMI) GREATER THAN OR EQUAL TO 70 IN ADULT, UNSPECIFIED OBESITY TYPE (HCC): ICD-10-CM

## 2023-11-16 DIAGNOSIS — I48.0 PAROXYSMAL ATRIAL FIBRILLATION (HCC): ICD-10-CM

## 2023-11-16 DIAGNOSIS — M79.3 LIPODERMATOSCLEROSIS OF BOTH LOWER EXTREMITIES: ICD-10-CM

## 2023-11-16 DIAGNOSIS — J96.21 ACUTE ON CHRONIC RESPIRATORY FAILURE WITH HYPOXIA AND HYPERCAPNIA (HCC): Primary | ICD-10-CM

## 2023-11-16 DIAGNOSIS — J96.22 ACUTE ON CHRONIC RESPIRATORY FAILURE WITH HYPOXIA AND HYPERCAPNIA (HCC): Primary | ICD-10-CM

## 2023-11-16 DIAGNOSIS — D50.9 IRON DEFICIENCY ANEMIA, UNSPECIFIED IRON DEFICIENCY ANEMIA TYPE: ICD-10-CM

## 2023-11-16 DIAGNOSIS — I89.0 LYMPHEDEMA OF BOTH LOWER EXTREMITIES: ICD-10-CM

## 2023-11-16 PROCEDURE — 99495 TRANSJ CARE MGMT MOD F2F 14D: CPT | Performed by: NURSE PRACTITIONER

## 2023-11-16 NOTE — ASSESSMENT & PLAN NOTE
Taking Metoprolol 25 bid and Xarelto 20 mg denies any bleeding and palpitations appearing in a RRR currently.  Patient is concerns about continuing with Xarelto in relation to having dental work coming up in the future encouraged to stay on the Xarelto currenlty

## 2023-11-16 NOTE — ASSESSMENT & PLAN NOTE
Patient lung sounds clear decreased in bases on nasal cannula 2 liters and CPAP at night encouraged to wear CPAP during the day when she is resting to manage her CO2 levels

## 2023-11-16 NOTE — PROGRESS NOTES
Assessment & Plan     1. Acute on chronic respiratory failure with hypoxia and hypercapnia (HCC)  Assessment & Plan:  Patient lung sounds clear decreased in bases on nasal cannula 2 liters and CPAP at night encouraged to wear CPAP during the day when she is resting to manage her CO2 levels       2. Paroxysmal atrial fibrillation (HCC)  Assessment & Plan:  Taking Metoprolol 25 bid and Xarelto 20 mg denies any bleeding and palpitations appearing in a RRR currently. Patient is concerns about continuing with Xarelto in relation to having dental work coming up in the future encouraged to stay on the Xarelto currenlty     Orders:  -     Comprehensive metabolic panel; Future  -     CBC and differential; Future  -     Iron Panel (Includes Ferritin, Iron Sat%, Iron, and TIBC); Future    3. Lipodermatosclerosis of both lower extremities    4. Lymphedema of both lower extremities  Assessment & Plan:  Following with [facility] will see if able to add on lymphedema care       5. Obesity hypoventilation syndrome (HCC)  Assessment & Plan:  Currently 92% on room air encouraged to wear her oxygen       6. Iron deficiency anemia, unspecified iron deficiency anemia type  Assessment & Plan:  Labs ordered to update patient is not currently taking iron supplements related to constipation     Orders:  -     Comprehensive metabolic panel; Future  -     CBC and differential; Future  -     Iron Panel (Includes Ferritin, Iron Sat%, Iron, and TIBC); Future    7. Class 3 severe obesity with serious comorbidity and body mass index (BMI) greater than or equal to 70 in adult, unspecified obesity type (HCC)      BMI Counseling: There is no height or weight on file to calculate BMI. The BMI is above normal. Nutrition recommendations include decreasing portion sizes, encouraging healthy choices of fruits and vegetables, decreasing fast food intake, consuming healthier snacks, limiting drinks that contain sugar and moderation in carbohydrate intake.  No pharmacotherapy was ordered. Rationale for BMI follow-up plan is due to patient being overweight or obese. BMI Counseling: There is no height or weight on file to calculate BMI. Follow-up plan was not completed due to elderly patient (72 years old) where weight reduction/weight gain would complicate underlying health condition such as: illness or physical disability. Subjective     Transitional Care Management Review:   Jose Benjamin is a 76 y.o. female here for TCM follow up. During the TCM phone call patient stated:  TCM Call     Date and time call was made  11/16/2023  4:04 PM    Hospital care reviewed  Records reviewed    Patient was hospitialized at  [id number] [facility]    Date of Admission  09/28/23    Date of discharge  11/04/23    Diagnosis  Acute on chronic respiratory failure with hypoxia and hypercapnia    Disposition  Rehabilitation center    Were the patients medications reviewed and updated  No    Current Symptoms  Shortness of breath; Weakness    Shortness of breath severity  Mild    Weakness severity  Mild      TCM Call     Post hospital issues  Reduced activity; Poor medication adherence    Should patient be enrolled in anticoag monitoring? No    Scheduled for follow up? Yes    I have advised the patient to call PCP with any new or worsening symptoms  MAUREEN LIAO    Comments  pt needs virtual - she is homebound        Home visit performed today at patient's home. Patient has limited mobility secondary to deconditioning from her recent hospitalizations and her body habitus BMI 80. Patient  present for entire visit and NNEKA Abel from [address] [place] [address]. Patient and  requesting visit from her recent hospitalizations and rehabilitation at [facility] 11/9/2023 discharged   Patient is compliant using her CPAP at night and using a nasal cannual 2 liters in the daytime. Patient currently not wearing her oxygen and home readings 92%.  She also states that she is not able to tolerate the oral iron secondary to having constipation and has stopped it. She also reports that she is not taking ditropan. She is having bilateral lower leg swelling and lymphedema chronically. She does have [facility] home care coming to her home for home care. Limited ability to attend appointments have made it challenging to get to her specialist appointments in person. Review of Systems   Constitutional:  Negative for activity change, appetite change, chills, diaphoresis, fatigue, fever and unexpected weight change. HENT:  Positive for dental problem. Negative for congestion, drooling, sinus pressure, sinus pain, trouble swallowing and voice change. Eyes:  Negative for visual disturbance. Respiratory:  Negative for apnea, cough, choking, chest tightness, shortness of breath, wheezing and stridor. Cardiovascular:  Positive for leg swelling. Negative for chest pain and palpitations. Gastrointestinal:  Positive for constipation. Negative for abdominal distention, abdominal pain, anal bleeding, blood in stool, diarrhea, nausea, rectal pain and vomiting. Endocrine: Negative. Genitourinary: Negative. Musculoskeletal:  Positive for gait problem. Skin:  Positive for rash. Neurological:  Negative for dizziness and headaches. Hematological:  Negative for adenopathy. Does not bruise/bleed easily. Objective     /84 (BP Location: Right arm, Patient Position: Sitting)   Pulse (!) 51   Temp 97.9 °F (36.6 °C) (Temporal)   SpO2 92%      Physical Exam  Vitals and nursing note reviewed. Exam conducted with a chaperone present. Constitutional:       General: She is awake. Appearance: Normal appearance. She is well-developed. She is morbidly obese. Cardiovascular:      Rate and Rhythm: Normal rate and regular rhythm. Heart sounds: Normal heart sounds.       Comments: Bilateral thickened skin on both legs consistent with chronic venous stasis changes Pulmonary:      Effort: Pulmonary effort is normal.      Breath sounds: Examination of the right-lower field reveals decreased breath sounds. Examination of the left-lower field reveals decreased breath sounds. Decreased breath sounds present. Abdominal:      General: Bowel sounds are normal.      Palpations: Abdomen is soft. Tenderness: There is no abdominal tenderness. Musculoskeletal:      Comments: Seated in wheelchair ambulates with walker limited distances    Skin:     General: Skin is warm. Findings: Abrasion present. Comments: Lower leg    Neurological:      Mental Status: She is alert. GCS: GCS eye subscore is 4. GCS verbal subscore is 5. GCS motor subscore is 6. Psychiatric:         Attention and Perception: Attention normal.         Mood and Affect: Mood normal.         Speech: Speech normal.         Behavior: Behavior normal. Behavior is cooperative. Thought Content:  Thought content normal.         Cognition and Memory: Cognition normal.         Judgment: Judgment normal.      Comments: Seated in wheelchair well dressed clean wearing glasses       Medications have been reviewed by provider in current encounter    Makenzie Tran, [address] [facility]

## 2023-11-20 ENCOUNTER — TELEPHONE (OUTPATIENT)
Dept: LAB | Facility: HOSPITAL | Age: 68
End: 2023-11-20

## 2023-11-24 ENCOUNTER — APPOINTMENT (OUTPATIENT)
Dept: LAB | Facility: HOSPITAL | Age: 68
End: 2023-11-24
Payer: COMMERCIAL

## 2023-11-24 DIAGNOSIS — D50.9 IRON DEFICIENCY ANEMIA, UNSPECIFIED IRON DEFICIENCY ANEMIA TYPE: ICD-10-CM

## 2023-11-24 DIAGNOSIS — Z79.899 LONG TERM USE OF DRUG: ICD-10-CM

## 2023-11-24 DIAGNOSIS — I48.0 PAROXYSMAL ATRIAL FIBRILLATION (HCC): ICD-10-CM

## 2023-11-24 LAB
ALBUMIN SERPL BCP-MCNC: 3.1 G/DL (ref 3.5–5)
ALP SERPL-CCNC: 66 U/L (ref 34–104)
ALT SERPL W P-5'-P-CCNC: 4 U/L (ref 7–52)
ANION GAP SERPL CALCULATED.3IONS-SCNC: 5 MMOL/L
AST SERPL W P-5'-P-CCNC: 9 U/L (ref 13–39)
BASOPHILS # BLD AUTO: 0.04 THOUSANDS/ÂΜL (ref 0–0.1)
BASOPHILS NFR BLD AUTO: 1 % (ref 0–1)
BILIRUB SERPL-MCNC: 0.4 MG/DL (ref 0.2–1)
BUN SERPL-MCNC: 14 MG/DL (ref 5–25)
CALCIUM ALBUM COR SERPL-MCNC: 9 MG/DL (ref 8.3–10.1)
CALCIUM SERPL-MCNC: 8.3 MG/DL (ref 8.4–10.2)
CHLORIDE SERPL-SCNC: 102 MMOL/L (ref 96–108)
CO2 SERPL-SCNC: 32 MMOL/L (ref 21–32)
CREAT SERPL-MCNC: 0.76 MG/DL (ref 0.6–1.3)
EOSINOPHIL # BLD AUTO: 0.42 THOUSAND/ÂΜL (ref 0–0.61)
EOSINOPHIL NFR BLD AUTO: 7 % (ref 0–6)
ERYTHROCYTE [DISTWIDTH] IN BLOOD BY AUTOMATED COUNT: 14.7 % (ref 11.6–15.1)
FERRITIN SERPL-MCNC: 53 NG/ML (ref 11–307)
GFR SERPL CREATININE-BSD FRML MDRD: 80 ML/MIN/1.73SQ M
GLUCOSE P FAST SERPL-MCNC: 86 MG/DL (ref 65–99)
HCT VFR BLD AUTO: 32.5 % (ref 34.8–46.1)
HGB BLD-MCNC: 9.9 G/DL (ref 11.5–15.4)
IMM GRANULOCYTES # BLD AUTO: 0.02 THOUSAND/UL (ref 0–0.2)
IMM GRANULOCYTES NFR BLD AUTO: 0 % (ref 0–2)
IRON SATN MFR SERPL: 16 % (ref 15–50)
IRON SERPL-MCNC: 35 UG/DL (ref 50–212)
LYMPHOCYTES # BLD AUTO: 2.11 THOUSANDS/ÂΜL (ref 0.6–4.47)
LYMPHOCYTES NFR BLD AUTO: 34 % (ref 14–44)
MCH RBC QN AUTO: 27.4 PG (ref 26.8–34.3)
MCHC RBC AUTO-ENTMCNC: 30.5 G/DL (ref 31.4–37.4)
MCV RBC AUTO: 90 FL (ref 82–98)
MONOCYTES # BLD AUTO: 0.62 THOUSAND/ÂΜL (ref 0.17–1.22)
MONOCYTES NFR BLD AUTO: 10 % (ref 4–12)
NEUTROPHILS # BLD AUTO: 3.02 THOUSANDS/ÂΜL (ref 1.85–7.62)
NEUTS SEG NFR BLD AUTO: 48 % (ref 43–75)
NRBC BLD AUTO-RTO: 0 /100 WBCS
PLATELET # BLD AUTO: 246 THOUSANDS/UL (ref 149–390)
PMV BLD AUTO: 10.9 FL (ref 8.9–12.7)
POTASSIUM SERPL-SCNC: 3.8 MMOL/L (ref 3.5–5.3)
PROT SERPL-MCNC: 6.2 G/DL (ref 6.4–8.4)
RBC # BLD AUTO: 3.61 MILLION/UL (ref 3.81–5.12)
SODIUM SERPL-SCNC: 139 MMOL/L (ref 135–147)
TIBC SERPL-MCNC: 225 UG/DL (ref 250–450)
UIBC SERPL-MCNC: 190 UG/DL (ref 155–355)
WBC # BLD AUTO: 6.23 THOUSAND/UL (ref 4.31–10.16)

## 2023-11-24 PROCEDURE — 85025 COMPLETE CBC W/AUTO DIFF WBC: CPT

## 2023-11-24 PROCEDURE — 36415 COLL VENOUS BLD VENIPUNCTURE: CPT

## 2023-11-24 PROCEDURE — 80053 COMPREHEN METABOLIC PANEL: CPT

## 2023-11-24 PROCEDURE — 82728 ASSAY OF FERRITIN: CPT

## 2023-11-24 PROCEDURE — 83550 IRON BINDING TEST: CPT

## 2023-11-24 PROCEDURE — 83540 ASSAY OF IRON: CPT

## 2023-12-07 ENCOUNTER — TELEMEDICINE (OUTPATIENT)
Dept: FAMILY MEDICINE CLINIC | Facility: CLINIC | Age: 68
End: 2023-12-07
Payer: COMMERCIAL

## 2023-12-07 ENCOUNTER — TELEPHONE (OUTPATIENT)
Dept: FAMILY MEDICINE CLINIC | Facility: CLINIC | Age: 68
End: 2023-12-07

## 2023-12-07 DIAGNOSIS — I48.0 PAROXYSMAL ATRIAL FIBRILLATION (HCC): ICD-10-CM

## 2023-12-07 DIAGNOSIS — D50.9 IRON DEFICIENCY ANEMIA, UNSPECIFIED IRON DEFICIENCY ANEMIA TYPE: Primary | ICD-10-CM

## 2023-12-07 DIAGNOSIS — I89.0 LYMPHEDEMA OF BOTH LOWER EXTREMITIES: ICD-10-CM

## 2023-12-07 PROBLEM — J96.12 CHRONIC RESPIRATORY FAILURE WITH HYPOXIA AND HYPERCAPNIA (HCC): Status: ACTIVE | Noted: 2023-09-28

## 2023-12-07 PROBLEM — J96.11 CHRONIC RESPIRATORY FAILURE WITH HYPOXIA AND HYPERCAPNIA (HCC): Status: ACTIVE | Noted: 2023-09-28

## 2023-12-07 PROCEDURE — 99213 OFFICE O/P EST LOW 20 MIN: CPT | Performed by: FAMILY MEDICINE

## 2023-12-07 NOTE — TELEPHONE ENCOUNTER
Only one that use to do it was [facility] and at this time they still do not have anyone that is providing this service.

## 2023-12-07 NOTE — TELEPHONE ENCOUNTER
Can we please call [address], [facility], and RATNA CARBAJAL to see if anyone has home health PT/OT that can do lymphedema therapy?

## 2023-12-07 NOTE — PROGRESS NOTES
Virtual Regular Visit    Verification of patient location:    Patient is located at Home in the following state in which I hold an active license [place]      Assessment/Plan:    Problem List Items Addressed This Visit        Cardiovascular and Mediastinum    Atrial fibrillation ([address])       Other    Lymphedema of leg    Iron deficiency anemia - Primary     Discussed further evaluation of ZEENAT including stool testing, EGD/colonoscopy, and/or treatment with iron infusions -- pt is going to try working on supplementing iron through diet     Reviewed that monitor pulse/BP BID -- if low (BP <110-120, pulse <6), should hold Metoprolol and Torsemide. Can also consider decreasing Metoprolol to 12.5 mg BID as pt was on this dosage prior to most recent hospitalization. Will check with Home Health agencies to see if anyone is able to do in home lymphedema therapy        Reason for visit is   Chief Complaint   Patient presents with   • Medication Management   • Virtual Regular Visit          Encounter provider Dev Lopez DO    Provider located at [address] [place] [id number]      Recent Visits  No visits were found meeting these conditions. Showing recent visits within past 7 days and meeting all other requirements  Today's Visits  Date Type Provider Dept   12/07/23 Telephone DO Nilay Wells   12/07/23 Telemedicine DO Nilay Duenas   Showing today's visits and meeting all other requirements  Future Appointments  No visits were found meeting these conditions. Showing future appointments within next 150 days and meeting all other requirements       The patient was identified by name and date of birth. Leyla Tyshawn was informed that this is a telemedicine visit and that the visit is being conducted through the [address] [place] [address] platform. She agrees to proceed. .  My office door was closed.  No one else was in the room.  She acknowledged consent and understanding of privacy and security of the video platform. The patient has agreed to participate and understands they can discontinue the visit at any time. Patient is aware this is a billable service. Subjective  Ronen Marsh is a 76 y.o. female who presents for lab review .       HPI     Overall labs stable from previous   Cr 0.76/GFR 80   AST/ALT low  Ca 8.3, slightly low protein (improved from previous)   Hb 9.9, Iron 35/Ferritin 53 -- denies any blood in urine/stool,     Pt notes that her BP has been running low (90s/50s) so she has been holding her Metoprolol and Torsemide    Pt also notes her lymphedema is worsening again after some improvement while hospitalized; pt states this limits her ability to walk         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 ([address]) 8/2/2021   • Scalp laceration 3/17/2023   • Thoracic vertebral fracture ([address]) 3/31/2023       Past Surgical History:   Procedure Laterality Date   • NO PAST SURGERIES         Current Outpatient Medications   Medication Sig Dispense Refill   • acetaminophen (TYLENOL) 325 mg tablet Take 3 tablets (975 mg total) by mouth every 6 (six) hours as needed for mild pain  0   • albuterol (Ventolin HFA) 90 mcg/act inhaler Inhale 2 puffs every 4 (four) hours as needed for wheezing or shortness of breath (cough) 18 g 1   • ammonium lactate (LAC-HYDRIN) 12 % lotion      • ascorbic acid (VITAMIN C) 250 MG tablet Take 250 mg by mouth     • cetirizine (ZyrTEC) 10 mg tablet TAKE 1 TABLET BY MOUTH EVERY DAY 90 tablet 1   • Diclofenac Sodium (VOLTAREN) 1 % PLEASE SEE ATTACHED FOR DETAILED DIRECTIONS     • hydrocortisone 1 % cream Apply topically 4 (four) times a day as needed for irritation or rash  0   • Klor-Con M20 20 MEQ tablet Take 40 mEq AM + 20 mEq PM by mouth daily 180 tablet 5   • Lidocaine 4 % PTCH Place 2 patches on the skin daily     • methocarbamol (ROBAXIN) 500 mg tablet Take 1 tablet (500 mg total) by mouth every 6 (six) hours as needed for muscle spasms 360 tablet 1   • metoprolol tartrate (LOPRESSOR) 25 mg tablet Take 1 tablet (25 mg total) by mouth 2 (two) times a day  0   • Multiple Vitamins-Minerals (MULTIVITAMIN ADULTS PO) Take 1 tablet by mouth daily     • rivaroxaban (XARELTO) 20 mg tablet Take 1 tablet (20 mg total) by mouth daily with dinner  0   • torsemide (DEMADEX) 20 mg tablet Take 1 tablet (20 mg total) by mouth in the morning 180 tablet 1     No current facility-administered medications for this visit. Allergies   Allergen Reactions   • Other GI Intolerance     Food preservatives   • Sulfa Antibiotics GI Intolerance   • Nitrates, Organic - Food Allergy Rash   • Silver Rash       Review of Systems   Cardiovascular:  Positive for leg swelling. Gastrointestinal:  Negative for blood in stool. Genitourinary:  Negative for hematuria. Video Exam    There were no vitals filed for this visit. Physical Exam  Vitals and nursing note reviewed. Constitutional:       General: She is not in acute distress. Appearance: Normal appearance. HENT:      Head: Normocephalic and atraumatic. Pulmonary:      Effort: Pulmonary effort is normal. No respiratory distress. Neurological:      Mental Status: She is alert.       Comments: Grossly intact    Psychiatric:         Mood and Affect: Mood normal.          Visit Time  Total Visit Duration: 10

## 2023-12-31 DIAGNOSIS — S22.31XD CLOSED FRACTURE OF ONE RIB OF RIGHT SIDE WITH ROUTINE HEALING, SUBSEQUENT ENCOUNTER: ICD-10-CM

## 2023-12-31 DIAGNOSIS — I89.0 LYMPHEDEMA OF BOTH LOWER EXTREMITIES: ICD-10-CM

## 2023-12-31 DIAGNOSIS — R25.2 SPASM: Primary | ICD-10-CM

## 2023-12-31 DIAGNOSIS — S22.039D CLOSED FRACTURE OF THIRD THORACIC VERTEBRA WITH ROUTINE HEALING, UNSPECIFIED FRACTURE MORPHOLOGY, SUBSEQUENT ENCOUNTER: ICD-10-CM

## 2023-12-31 DIAGNOSIS — S42.009D CLOSED NONDISPLACED FRACTURE OF CLAVICLE WITH ROUTINE HEALING, UNSPECIFIED LATERALITY, UNSPECIFIED PART OF CLAVICLE, SUBSEQUENT ENCOUNTER: ICD-10-CM

## 2023-12-31 DIAGNOSIS — S22.049D CLOSED FRACTURE OF FOURTH THORACIC VERTEBRA WITH ROUTINE HEALING, UNSPECIFIED FRACTURE MORPHOLOGY, SUBSEQUENT ENCOUNTER: ICD-10-CM

## 2023-12-31 DIAGNOSIS — S01.01XD LACERATION OF SCALP, SUBSEQUENT ENCOUNTER: ICD-10-CM

## 2024-01-02 DIAGNOSIS — J96.01 ACUTE RESPIRATORY FAILURE WITH HYPOXIA AND HYPERCAPNIA (HCC): ICD-10-CM

## 2024-01-02 DIAGNOSIS — J96.22 ACUTE ON CHRONIC RESPIRATORY FAILURE WITH HYPOXIA AND HYPERCAPNIA (HCC): ICD-10-CM

## 2024-01-02 DIAGNOSIS — J96.02 ACUTE RESPIRATORY FAILURE WITH HYPOXIA AND HYPERCAPNIA (HCC): ICD-10-CM

## 2024-01-02 DIAGNOSIS — S81.802A OPEN WOUND OF LEFT LOWER EXTREMITY, INITIAL ENCOUNTER: ICD-10-CM

## 2024-01-02 DIAGNOSIS — J96.21 ACUTE ON CHRONIC RESPIRATORY FAILURE WITH HYPOXIA AND HYPERCAPNIA (HCC): ICD-10-CM

## 2024-01-02 RX ORDER — METHOCARBAMOL 500 MG/1
500 TABLET, FILM COATED ORAL EVERY 6 HOURS PRN
Qty: 90 TABLET | Refills: 1 | Status: SHIPPED | OUTPATIENT
Start: 2024-01-02

## 2024-01-02 RX ORDER — TORSEMIDE 20 MG/1
20 TABLET ORAL 2 TIMES DAILY
Qty: 180 TABLET | Refills: 1 | Status: SHIPPED | OUTPATIENT
Start: 2024-01-02

## 2024-01-08 ENCOUNTER — TELEMEDICINE (OUTPATIENT)
Dept: FAMILY MEDICINE CLINIC | Facility: CLINIC | Age: 69
End: 2024-01-08
Payer: COMMERCIAL

## 2024-01-08 DIAGNOSIS — J96.11 CHRONIC RESPIRATORY FAILURE WITH HYPOXIA AND HYPERCAPNIA (HCC): Primary | ICD-10-CM

## 2024-01-08 DIAGNOSIS — J96.12 CHRONIC RESPIRATORY FAILURE WITH HYPOXIA AND HYPERCAPNIA (HCC): Primary | ICD-10-CM

## 2024-01-08 DIAGNOSIS — R68.89 COLD INTOLERANCE: ICD-10-CM

## 2024-01-08 DIAGNOSIS — Z13.220 SCREENING, LIPID: ICD-10-CM

## 2024-01-08 DIAGNOSIS — D50.9 IRON DEFICIENCY ANEMIA, UNSPECIFIED IRON DEFICIENCY ANEMIA TYPE: ICD-10-CM

## 2024-01-08 DIAGNOSIS — Z13.1 SCREENING FOR DIABETES MELLITUS: ICD-10-CM

## 2024-01-08 DIAGNOSIS — I48.0 PAROXYSMAL ATRIAL FIBRILLATION (HCC): ICD-10-CM

## 2024-01-08 PROCEDURE — 99214 OFFICE O/P EST MOD 30 MIN: CPT | Performed by: FAMILY MEDICINE

## 2024-01-08 NOTE — PROGRESS NOTES
Virtual Regular Visit    Verification of patient location:    Patient is located at Home in the following state in which I hold an active license [place]      Assessment/Plan:    Problem List Items Addressed This Visit        Respiratory    Chronic respiratory failure with hypoxia and hypercapnia (HCC) - Primary    Relevant Orders    CBC and differential    Iron Panel (Includes Ferritin, Iron Sat%, Iron, and TIBC)       Cardiovascular and Mediastinum    Atrial fibrillation (HCC)    Relevant Orders    CBC and differential    Iron Panel (Includes Ferritin, Iron Sat%, Iron, and TIBC)    TSH, 3rd generation    Vitamin D 25 hydroxy    Vitamin B12       Other    Iron deficiency anemia    Relevant Orders    CBC and differential    Iron Panel (Includes Ferritin, Iron Sat%, Iron, and TIBC)   Other Visit Diagnoses     Screening for diabetes mellitus        Relevant Orders    Comprehensive metabolic panel    Hemoglobin A1C    Screening, lipid        Relevant Orders    Lipid panel    Cold intolerance        Relevant Orders    CBC and differential    Iron Panel (Includes Ferritin, Iron Sat%, Iron, and TIBC)    TSH, 3rd generation    Vitamin D 25 hydroxy    Vitamin B12        Discussed pt's chills could be secondary to known anemia or potential thyroid dysfunction. Will update labs as above to re-evaluate anemia, evaluate for thyroid dysfunction, vitamin deficiency.     Reviewed risks of not taking anti-coagulation with AFib including blood clot/stroke. Pt expressed understanding, but prefers to remain off medication.     Discussed continuing to check BP prior to taking Lasix and holding if BP on lower end to prevent significant hypotension. While we do want to decrease LE swelling as able, would not do so at cost of hypotension. Continue home PT to increase physical activity to help mobilize fluid.          Reason for visit is   Chief Complaint   Patient presents with   • Virtual Regular Visit          Encounter provider Kylie  "DO Tony    Provider located at [facility]  [address]  [facility] [address]      Recent Visits  No visits were found meeting these conditions.  Showing recent visits within past 7 days and meeting all other requirements  Today's Visits  Date Type Provider Dept   01/08/24 Telemedicine Kylie Jimenez DO [facility]   Showing today's visits and meeting all other requirements  Future Appointments  No visits were found meeting these conditions.  Showing future appointments within next 150 days and meeting all other requirements       The patient was identified by name and date of birth. Anne Hernandez was informed that this is a telemedicine visit and that the visit is being conducted through the [employer] platform. She agrees to proceed..  My office door was closed. No one else was in the room.  She acknowledged consent and understanding of privacy and security of the video platform. The patient has agreed to participate and understands they can discontinue the visit at any time.    Patient is aware this is a billable service.     Subjective  Anne Hernandez is a 68 y.o. female who presents for chronic follow up as below.     HPI     Wondering about what to do when her blood pressure is running low; she is afraid to not take her diuretic because she is having a great deal of swelling     Breathing is \"ok\" -- wearing CPAP at night and wears oxygen with naps     Often feels chills even though her house is kept at 74 degrees   Stopped Xarelto due to easy bruising     Continues to have PT -- is supposed to be doing exercises daily, but sometimes she is not able to due to the heaviness of her legs       Past Medical History:   Diagnosis Date   • Acute respiratory failure with hypoxia (HCC) 3/27/2023   • Morbid obesity (HCC)    • 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) 8/2/2021   • Scalp laceration 3/17/2023   • " Thoracic vertebral fracture (HCC) 3/31/2023       Past Surgical History:   Procedure Laterality Date   • NO PAST SURGERIES         Current Outpatient Medications   Medication Sig Dispense Refill   • acetaminophen (TYLENOL) 325 mg tablet Take 3 tablets (975 mg total) by mouth every 6 (six) hours as needed for mild pain  0   • albuterol (Ventolin HFA) 90 mcg/act inhaler Inhale 2 puffs every 4 (four) hours as needed for wheezing or shortness of breath (cough) 18 g 1   • ammonium lactate (LAC-HYDRIN) 12 % lotion      • ascorbic acid (VITAMIN C) 250 MG tablet Take 250 mg by mouth     • cetirizine (ZyrTEC) 10 mg tablet TAKE 1 TABLET BY MOUTH EVERY DAY 90 tablet 1   • Diclofenac Sodium (VOLTAREN) 1 % PLEASE SEE ATTACHED FOR DETAILED DIRECTIONS     • hydrocortisone 1 % cream Apply topically 4 (four) times a day as needed for irritation or rash  0   • Klor-Con M20 20 MEQ tablet Take 40 mEq AM + 20 mEq PM by mouth daily 180 tablet 5   • Lidocaine 4 % PTCH Place 2 patches on the skin daily     • methocarbamol (ROBAXIN) 500 mg tablet TAKE 1 TABLET BY MOUTH EVERY 6 HOURS AS NEEDED FOR MUSCLE SPASMS. 90 tablet 1   • metoprolol tartrate (LOPRESSOR) 25 mg tablet Take 1 tablet (25 mg total) by mouth 2 (two) times a day 60 tablet 0   • Multiple Vitamins-Minerals (MULTIVITAMIN ADULTS PO) Take 1 tablet by mouth daily     • torsemide (DEMADEX) 20 mg tablet TAKE 1 TABLET BY MOUTH TWICE A  tablet 1     No current facility-administered medications for this visit.        Allergies   Allergen Reactions   • Other GI Intolerance     Food preservatives   • Penicillins Other (See Comments)   • Sulfa Antibiotics GI Intolerance   • Nitrates, Organic - Food Allergy Rash   • Silver Rash       Review of Systems   HENT:  Negative for nosebleeds.    Respiratory:          Saturation decreases when she lays down   Cardiovascular:  Positive for leg swelling. Negative for chest pain and palpitations.   Gastrointestinal:  Negative for blood in stool,  constipation and diarrhea.        (+) has hemorrhoids -- has intermittent BRBPR when wiping   Endocrine: Positive for cold intolerance.   Genitourinary:  Negative for hematuria and vaginal bleeding.       Video Exam    There were no vitals filed for this visit.    Physical Exam  Vitals and nursing note reviewed.   Constitutional:       General: She is not in acute distress.     Appearance: Normal appearance.   HENT:      Head: Normocephalic and atraumatic.   Pulmonary:      Effort: Pulmonary effort is normal. No respiratory distress.   Neurological:      Mental Status: She is alert.      Comments: Grossly intact    Psychiatric:         Mood and Affect: Mood normal.          Visit Time  Total Visit Duration: 10

## 2024-01-25 DIAGNOSIS — J96.22 ACUTE ON CHRONIC RESPIRATORY FAILURE WITH HYPOXIA AND HYPERCAPNIA (HCC): ICD-10-CM

## 2024-01-25 DIAGNOSIS — S81.802A OPEN WOUND OF LEFT LOWER EXTREMITY, INITIAL ENCOUNTER: ICD-10-CM

## 2024-01-25 DIAGNOSIS — J96.01 ACUTE RESPIRATORY FAILURE WITH HYPOXIA AND HYPERCAPNIA (HCC): ICD-10-CM

## 2024-01-25 DIAGNOSIS — J96.21 ACUTE ON CHRONIC RESPIRATORY FAILURE WITH HYPOXIA AND HYPERCAPNIA (HCC): ICD-10-CM

## 2024-01-25 DIAGNOSIS — J96.02 ACUTE RESPIRATORY FAILURE WITH HYPOXIA AND HYPERCAPNIA (HCC): ICD-10-CM

## 2024-01-30 ENCOUNTER — TELEPHONE (OUTPATIENT)
Dept: LAB | Facility: HOSPITAL | Age: 69
End: 2024-01-30

## 2024-01-31 ENCOUNTER — TELEPHONE (OUTPATIENT)
Dept: LAB | Facility: HOSPITAL | Age: 69
End: 2024-01-31

## 2024-02-09 ENCOUNTER — APPOINTMENT (OUTPATIENT)
Dept: LAB | Facility: HOSPITAL | Age: 69
End: 2024-02-09
Payer: COMMERCIAL

## 2024-02-09 DIAGNOSIS — I48.0 PAROXYSMAL ATRIAL FIBRILLATION (HCC): ICD-10-CM

## 2024-02-09 DIAGNOSIS — R68.89 COLD INTOLERANCE: ICD-10-CM

## 2024-02-09 DIAGNOSIS — J96.11 CHRONIC RESPIRATORY FAILURE WITH HYPOXIA AND HYPERCAPNIA (HCC): ICD-10-CM

## 2024-02-09 DIAGNOSIS — D50.9 IRON DEFICIENCY ANEMIA, UNSPECIFIED IRON DEFICIENCY ANEMIA TYPE: ICD-10-CM

## 2024-02-09 DIAGNOSIS — Z13.220 SCREENING, LIPID: ICD-10-CM

## 2024-02-09 DIAGNOSIS — Z13.1 SCREENING FOR DIABETES MELLITUS: ICD-10-CM

## 2024-02-09 DIAGNOSIS — J96.12 CHRONIC RESPIRATORY FAILURE WITH HYPOXIA AND HYPERCAPNIA (HCC): ICD-10-CM

## 2024-02-09 LAB
25(OH)D3 SERPL-MCNC: 9.8 NG/ML (ref 30–100)
ALBUMIN SERPL BCP-MCNC: 3.2 G/DL (ref 3.5–5)
ALP SERPL-CCNC: 69 U/L (ref 34–104)
ALT SERPL W P-5'-P-CCNC: 4 U/L (ref 7–52)
ANION GAP SERPL CALCULATED.3IONS-SCNC: 3 MMOL/L
AST SERPL W P-5'-P-CCNC: 8 U/L (ref 13–39)
BASOPHILS # BLD AUTO: 0.03 THOUSANDS/ÂΜL (ref 0–0.1)
BASOPHILS NFR BLD AUTO: 1 % (ref 0–1)
BILIRUB SERPL-MCNC: 0.48 MG/DL (ref 0.2–1)
BUN SERPL-MCNC: 14 MG/DL (ref 5–25)
CALCIUM ALBUM COR SERPL-MCNC: 8.8 MG/DL (ref 8.3–10.1)
CALCIUM SERPL-MCNC: 8.2 MG/DL (ref 8.4–10.2)
CHLORIDE SERPL-SCNC: 105 MMOL/L (ref 96–108)
CHOLEST SERPL-MCNC: 155 MG/DL
CO2 SERPL-SCNC: 33 MMOL/L (ref 21–32)
CREAT SERPL-MCNC: 0.54 MG/DL (ref 0.6–1.3)
EOSINOPHIL # BLD AUTO: 0.41 THOUSAND/ÂΜL (ref 0–0.61)
EOSINOPHIL NFR BLD AUTO: 7 % (ref 0–6)
ERYTHROCYTE [DISTWIDTH] IN BLOOD BY AUTOMATED COUNT: 15.4 % (ref 11.6–15.1)
FERRITIN SERPL-MCNC: 33 NG/ML (ref 11–307)
GFR SERPL CREATININE-BSD FRML MDRD: 97 ML/MIN/1.73SQ M
GLUCOSE P FAST SERPL-MCNC: 79 MG/DL (ref 65–99)
HCT VFR BLD AUTO: 32.8 % (ref 34.8–46.1)
HDLC SERPL-MCNC: 43 MG/DL
HGB BLD-MCNC: 9.9 G/DL (ref 11.5–15.4)
IMM GRANULOCYTES # BLD AUTO: 0.02 THOUSAND/UL (ref 0–0.2)
IMM GRANULOCYTES NFR BLD AUTO: 0 % (ref 0–2)
IRON SATN MFR SERPL: 11 % (ref 15–50)
IRON SERPL-MCNC: 34 UG/DL (ref 50–212)
LDLC SERPL CALC-MCNC: 97 MG/DL (ref 0–100)
LYMPHOCYTES # BLD AUTO: 1.83 THOUSANDS/ÂΜL (ref 0.6–4.47)
LYMPHOCYTES NFR BLD AUTO: 31 % (ref 14–44)
MCH RBC QN AUTO: 26.9 PG (ref 26.8–34.3)
MCHC RBC AUTO-ENTMCNC: 30.2 G/DL (ref 31.4–37.4)
MCV RBC AUTO: 89 FL (ref 82–98)
MONOCYTES # BLD AUTO: 0.61 THOUSAND/ÂΜL (ref 0.17–1.22)
MONOCYTES NFR BLD AUTO: 10 % (ref 4–12)
NEUTROPHILS # BLD AUTO: 3.1 THOUSANDS/ÂΜL (ref 1.85–7.62)
NEUTS SEG NFR BLD AUTO: 51 % (ref 43–75)
NONHDLC SERPL-MCNC: 112 MG/DL
NRBC BLD AUTO-RTO: 0 /100 WBCS
PLATELET # BLD AUTO: 222 THOUSANDS/UL (ref 149–390)
PMV BLD AUTO: 10.8 FL (ref 8.9–12.7)
POTASSIUM SERPL-SCNC: 4.3 MMOL/L (ref 3.5–5.3)
PROT SERPL-MCNC: 6.2 G/DL (ref 6.4–8.4)
RBC # BLD AUTO: 3.68 MILLION/UL (ref 3.81–5.12)
SODIUM SERPL-SCNC: 141 MMOL/L (ref 135–147)
TIBC SERPL-MCNC: 297 UG/DL (ref 250–450)
TRIGL SERPL-MCNC: 77 MG/DL
TSH SERPL DL<=0.05 MIU/L-ACNC: 2.03 UIU/ML (ref 0.45–4.5)
UIBC SERPL-MCNC: 263 UG/DL (ref 155–355)
VIT B12 SERPL-MCNC: 141 PG/ML (ref 180–914)
WBC # BLD AUTO: 6 THOUSAND/UL (ref 4.31–10.16)

## 2024-02-09 PROCEDURE — 83540 ASSAY OF IRON: CPT

## 2024-02-09 PROCEDURE — 82306 VITAMIN D 25 HYDROXY: CPT

## 2024-02-09 PROCEDURE — 85025 COMPLETE CBC W/AUTO DIFF WBC: CPT

## 2024-02-09 PROCEDURE — 80053 COMPREHEN METABOLIC PANEL: CPT

## 2024-02-09 PROCEDURE — 80061 LIPID PANEL: CPT

## 2024-02-09 PROCEDURE — 82607 VITAMIN B-12: CPT

## 2024-02-09 PROCEDURE — 83036 HEMOGLOBIN GLYCOSYLATED A1C: CPT

## 2024-02-09 PROCEDURE — 36415 COLL VENOUS BLD VENIPUNCTURE: CPT

## 2024-02-09 PROCEDURE — 84443 ASSAY THYROID STIM HORMONE: CPT

## 2024-02-09 PROCEDURE — 83550 IRON BINDING TEST: CPT

## 2024-02-09 PROCEDURE — 82728 ASSAY OF FERRITIN: CPT

## 2024-02-10 LAB
EST. AVERAGE GLUCOSE BLD GHB EST-MCNC: 114 MG/DL
HBA1C MFR BLD: 5.6 %

## 2024-02-14 ENCOUNTER — TELEMEDICINE (OUTPATIENT)
Dept: FAMILY MEDICINE CLINIC | Facility: CLINIC | Age: 69
End: 2024-02-14
Payer: COMMERCIAL

## 2024-02-14 DIAGNOSIS — E53.8 VITAMIN B12 DEFICIENCY: ICD-10-CM

## 2024-02-14 DIAGNOSIS — J96.11 CHRONIC RESPIRATORY FAILURE WITH HYPOXIA AND HYPERCAPNIA (HCC): Primary | ICD-10-CM

## 2024-02-14 DIAGNOSIS — E55.9 VITAMIN D DEFICIENCY: ICD-10-CM

## 2024-02-14 DIAGNOSIS — I48.0 PAROXYSMAL ATRIAL FIBRILLATION (HCC): ICD-10-CM

## 2024-02-14 DIAGNOSIS — D50.9 IRON DEFICIENCY ANEMIA, UNSPECIFIED IRON DEFICIENCY ANEMIA TYPE: ICD-10-CM

## 2024-02-14 DIAGNOSIS — J96.12 CHRONIC RESPIRATORY FAILURE WITH HYPOXIA AND HYPERCAPNIA (HCC): Primary | ICD-10-CM

## 2024-02-14 PROCEDURE — 99214 OFFICE O/P EST MOD 30 MIN: CPT | Performed by: FAMILY MEDICINE

## 2024-02-14 RX ORDER — FERROUS SULFATE 324(65)MG
324 TABLET, DELAYED RELEASE (ENTERIC COATED) ORAL
Qty: 90 TABLET | Refills: 1 | Status: SHIPPED | OUTPATIENT
Start: 2024-02-14

## 2024-02-14 RX ORDER — LANOLIN ALCOHOL/MO/W.PET/CERES
1000 CREAM (GRAM) TOPICAL DAILY
Qty: 90 TABLET | Refills: 1 | Status: SHIPPED | OUTPATIENT
Start: 2024-02-14

## 2024-02-14 RX ORDER — MELATONIN
1000 DAILY
Qty: 90 TABLET | Refills: 1 | Status: SHIPPED | OUTPATIENT
Start: 2024-02-14

## 2024-02-14 RX ORDER — ERGOCALCIFEROL 1.25 MG/1
50000 CAPSULE ORAL WEEKLY
Qty: 12 CAPSULE | Refills: 0 | Status: SHIPPED | OUTPATIENT
Start: 2024-02-14

## 2024-02-14 NOTE — PROGRESS NOTES
Virtual Regular Visit    Verification of patient location:    Patient is located at Home in the following state in which I hold an active license [place]      Assessment/Plan:    Problem List Items Addressed This Visit        Respiratory    Chronic respiratory failure with hypoxia and hypercapnia (HCC) - Primary    Relevant Orders    CBC and differential       Cardiovascular and Mediastinum    Atrial fibrillation (HCC)    Relevant Orders    Comprehensive metabolic panel       Other    Vitamin D deficiency    Relevant Medications    ergocalciferol (VITAMIN D2) 50,000 units    cholecalciferol (VITAMIN D3) 1,000 units tablet    Other Relevant Orders    Vitamin D 25 hydroxy    Vitamin B12 deficiency    Relevant Medications    vitamin B-12 (VITAMIN B-12) 1,000 mcg tablet    Other Relevant Orders    Vitamin B12    Iron deficiency anemia    Relevant Medications    vitamin B-12 (VITAMIN B-12) 1,000 mcg tablet    ferrous sulfate 324 (65 Fe) mg    Other Relevant Orders    CBC and differential    Iron Panel (Includes Ferritin, Iron Sat%, Iron, and TIBC)     Vit D quite low -- pt agreeable to starting high dose supplement once weekly x12 weeks, then switching to daily supplement.   Vit B12 low as well -- pt agreeable to daily supplement.   Hb/Iron stable, though still low. Pt previously did not feel well with iron. She has been trying to supplement in her diet. She is agreeable to trying iron supplement again -- discussed trial of every other day to see if this helps with GI upset.   Continue CPAP/supplemental oxygen. Discussed that while the Torsemide she is taking does not appear to pull much fluid off her lower extremities, while she was in the hospital in 09/2023, she had vascular congestion in her chest that led to acute on chronic respiratory failure and the torsemide is helping to prevent this from recurring. Pt is considering stopping the medication because of side effects, but did discuss the concern that she could require  "hospitalization again if she did so.   Continue HEP as able.     Will update blood work in 3 months to monitor.          Reason for visit is   Chief Complaint   Patient presents with   • Virtual Regular Visit          Encounter provider Kylie Jimenez DO    Provider located at [facility]  [address]  [facility] [address]      Recent Visits  No visits were found meeting these conditions.  Showing recent visits within past 7 days and meeting all other requirements  Today's Visits  Date Type Provider Dept   02/14/24 Telemedicine Kylie Jimenez DO [facility]   Showing today's visits and meeting all other requirements  Future Appointments  No visits were found meeting these conditions.  Showing future appointments within next 150 days and meeting all other requirements       The patient was identified by name and date of birth. Anne Hernandez was informed that this is a telemedicine visit and that the visit is being conducted through the [employer] platform. She agrees to proceed..  My office door was closed. No one else was in the room.  She acknowledged consent and understanding of privacy and security of the video platform. The patient has agreed to participate and understands they can discontinue the visit at any time.    Patient is aware this is a billable service.     Subjective  Anne Hernandez is a 68 y.o. female who presents for lab review.    HPI     D 9, B12 141 -- this week, started taking supplement (Ca (1000)/Mg (400)/Zinc/Vit D (600))  Cr 0.54/GFR 97 (stable)  Lipids: Total 155, LDL 97, HDL 43, TG 77; LFTs/protein low (stable)  Glucose 79/A1c 5.6%  TSH WNL   RBC/H&H low, Iron 34/Ferritin 33 (stable) -- not currently on iron supplement     Breathing is \"not bad\" -- when she wakes up her oxygen level is at 88-89, but higher throughout the day   Notes that about 30 minutes after taking Metoprolol, she has a cough and brings up some mucus " "  Wondering what the Torsemide is \"doing for me\" -- has trouble because she has to void all day and is worried about the vitamins and minerals she is losing; sometimes has lower BP 90-100s/60s and pulse is in 60s -- when this happens, she holds her medication   Has PT once weekly and tries to do HEP as well       Past Medical History:   Diagnosis Date   • Acute respiratory failure with hypoxia ([facility]) 3/27/2023   • Morbid obesity ([facility])    • 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 ([facility]) 8/2/2021   • Scalp laceration 3/17/2023   • Thoracic vertebral fracture ([facility]) 3/31/2023       Past Surgical History:   Procedure Laterality Date   • NO PAST SURGERIES         Current Outpatient Medications   Medication Sig Dispense Refill   • cholecalciferol (VITAMIN D3) 1,000 units tablet Take 1 tablet (1,000 Units total) by mouth daily Start AFTER completing high dose course 90 tablet 1   • ergocalciferol (VITAMIN D2) 50,000 units Take 1 capsule (50,000 Units total) by mouth once a week 12 capsule 0   • ferrous sulfate 324 (65 Fe) mg Take 1 tablet (324 mg total) by mouth daily before breakfast 90 tablet 1   • vitamin B-12 (VITAMIN B-12) 1,000 mcg tablet Take 1 tablet (1,000 mcg total) by mouth daily 90 tablet 1   • acetaminophen (TYLENOL) 325 mg tablet Take 3 tablets (975 mg total) by mouth every 6 (six) hours as needed for mild pain  0   • albuterol (Ventolin HFA) 90 mcg/act inhaler Inhale 2 puffs every 4 (four) hours as needed for wheezing or shortness of breath (cough) 18 g 1   • ammonium lactate (LAC-HYDRIN) 12 % lotion      • ascorbic acid (VITAMIN C) 250 MG tablet Take 250 mg by mouth     • cetirizine (ZyrTEC) 10 mg tablet TAKE 1 TABLET BY MOUTH EVERY DAY 90 tablet 1   • Diclofenac Sodium (VOLTAREN) 1 % PLEASE SEE ATTACHED FOR DETAILED DIRECTIONS     • hydrocortisone 1 % cream Apply topically 4 (four) times a day as needed for irritation or rash  0   • Klor-Con M20 20 MEQ tablet Take 40 mEq AM + 20 " "mEq PM by mouth daily 180 tablet 5   • Lidocaine 4 % PTCH Place 2 patches on the skin daily     • methocarbamol (ROBAXIN) 500 mg tablet TAKE 1 TABLET BY MOUTH EVERY 6 HOURS AS NEEDED FOR MUSCLE SPASMS. 90 tablet 1   • metoprolol tartrate (LOPRESSOR) 25 mg tablet TAKE 1 TABLET BY MOUTH TWICE A  tablet 1   • Multiple Vitamins-Minerals (MULTIVITAMIN ADULTS PO) Take 1 tablet by mouth daily     • torsemide (DEMADEX) 20 mg tablet TAKE 1 TABLET BY MOUTH TWICE A  tablet 1     No current facility-administered medications for this visit.        Allergies   Allergen Reactions   • Other GI Intolerance     Food preservatives   • Penicillins Other (See Comments)   • Sulfa Antibiotics GI Intolerance   • Nitrates, Organic - Food Allergy Rash   • Silver Rash       Review of Systems   Respiratory:  Positive for shortness of breath (\"not too bad\").    Cardiovascular:  Positive for leg swelling.   Gastrointestinal:  Positive for anal bleeding (intermittent hemorrhoidal bleeding). Negative for blood in stool, constipation and diarrhea.   Genitourinary:  Positive for frequency. Negative for hematuria.       Video Exam    There were no vitals filed for this visit.    Physical Exam  Vitals and nursing note reviewed.   Constitutional:       General: She is not in acute distress.     Appearance: Normal appearance.   HENT:      Head: Normocephalic and atraumatic.   Pulmonary:      Effort: Pulmonary effort is normal. No respiratory distress.   Neurological:      Mental Status: She is alert.      Comments: Grossly intact    Psychiatric:         Mood and Affect: Mood normal.          Visit Time  Total Visit Duration: 10        "

## 2024-02-22 ENCOUNTER — TELEPHONE (OUTPATIENT)
Dept: FAMILY MEDICINE CLINIC | Facility: CLINIC | Age: 69
End: 2024-02-22

## 2024-02-22 NOTE — TELEPHONE ENCOUNTER
Pt having pelvic pain, and dysuria, foul smelling urine since yesterday. Pt wants to know if you are able to send her something in. She has an appt with you on Monday.

## 2024-02-23 ENCOUNTER — TELEMEDICINE (OUTPATIENT)
Dept: FAMILY MEDICINE CLINIC | Facility: CLINIC | Age: 69
End: 2024-02-23
Payer: COMMERCIAL

## 2024-02-23 DIAGNOSIS — R39.9 UTI SYMPTOMS: Primary | ICD-10-CM

## 2024-02-23 PROCEDURE — 99214 OFFICE O/P EST MOD 30 MIN: CPT | Performed by: FAMILY MEDICINE

## 2024-02-23 RX ORDER — NITROFURANTOIN 25; 75 MG/1; MG/1
100 CAPSULE ORAL 2 TIMES DAILY
Qty: 10 CAPSULE | Refills: 0 | Status: SHIPPED | OUTPATIENT
Start: 2024-02-23 | End: 2024-02-26 | Stop reason: SDUPTHER

## 2024-02-23 NOTE — PROGRESS NOTES
Virtual Regular Visit    Verification of patient location:    Patient is located at Home in the following state in which I hold an active license [place]      Assessment/Plan:    Problem List Items Addressed This Visit    None  Visit Diagnoses     UTI symptoms    -  Primary    Relevant Medications    nitrofurantoin (MACROBID) 100 mg capsule        Pt is homebound and unable to provide urine sample. She is at high risk for complication from infection due to elevated BMI, chronic respiratory failure. Will start Macrobid (PCN/Sulfa allergies, normal kidney function). Reviewed possible ADRs including GI upset, encouraged yogurt and/or probiotic. Reviewed ED precautions including fever, hematuria, worsening pain, inability to void. To call if symptoms persist/worsen.          Reason for visit is   Chief Complaint   Patient presents with   • Abdominal Pain     fever   • Virtual Regular Visit          Encounter provider Kylie Jimenez DO    Provider located at [place] [address]  [facility] [address]      Recent Visits  Date Type Provider Dept   02/22/24 Telephone Lauren Hernandez LPN [facility]   Showing recent visits within past 7 days and meeting all other requirements  Today's Visits  Date Type Provider Dept   02/23/24 Telemedicine DO Nilay Duenas   Showing today's visits and meeting all other requirements  Future Appointments  No visits were found meeting these conditions.  Showing future appointments within next 150 days and meeting all other requirements       The patient was identified by name and date of birth. Anne Hernandez was informed that this is a telemedicine visit and that the visit is being conducted through the [employer] platform. She agrees to proceed..  My office door was closed. No one else was in the room.  She acknowledged consent and understanding of privacy and security of the video platform. The patient has  agreed to participate and understands they can discontinue the visit at any time.    Patient is aware this is a billable service.     Subjective  Anne Hernandez is a 68 y.o. female who presents due to abdominal pain,    HPI     Onset yesterday:   Dysuria, suprapubic pain, frequency  TMax 99  No hematuria, flank pain     Past Medical History:   Diagnosis Date   • Acute respiratory failure with hypoxia ([facility]) 3/27/2023   • Morbid obesity ([facility])    • 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 ([facility]) 8/2/2021   • Scalp laceration 3/17/2023   • Thoracic vertebral fracture ([facility]) 3/31/2023       Past Surgical History:   Procedure Laterality Date   • NO PAST SURGERIES         Current Outpatient Medications   Medication Sig Dispense Refill   • acetaminophen (TYLENOL) 325 mg tablet Take 3 tablets (975 mg total) by mouth every 6 (six) hours as needed for mild pain  0   • albuterol (Ventolin HFA) 90 mcg/act inhaler Inhale 2 puffs every 4 (four) hours as needed for wheezing or shortness of breath (cough) 18 g 1   • ammonium lactate (LAC-HYDRIN) 12 % lotion      • ascorbic acid (VITAMIN C) 250 MG tablet Take 250 mg by mouth     • cetirizine (ZyrTEC) 10 mg tablet TAKE 1 TABLET BY MOUTH EVERY DAY 90 tablet 1   • cholecalciferol (VITAMIN D3) 1,000 units tablet Take 1 tablet (1,000 Units total) by mouth daily Start AFTER completing high dose course 90 tablet 1   • Diclofenac Sodium (VOLTAREN) 1 % PLEASE SEE ATTACHED FOR DETAILED DIRECTIONS     • ergocalciferol (VITAMIN D2) 50,000 units Take 1 capsule (50,000 Units total) by mouth once a week 12 capsule 0   • ferrous sulfate 324 (65 Fe) mg Take 1 tablet (324 mg total) by mouth daily before breakfast 90 tablet 1   • hydrocortisone 1 % cream Apply topically 4 (four) times a day as needed for irritation or rash  0   • Klor-Con M20 20 MEQ tablet Take 40 mEq AM + 20 mEq PM by mouth daily 180 tablet 5   • Lidocaine 4 % PTCH Place 2 patches on the skin daily     •  methocarbamol (ROBAXIN) 500 mg tablet TAKE 1 TABLET BY MOUTH EVERY 6 HOURS AS NEEDED FOR MUSCLE SPASMS. 90 tablet 1   • metoprolol tartrate (LOPRESSOR) 25 mg tablet TAKE 1 TABLET BY MOUTH TWICE A  tablet 1   • Multiple Vitamins-Minerals (MULTIVITAMIN ADULTS PO) Take 1 tablet by mouth daily     • nitrofurantoin (MACROBID) 100 mg capsule Take 1 capsule (100 mg total) by mouth 2 (two) times a day for 5 days 10 capsule 0   • torsemide (DEMADEX) 20 mg tablet TAKE 1 TABLET BY MOUTH TWICE A  tablet 1   • vitamin B-12 (VITAMIN B-12) 1,000 mcg tablet Take 1 tablet (1,000 mcg total) by mouth daily 90 tablet 1     No current facility-administered medications for this visit.        Allergies   Allergen Reactions   • Other GI Intolerance     Food preservatives   • Penicillins Other (See Comments)   • Sulfa Antibiotics GI Intolerance   • Nitrates, Organic - Food Allergy Rash   • Silver Rash       Review of Systems   Constitutional:  Negative for fever.   Genitourinary:  Positive for dysuria and frequency. Negative for flank pain and hematuria.       Video Exam    There were no vitals filed for this visit.    Physical Exam  Vitals and nursing note reviewed.   Constitutional:       General: She is not in acute distress.     Appearance: She is well-developed.   Neurological:      Mental Status: She is alert.      Comments: Grossly intact            Visit Time  Total Visit Duration: 7

## 2024-02-26 ENCOUNTER — TELEMEDICINE (OUTPATIENT)
Dept: FAMILY MEDICINE CLINIC | Facility: CLINIC | Age: 69
End: 2024-02-26
Payer: COMMERCIAL

## 2024-02-26 DIAGNOSIS — E53.8 VITAMIN B12 DEFICIENCY: Primary | ICD-10-CM

## 2024-02-26 DIAGNOSIS — R39.9 UTI SYMPTOMS: ICD-10-CM

## 2024-02-26 PROCEDURE — 99213 OFFICE O/P EST LOW 20 MIN: CPT | Performed by: FAMILY MEDICINE

## 2024-02-26 RX ORDER — NITROFURANTOIN 25; 75 MG/1; MG/1
100 CAPSULE ORAL 2 TIMES DAILY
Qty: 6 CAPSULE | Refills: 0 | Status: SHIPPED | OUTPATIENT
Start: 2024-02-26 | End: 2024-02-29

## 2024-02-26 RX ORDER — CYANOCOBALAMIN 1000 UG/ML
INJECTION, SOLUTION INTRAMUSCULAR; SUBCUTANEOUS WEEKLY
Qty: 5 ML | Refills: 1 | Status: SHIPPED | OUTPATIENT
Start: 2024-02-26 | End: 2024-09-21

## 2024-02-26 NOTE — PROGRESS NOTES
"Virtual Regular Visit    Verification of patient location:    Patient is located at Home in the following state in which I hold an active license [place]      Assessment/Plan:    Problem List Items Addressed This Visit        Other    Vitamin B12 deficiency - Primary    Relevant Medications    cyanocobalamin 1,000 mcg/mL    Syringe/Needle, Disp, (SYRINGE 3CC/04WM1-0/4\") 21G X 1-1/4\" 3 ML MISC   Other Visit Diagnoses     UTI symptoms        Relevant Medications    nitrofurantoin (MACROBID) 100 mg capsule        Will extend Macrobid for an additional 3 days per pt request.     Script sent for B12 injections for pt to complete at home, which she feels comfortable doing.          Reason for visit is   Chief Complaint   Patient presents with   • Abdominal Pain   • Virtual Regular Visit          Encounter provider Kylie Jimenez DO    Provider located at [place] [address]  [facility] [address]      Recent Visits  Date Type Provider Dept   02/23/24 Telemedicine DO Nilay Duenas   02/22/24 Telephone Lauren Hernandez LPN [facility] [place]   Showing recent visits within past 7 days and meeting all other requirements  Today's Visits  Date Type Provider Dept   02/26/24 Telemedicine DO Nilay Duenas   Showing today's visits and meeting all other requirements  Future Appointments  No visits were found meeting these conditions.  Showing future appointments within next 150 days and meeting all other requirements       The patient was identified by name and date of birth. Anne Angelajefe was informed that this is a telemedicine visit and that the visit is being conducted through the [employer] platform. She agrees to proceed..  My office door was closed. No one else was in the room.  She acknowledged consent and understanding of privacy and security of the video platform. The patient has agreed to participate and understands they " can discontinue the visit at any time.    Patient is aware this is a billable service.     Subjective  Anne Hernandez is a 68 y.o. female who presents for UTI re-check.    HPI     Had virtual visit 2/23, reported UTI symptoms. Started Bactrim.   Today, pt states her symptoms are improved. Urinary odor has improved, urinary frequency has decreased. Pt is requesting to extend her antibiotic course as she read that she should be taking this for 8-14 days.     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 ([facility]) 8/2/2021   • Scalp laceration 3/17/2023   • Thoracic vertebral fracture ([facility]) 3/31/2023       Past Surgical History:   Procedure Laterality Date   • NO PAST SURGERIES         Current Outpatient Medications   Medication Sig Dispense Refill   • acetaminophen (TYLENOL) 325 mg tablet Take 3 tablets (975 mg total) by mouth every 6 (six) hours as needed for mild pain  0   • albuterol (Ventolin HFA) 90 mcg/act inhaler Inhale 2 puffs every 4 (four) hours as needed for wheezing or shortness of breath (cough) 18 g 1   • ammonium lactate (LAC-HYDRIN) 12 % lotion      • ascorbic acid (VITAMIN C) 250 MG tablet Take 250 mg by mouth     • cetirizine (ZyrTEC) 10 mg tablet TAKE 1 TABLET BY MOUTH EVERY DAY 90 tablet 1   • cholecalciferol (VITAMIN D3) 1,000 units tablet Take 1 tablet (1,000 Units total) by mouth daily Start AFTER completing high dose course 90 tablet 1   • cyanocobalamin 1,000 mcg/mL Inject 1 mL (1,000 mcg total) into a muscle once a week for 28 days, THEN 1 mL (1,000 mcg total) every 30 (thirty) days. 5 mL 1   • Diclofenac Sodium (VOLTAREN) 1 % PLEASE SEE ATTACHED FOR DETAILED DIRECTIONS     • ergocalciferol (VITAMIN D2) 50,000 units Take 1 capsule (50,000 Units total) by mouth once a  "week 12 capsule 0   • ferrous sulfate 324 (65 Fe) mg Take 1 tablet (324 mg total) by mouth daily before breakfast 90 tablet 1   • hydrocortisone 1 % cream Apply topically 4 (four) times a day as needed for irritation or rash  0   • Klor-Con M20 20 MEQ tablet Take 40 mEq AM + 20 mEq PM by mouth daily 180 tablet 5   • Lidocaine 4 % PTCH Place 2 patches on the skin daily     • methocarbamol (ROBAXIN) 500 mg tablet TAKE 1 TABLET BY MOUTH EVERY 6 HOURS AS NEEDED FOR MUSCLE SPASMS. 90 tablet 1   • metoprolol tartrate (LOPRESSOR) 25 mg tablet TAKE 1 TABLET BY MOUTH TWICE A  tablet 1   • Multiple Vitamins-Minerals (MULTIVITAMIN ADULTS PO) Take 1 tablet by mouth daily     • nitrofurantoin (MACROBID) 100 mg capsule Take 1 capsule (100 mg total) by mouth 2 (two) times a day for 3 days 6 capsule 0   • Syringe/Needle, Disp, (SYRINGE 3CC/63GP5-8/4\") 21G X 1-1/4\" 3 ML MISC Use if needed (B12 injection) 20 each 0   • torsemide (DEMADEX) 20 mg tablet TAKE 1 TABLET BY MOUTH TWICE A  tablet 1     No current facility-administered medications for this visit.        Allergies   Allergen Reactions   • Other GI Intolerance     Food preservatives   • Penicillins Other (See Comments)   • Sulfa Antibiotics GI Intolerance   • Nitrates, Organic - Food Allergy Rash   • Silver Rash       Review of Systems   Genitourinary:         Decreased urinary odor, freuqency       Video Exam    There were no vitals filed for this visit.    Physical Exam  Vitals and nursing note reviewed.   Constitutional:       General: She is not in acute distress.     Appearance: Normal appearance.   HENT:      Head: Normocephalic and atraumatic.   Pulmonary:      Effort: Pulmonary effort is normal. No respiratory distress.   Neurological:      Mental Status: She is alert.      Comments: Grossly intact    Psychiatric:         Mood and Affect: Mood normal.          Visit Time  Total Visit Duration: 5        "

## 2024-02-26 NOTE — TELEPHONE ENCOUNTER
Pt takes microbid for 3 days and stated she's a little bit better. She also asked about iron shots, not infusion, instead taking medication. She has an appointment with dr. Jimenez.

## 2024-03-20 DIAGNOSIS — E53.8 VITAMIN B12 DEFICIENCY: ICD-10-CM

## 2024-03-20 RX ORDER — CYANOCOBALAMIN 1000 UG/ML
1000 INJECTION, SOLUTION INTRAMUSCULAR; SUBCUTANEOUS
Qty: 3 ML | Refills: 1 | Status: SHIPPED | OUTPATIENT
Start: 2024-03-20

## 2024-03-25 DIAGNOSIS — R05.2 SUBACUTE COUGH: ICD-10-CM

## 2024-03-25 RX ORDER — CETIRIZINE HYDROCHLORIDE 10 MG/1
10 TABLET ORAL DAILY
Qty: 90 TABLET | Refills: 1 | Status: SHIPPED | OUTPATIENT
Start: 2024-03-25

## 2024-05-06 ENCOUNTER — TELEPHONE (OUTPATIENT)
Age: 69
End: 2024-05-06

## 2024-05-06 NOTE — TELEPHONE ENCOUNTER
Patient has an appointment on Wednesday May 8th, but would like labs put in for bloodwork. She will reschedule her Wednesday appointment if anything,so she can get the bloodwork done.

## 2024-05-07 ENCOUNTER — TELEPHONE (OUTPATIENT)
Age: 69
End: 2024-05-07

## 2024-05-07 ENCOUNTER — TELEPHONE (OUTPATIENT)
Dept: LAB | Facility: HOSPITAL | Age: 69
End: 2024-05-07

## 2024-05-07 NOTE — TELEPHONE ENCOUNTER
Scheduled for 5/17
Patient seen and examined bedside, sitting up, tolerating RA, on Bipap at night  Ac Hypercapneic Resp Failure in the background of uncontrolled COPD  Complete IV Zithromax total of 3 days  Continue IV steroids - may switch to PO in AM  Consider discharge in AM if clinically improved  Pul consul reviewed

## 2024-05-07 NOTE — TELEPHONE ENCOUNTER
Patient called in to cancel apt for 5/17 with Dr. Jimenez. Patient scheduled an apt through my chart for 5/22.   Patient is requesting if  apt can be a virtual visit due to know being able to leave her home. Patient would like to discuss labs and medical necessity to have oxygen in the home for insurance.     Please advise.

## 2024-05-17 ENCOUNTER — APPOINTMENT (OUTPATIENT)
Dept: LAB | Facility: HOSPITAL | Age: 69
End: 2024-05-17

## 2024-05-17 DIAGNOSIS — I48.0 PAROXYSMAL ATRIAL FIBRILLATION (HCC): ICD-10-CM

## 2024-05-17 DIAGNOSIS — E55.9 VITAMIN D DEFICIENCY: ICD-10-CM

## 2024-05-17 DIAGNOSIS — J96.12 CHRONIC RESPIRATORY FAILURE WITH HYPOXIA AND HYPERCAPNIA (HCC): ICD-10-CM

## 2024-05-17 DIAGNOSIS — D50.9 IRON DEFICIENCY ANEMIA, UNSPECIFIED IRON DEFICIENCY ANEMIA TYPE: ICD-10-CM

## 2024-05-17 DIAGNOSIS — J96.11 CHRONIC RESPIRATORY FAILURE WITH HYPOXIA AND HYPERCAPNIA (HCC): ICD-10-CM

## 2024-05-17 DIAGNOSIS — E53.8 VITAMIN B12 DEFICIENCY: ICD-10-CM

## 2024-05-17 LAB
25(OH)D3 SERPL-MCNC: 14.2 NG/ML (ref 30–100)
ALBUMIN SERPL BCP-MCNC: 3.3 G/DL (ref 3.5–5)
ALP SERPL-CCNC: 69 U/L (ref 34–104)
ALT SERPL W P-5'-P-CCNC: 6 U/L (ref 7–52)
ANION GAP SERPL CALCULATED.3IONS-SCNC: 6 MMOL/L (ref 4–13)
AST SERPL W P-5'-P-CCNC: 9 U/L (ref 13–39)
BASOPHILS # BLD AUTO: 0.04 THOUSANDS/ÂΜL (ref 0–0.1)
BASOPHILS NFR BLD AUTO: 1 % (ref 0–1)
BILIRUB SERPL-MCNC: 0.46 MG/DL (ref 0.2–1)
BUN SERPL-MCNC: 15 MG/DL (ref 5–25)
CALCIUM ALBUM COR SERPL-MCNC: 9.1 MG/DL (ref 8.3–10.1)
CALCIUM SERPL-MCNC: 8.5 MG/DL (ref 8.4–10.2)
CHLORIDE SERPL-SCNC: 103 MMOL/L (ref 96–108)
CO2 SERPL-SCNC: 34 MMOL/L (ref 21–32)
CREAT SERPL-MCNC: 0.6 MG/DL (ref 0.6–1.3)
EOSINOPHIL # BLD AUTO: 0.41 THOUSAND/ÂΜL (ref 0–0.61)
EOSINOPHIL NFR BLD AUTO: 8 % (ref 0–6)
ERYTHROCYTE [DISTWIDTH] IN BLOOD BY AUTOMATED COUNT: 16.5 % (ref 11.6–15.1)
FERRITIN SERPL-MCNC: 35 NG/ML (ref 11–307)
GFR SERPL CREATININE-BSD FRML MDRD: 93 ML/MIN/1.73SQ M
GLUCOSE P FAST SERPL-MCNC: 86 MG/DL (ref 65–99)
HCT VFR BLD AUTO: 38.9 % (ref 34.8–46.1)
HGB BLD-MCNC: 11.5 G/DL (ref 11.5–15.4)
IMM GRANULOCYTES # BLD AUTO: 0.01 THOUSAND/UL (ref 0–0.2)
IMM GRANULOCYTES NFR BLD AUTO: 0 % (ref 0–2)
IRON SATN MFR SERPL: 15 % (ref 15–50)
IRON SERPL-MCNC: 42 UG/DL (ref 50–212)
LYMPHOCYTES # BLD AUTO: 1.63 THOUSANDS/ÂΜL (ref 0.6–4.47)
LYMPHOCYTES NFR BLD AUTO: 32 % (ref 14–44)
MCH RBC QN AUTO: 27 PG (ref 26.8–34.3)
MCHC RBC AUTO-ENTMCNC: 29.6 G/DL (ref 31.4–37.4)
MCV RBC AUTO: 91 FL (ref 82–98)
MONOCYTES # BLD AUTO: 0.62 THOUSAND/ÂΜL (ref 0.17–1.22)
MONOCYTES NFR BLD AUTO: 12 % (ref 4–12)
NEUTROPHILS # BLD AUTO: 2.42 THOUSANDS/ÂΜL (ref 1.85–7.62)
NEUTS SEG NFR BLD AUTO: 47 % (ref 43–75)
NRBC BLD AUTO-RTO: 0 /100 WBCS
PLATELET # BLD AUTO: 190 THOUSANDS/UL (ref 149–390)
PMV BLD AUTO: 10.5 FL (ref 8.9–12.7)
POTASSIUM SERPL-SCNC: 4.4 MMOL/L (ref 3.5–5.3)
PROT SERPL-MCNC: 6.6 G/DL (ref 6.4–8.4)
RBC # BLD AUTO: 4.26 MILLION/UL (ref 3.81–5.12)
SODIUM SERPL-SCNC: 143 MMOL/L (ref 135–147)
TIBC SERPL-MCNC: 271 UG/DL (ref 250–450)
UIBC SERPL-MCNC: 229 UG/DL (ref 155–355)
VIT B12 SERPL-MCNC: 229 PG/ML (ref 180–914)
WBC # BLD AUTO: 5.13 THOUSAND/UL (ref 4.31–10.16)

## 2024-05-17 PROCEDURE — 83540 ASSAY OF IRON: CPT

## 2024-05-17 PROCEDURE — 83550 IRON BINDING TEST: CPT

## 2024-05-17 PROCEDURE — 82728 ASSAY OF FERRITIN: CPT

## 2024-05-17 PROCEDURE — 80053 COMPREHEN METABOLIC PANEL: CPT

## 2024-05-17 PROCEDURE — 36415 COLL VENOUS BLD VENIPUNCTURE: CPT

## 2024-05-17 PROCEDURE — 82306 VITAMIN D 25 HYDROXY: CPT

## 2024-05-17 PROCEDURE — 85025 COMPLETE CBC W/AUTO DIFF WBC: CPT

## 2024-05-17 PROCEDURE — 82607 VITAMIN B-12: CPT

## 2024-05-21 ENCOUNTER — TELEMEDICINE (OUTPATIENT)
Dept: FAMILY MEDICINE CLINIC | Facility: CLINIC | Age: 69
End: 2024-05-21
Payer: COMMERCIAL

## 2024-05-21 DIAGNOSIS — E55.9 VITAMIN D DEFICIENCY: ICD-10-CM

## 2024-05-21 DIAGNOSIS — E53.8 VITAMIN B12 DEFICIENCY: ICD-10-CM

## 2024-05-21 DIAGNOSIS — I89.0 LYMPHEDEMA OF LEG: ICD-10-CM

## 2024-05-21 DIAGNOSIS — D50.9 IRON DEFICIENCY ANEMIA, UNSPECIFIED IRON DEFICIENCY ANEMIA TYPE: Primary | ICD-10-CM

## 2024-05-21 PROCEDURE — 99214 OFFICE O/P EST MOD 30 MIN: CPT | Performed by: FAMILY MEDICINE

## 2024-05-21 RX ORDER — NEEDLES, FILTER 19GX1 1/2"
NEEDLE, DISPOSABLE MISCELLANEOUS
COMMUNITY
Start: 2024-02-27

## 2024-05-21 NOTE — PROGRESS NOTES
Virtual Regular Visit    Verification of patient location:    Patient is located at Home in the following state in which I hold an active license [place]      Assessment/Plan:    Problem List Items Addressed This Visit        Blood    Iron deficiency anemia - Primary    Relevant Orders    CBC and differential    Iron Panel (Includes Ferritin, Iron Sat%, Iron, and TIBC)       Surgery/Wound/Pain    Lymphedema of leg    Relevant Orders    Comprehensive metabolic panel       Other    Vitamin D deficiency    Relevant Orders    Vitamin D 25 hydroxy    Vitamin B12 deficiency    Relevant Orders    Vitamin B12     Reviewed results as below. Encouraged to continue vit D, vit B12, and iron supplements as her levels are improving -- will recheck in 3 months to monitor.     Discussed that when we last checked, no one within Home Health in the area was doing lymphedema therapy; however, we will check with providers again to see if something has changed.          Reason for visit is   Chief Complaint   Patient presents with   • Virtual Regular Visit          Encounter provider Kylie Jimenez DO      Recent Visits  No visits were found meeting these conditions.  Showing recent visits within past 7 days and meeting all other requirements  Today's Visits  Date Type Provider Dept   05/21/24 Telemedicine Kylie Jimenez DO [facility]   Showing today's visits and meeting all other requirements  Future Appointments  No visits were found meeting these conditions.  Showing future appointments within next 150 days and meeting all other requirements       The patient was identified by name and date of birth. Anne David was informed that this is a telemedicine visit and that the visit is being conducted through the [employer] platform. She agrees to proceed..  My office door was closed. No one else was in the room.  She acknowledged consent and understanding of privacy and security of the video platform. The patient has agreed  "to participate and understands they can discontinue the visit at any time.    Patient is aware this is a billable service.     Anjali Hernandez is a 69 y.o. female who presents for lab review.    HPI     Vit D 14 (improved), B12 229 (improved)   CBC benign, Iron 42 (increased), Ferritin 35 (stable)   Cr 0.6/GFR 93  LFTs WNL   Glucose 86    Notes that she didn't take her water pill for a few days due to low BP and then developed a bit of cough in AM  Wearing her lymphedema pumps regularly, but would like a referral to lymphedema specialist -- would like someone to come to the house to discuss other treatments. She is limited in her ability to walk and is scared she will wake up one day and not be able to get out of the bed.         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 ([facility]) 8/2/2021   • Scalp laceration 3/17/2023   • Thoracic vertebral fracture ([facility]) 3/31/2023       Past Surgical History:   Procedure Laterality Date   • NO PAST SURGERIES         Current Outpatient Medications   Medication Sig Dispense Refill   • BD Integra Syringe 25G X 1\" 3 ML MISC USE IF NEEDED (B12 INJECTION)     • acetaminophen (TYLENOL) 325 mg tablet Take 3 tablets (975 mg total) by mouth every 6 (six) hours as needed for mild pain  0   • albuterol (Ventolin HFA) 90 mcg/act inhaler Inhale 2 puffs every 4 (four) hours as needed for wheezing or shortness of breath (cough) 18 g 1   • ammonium lactate (LAC-HYDRIN) 12 % lotion      • ascorbic acid (VITAMIN C) 250 MG tablet Take 250 mg by mouth     • cetirizine (ZyrTEC) 10 mg tablet TAKE 1 TABLET BY MOUTH EVERY DAY 90 tablet 1   • cholecalciferol (VITAMIN D3) 1,000 units tablet Take 1 tablet (1,000 Units total) by mouth daily Start AFTER completing high dose course 90 tablet 1   • cyanocobalamin 1,000 mcg/mL Inject 1 mL (1,000 " mcg total) into a muscle every 30 (thirty) days 3 mL 1   • Diclofenac Sodium (VOLTAREN) 1 % PLEASE SEE ATTACHED FOR DETAILED DIRECTIONS     • ergocalciferol (VITAMIN D2) 50,000 units Take 1 capsule (50,000 Units total) by mouth once a week 12 capsule 0   • ferrous sulfate 324 (65 Fe) mg Take 1 tablet (324 mg total) by mouth daily before breakfast 90 tablet 1   • hydrocortisone 1 % cream Apply topically 4 (four) times a day as needed for irritation or rash  0   • Klor-Con M20 20 MEQ tablet Take 40 mEq AM + 20 mEq PM by mouth daily 180 tablet 5   • Lidocaine 4 % PTCH Place 2 patches on the skin daily     • methocarbamol (ROBAXIN) 500 mg tablet TAKE 1 TABLET BY MOUTH EVERY 6 HOURS AS NEEDED FOR MUSCLE SPASMS. 90 tablet 1   • metoprolol tartrate (LOPRESSOR) 25 mg tablet TAKE 1 TABLET BY MOUTH TWICE A  tablet 1   • torsemide (DEMADEX) 20 mg tablet TAKE 1 TABLET BY MOUTH TWICE A  tablet 1     No current facility-administered medications for this visit.        Allergies   Allergen Reactions   • Other GI Intolerance     Food preservatives   • Penicillins Other (See Comments)   • Sulfa Antibiotics GI Intolerance   • Nitrates, Organic - Food Allergy Rash   • Silver Rash       Review of Systems   Cardiovascular:  Positive for leg swelling.       Video Exam    There were no vitals filed for this visit.    Physical Exam  Vitals and nursing note reviewed.   Constitutional:       General: She is not in acute distress.     Appearance: Normal appearance.   HENT:      Head: Normocephalic and atraumatic.   Pulmonary:      Effort: Pulmonary effort is normal. No respiratory distress.   Neurological:      Mental Status: She is alert.      Comments: Grossly intact    Psychiatric:         Mood and Affect: Mood normal.          Visit Time  Total Visit Duration: 10

## 2024-07-24 DIAGNOSIS — S81.802A OPEN WOUND OF LEFT LOWER EXTREMITY, INITIAL ENCOUNTER: ICD-10-CM

## 2024-07-24 DIAGNOSIS — J96.01 ACUTE RESPIRATORY FAILURE WITH HYPOXIA AND HYPERCAPNIA (HCC): ICD-10-CM

## 2024-07-24 DIAGNOSIS — J96.02 ACUTE RESPIRATORY FAILURE WITH HYPOXIA AND HYPERCAPNIA (HCC): ICD-10-CM

## 2024-07-24 DIAGNOSIS — J96.21 ACUTE ON CHRONIC RESPIRATORY FAILURE WITH HYPOXIA AND HYPERCAPNIA (HCC): ICD-10-CM

## 2024-07-24 DIAGNOSIS — J96.22 ACUTE ON CHRONIC RESPIRATORY FAILURE WITH HYPOXIA AND HYPERCAPNIA (HCC): ICD-10-CM

## 2024-07-25 ENCOUNTER — TELEPHONE (OUTPATIENT)
Age: 69
End: 2024-07-25

## 2024-08-22 ENCOUNTER — TELEPHONE (OUTPATIENT)
Dept: LAB | Facility: HOSPITAL | Age: 69
End: 2024-08-22

## 2024-08-23 ENCOUNTER — TELEPHONE (OUTPATIENT)
Dept: LAB | Facility: HOSPITAL | Age: 69
End: 2024-08-23

## 2024-09-13 ENCOUNTER — APPOINTMENT (OUTPATIENT)
Dept: LAB | Facility: HOSPITAL | Age: 69
End: 2024-09-13
Payer: COMMERCIAL

## 2024-09-13 DIAGNOSIS — D50.9 IRON DEFICIENCY ANEMIA, UNSPECIFIED IRON DEFICIENCY ANEMIA TYPE: ICD-10-CM

## 2024-09-13 DIAGNOSIS — E55.9 VITAMIN D DEFICIENCY: ICD-10-CM

## 2024-09-13 DIAGNOSIS — I89.0 LYMPHEDEMA OF LEG: ICD-10-CM

## 2024-09-13 DIAGNOSIS — E53.8 VITAMIN B12 DEFICIENCY: ICD-10-CM

## 2024-09-13 LAB
25(OH)D3 SERPL-MCNC: 10.3 NG/ML (ref 30–100)
ALBUMIN SERPL BCG-MCNC: 3.2 G/DL (ref 3.5–5)
ALP SERPL-CCNC: 62 U/L (ref 34–104)
ALT SERPL W P-5'-P-CCNC: 6 U/L (ref 7–52)
ANION GAP SERPL CALCULATED.3IONS-SCNC: 7 MMOL/L (ref 4–13)
AST SERPL W P-5'-P-CCNC: 10 U/L (ref 13–39)
BASOPHILS # BLD AUTO: 0.03 THOUSANDS/ΜL (ref 0–0.1)
BASOPHILS NFR BLD AUTO: 1 % (ref 0–1)
BILIRUB SERPL-MCNC: 0.47 MG/DL (ref 0.2–1)
BUN SERPL-MCNC: 15 MG/DL (ref 5–25)
CALCIUM ALBUM COR SERPL-MCNC: 8.8 MG/DL (ref 8.3–10.1)
CALCIUM SERPL-MCNC: 8.2 MG/DL (ref 8.4–10.2)
CHLORIDE SERPL-SCNC: 104 MMOL/L (ref 96–108)
CO2 SERPL-SCNC: 31 MMOL/L (ref 21–32)
CREAT SERPL-MCNC: 0.61 MG/DL (ref 0.6–1.3)
EOSINOPHIL # BLD AUTO: 0.41 THOUSAND/ΜL (ref 0–0.61)
EOSINOPHIL NFR BLD AUTO: 7 % (ref 0–6)
ERYTHROCYTE [DISTWIDTH] IN BLOOD BY AUTOMATED COUNT: 15.2 % (ref 11.6–15.1)
FERRITIN SERPL-MCNC: 48 NG/ML (ref 11–307)
GFR SERPL CREATININE-BSD FRML MDRD: 92 ML/MIN/1.73SQ M
GLUCOSE P FAST SERPL-MCNC: 85 MG/DL (ref 65–99)
HCT VFR BLD AUTO: 38.6 % (ref 34.8–46.1)
HGB BLD-MCNC: 11.6 G/DL (ref 11.5–15.4)
IMM GRANULOCYTES # BLD AUTO: 0.02 THOUSAND/UL (ref 0–0.2)
IMM GRANULOCYTES NFR BLD AUTO: 0 % (ref 0–2)
IRON SATN MFR SERPL: 19 % (ref 15–50)
IRON SERPL-MCNC: 47 UG/DL (ref 50–212)
LYMPHOCYTES # BLD AUTO: 1.82 THOUSANDS/ΜL (ref 0.6–4.47)
LYMPHOCYTES NFR BLD AUTO: 30 % (ref 14–44)
MCH RBC QN AUTO: 27.8 PG (ref 26.8–34.3)
MCHC RBC AUTO-ENTMCNC: 30.1 G/DL (ref 31.4–37.4)
MCV RBC AUTO: 93 FL (ref 82–98)
MONOCYTES # BLD AUTO: 0.65 THOUSAND/ΜL (ref 0.17–1.22)
MONOCYTES NFR BLD AUTO: 11 % (ref 4–12)
NEUTROPHILS # BLD AUTO: 3.13 THOUSANDS/ΜL (ref 1.85–7.62)
NEUTS SEG NFR BLD AUTO: 51 % (ref 43–75)
NRBC BLD AUTO-RTO: 0 /100 WBCS
PLATELET # BLD AUTO: 173 THOUSANDS/UL (ref 149–390)
PMV BLD AUTO: 10.8 FL (ref 8.9–12.7)
POTASSIUM SERPL-SCNC: 4.5 MMOL/L (ref 3.5–5.3)
PROT SERPL-MCNC: 6.2 G/DL (ref 6.4–8.4)
RBC # BLD AUTO: 4.17 MILLION/UL (ref 3.81–5.12)
SODIUM SERPL-SCNC: 142 MMOL/L (ref 135–147)
TIBC SERPL-MCNC: 242 UG/DL (ref 250–450)
UIBC SERPL-MCNC: 195 UG/DL (ref 155–355)
VIT B12 SERPL-MCNC: 629 PG/ML (ref 180–914)
WBC # BLD AUTO: 6.06 THOUSAND/UL (ref 4.31–10.16)

## 2024-09-13 PROCEDURE — 80053 COMPREHEN METABOLIC PANEL: CPT

## 2024-09-13 PROCEDURE — 82306 VITAMIN D 25 HYDROXY: CPT

## 2024-09-13 PROCEDURE — 83550 IRON BINDING TEST: CPT

## 2024-09-13 PROCEDURE — 82607 VITAMIN B-12: CPT

## 2024-09-13 PROCEDURE — 36415 COLL VENOUS BLD VENIPUNCTURE: CPT

## 2024-09-13 PROCEDURE — 85025 COMPLETE CBC W/AUTO DIFF WBC: CPT

## 2024-09-13 PROCEDURE — 83540 ASSAY OF IRON: CPT

## 2024-09-13 PROCEDURE — 82728 ASSAY OF FERRITIN: CPT

## 2024-09-14 DIAGNOSIS — E53.8 VITAMIN B12 DEFICIENCY: ICD-10-CM

## 2024-09-16 ENCOUNTER — TELEMEDICINE (OUTPATIENT)
Dept: FAMILY MEDICINE CLINIC | Facility: CLINIC | Age: 69
End: 2024-09-16
Payer: COMMERCIAL

## 2024-09-16 DIAGNOSIS — J96.11 CHRONIC RESPIRATORY FAILURE WITH HYPOXIA AND HYPERCAPNIA (HCC): Primary | ICD-10-CM

## 2024-09-16 DIAGNOSIS — E55.9 VITAMIN D DEFICIENCY: ICD-10-CM

## 2024-09-16 DIAGNOSIS — F41.9 ANXIETY: ICD-10-CM

## 2024-09-16 DIAGNOSIS — E66.2 OBESITY HYPOVENTILATION SYNDROME (HCC): ICD-10-CM

## 2024-09-16 DIAGNOSIS — J96.12 CHRONIC RESPIRATORY FAILURE WITH HYPOXIA AND HYPERCAPNIA (HCC): Primary | ICD-10-CM

## 2024-09-16 DIAGNOSIS — I89.0 LYMPHEDEMA OF LEG: ICD-10-CM

## 2024-09-16 DIAGNOSIS — E53.8 VITAMIN B12 DEFICIENCY: ICD-10-CM

## 2024-09-16 DIAGNOSIS — E66.01 CLASS 3 SEVERE OBESITY WITH SERIOUS COMORBIDITY AND BODY MASS INDEX (BMI) GREATER THAN OR EQUAL TO 70 IN ADULT, UNSPECIFIED OBESITY TYPE (HCC): ICD-10-CM

## 2024-09-16 DIAGNOSIS — D50.9 IRON DEFICIENCY ANEMIA, UNSPECIFIED IRON DEFICIENCY ANEMIA TYPE: ICD-10-CM

## 2024-09-16 PROCEDURE — 99214 OFFICE O/P EST MOD 30 MIN: CPT | Performed by: FAMILY MEDICINE

## 2024-09-16 RX ORDER — CYANOCOBALAMIN 1000 UG/ML
1000 INJECTION, SOLUTION INTRAMUSCULAR; SUBCUTANEOUS
Qty: 3 ML | Refills: 1 | Status: SHIPPED | OUTPATIENT
Start: 2024-09-16

## 2024-09-16 RX ORDER — ERGOCALCIFEROL 1.25 MG/1
50000 CAPSULE, LIQUID FILLED ORAL WEEKLY
Qty: 12 CAPSULE | Refills: 1 | Status: SHIPPED | OUTPATIENT
Start: 2024-09-16

## 2024-09-16 RX ORDER — HYDROXYZINE HYDROCHLORIDE 25 MG/1
25 TABLET, FILM COATED ORAL EVERY 6 HOURS PRN
Qty: 30 TABLET | Refills: 1 | Status: SHIPPED | OUTPATIENT
Start: 2024-09-16

## 2024-09-16 NOTE — ASSESSMENT & PLAN NOTE
Overall stable respiratory status. Continue PRN O2 during the day and Bipap at night. Continue home PT to improve physical deconditioning/stamina  Orders:    CBC and differential; Future    Comprehensive metabolic panel; Future

## 2024-09-16 NOTE — LETTER
September 16, 2024     Patient: Anne Hernandez  YOB: 1955  Date of Visit: 9/16/2024      To Whom it May Concern:    Anne Hernandez is under my professional care. Due to her ongoing medical conditions (including chronic respiratory failure, lymphedema, and BMI >70), she is very limited in her ability to ambulate and leave her home.     If you have any questions or concerns, please don't hesitate to call.         Sincerely,          Kylie Jimenez, DO        CC: No Recipients

## 2024-09-16 NOTE — ASSESSMENT & PLAN NOTE
Level low -- switch back to once weekly high dose supplement   Orders:    ergocalciferol (VITAMIN D2) 50,000 units; Take 1 capsule (50,000 Units total) by mouth once a week    Vitamin D 25 hydroxy; Future

## 2024-09-16 NOTE — ASSESSMENT & PLAN NOTE
Hb stable, ferritin improved -- continue current supplement   Orders:    CBC and differential; Future    Iron Panel (Includes Ferritin, Iron Sat%, Iron, and TIBC); Future

## 2024-09-16 NOTE — PROGRESS NOTES
Virtual Regular Visit  Name: Anne Hernandez      : 1955      MRN: [id number]  Encounter Provider: Kylie Jimenez DO  Encounter Date: 2024   Encounter department: [facility]    Verification of patient location:    Patient is located at Home in the following state in which I hold an active license [place]    Assessment & Plan  Chronic respiratory failure with hypoxia and hypercapnia (HCC)  Overall stable respiratory status. Continue PRN O2 during the day and Bipap at night. Continue home PT to improve physical deconditioning/stamina  Orders:    CBC and differential; Future    Comprehensive metabolic panel; Future    Obesity hypoventilation syndrome (HCC)  See Chronic respiratory failure   Orders:    CBC and differential; Future    Comprehensive metabolic panel; Future    Lymphedema of leg  Ni[place]ka does have home health provider that does lymphedema treatment, but she states there is a waiting listing until next year. Continue PT. Agree with holding Torsemide when hypotensive.   Orders:    CBC and differential; Future    Comprehensive metabolic panel; Future    Vitamin B12 deficiency  Level in good range, continue current supplement   Orders:    Vitamin B12; Future    Iron deficiency anemia, unspecified iron deficiency anemia type  Hb stable, ferritin improved -- continue current supplement   Orders:    CBC and differential; Future    Iron Panel (Includes Ferritin, Iron Sat%, Iron, and TIBC); Future    Vitamin D deficiency  Level low -- switch back to once weekly high dose supplement   Orders:    ergocalciferol (VITAMIN D2) 50,000 units; Take 1 capsule (50,000 Units total) by mouth once a week    Vitamin D 25 hydroxy; Future    Class 3 severe obesity with serious comorbidity and body mass index (BMI) greater than or equal to 70 in adult, unspecified obesity type (HCC)  Discussed GLP -- pt defers. Am not aware of any nutritionists that provide virtual visits. Reviewed that CGM is  "generally only covered for insulin-dependent diabetics.   Orders:    Lipid panel; Future    Hemoglobin A1C; Future    Anxiety  Reviewed that the benzodiazepine family is not recommended as first line treatment for PRN anxiety given potential for habit forming. Pt agreeable to trial of Hydroxyzine instead. Reviewed possible ADRs including somnolence.   Orders:    hydrOXYzine HCL (ATARAX) 25 mg tablet; Take 1 tablet (25 mg total) by mouth every 6 (six) hours as needed for anxiety    TSH, 3rd generation with Free T4 reflex; Future         Encounter provider Kylie Jimenez DO    The patient was identified by name and date of birth. Anne Hernandez was informed that this is a telemedicine visit and that the visit is being conducted through the [employer] platform. She agrees to proceed..  My office door was closed. No one else was in the room.  She acknowledged consent and understanding of privacy and security of the video platform. The patient has agreed to participate and understands they can discontinue the visit at any time.    Patient is aware this is a billable service.     History of Present Illness     HPI    Pt presents for chronic follow up, lab review    Hb 11.6; Iron 47, Ferritin 48 -- currently on once daily supplement   Cr 0.61/GFR 92   Low protein/albumin (chronic)  Ca 8.2 (intermittently low) -- taking Ca-Mg-Vit D daily   B12 629 -- currently on 1000 mcg once monthly, D 10.3 -- currently on 1000 IU daily     Patient wondering about CGM. She feels her sugars are running a little high. She wants to make sure \"everything is okay\" because she has a strong family history of DM.     Sometimes feels \"unsteady\" or \"angry\" -- wondering about Oxazepam     Her passport is expiring and she would like a letter stating that she is not able to travel to the [place] consulate     Notes her blood pressure is running low -- 100s/60-70s -- when it is running low, she holds her Torsemide     Breathing is \"so so\", " sometimes pulse ox in 90-93%, sometimes higher; sometimes wakes up and it is lower and she has to use supplement O2 during the day. Otherwise, tries to only use at night.     Wondering about nutritionist that would be able to see her virtually       Review of Systems   Respiratory:  Positive for shortness of breath.    Cardiovascular:  Positive for leg swelling. Negative for chest pain and palpitations.   Gastrointestinal:  Negative for blood in stool, constipation and diarrhea.   Genitourinary:  Negative for dysuria and hematuria.     Medical History Reviewed by provider this encounter:  Tobacco  Allergies  Meds  Problems  Med Hx  Surg Hx  Fam Hx           Objective     There were no vitals taken for this visit.  Physical Exam  Vitals and nursing note reviewed.   Constitutional:       General: She is not in acute distress.     Appearance: Normal appearance.   Pulmonary:      Effort: Pulmonary effort is normal. No respiratory distress.   Neurological:      Mental Status: She is alert.      Comments: Grossly intact   Psychiatric:         Mood and Affect: Mood normal.         Visit Time  Total Visit Duration: 20

## 2024-09-16 NOTE — ASSESSMENT & PLAN NOTE
See Chronic respiratory failure   Orders:    CBC and differential; Future    Comprehensive metabolic panel; Future

## 2024-09-16 NOTE — ASSESSMENT & PLAN NOTE
Reviewed that the benzodiazepine family is not recommended as first line treatment for PRN anxiety given potential for habit forming. Pt agreeable to trial of Hydroxyzine instead. Reviewed possible ADRs including somnolence.   Orders:    hydrOXYzine HCL (ATARAX) 25 mg tablet; Take 1 tablet (25 mg total) by mouth every 6 (six) hours as needed for anxiety    TSH, 3rd generation with Free T4 reflex; Future

## 2024-09-16 NOTE — ASSESSMENT & PLAN NOTE
Discussed GLP -- pt defers. Am not aware of any nutritionists that provide virtual visits. Reviewed that CGM is generally only covered for insulin-dependent diabetics.   Orders:    Lipid panel; Future    Hemoglobin A1C; Future

## 2024-09-16 NOTE — ASSESSMENT & PLAN NOTE
Ni[place]ka does have home health provider that does lymphedema treatment, but she states there is a waiting listing until next year. Continue PT. Agree with holding Torsemide when hypotensive.   Orders:    CBC and differential; Future    Comprehensive metabolic panel; Future

## 2024-10-22 ENCOUNTER — TELEPHONE (OUTPATIENT)
Age: 69
End: 2024-10-22

## 2024-10-22 NOTE — TELEPHONE ENCOUNTER
Olivia calls from [facility] and states that they need office visit notes fax to them. Informed them that pt has not been seen in our office. Pt was seen in ED by one of our providers. Olivia verbalizes understanding and gives thanks

## 2024-11-19 ENCOUNTER — TELEPHONE (OUTPATIENT)
Dept: LAB | Facility: HOSPITAL | Age: 69
End: 2024-11-19

## 2024-12-03 ENCOUNTER — TELEPHONE (OUTPATIENT)
Age: 69
End: 2024-12-03

## 2024-12-03 NOTE — TELEPHONE ENCOUNTER
Hina from [facility] called.  Looking for orders that are signed to be faxed over to them.  Looking for the following orders:    [id number]  [id number]    Informed Hina that [id number] is completed and can be refaxed as requested.  Hina asking if orders could please be faxed to [phone].  Faxed as requested.    Hina will refax order [id number] to office for review and signature.

## 2024-12-03 NOTE — TELEPHONE ENCOUNTER
I don't know what these order numbers are in reference to? Can we please clarify -- is this labs or ??

## 2024-12-04 ENCOUNTER — TELEPHONE (OUTPATIENT)
Dept: LAB | Facility: HOSPITAL | Age: 69
End: 2024-12-04

## 2024-12-04 NOTE — TELEPHONE ENCOUNTER
12/4 Left Message on Machine to Call Back for Hina at [facility], what are these numbers for?  What orders are needed?

## 2024-12-05 ENCOUNTER — TELEPHONE (OUTPATIENT)
Dept: LAB | Facility: HOSPITAL | Age: 69
End: 2024-12-05

## 2024-12-11 ENCOUNTER — TELEPHONE (OUTPATIENT)
Age: 69
End: 2024-12-11

## 2024-12-11 NOTE — TELEPHONE ENCOUNTER
Patient is scheduled for an appointment on 1/7 and 1/9.    In appointment note add discuss ventilator.    Please advise, thank you.

## 2024-12-15 DIAGNOSIS — R05.2 SUBACUTE COUGH: ICD-10-CM

## 2024-12-16 RX ORDER — CETIRIZINE HYDROCHLORIDE 10 MG/1
10 TABLET ORAL DAILY
Qty: 90 TABLET | Refills: 1 | Status: SHIPPED | OUTPATIENT
Start: 2024-12-16

## 2024-12-27 ENCOUNTER — APPOINTMENT (OUTPATIENT)
Dept: LAB | Facility: HOSPITAL | Age: 69
End: 2024-12-27
Attending: FAMILY MEDICINE
Payer: COMMERCIAL

## 2024-12-27 DIAGNOSIS — J96.12 CHRONIC RESPIRATORY FAILURE WITH HYPOXIA AND HYPERCAPNIA (HCC): ICD-10-CM

## 2024-12-27 DIAGNOSIS — F41.9 ANXIETY: ICD-10-CM

## 2024-12-27 DIAGNOSIS — E66.813 CLASS 3 SEVERE OBESITY WITH SERIOUS COMORBIDITY AND BODY MASS INDEX (BMI) GREATER THAN OR EQUAL TO 70 IN ADULT, UNSPECIFIED OBESITY TYPE (HCC): ICD-10-CM

## 2024-12-27 DIAGNOSIS — E66.01 CLASS 3 SEVERE OBESITY WITH SERIOUS COMORBIDITY AND BODY MASS INDEX (BMI) GREATER THAN OR EQUAL TO 70 IN ADULT, UNSPECIFIED OBESITY TYPE (HCC): ICD-10-CM

## 2024-12-27 DIAGNOSIS — E53.8 VITAMIN B12 DEFICIENCY: ICD-10-CM

## 2024-12-27 DIAGNOSIS — E66.2 OBESITY HYPOVENTILATION SYNDROME (HCC): ICD-10-CM

## 2024-12-27 DIAGNOSIS — D50.9 IRON DEFICIENCY ANEMIA, UNSPECIFIED IRON DEFICIENCY ANEMIA TYPE: ICD-10-CM

## 2024-12-27 DIAGNOSIS — E55.9 VITAMIN D DEFICIENCY: ICD-10-CM

## 2024-12-27 DIAGNOSIS — I89.0 LYMPHEDEMA OF LEG: ICD-10-CM

## 2024-12-27 DIAGNOSIS — J96.11 CHRONIC RESPIRATORY FAILURE WITH HYPOXIA AND HYPERCAPNIA (HCC): ICD-10-CM

## 2024-12-27 LAB
25(OH)D3 SERPL-MCNC: 17.5 NG/ML (ref 30–100)
ALBUMIN SERPL BCG-MCNC: 3.3 G/DL (ref 3.5–5)
ALP SERPL-CCNC: 63 U/L (ref 34–104)
ALT SERPL W P-5'-P-CCNC: 6 U/L (ref 7–52)
ANION GAP SERPL CALCULATED.3IONS-SCNC: 5 MMOL/L (ref 4–13)
AST SERPL W P-5'-P-CCNC: 9 U/L (ref 13–39)
BASOPHILS # BLD AUTO: 0.04 THOUSANDS/ÂΜL (ref 0–0.1)
BASOPHILS NFR BLD AUTO: 1 % (ref 0–1)
BILIRUB SERPL-MCNC: 0.48 MG/DL (ref 0.2–1)
BUN SERPL-MCNC: 18 MG/DL (ref 5–25)
CALCIUM ALBUM COR SERPL-MCNC: 8.9 MG/DL (ref 8.3–10.1)
CALCIUM SERPL-MCNC: 8.3 MG/DL (ref 8.4–10.2)
CHLORIDE SERPL-SCNC: 105 MMOL/L (ref 96–108)
CHOLEST SERPL-MCNC: 158 MG/DL (ref ?–200)
CO2 SERPL-SCNC: 31 MMOL/L (ref 21–32)
CREAT SERPL-MCNC: 0.57 MG/DL (ref 0.6–1.3)
EOSINOPHIL # BLD AUTO: 0.5 THOUSAND/ÂΜL (ref 0–0.61)
EOSINOPHIL NFR BLD AUTO: 9 % (ref 0–6)
ERYTHROCYTE [DISTWIDTH] IN BLOOD BY AUTOMATED COUNT: 14.7 % (ref 11.6–15.1)
EST. AVERAGE GLUCOSE BLD GHB EST-MCNC: 117 MG/DL
FERRITIN SERPL-MCNC: 40 NG/ML (ref 11–307)
GFR SERPL CREATININE-BSD FRML MDRD: 94 ML/MIN/1.73SQ M
GLUCOSE P FAST SERPL-MCNC: 90 MG/DL (ref 65–99)
HBA1C MFR BLD: 5.7 %
HCT VFR BLD AUTO: 36.8 % (ref 34.8–46.1)
HDLC SERPL-MCNC: 42 MG/DL
HGB BLD-MCNC: 11 G/DL (ref 11.5–15.4)
IMM GRANULOCYTES # BLD AUTO: 0.02 THOUSAND/UL (ref 0–0.2)
IMM GRANULOCYTES NFR BLD AUTO: 0 % (ref 0–2)
IRON SATN MFR SERPL: 14 % (ref 15–50)
IRON SERPL-MCNC: 39 UG/DL (ref 50–212)
LDLC SERPL CALC-MCNC: 101 MG/DL (ref 0–100)
LYMPHOCYTES # BLD AUTO: 1.64 THOUSANDS/ÂΜL (ref 0.6–4.47)
LYMPHOCYTES NFR BLD AUTO: 30 % (ref 14–44)
MCH RBC QN AUTO: 28 PG (ref 26.8–34.3)
MCHC RBC AUTO-ENTMCNC: 29.9 G/DL (ref 31.4–37.4)
MCV RBC AUTO: 94 FL (ref 82–98)
MONOCYTES # BLD AUTO: 0.69 THOUSAND/ÂΜL (ref 0.17–1.22)
MONOCYTES NFR BLD AUTO: 12 % (ref 4–12)
NEUTROPHILS # BLD AUTO: 2.66 THOUSANDS/ÂΜL (ref 1.85–7.62)
NEUTS SEG NFR BLD AUTO: 48 % (ref 43–75)
NONHDLC SERPL-MCNC: 116 MG/DL
NRBC BLD AUTO-RTO: 0 /100 WBCS
PLATELET # BLD AUTO: 176 THOUSANDS/UL (ref 149–390)
PMV BLD AUTO: 11 FL (ref 8.9–12.7)
POTASSIUM SERPL-SCNC: 4.2 MMOL/L (ref 3.5–5.3)
PROT SERPL-MCNC: 6.4 G/DL (ref 6.4–8.4)
RBC # BLD AUTO: 3.93 MILLION/UL (ref 3.81–5.12)
SODIUM SERPL-SCNC: 141 MMOL/L (ref 135–147)
TIBC SERPL-MCNC: 278.6 UG/DL (ref 250–450)
TRANSFERRIN SERPL-MCNC: 199 MG/DL (ref 203–362)
TRIGL SERPL-MCNC: 74 MG/DL (ref ?–150)
TSH SERPL DL<=0.05 MIU/L-ACNC: 1.74 UIU/ML (ref 0.45–4.5)
UIBC SERPL-MCNC: 240 UG/DL (ref 155–355)
VIT B12 SERPL-MCNC: 332 PG/ML (ref 180–914)
WBC # BLD AUTO: 5.55 THOUSAND/UL (ref 4.31–10.16)

## 2024-12-27 PROCEDURE — 82306 VITAMIN D 25 HYDROXY: CPT

## 2024-12-27 PROCEDURE — 80061 LIPID PANEL: CPT

## 2024-12-27 PROCEDURE — 83540 ASSAY OF IRON: CPT

## 2024-12-27 PROCEDURE — 85025 COMPLETE CBC W/AUTO DIFF WBC: CPT

## 2024-12-27 PROCEDURE — 80053 COMPREHEN METABOLIC PANEL: CPT

## 2024-12-27 PROCEDURE — 82607 VITAMIN B-12: CPT

## 2024-12-27 PROCEDURE — 84443 ASSAY THYROID STIM HORMONE: CPT

## 2024-12-27 PROCEDURE — 83550 IRON BINDING TEST: CPT

## 2024-12-27 PROCEDURE — 83036 HEMOGLOBIN GLYCOSYLATED A1C: CPT

## 2024-12-27 PROCEDURE — 36415 COLL VENOUS BLD VENIPUNCTURE: CPT

## 2024-12-27 PROCEDURE — 82728 ASSAY OF FERRITIN: CPT

## 2024-12-29 ENCOUNTER — RESULTS FOLLOW-UP (OUTPATIENT)
Dept: FAMILY MEDICINE CLINIC | Facility: CLINIC | Age: 69
End: 2024-12-29

## 2025-01-09 ENCOUNTER — TELEMEDICINE (OUTPATIENT)
Dept: FAMILY MEDICINE CLINIC | Facility: CLINIC | Age: 70
End: 2025-01-09
Payer: COMMERCIAL

## 2025-01-09 DIAGNOSIS — D50.9 IRON DEFICIENCY ANEMIA, UNSPECIFIED IRON DEFICIENCY ANEMIA TYPE: ICD-10-CM

## 2025-01-09 DIAGNOSIS — E83.51 HYPOCALCEMIA: ICD-10-CM

## 2025-01-09 DIAGNOSIS — E55.9 VITAMIN D DEFICIENCY: Primary | ICD-10-CM

## 2025-01-09 DIAGNOSIS — I48.11 LONGSTANDING PERSISTENT ATRIAL FIBRILLATION (HCC): ICD-10-CM

## 2025-01-09 DIAGNOSIS — I89.0 LYMPHEDEMA OF LEG: ICD-10-CM

## 2025-01-09 PROCEDURE — 99214 OFFICE O/P EST MOD 30 MIN: CPT | Performed by: FAMILY MEDICINE

## 2025-01-09 RX ORDER — ERGOCALCIFEROL 1.25 MG/1
50000 CAPSULE, LIQUID FILLED ORAL 2 TIMES WEEKLY
Qty: 24 CAPSULE | Refills: 1 | Status: SHIPPED | OUTPATIENT
Start: 2025-01-09

## 2025-01-09 RX ORDER — METOPROLOL TARTRATE 25 MG/1
12.5 TABLET, FILM COATED ORAL 2 TIMES DAILY
Status: SHIPPED
Start: 2025-01-09

## 2025-01-09 NOTE — ASSESSMENT & PLAN NOTE
Pt would greatly benefit from treatment -- will reach out to local home health agencies to look for any available providers

## 2025-01-09 NOTE — PROGRESS NOTES
Virtual Regular Visit  Name: Anne Hernandez      : 1955      MRN: [id number]  Encounter Provider: Kylie Jimenez DO  Encounter Date: 2025   Encounter department: [facility]      Verification of patient location:  Patient is located at Home in the following state in which I hold an active license [place] :  Assessment & Plan  Vitamin D deficiency  Improving, though persistently low -- will increase supplement to twice weekly   Orders:  •  PTH, intact; Future  •  Phosphorus; Future  •  ergocalciferol (VITAMIN D2) 50,000 units; Take 1 capsule (50,000 Units total) by mouth 2 (two) times a week    Hypocalcemia  Encouraged to restart supplement. Given this and low vitamin D, will also check PTH/Phos for further evaluation.   Orders:  •  PTH, intact; Future  •  Phosphorus; Future    Longstanding persistent atrial fibrillation (HCC)  Reviewed that episode of elevated heart rate likely related to AFib (possible RVR) -- should continue lopressor. Reviewed that if this recurred and lasted for a long period of time/she had pain, shortness of breath, lightheadedness, she should be evaluated in ED   Orders:  •  metoprolol tartrate (LOPRESSOR) 25 mg tablet; Take 0.5 tablets (12.5 mg total) by mouth 2 (two) times a day    Iron deficiency anemia, unspecified iron deficiency anemia type  Somewhat worsening, though not significantly so. Pt denies any signs/symptoms of bleeding -- encouraged to continue iron supplement.        Lymphedema of leg  Pt would greatly benefit from treatment -- will reach out to local home health agencies to look for any available providers            Encounter provider Kylie Jimenez DO    The patient was identified by name and date of birth. Anne Hernandez was informed that this is a telemedicine visit and that the visit is being conducted through the Epic Embedded platform. She agrees to proceed..  My office door was closed. No one else was in the room.  She  acknowledged consent and understanding of privacy and security of the video platform. The patient has agreed to participate and understands they can discontinue the visit at any time.    Patient is aware this is a billable service.     History of Present Illness     HPI    Pt presents for lab review     Pt also notes her heart rate generally runs low (50-60s), but had a few episodes of high heart rate   Continues to struggle with lymphedema -- limits her physical activity significantly     TSH WNL  A1c 5.7% (from 5.6)   Ferritin 40, Iron 39; Hb 11 (from 11.5) -- trying to take iron supplement daily, but worries about constipation   B12 332 (from 629) -- once monthly injections   D 17.5 (from 10.3) -- on once weekly 50k; Ca 8.3  Lipids: Total 158, , HDL 42, TG 74; LFTs WNL  Cr 0.57/GFR 94      Review of Systems   Cardiovascular:  Positive for palpitations and leg swelling.   Gastrointestinal:  Negative for blood in stool.   Genitourinary:  Negative for hematuria.       Objective   There were no vitals taken for this visit.    Physical Exam  Vitals and nursing note reviewed.   Constitutional:       General: She is not in acute distress.     Appearance: Normal appearance.   Pulmonary:      Effort: Pulmonary effort is normal. No respiratory distress.   Neurological:      Mental Status: She is alert.      Comments: Grossly intact   Psychiatric:         Mood and Affect: Mood normal.         Visit Time  Total Visit Duration: 15

## 2025-01-09 NOTE — ASSESSMENT & PLAN NOTE
Improving, though persistently low -- will increase supplement to twice weekly   Orders:  •  PTH, intact; Future  •  Phosphorus; Future  •  ergocalciferol (VITAMIN D2) 50,000 units; Take 1 capsule (50,000 Units total) by mouth 2 (two) times a week

## 2025-01-09 NOTE — ASSESSMENT & PLAN NOTE
Somewhat worsening, though not significantly so. Pt denies any signs/symptoms of bleeding -- encouraged to continue iron supplement.

## 2025-01-09 NOTE — ASSESSMENT & PLAN NOTE
Reviewed that episode of elevated heart rate likely related to AFib (possible RVR) -- should continue lopressor. Reviewed that if this recurred and lasted for a long period of time/she had pain, shortness of breath, lightheadedness, she should be evaluated in ED   Orders:  •  metoprolol tartrate (LOPRESSOR) 25 mg tablet; Take 0.5 tablets (12.5 mg total) by mouth 2 (two) times a day

## 2025-01-20 ENCOUNTER — TELEPHONE (OUTPATIENT)
Age: 70
End: 2025-01-20

## 2025-01-20 NOTE — TELEPHONE ENCOUNTER
Alfred from [employer] called regarding the patient.  [facility] would like the patients last appointment note dated 1/9/25.  They  are mainly interested in the part that discusses the ventilator. [facility] is aware this is a Virtual Visit.    The note can be faxed to Alfred at: [phone].    Please advise, thank you.

## 2025-01-25 DIAGNOSIS — E55.9 VITAMIN D DEFICIENCY: ICD-10-CM

## 2025-01-25 DIAGNOSIS — R52 PAIN: ICD-10-CM

## 2025-01-25 DIAGNOSIS — R25.2 SPASM: ICD-10-CM

## 2025-01-25 DIAGNOSIS — L85.3 DRY SKIN: Primary | ICD-10-CM

## 2025-01-27 RX ORDER — METHOCARBAMOL 500 MG/1
500 TABLET, FILM COATED ORAL EVERY 6 HOURS PRN
Qty: 90 TABLET | Refills: 1 | Status: SHIPPED | OUTPATIENT
Start: 2025-01-27

## 2025-01-27 RX ORDER — METHOCARBAMOL 500 MG/1
500 TABLET, FILM COATED ORAL EVERY 6 HOURS PRN
Qty: 90 TABLET | Refills: 0 | OUTPATIENT
Start: 2025-01-27

## 2025-01-27 RX ORDER — AMMONIUM LACTATE 12 G/100G
LOTION TOPICAL 2 TIMES DAILY PRN
Qty: 400 G | Refills: 2 | Status: SHIPPED | OUTPATIENT
Start: 2025-01-27

## 2025-01-27 RX ORDER — ERGOCALCIFEROL 1.25 MG/1
50000 CAPSULE, LIQUID FILLED ORAL 2 TIMES WEEKLY
Qty: 24 CAPSULE | Refills: 1 | Status: SHIPPED | OUTPATIENT
Start: 2025-01-27

## 2025-02-12 ENCOUNTER — TELEPHONE (OUTPATIENT)
Dept: FAMILY MEDICINE CLINIC | Facility: CLINIC | Age: 70
End: 2025-02-12

## 2025-02-12 NOTE — LETTER
February 14, 2025     Patient: Anne Hernandez  YOB: 1955      To Whom it May Concern:    Anne Hernandez is under my professional care. Due to her ongoing medical conditions (including chronic respiratory failure, lymphedema, and BMI >70), she is very limited in her ability to ambulate and leave her home.     If you have any questions or concerns, please don't hesitate to call.         Sincerely,          Kylie Jimenez, DO        CC: No Recipients

## 2025-02-12 NOTE — TELEPHONE ENCOUNTER
Pt called and asked for updated letter for [employer]:    Anne Hernandez is under my professional care. Due to her ongoing medical conditions (including chronic respiratory failure, lymphedema, and BMI >70), she is very limited in her ability to ambulate and leave her home.     Please advise

## 2025-03-05 ENCOUNTER — TELEPHONE (OUTPATIENT)
Dept: LAB | Facility: HOSPITAL | Age: 70
End: 2025-03-05

## 2025-03-21 ENCOUNTER — APPOINTMENT (OUTPATIENT)
Dept: LAB | Facility: HOSPITAL | Age: 70
End: 2025-03-21
Attending: FAMILY MEDICINE
Payer: COMMERCIAL

## 2025-03-21 DIAGNOSIS — E55.9 VITAMIN D DEFICIENCY: ICD-10-CM

## 2025-03-21 DIAGNOSIS — E83.51 HYPOCALCEMIA: ICD-10-CM

## 2025-03-21 LAB
PHOSPHATE SERPL-MCNC: 2.9 MG/DL (ref 2.3–4.1)
PTH-INTACT SERPL-MCNC: 101.4 PG/ML (ref 12–88)

## 2025-03-21 PROCEDURE — 84100 ASSAY OF PHOSPHORUS: CPT

## 2025-03-21 PROCEDURE — 36415 COLL VENOUS BLD VENIPUNCTURE: CPT

## 2025-03-21 PROCEDURE — 83970 ASSAY OF PARATHORMONE: CPT

## 2025-03-23 ENCOUNTER — RESULTS FOLLOW-UP (OUTPATIENT)
Dept: FAMILY MEDICINE CLINIC | Facility: CLINIC | Age: 70
End: 2025-03-23

## 2025-03-25 ENCOUNTER — TELEPHONE (OUTPATIENT)
Dept: FAMILY MEDICINE CLINIC | Facility: CLINIC | Age: 70
End: 2025-03-25

## 2025-03-25 ENCOUNTER — TELEMEDICINE (OUTPATIENT)
Dept: FAMILY MEDICINE CLINIC | Facility: CLINIC | Age: 70
End: 2025-03-25
Payer: COMMERCIAL

## 2025-03-25 DIAGNOSIS — I89.0 LYMPHEDEMA OF LEG: ICD-10-CM

## 2025-03-25 DIAGNOSIS — E55.9 VITAMIN D DEFICIENCY: ICD-10-CM

## 2025-03-25 DIAGNOSIS — I48.0 PAROXYSMAL ATRIAL FIBRILLATION (HCC): Primary | ICD-10-CM

## 2025-03-25 DIAGNOSIS — J96.12 CHRONIC RESPIRATORY FAILURE WITH HYPOXIA AND HYPERCAPNIA (HCC): ICD-10-CM

## 2025-03-25 DIAGNOSIS — R26.2 AMBULATORY DYSFUNCTION: ICD-10-CM

## 2025-03-25 DIAGNOSIS — E53.8 VITAMIN B12 DEFICIENCY: ICD-10-CM

## 2025-03-25 DIAGNOSIS — R53.1 GENERALIZED WEAKNESS: ICD-10-CM

## 2025-03-25 DIAGNOSIS — R25.2 SPASM: ICD-10-CM

## 2025-03-25 DIAGNOSIS — J96.11 CHRONIC RESPIRATORY FAILURE WITH HYPOXIA AND HYPERCAPNIA (HCC): ICD-10-CM

## 2025-03-25 PROCEDURE — 99214 OFFICE O/P EST MOD 30 MIN: CPT | Performed by: FAMILY MEDICINE

## 2025-03-25 RX ORDER — METHOCARBAMOL 500 MG/1
500 TABLET, FILM COATED ORAL EVERY 6 HOURS PRN
Qty: 90 TABLET | Refills: 1 | Status: SHIPPED | OUTPATIENT
Start: 2025-03-25

## 2025-03-25 RX ORDER — ERGOCALCIFEROL 1.25 MG/1
50000 CAPSULE, LIQUID FILLED ORAL 2 TIMES WEEKLY
Qty: 24 CAPSULE | Refills: 1 | Status: SHIPPED | OUTPATIENT
Start: 2025-03-27

## 2025-03-25 RX ORDER — CARVEDILOL 6.25 MG/1
6.25 TABLET ORAL 2 TIMES DAILY WITH MEALS
Qty: 60 TABLET | Refills: 1 | Status: SHIPPED | OUTPATIENT
Start: 2025-03-25

## 2025-03-25 RX ORDER — CYANOCOBALAMIN 1000 UG/ML
1000 INJECTION, SOLUTION INTRAMUSCULAR; SUBCUTANEOUS
Qty: 3 ML | Refills: 1 | Status: SHIPPED | OUTPATIENT
Start: 2025-03-25

## 2025-03-25 RX ORDER — CYANOCOBALAMIN 1000 UG/ML
1000 INJECTION, SOLUTION INTRAMUSCULAR; SUBCUTANEOUS
Qty: 3 ML | Refills: 1 | OUTPATIENT
Start: 2025-03-25

## 2025-03-25 NOTE — ASSESSMENT & PLAN NOTE
Discussed trial of alternative beta-blocker -- pt agreeable. Will switch to Coreg, encouraged to monitor BP/pulse and send update via "[employer] in 1 week   Orders:    carvedilol (COREG) 6.25 mg tablet; Take 1 tablet (6.25 mg total) by mouth 2 (two) times a day with meals

## 2025-03-25 NOTE — ASSESSMENT & PLAN NOTE
Per pt, oxygen decreases to mid-80s without supplemental O2; however, she was not able to get her pulse ox to read during today's visit. Will have home health evaluate oxygen needs in addition to strengthening/stamina/working on ambulation, but will place call to [facility] in meantime with update. Pt would also continue to benefit from Bipap while asleep for respiratory support.   Orders:    EXTERNAL Referral to Home Health; Future

## 2025-03-25 NOTE — ASSESSMENT & PLAN NOTE
Orders:    cyanocobalamin 1,000 mcg/mL; Inject 1 mL (1,000 mcg total) into a muscle every 30 (thirty) days

## 2025-03-25 NOTE — ASSESSMENT & PLAN NOTE
Chronic, persistent and limited pt's ability to ambulate. Will refer to Home Health as above for strengthening/stamina   Orders:    EXTERNAL Referral to Home Health; Future

## 2025-03-25 NOTE — PROGRESS NOTES
Virtual Regular VisitName: Anne Hernandez      : 1955      MRN: [id number]  Encounter Provider: Kylie Jimenez DO  Encounter Date: 3/25/2025   Encounter department: [facility]  :  Assessment & Plan  Paroxysmal atrial fibrillation (HCC)  Discussed trial of alternative beta-blocker -- pt agreeable. Will switch to Coreg, encouraged to monitor BP/pulse and send update via [employer] in 1 week   Orders:    carvedilol (COREG) 6.25 mg tablet; Take 1 tablet (6.25 mg total) by mouth 2 (two) times a day with meals    Chronic respiratory failure with hypoxia and hypercapnia (HCC)  Per pt, oxygen decreases to mid-80s without supplemental O2; however, she was not able to get her pulse ox to read during today's visit. Will have home health evaluate oxygen needs in addition to strengthening/stamina/working on ambulation, but will place call to [facility] in meantime with update. Pt would also continue to benefit from Bipap while asleep for respiratory support.   Orders:    EXTERNAL Referral to Home Health; Future    Lymphedema of leg  Chronic, persistent and limited pt's ability to ambulate. Will refer to Home Health as above for strengthening/stamina   Orders:    EXTERNAL Referral to Home Health; Future    Ambulatory dysfunction  In setting of chronic respiratory failure and lymphedema -- will refer to home PT/OT as above   Orders:    EXTERNAL Referral to Home Health; Future    Generalized weakness  In setting of chronic respiratory failure and lymphedema -- will refer to home PT/OT as above   Orders:    EXTERNAL Referral to Home Health; Future    Vitamin D deficiency    Orders:    ergocalciferol (VITAMIN D2) 50,000 units; Take 1 capsule (50,000 Units total) by mouth 2 (two) times a week    Vitamin B12 deficiency    Orders:    cyanocobalamin 1,000 mcg/mL; Inject 1 mL (1,000 mcg total) into a muscle every 30 (thirty) days    Spasm    Orders:    methocarbamol (ROBAXIN) 500 mg tablet; Take 1  "tablet (500 mg total) by mouth every 6 (six) hours as needed for muscle spasms                   History of Present Illness     HPI    Pt presents for chronic follow up     \"It just depends on the day\"   Notes that about 15-20 minutes after taking Metoprolol she will cough, sneeze, and has a lot of mucus in her nose/throat   Also notes that she feels like her heart is not working as well -- likes her heart is \"not allowed to beat\"  Wondering if she could she try something different    Would like restart home PT to help on her weakness/ambulatory dysfunction -- she would like to be able to walk, get to the car, and be able to go to appointments outside the home     Also needs a new script for her home oxygen -- [facility]   Pulse ox at rest     PTH elevated, Phos WNL  Is taking Vit D, but not calcium -- encouraged calcium supplement     Needs refills    Review of Systems   HENT:  Positive for sneezing.    Respiratory:  Positive for cough and shortness of breath.    Cardiovascular:  Positive for leg swelling.   Musculoskeletal:  Positive for gait problem.       Objective   There were no vitals taken for this visit.    Physical Exam  Vitals and nursing note reviewed.   Constitutional:       General: She is not in acute distress.     Appearance: Normal appearance.      Interventions: Nasal cannula in place.   Pulmonary:      Effort: Pulmonary effort is normal. No respiratory distress.   Neurological:      Mental Status: She is alert.      Comments: Grossly intact   Psychiatric:         Mood and Affect: Mood normal.         Administrative Statements   Encounter provider Kylie Jimenez, DO    The Patient is located at Home and in the following state in which I hold an active license [place].    The patient was identified by name and date of birth. Anne Hernandez was informed that this is a telemedicine visit and that the visit is being conducted through the Epic Embedded platform. She agrees to proceed..  My office door " was closed. No one else was in the room.  She acknowledged consent and understanding of privacy and security of the video platform. The patient has agreed to participate and understands they can discontinue the visit at any time.    I have spent a total time of 15 minutes in caring for this patient on the day of the visit/encounter including Risks and benefits of tx options, Counseling / Coordination of care, Documenting in the medical record, and Obtaining or reviewing history  , not including the time spent for establishing the audio/video connection.

## 2025-03-25 NOTE — TELEPHONE ENCOUNTER
Pt received notice from [employer] that she needs a renewal for her O2 -- she was unable to provide pulse ox over video visit, but we are setting up home health to go in for PT/OT and to monitor her O2. Can we please let them know that we are working on getting updated oxygen readings for them, but she is homebound, so it is a bit more difficult

## 2025-03-27 NOTE — TELEPHONE ENCOUNTER
Can we please confirm they are referring to her Bipap? I believe she uses supplemental O2 during the day and Bipap at night

## 2025-03-27 NOTE — TELEPHONE ENCOUNTER
Jake, from [email or web address], called in requesting to please fax them the pt's OVS from appt: 03/25/25    Jake states they needs the OVS notes & reason as to why pt would benefit from continuous use & authorization of a ventilator.    Jake states they sent over a request on 03/25. Reviewed chart, did not see anything in Media.     FAX#: [phone]

## 2025-03-27 NOTE — TELEPHONE ENCOUNTER
3/27 I spoke with [facility] again, they confirmed that this re-certification is for her home ventilator, not her Bi-pap or O2. I again explained that we will forward the information once we have an O2 reading from her home nurse.

## 2025-04-18 DIAGNOSIS — I48.0 PAROXYSMAL ATRIAL FIBRILLATION (HCC): ICD-10-CM

## 2025-04-18 RX ORDER — CARVEDILOL 6.25 MG/1
6.25 TABLET ORAL 2 TIMES DAILY WITH MEALS
Qty: 180 TABLET | Refills: 1 | Status: SHIPPED | OUTPATIENT
Start: 2025-04-18

## 2025-05-12 DIAGNOSIS — R25.2 SPASM: ICD-10-CM

## 2025-05-12 RX ORDER — METHOCARBAMOL 500 MG/1
500 TABLET, FILM COATED ORAL EVERY 6 HOURS PRN
Qty: 90 TABLET | Refills: 1 | Status: SHIPPED | OUTPATIENT
Start: 2025-05-12

## 2025-05-12 RX ORDER — METHOCARBAMOL 500 MG/1
500 TABLET, FILM COATED ORAL EVERY 6 HOURS PRN
Qty: 90 TABLET | Refills: 1 | Status: SHIPPED | OUTPATIENT
Start: 2025-05-12 | End: 2025-05-12 | Stop reason: SDUPTHER

## 2025-05-14 ENCOUNTER — VBI (OUTPATIENT)
Dept: ADMINISTRATIVE | Facility: OTHER | Age: 70
End: 2025-05-14

## 2025-05-14 NOTE — TELEPHONE ENCOUNTER
05/14/25 2:13 PM   05/14/25 2:13 PM    The patient was called and a message was left for patient to return a call to the [facility] Department at [facility]: Phone [phone] .    Thank you.  Lucy Gongora MA  [facility]

## 2025-06-28 DIAGNOSIS — R25.2 SPASM: ICD-10-CM

## 2025-06-30 RX ORDER — METHOCARBAMOL 500 MG/1
500 TABLET, FILM COATED ORAL EVERY 6 HOURS PRN
Qty: 90 TABLET | Refills: 1 | Status: SHIPPED | OUTPATIENT
Start: 2025-06-30

## 2025-07-16 ENCOUNTER — TELEPHONE (OUTPATIENT)
Dept: FAMILY MEDICINE CLINIC | Facility: CLINIC | Age: 70
End: 2025-07-16

## 2025-07-16 NOTE — LETTER
July 16, 2025     Patient: Anne Hernandez  YOB: 1955        To Whom it May Concern:    Anne Hernandez is under my professional care. Ms. Hernandez carries a diagnosis of Chronic Respiratory Failure with Hypoxia and Hypercapnia requiring continued use of CPAP and supplemental oxygen.     If you have any questions or concerns, please don't hesitate to call.         Sincerely,          Kylie Jimenez, DO         CC: No Recipients

## 2025-07-16 NOTE — TELEPHONE ENCOUNTER
Pt called and asked for a letter for [facility] that she still have to use an oxygen and CPAP.

## 2025-07-25 ENCOUNTER — TELEPHONE (OUTPATIENT)
Age: 70
End: 2025-07-25